# Patient Record
Sex: FEMALE | Race: WHITE | NOT HISPANIC OR LATINO | Employment: OTHER | URBAN - METROPOLITAN AREA
[De-identification: names, ages, dates, MRNs, and addresses within clinical notes are randomized per-mention and may not be internally consistent; named-entity substitution may affect disease eponyms.]

---

## 2024-02-14 NOTE — H&P (VIEW-ONLY)
Cardiology Follow Up    Tevin Bazan  1945  [id number]  [facility] CARDIAC CATH LAB  [address] [address]  [phone]    1. Stroke due to embolism (HCC)        2. Essential hypertension        3. Nonrheumatic aortic valve stenosis        4. Nonrheumatic mitral valve stenosis        5. Dyspnea on exertion        6. Hypercholesterolemia        7. Chest heaviness        8. Unstable angina pectoris (HCC)        9. Abnormal computed tomography angiography (CTA)        10. Coronary artery disease involving native coronary artery of native heart with unstable angina pectoris (HCC)            Interval History: Cardiology follow-up.  Patient was hospitalized earlier this month with chest discomfort.  History had a severe episode, midsternal without radiation.  He tells me over the last several months, there is a crescendo pattern, currently class II-III.  Interestingly he tends to improve after he uses an inhaler.  He has had no prolonged episodes.  His EKG and troponins were unremarkable.  He went on  to have a CT of the coronaries, personally reviewed.  He does have severe coronary calcium score over 4000.  Heavy calcification in the LAD and RCA systems made him unreadable in terms of quantification of his stenosis.  There was mild disease in the circumflex system.  The patient states being compliant with low-cholesterol diet, patient not willing to take statins lipids last checked total cholesterol 137, HDL 53, LDL 72.  He states been compliant with low-sodium diet, blood pressures been well-controlled.    Patient Active Problem List   Diagnosis    Transgender    Stroke due to embolism (HCC)    Primary osteoarthritis of left knee    Effusion of left knee    Cellulitis of right knee    Lumbar spondylosis    Chronic bilateral low back pain without sciatica    Lumbar radiculopathy    Spinal stenosis of lumbar region with  neurogenic claudication    Chronic pain of left knee    Thoracogenic scoliosis of thoracolumbar region    Chronic pain syndrome    Essential hypertension    Symptomatic carotid artery stenosis, left    H/O carotid endarterectomy    History of stroke    Nonrheumatic aortic valve stenosis    Nonrheumatic mitral valve stenosis    Chest heaviness    Abnormal stress test    Dyspnea on exertion    Hypercholesterolemia    Chest pain    Stage 2 chronic kidney disease    COPD (chronic obstructive pulmonary disease) (HCC)    Abnormal computed tomography angiography (CTA)    Coronary artery disease involving native coronary artery of native heart with unstable angina pectoris (HCC)     Past Medical History:   Diagnosis Date    Chronic bilateral low back pain without sciatica 2020    Hypertension     Scoliosis     Stroke (HCC)     Stroke-like symptoms 2021     Social History     Socioeconomic History    Marital status: /Civil Union     Spouse name: Not on file    Number of children: Not on file    Years of education: Not on file    Highest education level: Not on file   Occupational History    Not on file   Tobacco Use    Smoking status: Former     Current packs/day: 0.00     Types: Cigarettes     Quit date:      Years since quittin.1    Smokeless tobacco: Never   Vaping Use    Vaping status: Never Used   Substance and Sexual Activity    Alcohol use: Not Currently    Drug use: No    Sexual activity: Not on file   Other Topics Concern    Not on file   Social History Narrative    Not on file     Social Determinants of Health     Financial Resource Strain: Low Risk  (2022)    Overall Financial Resource Strain (CARDIA)     Difficulty of Paying Living Expenses: Not hard at all   Food Insecurity: No Food Insecurity (2024)    Hunger Vital Sign     Worried About Running Out of Food in the Last Year: Never true     Ran Out of Food in the Last Year: Never true   Transportation Needs: No Transportation  Needs (1/30/2024)    PRAPARE - Transportation     Lack of Transportation (Medical): No     Lack of Transportation (Non-Medical): No   Physical Activity: Sufficiently Active (5/20/2022)    Exercise Vital Sign     Days of Exercise per Week: 7 days     Minutes of Exercise per Session: 30 min   Stress: No Stress Concern Present (5/20/2022)    [place] [employer] - Occupational Stress Questionnaire     Feeling of Stress : Not at all   Social Connections: Socially Isolated (5/20/2022)    Social Connection and Isolation Panel [NHANES]     Frequency of Communication with Friends and Family: Never     Frequency of Social Gatherings with Friends and Family: Never     Attends Gnosticist Services: Never     Active Member of Clubs or Organizations: No     Attends Club or Organization Meetings: Never     Marital Status:    Intimate Partner Violence: Not At Risk (5/20/2022)    Humiliation, Afraid, Rape, and Kick questionnaire     Fear of Current or Ex-Partner: No     Emotionally Abused: No     Physically Abused: No     Sexually Abused: No   Housing Stability: Low Risk  (1/30/2024)    Housing Stability Vital Sign     Unable to Pay for Housing in the Last Year: No     Number of Places Lived in the Last Year: 1     Unstable Housing in the Last Year: No      Family History   Adopted: Yes   Family history unknown: Yes     Past Surgical History:   Procedure Laterality Date    BACK SURGERY      BOWEL RESECTION      MA TEAEC W/PATCH GRF CAROTID VERTB SUBCLAV NECK INC Left 12/1/2021    Procedure: ENDARTERECTOMY ARTERY CAROTID;  Surgeon: Vito Mo MD;  Location: [facility] OR;  Service: Vascular    TONSILLECTOMY         Current Outpatient Medications:     acetaminophen (TYLENOL) 500 mg tablet, Take 1,000 mg by mouth every 6 (six) hours as needed for mild pain, Disp: , Rfl:     albuterol (Proventil HFA) 90 mcg/act inhaler, Inhale 1-2 puffs every 6 (six) hours as needed for wheezing, Disp: 18 g, Rfl: 0    aspirin  (ECOTRIN LOW STRENGTH) 81 mg EC tablet, Take 1 tablet (81 mg total) by mouth daily for 21 days, Disp: 21 tablet, Rfl: 0    Chelated Zinc 50 MG TABS, every 24 hours, Disp: , Rfl:     clopidogrel (PLAVIX) 75 mg tablet, Take 1 tablet (75 mg total) by mouth daily, Disp: 30 tablet, Rfl: 0    diltiazem (TIAZAC) 120 MG 24 hr capsule, Take 120 mg by mouth daily Pt reports taking 80 mg daily, Disp: , Rfl:     famotidine (PEPCID) 40 MG tablet, , Disp: , Rfl:   Allergies   Allergen Reactions    Black Cohosh Rash    Minoxidil Rash       Labs:  Admission on 01/29/2024, Discharged on 01/30/2024   Component Date Value    Ventricular Rate 01/29/2024 85     Atrial Rate 01/29/2024 85     [place] Interval 01/29/2024 146     QRSD Interval 01/29/2024 86     QT Interval 01/29/2024 346     QTC Interval 01/29/2024 411     P Axis 01/29/2024 130     QRS [place] 01/29/2024 189     T Wave [place] 01/29/2024 93     Ventricular Rate 01/29/2024 79     Atrial Rate 01/29/2024 79     [place] Interval 01/29/2024 144     QRSD Interval 01/29/2024 76     QT Interval 01/29/2024 366     QTC Interval 01/29/2024 419     P Axis 01/29/2024 53     QRS Axis 01/29/2024 57     T Wave [place] 01/29/2024 80     WBC 01/29/2024 8.36     RBC 01/29/2024 4.32     Hemoglobin 01/29/2024 13.3     Hematocrit 01/29/2024 40.7     MCV 01/29/2024 94     MCH 01/29/2024 30.8     MCHC 01/29/2024 32.7     RDW 01/29/2024 13.3     MPV 01/29/2024 10.1     Platelets 01/29/2024 198     nRBC 01/29/2024 0     Neutrophils Relative 01/29/2024 52     Immat GRANS % 01/29/2024 1     Lymphocytes Relative 01/29/2024 26     Monocytes Relative 01/29/2024 13 (H)     Eosinophils Relative 01/29/2024 7 (H)     Basophils Relative 01/29/2024 1     Neutrophils Absolute 01/29/2024 4.46     Immature Grans Absolute 01/29/2024 0.04     Lymphocytes Absolute 01/29/2024 2.14     Monocytes Absolute 01/29/2024 1.08     Eosinophils Absolute 01/29/2024 0.56     Basophils Absolute 01/29/2024 0.08     Sodium 01/29/2024 139      Potassium 01/29/2024 3.8     Chloride 01/29/2024 104     CO2 01/29/2024 27     ANION GAP 01/29/2024 8     BUN 01/29/2024 35 (H)     Creatinine 01/29/2024 1.11     Glucose 01/29/2024 149 (H)     Calcium 01/29/2024 9.2     AST 01/29/2024 17     ALT 01/29/2024 18     Alkaline Phosphatase 01/29/2024 110 (H)     Total Protein 01/29/2024 7.1     Albumin 01/29/2024 3.6     Total Bilirubin 01/29/2024 0.40     eGFR 01/29/2024 62     hs TnI 0hr 01/29/2024 11     hs TnI 2hr 01/29/2024 15     Delta 2hr hsTnI 01/29/2024 4     hs TnI 4hr 01/29/2024 18     Delta 4hr hsTnI 01/29/2024 7     Ventricular Rate 01/29/2024 85     Atrial Rate 01/29/2024 85     [place] Interval 01/29/2024 148     QRSD Interval 01/29/2024 72     QT Interval 01/29/2024 366     QTC Interval 01/29/2024 435     P Axis 01/29/2024 47     QRS Axis 01/29/2024 52     T Wave [place] 01/29/2024 63     Ventricular Rate 01/30/2024 83     Atrial Rate 01/30/2024 83     [place] Interval 01/30/2024 150     QRSD Interval 01/30/2024 80     QT Interval 01/30/2024 370     QTC Interval 01/30/2024 434     P Axis 01/30/2024 51     QRS Axis 01/30/2024 28     T Wave [place] 01/30/2024 23     Sodium 01/30/2024 136     Potassium 01/30/2024 3.9     Chloride 01/30/2024 105     CO2 01/30/2024 26     ANION GAP 01/30/2024 5     BUN 01/30/2024 26 (H)     Creatinine 01/30/2024 0.91     Glucose 01/30/2024 114     Glucose, Fasting 01/30/2024 114 (H)     Calcium 01/30/2024 8.2 (L)     eGFR 01/30/2024 79     WBC 01/30/2024 8.07     RBC 01/30/2024 4.28     Hemoglobin 01/30/2024 13.4     Hematocrit 01/30/2024 40.6     MCV 01/30/2024 95     MCH 01/30/2024 31.3     MCHC 01/30/2024 33.0     RDW 01/30/2024 13.2     Platelets 01/30/2024 195     MPV 01/30/2024 9.8    Admission on 08/21/2023, Discharged on 08/21/2023   Component Date Value    WBC 08/21/2023 6.38     RBC 08/21/2023 4.81     Hemoglobin 08/21/2023 14.9     Hematocrit 08/21/2023 45.8     MCV 08/21/2023 95     MCH 08/21/2023 31.0     MCHC 08/21/2023 32.5      RDW 08/21/2023 12.8     MPV 08/21/2023 9.5     Platelets 08/21/2023 200     nRBC 08/21/2023 0     Neutrophils Relative 08/21/2023 58     Immat GRANS % 08/21/2023 0     Lymphocytes Relative 08/21/2023 24     Monocytes Relative 08/21/2023 14 (H)     Eosinophils Relative 08/21/2023 3     Basophils Relative 08/21/2023 1     Neutrophils Absolute 08/21/2023 3.75     Immature Grans Absolute 08/21/2023 0.02     Lymphocytes Absolute 08/21/2023 1.51     Monocytes Absolute 08/21/2023 0.87     Eosinophils Absolute 08/21/2023 0.18     Basophils Absolute 08/21/2023 0.05     Sodium 08/21/2023 137     Potassium 08/21/2023 4.1     Chloride 08/21/2023 103     CO2 08/21/2023 30     ANION GAP 08/21/2023 4     BUN 08/21/2023 28 (H)     Creatinine 08/21/2023 0.95     Glucose 08/21/2023 105     Calcium 08/21/2023 9.4     AST 08/21/2023 17     ALT 08/21/2023 18     Alkaline Phosphatase 08/21/2023 85     Total Protein 08/21/2023 7.2     Albumin 08/21/2023 4.0     Total Bilirubin 08/21/2023 0.65     eGFR 08/21/2023 75     hs TnI 0hr 08/21/2023 8     Ventricular Rate 08/21/2023 80     Atrial Rate 08/21/2023 80     [place] Interval 08/21/2023 140     QRSD Interval 08/21/2023 70     QT Interval 08/21/2023 350     QTC Interval 08/21/2023 403     P Axis 08/21/2023 46     QRS Axis 08/21/2023 33     T Wave [place] 08/21/2023 1     hs TnI 2hr 08/21/2023 9     Delta 2hr hsTnI 08/21/2023 1     Ventricular Rate 08/21/2023 65     Atrial Rate 08/21/2023 65     [place] Interval 08/21/2023 144     QRSD Interval 08/21/2023 70     QT Interval 08/21/2023 392     QTC Interval 08/21/2023 407     P Axis 08/21/2023 46     QRS Axis 08/21/2023 49     T Wave [place] 08/21/2023 44      Imaging: CTA cardiac    Result Date: 1/30/2024  Narrative: CORONARY CT ANGIOGRAM AND CT CALCIUM SCORE - WITHOUT AND WITH IV CONTRAST INDICATION: Chest pain, nonspecific Chest pain. Patient Age: 78 years Patient Gender: Male. COMPARISON: None. TECHNIQUE: Noncontrast CT examination of the heart  examination was performed according to a protocol designed to obtain coronary calcium score. Thin section postcontrast images of the heart were obtained according to gated coronary CT angiographic protocol.  2D and 3D image reconstruction was performed on an independent workstation in order to perform coronary vessel analysis. Premedication was administered according to institutional protocol, as detailed in the electronic health record. Prospective ECG triggering was used. This examination, like all CT scans performed in the [facility], was performed utilizing techniques to minimize radiation dose exposure, including the use of iterative reconstruction and automated exposure control. Radiation dose length product (DLP) for this visit: 183.68 mGy-cm . IV CONTRAST: 80 mL of iohexol (OMNIPAQUE) IMAGE QUALITY: Excellent image quality with no significant artifact present. FINDINGS: CORONARY CALCIUM SCORE: 4306 Calcium score PERCENTILE of age, race, and gender matched database participants in the Multi-Ethnic Study of Atherosclerosis (RUSSO) trial:  94 CORONARY ARTERY ANATOMY: Coronary arteries arise in normal position. There is right coronary artery dominance. There is typical bifurcation of the left main coronary artery into left anterior descending and left circumflex coronary arteries. LEFT MAIN: No atherosclerotic plaque or vascular stenosis. LAD AND DIAGONAL BRANCHES: Heavy calcified plaque burden in the proximal to mid LAD renders this examination nondiagnostic for confident exclusion of flow-limiting stenosis in some segments of this vascular distribution. LCX: Mild atherosclerotic plaque without significant stenosis (less than 50% narrowing). RCA: Heavy calcified plaque burden in the mid to distal RCA renders this examination nondiagnostic for confident exclusion of flow-limiting stenosis in some segments of this vascular distribution. Posterior descending artery is well opacified and patent.  CARDIAC STRUCTURES: Valves: Dense mitral annulus calcification. Mild calcification of the aortic valve leaflets. Pericardium: Normal pericardial contour without pericardial effusion, thickening, or calcifications. Myocardium: No abnormal myocardial thinning, myocardial thickening, or myocardial calcification. GREAT VESSELS: Visualized thoracic aorta and central pulmonary arterial tree are within normal limits for the patient's age. EXTRACARDIAC FINDINGS: Moderate sized hiatal hernia. Mild thickening of the left adrenal gland compatible with adenomatous hyperplasia. Marked thoracic dextroscoliosis noted. No other significant findings identified on limited small field of view low radiation dose noncontrast images of the chest at the level of the heart.     Impression: Severe calcified plaque burden in the coronary arteries, rendering examination nondiagnostic for confident exclusion of flow-limiting stenosis in the LAD and RCA. Dense mitral annulus calcification. CAD-RADS N - Nondiagnostic study. Obstructive coronary artery disease cannot be excluded. Management recommendations: - Additional or alternative evaluation may be needed. Total coronary calcium score equals 4306.  For more useful information regarding the prognostic significance of the calcium score, please consult the calculator at the website http://[email or web address]-[facility].org/CACReference.aspx. Workstation performed: [id number]     XR chest 2 views    Result Date: 1/29/2024  Narrative: CHEST INDICATION:   Chest discomfort, cough, rales right base. COMPARISON: CXR 8/21/2023 and 12/25/2022, CTA neck 11/22/2021. EXAM PERFORMED/VIEWS:  XR CHEST PA & LATERAL. DUAL ENERGY SUBTRACTION. FINDINGS: Cardiomediastinal silhouette normal. Lungs clear. No effusion or pneumothorax. Upper abdomen normal. Partially imaged linear metallic foreign body projecting over the right upper quadrant. Severe scoliosis.     Impression: No acute cardiopulmonary disease. Workstation performed:  [id number]       Review of Systems:  Review of Systems   Constitutional:  Positive for activity change.   HENT:  Negative for hearing loss and nosebleeds.    Eyes:  Negative for visual disturbance.   Respiratory:  Negative for apnea, shortness of breath, wheezing and stridor.    Cardiovascular:  Positive for chest pain. Negative for palpitations and leg swelling.   Gastrointestinal:  Negative for abdominal pain, anal bleeding and blood in stool.   Endocrine: Negative for cold intolerance.   Genitourinary:  Negative for hematuria.   Musculoskeletal:  Negative for arthralgias, gait problem and myalgias.   Skin:  Negative for pallor and rash.   Allergic/Immunologic: Negative for immunocompromised state.   Neurological:  Negative for syncope and weakness.   Hematological:  Does not bruise/bleed easily.   Psychiatric/Behavioral:  Negative for sleep disturbance. The patient is not nervous/anxious.        Physical Exam:  Physical Exam  Vitals reviewed.   Constitutional:       General: He is not in acute distress.     Appearance: Normal appearance. He is normal weight. He is not ill-appearing, toxic-appearing or diaphoretic.   Eyes:      General: No scleral icterus.  Neck:      Vascular: No carotid bruit.   Cardiovascular:      Rate and Rhythm: Normal rate and regular rhythm.      Pulses: Normal pulses.      Heart sounds: Normal heart sounds. No murmur heard.     No friction rub. No gallop.   Pulmonary:      Effort: Pulmonary effort is normal. No respiratory distress.      Breath sounds: Normal breath sounds. No stridor. No wheezing, rhonchi or rales.      Comments: Significant kyphoscoliosis  Chest:      Chest wall: No tenderness.   Musculoskeletal:      Right lower leg: No edema.      Left lower leg: No edema.   Skin:     General: Skin is warm and dry.      Capillary Refill: Capillary refill takes less than 2 seconds.      Coloration: Skin is not jaundiced or pale.      Findings: No bruising or erythema.   Neurological:       Mental Status: He is alert and oriented to person, place, and time.   Psychiatric:         Mood and Affect: Mood normal.         Discussion/Summary:Chest pain syndrome.  Previously improved with inhalers.  He does have several risk factors.  Stress test 2023, he did almost 10 minutes on a modified protocol achieving target rate, he did experience chest discomfort, but there were no EKG criteria for ischemia.  Echocardiogram 2022 revealed normal left ventricular systolic function.  Normal diastolic parameters, there was very mild AV stenosis, mean gradient of 4 mmHg, and very mild mitral stenosis mean gradient of 2 mmHg.  There was mild to moderate tricuspid efficiency with estimated normal pulmonary artery pressures suggested by Doppler criteria.  Recent CT of the coronary suggested coronary disease of uncertain severity.  Favor catheterization.  Continue current medications.  Valvular heart disease, mild severity, in the setting of chronic dyspnea with restrictive lung disease from significant scoliosis.  48-hour Holter monitor 2023 revealed normal sinus rhythm and no significant arrhythmias. , I did explain to him that he would be best for him to be on lipid-lowering therapy given he is peripheral vascular disease, he explained to me that he is antistatin therapy patient.  I explained the patient that his medications are safe and very effective in reducing clinical events including myocardial infarction, further CVAs and need for revascularization  Vascular disease.  Status post left carotid endarterectomy 2021 in the setting of CVA of the central semiovale on the left.  Patent on duplex in 2023, less than 50% on the right..        This note was completed in part utilizing [employer] direct voice recognition software.   Grammatical errors, random word insertion, spelling mistakes, and incomplete sentences may be an occasional consequence of the system secondary to software limitations, ambient noise and  hardware issues. At the time of dictation, efforts were made to edit, clarify and /or correct errors.  Please read the chart carefully and recognize, using context, where substitutions have occurred.  If you have any questions or concerns about the context, text or information contained within the body of this dictation, please contact myself, the provider, for further clarification.

## 2024-02-26 ENCOUNTER — HOSPITAL ENCOUNTER (OUTPATIENT)
Facility: HOSPITAL | Age: 79
Setting detail: OUTPATIENT SURGERY
Discharge: HOME | End: 2024-02-26
Attending: INTERNAL MEDICINE | Admitting: INTERNAL MEDICINE
Payer: COMMERCIAL

## 2024-02-26 VITALS
WEIGHT: 158.51 LBS | HEART RATE: 70 BPM | TEMPERATURE: 98.2 F | BODY MASS INDEX: 24.88 KG/M2 | SYSTOLIC BLOOD PRESSURE: 119 MMHG | HEIGHT: 67 IN | OXYGEN SATURATION: 94 % | DIASTOLIC BLOOD PRESSURE: 72 MMHG | RESPIRATION RATE: 16 BRPM

## 2024-02-26 DIAGNOSIS — I20.0 UNSTABLE ANGINA (HCC): ICD-10-CM

## 2024-02-26 DIAGNOSIS — I25.110 CORONARY ARTERY DISEASE INVOLVING NATIVE CORONARY ARTERY OF NATIVE HEART WITH UNSTABLE ANGINA PECTORIS (HCC): ICD-10-CM

## 2024-02-26 DIAGNOSIS — I25.10 CORONARY ARTERY DISEASE INVOLVING NATIVE CORONARY ARTERY: Primary | ICD-10-CM

## 2024-02-26 DIAGNOSIS — I20.0 UNSTABLE ANGINA PECTORIS (HCC): ICD-10-CM

## 2024-02-26 DIAGNOSIS — Z98.890 S/P CARDIAC CATH: ICD-10-CM

## 2024-02-26 LAB
ATRIAL RATE: 69 BPM
KCT BLD-ACNC: 258 SEC (ref 89–137)
P AXIS: 56 DEGREES
PR INTERVAL: 142 MS
QRS AXIS: 37 DEGREES
QRSD INTERVAL: 72 MS
QT INTERVAL: 384 MS
QTC INTERVAL: 411 MS
SPECIMEN SOURCE: ABNORMAL
T WAVE AXIS: 60 DEGREES
VENTRICULAR RATE: 69 BPM

## 2024-02-26 PROCEDURE — 99152 MOD SED SAME PHYS/QHP 5/>YRS: CPT | Performed by: INTERNAL MEDICINE

## 2024-02-26 PROCEDURE — C1769 GUIDE WIRE: HCPCS | Performed by: INTERNAL MEDICINE

## 2024-02-26 PROCEDURE — 85347 COAGULATION TIME ACTIVATED: CPT

## 2024-02-26 PROCEDURE — C1887 CATHETER, GUIDING: HCPCS | Performed by: INTERNAL MEDICINE

## 2024-02-26 PROCEDURE — 99153 MOD SED SAME PHYS/QHP EA: CPT | Performed by: INTERNAL MEDICINE

## 2024-02-26 PROCEDURE — 93454 CORONARY ARTERY ANGIO S&I: CPT | Performed by: INTERNAL MEDICINE

## 2024-02-26 PROCEDURE — C1894 INTRO/SHEATH, NON-LASER: HCPCS | Performed by: INTERNAL MEDICINE

## 2024-02-26 PROCEDURE — 93005 ELECTROCARDIOGRAM TRACING: CPT

## 2024-02-26 PROCEDURE — 93010 ELECTROCARDIOGRAM REPORT: CPT | Performed by: INTERNAL MEDICINE

## 2024-02-26 RX ORDER — SODIUM CHLORIDE 9 MG/ML
125 INJECTION, SOLUTION INTRAVENOUS CONTINUOUS
Status: DISCONTINUED | OUTPATIENT
Start: 2024-02-26 | End: 2024-02-26

## 2024-02-26 RX ORDER — NITROGLYCERIN 0.4 MG/1
0.4 TABLET SUBLINGUAL
Qty: 45 TABLET | Refills: 0 | Status: SHIPPED | OUTPATIENT
Start: 2024-02-26

## 2024-02-26 RX ORDER — MIDAZOLAM HYDROCHLORIDE 2 MG/2ML
INJECTION, SOLUTION INTRAMUSCULAR; INTRAVENOUS CODE/TRAUMA/SEDATION MEDICATION
Status: DISCONTINUED | OUTPATIENT
Start: 2024-02-26 | End: 2024-02-26 | Stop reason: HOSPADM

## 2024-02-26 RX ORDER — ISOSORBIDE MONONITRATE 30 MG/1
30 TABLET, EXTENDED RELEASE ORAL DAILY
Status: DISCONTINUED | OUTPATIENT
Start: 2024-02-26 | End: 2024-02-26 | Stop reason: HOSPADM

## 2024-02-26 RX ORDER — ONDANSETRON 2 MG/ML
4 INJECTION INTRAMUSCULAR; INTRAVENOUS EVERY 6 HOURS PRN
Status: DISCONTINUED | OUTPATIENT
Start: 2024-02-26 | End: 2024-02-26 | Stop reason: HOSPADM

## 2024-02-26 RX ORDER — NITROGLYCERIN 20 MG/100ML
INJECTION INTRAVENOUS CODE/TRAUMA/SEDATION MEDICATION
Status: DISCONTINUED | OUTPATIENT
Start: 2024-02-26 | End: 2024-02-26 | Stop reason: HOSPADM

## 2024-02-26 RX ORDER — LIDOCAINE HYDROCHLORIDE 10 MG/ML
INJECTION, SOLUTION EPIDURAL; INFILTRATION; INTRACAUDAL; PERINEURAL CODE/TRAUMA/SEDATION MEDICATION
Status: DISCONTINUED | OUTPATIENT
Start: 2024-02-26 | End: 2024-02-26 | Stop reason: HOSPADM

## 2024-02-26 RX ORDER — VERAPAMIL HYDROCHLORIDE 2.5 MG/ML
INJECTION, SOLUTION INTRAVENOUS CODE/TRAUMA/SEDATION MEDICATION
Status: DISCONTINUED | OUTPATIENT
Start: 2024-02-26 | End: 2024-02-26 | Stop reason: HOSPADM

## 2024-02-26 RX ORDER — ISOSORBIDE MONONITRATE 30 MG/1
30 TABLET, EXTENDED RELEASE ORAL DAILY
Qty: 30 TABLET | Refills: 1 | Status: SHIPPED | OUTPATIENT
Start: 2024-02-26

## 2024-02-26 RX ORDER — HEPARIN SODIUM 1000 [USP'U]/ML
INJECTION, SOLUTION INTRAVENOUS; SUBCUTANEOUS CODE/TRAUMA/SEDATION MEDICATION
Status: DISCONTINUED | OUTPATIENT
Start: 2024-02-26 | End: 2024-02-26 | Stop reason: HOSPADM

## 2024-02-26 RX ORDER — ASPIRIN 81 MG/1
324 TABLET, CHEWABLE ORAL ONCE
Status: COMPLETED | OUTPATIENT
Start: 2024-02-26 | End: 2024-02-26

## 2024-02-26 RX ORDER — METOPROLOL SUCCINATE 25 MG/1
25 TABLET, EXTENDED RELEASE ORAL DAILY
Qty: 90 TABLET | Refills: 3 | Status: SHIPPED | OUTPATIENT
Start: 2024-02-26

## 2024-02-26 RX ORDER — NITROGLYCERIN 0.4 MG/1
0.4 TABLET SUBLINGUAL
Status: DISCONTINUED | OUTPATIENT
Start: 2024-02-26 | End: 2024-02-26 | Stop reason: HOSPADM

## 2024-02-26 RX ORDER — ACETAMINOPHEN 325 MG/1
650 TABLET ORAL EVERY 4 HOURS PRN
Status: DISCONTINUED | OUTPATIENT
Start: 2024-02-26 | End: 2024-02-26 | Stop reason: HOSPADM

## 2024-02-26 RX ORDER — SODIUM CHLORIDE 9 MG/ML
200 INJECTION, SOLUTION INTRAVENOUS CONTINUOUS
Status: DISPENSED | OUTPATIENT
Start: 2024-02-26 | End: 2024-02-26

## 2024-02-26 RX ORDER — FENTANYL CITRATE 50 UG/ML
INJECTION, SOLUTION INTRAMUSCULAR; INTRAVENOUS CODE/TRAUMA/SEDATION MEDICATION
Status: DISCONTINUED | OUTPATIENT
Start: 2024-02-26 | End: 2024-02-26 | Stop reason: HOSPADM

## 2024-02-26 RX ORDER — METOPROLOL SUCCINATE 25 MG/1
25 TABLET, EXTENDED RELEASE ORAL DAILY
Status: DISCONTINUED | OUTPATIENT
Start: 2024-02-26 | End: 2024-02-26 | Stop reason: HOSPADM

## 2024-02-26 RX ADMIN — ASPIRIN 81 MG CHEWABLE TABLET 324 MG: 81 TABLET CHEWABLE at 06:22

## 2024-02-26 RX ADMIN — SODIUM CHLORIDE 125 ML/HR: 0.9 INJECTION, SOLUTION INTRAVENOUS at 06:31

## 2024-02-26 RX ADMIN — ISOSORBIDE MONONITRATE 30 MG: 30 TABLET, EXTENDED RELEASE ORAL at 10:19

## 2024-02-26 RX ADMIN — METOPROLOL SUCCINATE 25 MG: 25 TABLET, EXTENDED RELEASE ORAL at 10:19

## 2024-02-26 NOTE — DISCHARGE INSTR - AVS FIRST PAGE
BLOOD TEST  Please have chemistry (BMP) blood test done in the next 2 days.  An electronic prescription has been sent to the [facility] lab for it. You do not need to fast for this test    -----------------------------------------------------------------------------------------------------------------------------------------------------------------------------  SITE CARE  1. Please see the post cardiac catheterization dishcarge instructions. No lifting greater than 10 lbs. or strenuous activity for 48 hrs.    2. Remove band aid tomorrow.  Shower and wash area (wrist) gently with soap and water- beginning tomorrow. Rinse and pat dry.  Apply new water seal band aid.  Repeat this process for 5 days. No powders, creams lotions or antibiotic ointments for 5 days.  No tub baths, hot tubs/sauna or swimming for 5 days.     3. Please call our office ([phone]) if you have any fever, redness, swelling, discharge from your wrist/access site.    4. No driving for 1 day       -----------------------------------------------------------------------------------------------------------------------------------------------------------------------------  PROCEDURE INFORMATION    LEFT HEART CATHETERIZATION    WHAT YOU NEED TO KNOW:   A left heart catheterization is a procedure to look at your heart and its arteries. You may need this procedure if you have chest pain, heart disease, or your heart is not working as it should.        DISCHARGE INSTRUCTIONS:   Follow up with your healthcare provider as directed:  Write down your questions so you remember to ask them during your visits.  Limit activity as directed:   Avoid unnecessary stair climbing for 48 hours, if a catheter was put in your groin.    Do not place pressure on your arm, hand, or wrist, if the catheter was placed in your wrist. Avoid pushing, pulling, or heavy lifting with that arm.    If you need to cough, support the area where the catheter was inserted with your  hand.    Ask your healthcare provider how long you need to limit movement and avoid certain activities.    You may feel like resting more after your procedure. Slowly start to do more each day. Rest when you feel it is needed.  Drink liquids as directed:  Liquids help flush the dye used for your procedure out of your body. Ask your healthcare provider how much liquid to drink each day, and which liquids to drink. Some foods, such as soup and fruit, also provide liquid.   Wound care:  Ask your healthcare provider about how to care for your incision wound. Ask when you can get into a tub, shower, or pool.   Contact your healthcare provider if:   You have a fever.     The skin around your wound is red, swollen, or has pus coming from it.     You have trouble breathing, or your skin is itchy, swollen, or has a rash.     You have questions or concerns about your condition or care.  Seek care immediately or call 911 if:   The area where the catheter was placed is swollen and filled with blood or is bleeding.     The leg or arm used for the procedure becomes numb or turns white or blue.    You feel lightheaded, short of breath, and have chest pain.     You cough up blood.     You have any of the following signs of a heart attack:      Squeezing, pressure, or pain in your chest that lasts longer than 5 minutes or returns    Discomfort or pain in your back, neck, jaw, stomach, or arm     Trouble breathing    Nausea or vomiting    Lightheadedness or a sudden cold sweat, especially with chest pain or trouble breathing    Your arm or leg feels warm, tender, and painful. It may look swollen and red.    You have any of the following signs of a stroke:     Part of your face droops or is numb    Weakness in an arm or leg    Confusion or difficulty speaking    Dizziness, a severe headache, or vision loss  © 2017 [employer] Information is for End User's use only and may not be sold, redistributed or otherwise used  for commercial purposes. All illustrations and images included in CareNotes® are the copyrighted property of [email or web address] or [employer].  The above information is an  only. It is not intended as medical advice for individual conditions or treatments. Talk to your doctor, nurse or pharmacist before following any medical regimen to see if it is safe and effective for you.                1. Please see the post cardiac catheterization dishcarge instructions.   No heavy lifting, greater than 10 lbs. or strenuous  activity for 48 hrs.    2.Remove band aid tomorrow.  Shower and wash area- wrist gently with soap and water- beginning tomorrow. Rinse and pat dry.  Apply new water seal band aid.  Repeat this process for 5 days. No powders, creams lotions or antibiotic ointments  for 5 days.  No tub baths, hot tubs or swimming for 5 days.     3. Please call our office ([phone]) if you have any fever, redness, swelling, discharge from your wrist access site.    4.No driving for 1 day

## 2024-02-26 NOTE — Clinical Note
RCA Quality 47: Advance Care Plan: Advance Care Planning discussed and documented; advance care plan or surrogate decision maker documented in the medical record. Quality 431: Preventive Care And Screening: Unhealthy Alcohol Use - Screening: Patient screened for unhealthy alcohol use using a single question and scores less than 2 times per year Quality 402: Tobacco Use And Help With Quitting Among Adolescents: Patient screened for tobacco and never smoked Quality 110: Preventive Care And Screening: Influenza Immunization: Influenza Immunization Administered during Influenza season Detail Level: Detailed Quality 130: Documentation Of Current Medications In The Medical Record: Current Medications Documented Quality 111:Pneumonia Vaccination Status For Older Adults: Pneumococcal Vaccination Previously Received

## 2024-02-26 NOTE — Clinical Note
Post procedure Danielle is sleepy easily awakened with tactile and verbal stimulation then is responsive and appropriate.  Without complaints.  Dozing when not disturbed.  VALERIANO, NANCY, readied for transfer

## 2024-02-26 NOTE — INTERVAL H&P NOTE
"/70   Pulse 69   Temp 98.2 °F (36.8 °C) (Temporal)   Resp 16   Ht 5' 7\" (1.702 m)   Wt 71.9 kg (158 lb 8.2 oz)   SpO2 94%   BMI 24.83 kg/m²     H&P reviewed. After examining the patient, I find no changed to the H&P since it had been written.    Patient re-evaluated. Accept as history and physical.    Rivas Cantor MD/February 26, 2024/7:24 AM  "

## 2024-02-26 NOTE — Clinical Note
Received to [facility] procedure room and spoke with MD Devaughn, informed consent obtained, questions answered.  Without current complaints

## 2024-02-27 ENCOUNTER — APPOINTMENT (OUTPATIENT)
Dept: LAB | Facility: HOSPITAL | Age: 79
DRG: 091 | End: 2024-02-27
Payer: COMMERCIAL

## 2024-02-27 DIAGNOSIS — Z98.890 S/P CARDIAC CATH: ICD-10-CM

## 2024-02-27 LAB
ANION GAP SERPL CALCULATED.3IONS-SCNC: 8 MMOL/L
BUN SERPL-MCNC: 31 MG/DL (ref 5–25)
CALCIUM SERPL-MCNC: 10.2 MG/DL (ref 8.4–10.2)
CHLORIDE SERPL-SCNC: 100 MMOL/L (ref 96–108)
CO2 SERPL-SCNC: 30 MMOL/L (ref 21–32)
CREAT SERPL-MCNC: 0.99 MG/DL (ref 0.6–1.3)
GFR SERPL CREATININE-BSD FRML MDRD: 72 ML/MIN/1.73SQ M
GLUCOSE SERPL-MCNC: 103 MG/DL (ref 65–140)
POTASSIUM SERPL-SCNC: 4.2 MMOL/L (ref 3.5–5.3)
SODIUM SERPL-SCNC: 138 MMOL/L (ref 135–147)

## 2024-02-27 PROCEDURE — 80048 BASIC METABOLIC PNL TOTAL CA: CPT

## 2024-02-27 PROCEDURE — 36415 COLL VENOUS BLD VENIPUNCTURE: CPT

## 2024-02-28 ENCOUNTER — APPOINTMENT (EMERGENCY)
Dept: RADIOLOGY | Facility: HOSPITAL | Age: 79
DRG: 091 | End: 2024-02-28
Payer: COMMERCIAL

## 2024-02-28 ENCOUNTER — HOSPITAL ENCOUNTER (INPATIENT)
Facility: HOSPITAL | Age: 79
LOS: 1 days | Discharge: HOME | DRG: 091 | End: 2024-03-01
Attending: EMERGENCY MEDICINE | Admitting: INTERNAL MEDICINE
Payer: COMMERCIAL

## 2024-02-28 ENCOUNTER — APPOINTMENT (EMERGENCY)
Dept: CT IMAGING | Facility: HOSPITAL | Age: 79
DRG: 091 | End: 2024-02-28
Payer: COMMERCIAL

## 2024-02-28 DIAGNOSIS — I25.110 CORONARY ARTERY DISEASE INVOLVING NATIVE CORONARY ARTERY OF NATIVE HEART WITH UNSTABLE ANGINA PECTORIS (HCC): ICD-10-CM

## 2024-02-28 DIAGNOSIS — I63.9 CVA (CEREBRAL VASCULAR ACCIDENT) (HCC): ICD-10-CM

## 2024-02-28 DIAGNOSIS — R79.89 ELEVATED TROPONIN: ICD-10-CM

## 2024-02-28 DIAGNOSIS — E78.00 HYPERCHOLESTEROLEMIA: ICD-10-CM

## 2024-02-28 DIAGNOSIS — R47.89 WORD FINDING DIFFICULTY: Primary | ICD-10-CM

## 2024-02-28 PROBLEM — G45.9 TIA (TRANSIENT ISCHEMIC ATTACK): Status: ACTIVE | Noted: 2024-02-28

## 2024-02-28 LAB
2HR DELTA HS TROPONIN: 20 NG/L
4HR DELTA HS TROPONIN: 17 NG/L
ALBUMIN SERPL BCP-MCNC: 3.6 G/DL (ref 3.5–5)
ALP SERPL-CCNC: 93 U/L (ref 34–104)
ALT SERPL W P-5'-P-CCNC: 13 U/L (ref 7–52)
ANION GAP SERPL CALCULATED.3IONS-SCNC: 8 MMOL/L
APTT PPP: 31 SECONDS (ref 23–37)
AST SERPL W P-5'-P-CCNC: 19 U/L (ref 13–39)
BACTERIA UR QL AUTO: ABNORMAL /HPF
BASOPHILS # BLD AUTO: 0.08 THOUSANDS/ÂΜL (ref 0–0.1)
BASOPHILS NFR BLD AUTO: 1 % (ref 0–1)
BILIRUB SERPL-MCNC: 0.29 MG/DL (ref 0.2–1)
BILIRUB UR QL STRIP: NEGATIVE
BUN SERPL-MCNC: 34 MG/DL (ref 5–25)
CALCIUM SERPL-MCNC: 8.6 MG/DL (ref 8.4–10.2)
CARDIAC TROPONIN I PNL SERPL HS: 159 NG/L
CARDIAC TROPONIN I PNL SERPL HS: 176 NG/L
CARDIAC TROPONIN I PNL SERPL HS: 179 NG/L
CHLORIDE SERPL-SCNC: 106 MMOL/L (ref 96–108)
CLARITY UR: CLEAR
CO2 SERPL-SCNC: 22 MMOL/L (ref 21–32)
COLOR UR: YELLOW
CREAT SERPL-MCNC: 0.94 MG/DL (ref 0.6–1.3)
EOSINOPHIL # BLD AUTO: 0.6 THOUSAND/ÂΜL (ref 0–0.61)
EOSINOPHIL NFR BLD AUTO: 7 % (ref 0–6)
ERYTHROCYTE [DISTWIDTH] IN BLOOD BY AUTOMATED COUNT: 12.9 % (ref 11.6–15.1)
GFR SERPL CREATININE-BSD FRML MDRD: 76 ML/MIN/1.73SQ M
GLUCOSE SERPL-MCNC: 138 MG/DL (ref 65–140)
GLUCOSE UR STRIP-MCNC: NEGATIVE MG/DL
HCT VFR BLD AUTO: 39.8 % (ref 36.5–49.3)
HGB BLD-MCNC: 12.8 G/DL (ref 12–17)
HGB UR QL STRIP.AUTO: NEGATIVE
HYALINE CASTS #/AREA URNS LPF: ABNORMAL /LPF
IMM GRANULOCYTES # BLD AUTO: 0.03 THOUSAND/UL (ref 0–0.2)
IMM GRANULOCYTES NFR BLD AUTO: 0 % (ref 0–2)
INR PPP: 1.05 (ref 0.84–1.19)
KETONES UR STRIP-MCNC: NEGATIVE MG/DL
LEUKOCYTE ESTERASE UR QL STRIP: ABNORMAL
LYMPHOCYTES # BLD AUTO: 2.2 THOUSANDS/ÂΜL (ref 0.6–4.47)
LYMPHOCYTES NFR BLD AUTO: 25 % (ref 14–44)
MCH RBC QN AUTO: 30.8 PG (ref 26.8–34.3)
MCHC RBC AUTO-ENTMCNC: 32.2 G/DL (ref 31.4–37.4)
MCV RBC AUTO: 96 FL (ref 82–98)
MONOCYTES # BLD AUTO: 1.19 THOUSAND/ÂΜL (ref 0.17–1.22)
MONOCYTES NFR BLD AUTO: 13 % (ref 4–12)
MUCOUS THREADS UR QL AUTO: ABNORMAL
NEUTROPHILS # BLD AUTO: 4.76 THOUSANDS/ÂΜL (ref 1.85–7.62)
NEUTS SEG NFR BLD AUTO: 54 % (ref 43–75)
NITRITE UR QL STRIP: NEGATIVE
NON-SQ EPI CELLS URNS QL MICRO: ABNORMAL /HPF
NRBC BLD AUTO-RTO: 0 /100 WBCS
PH UR STRIP.AUTO: 6 [PH]
PLATELET # BLD AUTO: 187 THOUSANDS/UL (ref 149–390)
PMV BLD AUTO: 9.8 FL (ref 8.9–12.7)
POTASSIUM SERPL-SCNC: 4.4 MMOL/L (ref 3.5–5.3)
PROT SERPL-MCNC: 6.9 G/DL (ref 6.4–8.4)
PROT UR STRIP-MCNC: ABNORMAL MG/DL
PROTHROMBIN TIME: 14.1 SECONDS (ref 11.6–14.5)
RBC # BLD AUTO: 4.15 MILLION/UL (ref 3.88–5.62)
RBC #/AREA URNS AUTO: ABNORMAL /HPF
SODIUM SERPL-SCNC: 136 MMOL/L (ref 135–147)
SP GR UR STRIP.AUTO: 1.02 (ref 1–1.03)
UROBILINOGEN UR STRIP-ACNC: 2 MG/DL
WBC # BLD AUTO: 8.86 THOUSAND/UL (ref 4.31–10.16)
WBC #/AREA URNS AUTO: ABNORMAL /HPF

## 2024-02-28 PROCEDURE — 81001 URINALYSIS AUTO W/SCOPE: CPT | Performed by: EMERGENCY MEDICINE

## 2024-02-28 PROCEDURE — 70496 CT ANGIOGRAPHY HEAD: CPT

## 2024-02-28 PROCEDURE — 36415 COLL VENOUS BLD VENIPUNCTURE: CPT | Performed by: EMERGENCY MEDICINE

## 2024-02-28 PROCEDURE — 85610 PROTHROMBIN TIME: CPT | Performed by: EMERGENCY MEDICINE

## 2024-02-28 PROCEDURE — 85025 COMPLETE CBC W/AUTO DIFF WBC: CPT | Performed by: EMERGENCY MEDICINE

## 2024-02-28 PROCEDURE — 93005 ELECTROCARDIOGRAM TRACING: CPT

## 2024-02-28 PROCEDURE — 71045 X-RAY EXAM CHEST 1 VIEW: CPT

## 2024-02-28 PROCEDURE — 70498 CT ANGIOGRAPHY NECK: CPT

## 2024-02-28 PROCEDURE — 99285 EMERGENCY DEPT VISIT HI MDM: CPT | Performed by: EMERGENCY MEDICINE

## 2024-02-28 PROCEDURE — 80053 COMPREHEN METABOLIC PANEL: CPT | Performed by: EMERGENCY MEDICINE

## 2024-02-28 PROCEDURE — 99285 EMERGENCY DEPT VISIT HI MDM: CPT

## 2024-02-28 PROCEDURE — 99223 1ST HOSP IP/OBS HIGH 75: CPT | Performed by: INTERNAL MEDICINE

## 2024-02-28 PROCEDURE — 85730 THROMBOPLASTIN TIME PARTIAL: CPT | Performed by: EMERGENCY MEDICINE

## 2024-02-28 PROCEDURE — 84484 ASSAY OF TROPONIN QUANT: CPT | Performed by: EMERGENCY MEDICINE

## 2024-02-28 RX ORDER — ISOSORBIDE MONONITRATE 30 MG/1
30 TABLET, EXTENDED RELEASE ORAL DAILY
Status: DISCONTINUED | OUTPATIENT
Start: 2024-02-29 | End: 2024-03-01 | Stop reason: HOSPADM

## 2024-02-28 RX ORDER — DILTIAZEM HYDROCHLORIDE 120 MG/1
120 CAPSULE, EXTENDED RELEASE ORAL DAILY
Status: DISCONTINUED | OUTPATIENT
Start: 2024-02-29 | End: 2024-02-29

## 2024-02-28 RX ORDER — ACETAMINOPHEN 325 MG/1
650 TABLET ORAL EVERY 6 HOURS PRN
Status: DISCONTINUED | OUTPATIENT
Start: 2024-02-28 | End: 2024-03-01 | Stop reason: HOSPADM

## 2024-02-28 RX ORDER — ACETAMINOPHEN 325 MG/1
975 TABLET ORAL ONCE
Status: COMPLETED | OUTPATIENT
Start: 2024-02-28 | End: 2024-02-28

## 2024-02-28 RX ORDER — CLOPIDOGREL BISULFATE 75 MG/1
75 TABLET ORAL DAILY
Status: DISCONTINUED | OUTPATIENT
Start: 2024-02-29 | End: 2024-03-01 | Stop reason: HOSPADM

## 2024-02-28 RX ORDER — ALBUTEROL SULFATE 90 UG/1
2 AEROSOL, METERED RESPIRATORY (INHALATION) EVERY 6 HOURS PRN
Status: DISCONTINUED | OUTPATIENT
Start: 2024-02-28 | End: 2024-03-01 | Stop reason: HOSPADM

## 2024-02-28 RX ORDER — ONDANSETRON 2 MG/ML
4 INJECTION INTRAMUSCULAR; INTRAVENOUS EVERY 6 HOURS PRN
Status: DISCONTINUED | OUTPATIENT
Start: 2024-02-28 | End: 2024-03-01 | Stop reason: HOSPADM

## 2024-02-28 RX ORDER — ENOXAPARIN SODIUM 100 MG/ML
40 INJECTION SUBCUTANEOUS DAILY
Status: DISCONTINUED | OUTPATIENT
Start: 2024-02-29 | End: 2024-03-01 | Stop reason: HOSPADM

## 2024-02-28 RX ORDER — METOPROLOL SUCCINATE 25 MG/1
25 TABLET, EXTENDED RELEASE ORAL DAILY
Status: DISCONTINUED | OUTPATIENT
Start: 2024-02-29 | End: 2024-03-01 | Stop reason: HOSPADM

## 2024-02-28 RX ORDER — ASPIRIN 81 MG/1
324 TABLET, CHEWABLE ORAL ONCE
Status: COMPLETED | OUTPATIENT
Start: 2024-02-28 | End: 2024-02-28

## 2024-02-28 RX ORDER — ATORVASTATIN CALCIUM 40 MG/1
80 TABLET, FILM COATED ORAL EVERY EVENING
Status: DISCONTINUED | OUTPATIENT
Start: 2024-02-28 | End: 2024-03-01 | Stop reason: HOSPADM

## 2024-02-28 RX ADMIN — IOHEXOL 100 ML: 350 INJECTION, SOLUTION INTRAVENOUS at 18:41

## 2024-02-28 RX ADMIN — ASPIRIN 81 MG CHEWABLE TABLET 324 MG: 81 TABLET CHEWABLE at 20:51

## 2024-02-28 RX ADMIN — ACETAMINOPHEN 975 MG: 325 TABLET ORAL at 19:33

## 2024-02-28 NOTE — ED PROVIDER NOTES
History  Chief Complaint   Patient presents with    Slurred Speech     Patient presents to the ED with c/o slurred speech and trouble with word finding since this afternoon      Patient is a 78-year-old transgender female who presents with episodes of word finding difficulty and slurred speech that started at 3:30 PM.  Patient states that currently she sounds like herself which family member also reports.  Patient states that earlier in the day she had a headache, then later in the day she went to [facility] and that is when the word finding difficulty started.      Prior to Admission Medications   Prescriptions Last Dose Informant Patient Reported? Taking?   Chelated Zinc 50 MG TABS  Self Yes No   Sig: every 24 hours   acetaminophen (TYLENOL) 500 mg tablet  Self Yes No   Sig: Take 1,000 mg by mouth every 6 (six) hours as needed for mild pain   albuterol (Proventil HFA) 90 mcg/act inhaler  Self No No   Sig: Inhale 1-2 puffs every 6 (six) hours as needed for wheezing   aspirin (ECOTRIN LOW STRENGTH) 81 mg EC tablet  Self No No   Sig: Take 1 tablet (81 mg total) by mouth daily for 21 days   clopidogrel (PLAVIX) 75 mg tablet  Self No No   Sig: Take 1 tablet (75 mg total) by mouth daily   diltiazem (TIAZAC) 120 MG 24 hr capsule  Self Yes No   Sig: Take 120 mg by mouth daily Pt reports taking 80 mg daily   famotidine (PEPCID) 40 MG tablet  Self Yes No   Si (two) times a day as needed   isosorbide mononitrate (IMDUR) 30 mg 24 hr tablet   No No   Sig: Take 1 tablet (30 mg total) by mouth daily   metoprolol succinate (TOPROL-XL) 25 mg 24 hr tablet   No No   Sig: Take 1 tablet (25 mg total) by mouth daily   nitroglycerin (NITROSTAT) 0.4 mg SL tablet   No No   Sig: Place 1 tablet (0.4 mg total) under the tongue every 5 (five) minutes as needed for chest pain      Facility-Administered Medications: None       Past Medical History:   Diagnosis Date    Chronic bilateral low back pain without sciatica 2020    Hypertension      Scoliosis     Stroke (HCC)     Stroke-like symptoms 2021       Past Surgical History:   Procedure Laterality Date    BACK SURGERY      BOWEL RESECTION      CARDIAC CATHETERIZATION N/A 2024    Procedure: Cardiac Coronary Angiogram;  Surgeon: Rivas Cantor MD;  Location: [facility];  Service: Cardiology    CARDIAC CATHETERIZATION  2024    Procedure: Cardiac catheterization;  Surgeon: Rivas Cantor MD;  Location: [facility];  Service: Cardiology    CARDIAC CATHETERIZATION N/A 2024    Procedure: Cardiac pci;  Surgeon: Rivas Cantor MD;  Location: [facility];  Service: Cardiology    DE TEAEC W/PATCH GRF CAROTID VERTB SUBCLAV NECK INC Left 2021    Procedure: ENDARTERECTOMY ARTERY CAROTID;  Surgeon: Vito Mo MD;  Location: [facility];  Service: Vascular    TONSILLECTOMY         Family History   Adopted: Yes   Family history unknown: Yes     I have reviewed and agree with the history as documented.    E-Cigarette/Vaping    E-Cigarette Use Never User      E-Cigarette/Vaping Substances    Nicotine No     THC No     CBD No     Flavoring No     Other No     Unknown No      Social History     Tobacco Use    Smoking status: Former     Current packs/day: 0.00     Types: Cigarettes     Quit date:      Years since quittin.1    Smokeless tobacco: Never   Vaping Use    Vaping status: Never Used   Substance Use Topics    Alcohol use: Not Currently    Drug use: No       Review of Systems   Respiratory:  Negative for shortness of breath.    Cardiovascular:  Negative for chest pain.   Gastrointestinal:  Negative for abdominal pain, nausea and vomiting.   Genitourinary:  Negative for dysuria and hematuria.   Musculoskeletal:  Negative for back pain and neck pain.   Neurological:  Positive for speech difficulty and headaches. Negative for dizziness, weakness, light-headedness and numbness.       Physical Exam  Physical Exam  Vitals and nursing note reviewed.    Constitutional:       General: Danielle is not in acute distress.     Appearance: Normal appearance. Danielle is not ill-appearing, toxic-appearing or diaphoretic.   HENT:      Head: Normocephalic and atraumatic.      Mouth/Throat:      Mouth: Mucous membranes are moist.   Eyes:      General: No visual field deficit.     Extraocular Movements: Extraocular movements intact.      Conjunctiva/sclera: Conjunctivae normal.      Pupils: Pupils are equal, round, and reactive to light.   Cardiovascular:      Rate and Rhythm: Normal rate and regular rhythm.      Pulses: Normal pulses.      Heart sounds: Normal heart sounds. No murmur heard.  Pulmonary:      Effort: Pulmonary effort is normal. No respiratory distress.      Breath sounds: Normal breath sounds. No stridor. No wheezing, rhonchi or rales.   Chest:      Chest wall: No tenderness.   Abdominal:      General: Bowel sounds are normal. There is no distension.      Palpations: Abdomen is soft.      Tenderness: There is no abdominal tenderness. There is no guarding or rebound.   Musculoskeletal:      Right lower leg: No edema.      Left lower leg: No edema.   Skin:     General: Skin is warm and dry.   Neurological:      General: No focal deficit present.      Mental Status: Danielle is alert and oriented to person, place, and time. Mental status is at baseline.      GCS: GCS eye subscore is 4. GCS verbal subscore is 5. GCS motor subscore is 6.      Cranial Nerves: Cranial nerves 2-12 are intact. No cranial nerve deficit, dysarthria or facial asymmetry.      Sensory: Sensation is intact. No sensory deficit.      Motor: Tremor (baseline- same as always per patient) present. No weakness, atrophy, abnormal muscle tone, seizure activity or pronator drift.      Coordination: Coordination is intact. Finger-Nose-Finger Test normal.   Psychiatric:         Mood and Affect: Mood normal.         Behavior: Behavior normal.         Vital Signs  ED Triage Vitals   Temperature Pulse  Respirations Blood Pressure SpO2   02/28/24 1705 02/28/24 1705 02/28/24 1705 02/28/24 1705 02/28/24 1705   98.9 °F (37.2 °C) 71 18 127/72 94 %      Temp Source Heart Rate Source Patient Position - Orthostatic VS BP Location FiO2 (%)   02/28/24 1705 02/28/24 1705 02/28/24 1930 02/28/24 1930 --   Temporal Monitor Sitting Right arm       Pain Score       02/28/24 1705       No Pain           Vitals:    02/28/24 1705 02/28/24 1745 02/28/24 1930   BP: 127/72  128/75   Pulse: 71 66 63   Patient Position - Orthostatic VS:   Sitting         Visual Acuity  Visual Acuity      Flowsheet Row Most Recent Value   L Pupil Size (mm) 3   R Pupil Size (mm) 3            ED Medications  Medications   aspirin chewable tablet 324 mg (has no administration in time range)   iohexol (OMNIPAQUE) 350 MG/ML injection (MULTI-DOSE) 100 mL (100 mL Intravenous Given 2/28/24 1841)   acetaminophen (TYLENOL) tablet 975 mg (975 mg Oral Given 2/28/24 1933)       Diagnostic Studies  Results Reviewed       Procedure Component Value Units Date/Time    HS Troponin I 2hr [[id number]]  (Abnormal) Collected: 02/28/24 1947    Lab Status: Final result Specimen: Blood from Arm, Left Updated: 02/28/24 2014     hs TnI 2hr 179 ng/L      Delta 2hr hsTnI 20 ng/L     HS Troponin I 4hr [[id number]]     Lab Status: No result Specimen: Blood     Urine Microscopic [[id number]]  (Abnormal) Collected: 02/28/24 1814    Lab Status: Final result Specimen: Urine, Clean Catch Updated: 02/28/24 1831     RBC, UA None Seen /hpf      WBC, UA 0-1 /hpf      Epithelial Cells Occasional /hpf      Bacteria, UA None Seen /hpf      MUCUS THREADS Occasional     Hyaline Casts, UA 0-1 /lpf     UA w Reflex to Microscopic w Reflex to Culture [[id number]]  (Abnormal) Collected: 02/28/24 1814    Lab Status: Final result Specimen: Urine, Clean Catch Updated: 02/28/24 1825     Color, UA Yellow     Clarity, UA Clear     Specific Gravity, UA 1.025     pH, UA 6.0     Leukocytes, UA Small     Nitrite, UA  Negative     Protein, UA Trace mg/dl      Glucose, UA Negative mg/dl      Ketones, UA Negative mg/dl      Urobilinogen, UA 2.0 mg/dl      Bilirubin, UA Negative     Occult Blood, UA Negative    Comprehensive metabolic panel [[id number]]  (Abnormal) Collected: 02/28/24 1738    Lab Status: Final result Specimen: Blood from Arm, Right Updated: 02/28/24 1815     Sodium 136 mmol/L      Potassium 4.4 mmol/L      Chloride 106 mmol/L      CO2 22 mmol/L      ANION GAP 8 mmol/L      BUN 34 mg/dL      Creatinine 0.94 mg/dL      Glucose 138 mg/dL      Calcium 8.6 mg/dL      AST 19 U/L      ALT 13 U/L      Alkaline Phosphatase 93 U/L      Total Protein 6.9 g/dL      Albumin 3.6 g/dL      Total Bilirubin 0.29 mg/dL      eGFR 76 ml/min/1.73sq m     Narrative:      National Kidney Disease Foundation guidelines for Chronic Kidney Disease (CKD):     Stage 1 with normal or high GFR (GFR > 90 mL/min/1.73 square meters)    Stage 2 Mild CKD (GFR = 60-89 mL/min/1.73 square meters)    Stage 3A Moderate CKD (GFR = 45-59 mL/min/1.73 square meters)    Stage 3B Moderate CKD (GFR = 30-44 mL/min/1.73 square meters)    Stage 4 Severe CKD (GFR = 15-29 mL/min/1.73 square meters)    Stage 5 End Stage CKD (GFR <15 mL/min/1.73 square meters)  Note: GFR calculation is accurate only with a steady state creatinine    HS Troponin 0hr (reflex protocol) [[id number]]  (Abnormal) Collected: 02/28/24 1738    Lab Status: Final result Specimen: Blood from Arm, Right Updated: 02/28/24 1806     hs TnI 0hr 159 ng/L     Protime-INR [[id number]]  (Normal) Collected: 02/28/24 1738    Lab Status: Final result Specimen: Blood from Arm, Right Updated: 02/28/24 1803     Protime 14.1 seconds      INR 1.05    APTT [[id number]]  (Normal) Collected: 02/28/24 1738    Lab Status: Final result Specimen: Blood from Arm, Right Updated: 02/28/24 1803     PTT 31 seconds     CBC and differential [[id number]]  (Abnormal) Collected: 02/28/24 1738    Lab Status: Final result Specimen: Blood  from Arm, Right Updated: 02/28/24 1743     WBC 8.86 Thousand/uL      RBC 4.15 Million/uL      Hemoglobin 12.8 g/dL      Hematocrit 39.8 %      MCV 96 fL      MCH 30.8 pg      MCHC 32.2 g/dL      RDW 12.9 %      MPV 9.8 fL      Platelets 187 Thousands/uL      nRBC 0 /100 WBCs      Neutrophils Relative 54 %      Immat GRANS % 0 %      Lymphocytes Relative 25 %      Monocytes Relative 13 %      Eosinophils Relative 7 %      Basophils Relative 1 %      Neutrophils Absolute 4.76 Thousands/µL      Immature Grans Absolute 0.03 Thousand/uL      Lymphocytes Absolute 2.20 Thousands/µL      Monocytes Absolute 1.19 Thousand/µL      Eosinophils Absolute 0.60 Thousand/µL      Basophils Absolute 0.08 Thousands/µL                    CTA head and neck with and without contrast   Final Result by Hari Rivas MD (02/28 1941)      CT brain:  No acute intracranial abnormality.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      CTA head: Negative for large vessel intracranial occlusion or hemodynamically significant stenosis.      CTA neck: Mild right extracranial ICA stenosis. Left carotid endarterectomy.  The cervical vertebral arteries are patent. Moderate stenosis along the right vertebral artery V2 segment.      Left thyroid nodule for which ultrasound correlation is recommended based on JACR guidelines.               Workstation performed: [id number]         XR chest 1 view portable    (Results Pending)              Procedures  Procedures         ED Course  ED Course as of 02/28/24 2027 Wed Feb 28, 2024 1727 Case reviewed with Dr. Richardson, neurology, who agrees that since Nihss 0 and no aphasia here in the ER, not to call a stroke alert but would obtain cta h/ n, which is already ordered.   1806 hs TnI 0hr(!): 159  S/p cardiac cath on 2/26. EKG WNL and no CP. Will discuss with on-call cardiology.    1823 TT Dr. Sullivan, cardiology   1838 Discussed case with Dr. Sullivan. A mild troponin elevation  can be normal s/p cath or can be elevated in setting of potential stroke. Rcommends trending troponin and 324 mg aspirin. Reviewed EKG which is non-ischemic appearing. No need for heparin.                   Stroke Assessment       Row Name 02/28/24 1717             NIH Stroke Scale    Interval Baseline      Level of Consciousness (1a.) 0      LOC Questions (1b.) 0      LOC Commands (1c.) 0      Best Gaze (2.) 0      Visual (3.) 0      Facial Palsy (4.) 0      Motor Arm, Left (5a.) 0      Motor Arm, Right (5b.) 0      Motor Leg, Left (6a.) 0      Motor Leg, Right (6b.) 0      Limb Ataxia (7.) 0      Sensory (8.) 0      Best Language (9.) 0      Dysarthria (10.) 0      Extinction and Inattention (11.) (Formerly Neglect) 0      Total 0                    Flowsheet Row Most Recent Value   Thrombolytic Decision Options    Thrombolytic Decision Patient not a candidate.   Patient is not a candidate options Symptoms resolved/clearly non disabling.                      SBIRT 22yo+      Flowsheet Row Most Recent Value   Initial Alcohol Screen: US AUDIT-C     1. How often do you have a drink containing alcohol? 0 Filed at: 02/28/2024 1706   2. How many drinks containing alcohol do you have on a typical day you are drinking?  0 Filed at: 02/28/2024 1706   3a. Male UNDER 65: How often do you have five or more drinks on one occasion? 0 Filed at: 02/28/2024 1706   3b. FEMALE Any Age, or MALE 65+: How often do you have 4 or more drinks on one occassion? 0 Filed at: 02/28/2024 1706   Audit-C Score 0 Filed at: 02/28/2024 1706   PORFIRIO: How many times in the past year have you...    Used an illegal drug or used a prescription medication for non-medical reasons? Never Filed at: 02/28/2024 1706                      Medical Decision Making  Assessment and plan:  TIA/CVA versus complex migraine.  NIH currently 0.  Nonfocal neurological exam. check labs to assess for leukocytosis, anemia, electrolyte abnormalities, kidney liver function; CTA  head and neck to evaluate for CVA; chest x-ray to rule out widened mediastinum; EKG/troponin to evaluate for arrhythmia/ischemia.    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  Patient also underwent a cardiac catheterization on 2/26/2024 that showed two-vessel coronary artery disease with 100%m RCA, moderate LAD and crade 3L-R collaterals.     Amount and/or Complexity of Data Reviewed  Labs: ordered. Decision-making details documented in ED Course.  Radiology: ordered.    Risk  OTC drugs.  Prescription drug management.  Decision regarding hospitalization.             Disposition  Final diagnoses:   Word finding difficulty   Elevated troponin     Time reflects when diagnosis was documented in both MDM as applicable and the Disposition within this note       Time User Action Codes Description Comment    2/28/2024  5:59 PM Marichuy Pritchett Add [R47.89] Word finding difficulty     2/28/2024  6:11 PM Marichuy Pritchett Add [R79.89] Elevated troponin           ED Disposition       ED Disposition   Admit    Condition   Stable    Date/Time   Wed Feb 28, 2024 6960    Comment   Case was discussed with hospitalist and the patient's admission status was agreed to be Admission Status: observation status to the service of Dr. Jeffries .               Follow-up Information    None         Patient's Medications   Discharge Prescriptions    No medications on file       No discharge procedures on file.    PDMP Review         Value Time User    PDMP Reviewed  Yes 3/1/2022 10:33 AM ANNIE Welsh            ED Provider  Electronically Signed by             Marichuy Pritchett DO  02/28/24 2027

## 2024-02-29 ENCOUNTER — APPOINTMENT (OUTPATIENT)
Dept: NON INVASIVE DIAGNOSTICS | Facility: HOSPITAL | Age: 79
DRG: 091 | End: 2024-02-29
Payer: COMMERCIAL

## 2024-02-29 ENCOUNTER — APPOINTMENT (OUTPATIENT)
Dept: MRI IMAGING | Facility: HOSPITAL | Age: 79
DRG: 091 | End: 2024-02-29
Payer: COMMERCIAL

## 2024-02-29 PROBLEM — I63.9 CVA (CEREBRAL VASCULAR ACCIDENT) (HCC): Status: ACTIVE | Noted: 2024-02-28

## 2024-02-29 PROBLEM — R73.03 PRE-DIABETES: Status: ACTIVE | Noted: 2024-02-29

## 2024-02-29 PROBLEM — R47.89 WORD FINDING DIFFICULTY: Status: ACTIVE | Noted: 2024-02-28

## 2024-02-29 LAB
ALBUMIN SERPL BCP-MCNC: 3.6 G/DL (ref 3.5–5)
ALP SERPL-CCNC: 89 U/L (ref 34–104)
ALT SERPL W P-5'-P-CCNC: 13 U/L (ref 7–52)
ANION GAP SERPL CALCULATED.3IONS-SCNC: 6 MMOL/L
AORTIC ROOT: 3.5 CM
AORTIC VALVE MEAN VELOCITY: 14.5 M/S
APICAL FOUR CHAMBER EJECTION FRACTION: 70 %
ASCENDING AORTA: 3.7 CM
AST SERPL W P-5'-P-CCNC: 14 U/L (ref 5–45)
ATRIAL RATE: 64 BPM
AV AREA BY CONTINUOUS VTI: 2 CM2
AV AREA PEAK VELOCITY: 1.5 CM2
AV LVOT MEAN GRADIENT: 2 MMHG
AV LVOT PEAK GRADIENT: 4 MMHG
AV MEAN GRADIENT: 10 MMHG
AV PEAK GRADIENT: 17 MMHG
AV VALVE AREA: 1.99 CM2
AV VELOCITY RATIO: 0.47
AVA (PLAN): 2 CM2
BASOPHILS # BLD AUTO: 0.06 THOUSANDS/ÂΜL (ref 0–0.1)
BASOPHILS NFR BLD AUTO: 1 % (ref 0–1)
BILIRUB SERPL-MCNC: 0.51 MG/DL (ref 0.2–1)
BSA FOR ECHO PROCEDURE: 1.83 M2
BUN SERPL-MCNC: 32 MG/DL (ref 5–25)
CALCIUM SERPL-MCNC: 8.5 MG/DL (ref 8.4–10.2)
CHLORIDE SERPL-SCNC: 106 MMOL/L (ref 96–108)
CHOLEST SERPL-MCNC: 103 MG/DL
CO2 SERPL-SCNC: 25 MMOL/L (ref 21–32)
CREAT SERPL-MCNC: 1.04 MG/DL (ref 0.6–1.3)
DOP CALC AO PEAK VEL: 2.06 M/S
DOP CALC AO VTI: 38 CM
DOP CALC LVOT AREA: 3.14 CM2
DOP CALC LVOT CARDIAC INDEX: 2.88 L/MIN/M2
DOP CALC LVOT CARDIAC OUTPUT: 5.27 L/MIN
DOP CALC LVOT DIAMETER: 2 CM
DOP CALC LVOT PEAK VEL VTI: 24.06 CM
DOP CALC LVOT PEAK VEL: 0.97 M/S
DOP CALC LVOT STROKE INDEX: 39.9 ML/M2
DOP CALC LVOT STROKE VOLUME: 75.55
DOP CALC MV VTI: 31.55 CM
E WAVE DECELERATION TIME: 366 MS
E/A RATIO: 0.51
EOSINOPHIL # BLD AUTO: 0.7 THOUSAND/ÂΜL (ref 0–0.61)
EOSINOPHIL NFR BLD AUTO: 8 % (ref 0–6)
ERYTHROCYTE [DISTWIDTH] IN BLOOD BY AUTOMATED COUNT: 12.9 % (ref 11.6–15.1)
EST. AVERAGE GLUCOSE BLD GHB EST-MCNC: 126 MG/DL
FRACTIONAL SHORTENING: 36 (ref 28–44)
GLUCOSE P FAST SERPL-MCNC: 95 MG/DL (ref 65–99)
GLUCOSE SERPL-MCNC: 95 MG/DL (ref 65–140)
HBA1C MFR BLD: 6 %
HCT VFR BLD AUTO: 40.4 % (ref 36.5–46.1)
HDLC SERPL-MCNC: 39 MG/DL
HGB BLD-MCNC: 13.3 G/DL (ref 12–15.4)
IMM GRANULOCYTES # BLD AUTO: 0.04 THOUSAND/UL (ref 0–0.2)
IMM GRANULOCYTES NFR BLD AUTO: 1 % (ref 0–2)
INTERVENTRICULAR SEPTUM IN DIASTOLE (PARASTERNAL SHORT AXIS VIEW): 0.9 CM
INTERVENTRICULAR SEPTUM: 0.9 CM (ref 0.6–1.1)
LAAS-AP2: 15.9 CM2
LAAS-AP4: 26.3 CM2
LDLC SERPL CALC-MCNC: 51 MG/DL (ref 0–100)
LEFT ATRIUM SIZE: 3.6 CM
LEFT ATRIUM VOLUME (MOD BIPLANE): 63 ML
LEFT ATRIUM VOLUME INDEX (MOD BIPLANE): 34.4 ML/M2
LEFT INTERNAL DIMENSION IN SYSTOLE: 3 CM (ref 2.1–4)
LEFT VENTRICLE DIASTOLIC VOLUME (MOD BIPLANE): 110 ML
LEFT VENTRICLE DIASTOLIC VOLUME INDEX (MOD BIPLANE): 60.1 ML/M2
LEFT VENTRICLE SYSTOLIC VOLUME (MOD BIPLANE): 40 ML
LEFT VENTRICLE SYSTOLIC VOLUME INDEX (MOD BIPLANE): 21.9 ML/M2
LEFT VENTRICULAR INTERNAL DIMENSION IN DIASTOLE: 4.7 CM (ref 3.5–6)
LEFT VENTRICULAR POSTERIOR WALL IN END DIASTOLE: 0.8 CM
LEFT VENTRICULAR STROKE VOLUME: 66 ML
LV EF: 64 %
LVSV (TEICH): 66 ML
LYMPHOCYTES # BLD AUTO: 2.29 THOUSANDS/ÂΜL (ref 0.6–4.47)
LYMPHOCYTES NFR BLD AUTO: 27 % (ref 14–44)
MCH RBC QN AUTO: 31 PG (ref 26.8–34.3)
MCHC RBC AUTO-ENTMCNC: 32.9 G/DL (ref 31.4–37.4)
MCV RBC AUTO: 94 FL (ref 82–98)
MONOCYTES # BLD AUTO: 1.31 THOUSAND/ÂΜL (ref 0.17–1.22)
MONOCYTES NFR BLD AUTO: 15 % (ref 4–12)
MV E'TISSUE VEL-LAT: 11 CM/S
MV E'TISSUE VEL-SEP: 7 CM/S
MV MEAN GRADIENT: 3 MMHG
MV PEAK A VEL: 1.42 M/S
MV PEAK E VEL: 72 CM/S
MV PEAK GRADIENT: 9 MMHG
MV STENOSIS PRESSURE HALF TIME: 106 MS
MV VALVE AREA BY CONTINUITY EQUATION: 2.39 CM2
MV VALVE AREA P 1/2 METHOD: 2.08
NEUTROPHILS # BLD AUTO: 4.22 THOUSANDS/ÂΜL (ref 1.85–7.62)
NEUTS SEG NFR BLD AUTO: 48 % (ref 43–75)
NRBC BLD AUTO-RTO: 0 /100 WBCS
P AXIS: 51 DEGREES
PLATELET # BLD AUTO: 200 THOUSANDS/UL (ref 149–390)
PMV BLD AUTO: 10.2 FL (ref 8.9–12.7)
POTASSIUM SERPL-SCNC: 4.1 MMOL/L (ref 3.5–5.3)
PR INTERVAL: 150 MS
PROT SERPL-MCNC: 6.7 G/DL (ref 6.4–8.4)
QRS AXIS: 46 DEGREES
QRSD INTERVAL: 74 MS
QT INTERVAL: 382 MS
QTC INTERVAL: 394 MS
RBC # BLD AUTO: 4.29 MILLION/UL (ref 3.88–5.12)
RIGHT ATRIUM AREA SYSTOLE A4C: 19.2 CM2
RIGHT VENTRICLE ID DIMENSION: 4.5 CM
SL CV LEFT ATRIUM LENGTH A2C: 5.5 CM
SL CV LV EF: 65
SL CV PED ECHO LEFT VENTRICLE DIASTOLIC VOLUME (MOD BIPLANE) 2D: 102 ML
SL CV PED ECHO LEFT VENTRICLE SYSTOLIC VOLUME (MOD BIPLANE) 2D: 36 ML
SODIUM SERPL-SCNC: 137 MMOL/L (ref 135–147)
T WAVE AXIS: 67 DEGREES
TR MAX PG: 32 MMHG
TR PEAK VELOCITY: 2.8 M/S
TRICUSPID ANNULAR PLANE SYSTOLIC EXCURSION: 2.4 CM
TRICUSPID VALVE PEAK REGURGITATION VELOCITY: 2.84 M/S
TRIGL SERPL-MCNC: 64 MG/DL
VENTRICULAR RATE: 64 BPM
WBC # BLD AUTO: 8.62 THOUSAND/UL (ref 4.31–10.16)

## 2024-02-29 PROCEDURE — 70551 MRI BRAIN STEM W/O DYE: CPT

## 2024-02-29 PROCEDURE — 93306 TTE W/DOPPLER COMPLETE: CPT

## 2024-02-29 PROCEDURE — 80061 LIPID PANEL: CPT | Performed by: INTERNAL MEDICINE

## 2024-02-29 PROCEDURE — 93010 ELECTROCARDIOGRAM REPORT: CPT | Performed by: INTERNAL MEDICINE

## 2024-02-29 PROCEDURE — 93306 TTE W/DOPPLER COMPLETE: CPT | Performed by: INTERNAL MEDICINE

## 2024-02-29 PROCEDURE — 99223 1ST HOSP IP/OBS HIGH 75: CPT | Performed by: INTERNAL MEDICINE

## 2024-02-29 PROCEDURE — 83036 HEMOGLOBIN GLYCOSYLATED A1C: CPT | Performed by: INTERNAL MEDICINE

## 2024-02-29 PROCEDURE — 85025 COMPLETE CBC W/AUTO DIFF WBC: CPT | Performed by: INTERNAL MEDICINE

## 2024-02-29 PROCEDURE — 92610 EVALUATE SWALLOWING FUNCTION: CPT

## 2024-02-29 PROCEDURE — 80053 COMPREHEN METABOLIC PANEL: CPT | Performed by: INTERNAL MEDICINE

## 2024-02-29 PROCEDURE — 97162 PT EVAL MOD COMPLEX 30 MIN: CPT

## 2024-02-29 PROCEDURE — 99232 SBSQ HOSP IP/OBS MODERATE 35: CPT | Performed by: INTERNAL MEDICINE

## 2024-02-29 RX ORDER — DILTIAZEM HYDROCHLORIDE 120 MG/1
120 CAPSULE, COATED, EXTENDED RELEASE ORAL DAILY
Status: DISCONTINUED | OUTPATIENT
Start: 2024-02-29 | End: 2024-03-01 | Stop reason: HOSPADM

## 2024-02-29 RX ADMIN — CLOPIDOGREL BISULFATE 75 MG: 75 TABLET ORAL at 08:15

## 2024-02-29 RX ADMIN — ASPIRIN 81 MG: 81 TABLET, COATED ORAL at 08:15

## 2024-02-29 RX ADMIN — ACETAMINOPHEN 650 MG: 325 TABLET ORAL at 04:12

## 2024-02-29 RX ADMIN — DILTIAZEM HYDROCHLORIDE 120 MG: 120 CAPSULE, COATED, EXTENDED RELEASE ORAL at 08:14

## 2024-02-29 RX ADMIN — ATORVASTATIN CALCIUM 80 MG: 40 TABLET, FILM COATED ORAL at 17:50

## 2024-02-29 RX ADMIN — ATORVASTATIN CALCIUM 80 MG: 40 TABLET, FILM COATED ORAL at 00:06

## 2024-02-29 NOTE — PLAN OF CARE
Problem: NEUROSENSORY - ADULT  Goal: Achieves stable or improved neurological status  Description: INTERVENTIONS  - Monitor and report changes in neurological status  - Monitor vital signs such as temperature, blood pressure, glucose, and any other labs ordered   - Initiate measures to prevent increased intracranial pressure  - Monitor for seizure activity and implement precautions if appropriate      Outcome: Progressing

## 2024-02-29 NOTE — CONSULTS
Consultation - Neurology   [facility] 78 y.o. adult MRN: [id number]  Unit/Bed#: -01 Encounter: [id number]      Assessment/Plan     * CVA (cerebral vascular accident) (HCC)  Assessment & Plan  78-year-old adult with CKD, COPD, chronic low back pain, prediabetes, HTN, CAD s/p cardiac cath (02/2024), HLD, history of TIA in the setting of symptomatic left ICA stenosis s/p L CEA (December 2021) and history of CVA (2003, on DAPT) who presents with word finding difficulty and dysarthria.  Patient reports speech disturbances began 2/28 at approximately 3:30 PM and resolved on its own.  Patient also reported a headache earlier in the day but this had also resolved.    BP on arrival 127/72.  NIH score 0, patient was back to baseline in the ED. Initial neuroimaging negative for acute pathology.     Workup:  - CT head negative for acute intracranial abnormality, revealed chronic left centrum semiovale/corona radiata infarct.   - CTA head/neck revealed mild right extracranial ICA stenosis and moderate stenosis along the right vertebral artery V2 segment.    - MRI brain wo revealed punctate acute to subacute infarcts involving left frontal parietal cortex, left parietal subcortical white matter and posterior left cerebellum  - Echo pending  - Lipid panel: Cholesterol 103, LDL 51  - Hemoglobin A1c 6.0    Plan:  - Stroke pathway  - Loaded with Aspirin 325 mg x 1. Continue with Aspirin 81 mg and Plavix 75 mg daily (home medications) at this time   - Recommend outpatient P2Y12  - Continue with Atorvastatin 80 mg daily   - Maintain normotension  - Euglycemic, normothermic goal  - Continue telemetry  - PT/OT/ST  - Secondary risk factor modification.   - Stroke education  - Frequent neuro checks. Continue to monitor and notify neurology with any changes.  - STAT CT head for any acute change in neuro exam  - Medical management and supportive care per primary team. Correction of any metabolic or infectious disturbances.      Plan discussed with Attending Neurologist, please see attestation for further input/recommendations.           Tevin Bazan will need follow up in in 6 weeks with neurovascular attending or advance practitioner. Danielle will need outpatient P2Y12 testing     History of Present Illness     Reason for Consult / Principal Problem: Word finding difficulty  Hx and PE limited by: N/A  HPI: Tevin Bazan is a 78 y.o. adult with CKD, COPD, chronic low back pain, prediabetes, HTN, CAD s/p cardiac cath (02/2024), HLD, history of TIA in the setting of symptomatic left ICA stenosis s/p L CEA (December 2021) and history of CVA (2003, on DAPT) who presents with word finding difficulty and dysarthria.  Patient reports speech disturbances began 2/28 at approximately 3:30 PM and resolved on its own.  Patient also reported a headache earlier in the day but this had also resolved.    BP on arrival 127/72.  NIH score 0, patient was back to baseline in the ED.  CT head negative for acute intracranial abnormality, revealed chronic left centrum semiovale/corona radiata infarct.  CTA head/neck revealed mild right extracranial ICA stenosis and moderate stenosis along the right vertebral artery V2 segment.  Patient was loaded with aspirin and admitted to the hospital on the stroke pathway.    Per chart review, patient had cardiac cath done by his cardiologist on 2/26/2024.  He was continued on DAPT.  He reports he was previously on Coumadin, however this had been discontinued and he denied a history of A-fib.    Per chart review, patient was previously seen by [facility] inpatient neurology in November 2021 for 1 hour episode of confusion, aphasia and right hand numbness.  This episode occurred while being off Plavix for a week due to delay in picking up refill.  Etiology was thought to be secondary to TIA in the setting of symptomatic left ICA stenosis.  AP regimen was deferred to vascular and from neurology standpoint,  patient only required Plavix monotherapy in regards to his stroke.  Patient had left carotid endarterectomy in December 2021 and he was discharged on DAPT.    Inpatient consult to Neurology  Consult performed by: ANNIE Moreno  Consult ordered by: Rubi Gibbs MD          Review of Systems   Constitutional:  Negative for fatigue and fever.   HENT:  Negative for trouble swallowing.    Eyes:  Negative for photophobia and visual disturbance.   Respiratory:  Negative for cough and shortness of breath.    Cardiovascular:  Negative for chest pain and palpitations.   Gastrointestinal:  Negative for nausea.   Musculoskeletal:  Negative for back pain and neck pain.   Skin:  Negative for color change and pallor.   Neurological:  Positive for tremors and headaches. Negative for dizziness, facial asymmetry, speech difficulty, weakness, light-headedness and numbness.   Psychiatric/Behavioral:  Negative for confusion. The patient is not nervous/anxious.        Historical Information   Past Medical History:   Diagnosis Date    Chronic bilateral low back pain without sciatica 7/17/2020    Hypertension     Scoliosis     Stroke (HCC)     Stroke-like symptoms 11/22/2021     Past Surgical History:   Procedure Laterality Date    BACK SURGERY      BOWEL RESECTION      CARDIAC CATHETERIZATION N/A 2/26/2024    Procedure: Cardiac Coronary Angiogram;  Surgeon: Rivas Cantor MD;  Location: [facility];  Service: Cardiology    CARDIAC CATHETERIZATION  2/26/2024    Procedure: Cardiac catheterization;  Surgeon: Rivas Cantor MD;  Location: [facility];  Service: Cardiology    CARDIAC CATHETERIZATION N/A 2/26/2024    Procedure: Cardiac pci;  Surgeon: Rivas Cantor MD;  Location: [facility];  Service: Cardiology    AR TEAEC W/PATCH GRF CAROTID VERTB SUBCLAV NECK INC Left 12/1/2021    Procedure: ENDARTERECTOMY ARTERY CAROTID;  Surgeon: Vito Mo MD;  Location: [facility];  Service: Vascular     TONSILLECTOMY       Social History   Social History     Substance and Sexual Activity   Alcohol Use Not Currently     Social History     Substance and Sexual Activity   Drug Use No     E-Cigarette/Vaping    E-Cigarette Use Never User      E-Cigarette/Vaping Substances    Nicotine No     THC No     CBD No     Flavoring No     Other No     Unknown No      Social History     Tobacco Use   Smoking Status Former    Current packs/day: 0.00    Types: Cigarettes    Quit date:     Years since quittin.1   Smokeless Tobacco Never     Family History:   Family History   Adopted: Yes   Family history unknown: Yes       Review of previous medical records was completed.     Meds/Allergies   all current active meds have been reviewed, current meds:   Current Facility-Administered Medications   Medication Dose Route Frequency    acetaminophen (TYLENOL) tablet 650 mg  650 mg Oral Q6H PRN    albuterol (PROVENTIL HFA,VENTOLIN HFA) inhaler 2 puff  2 puff Inhalation Q6H PRN    aspirin (ECOTRIN LOW STRENGTH) EC tablet 81 mg  81 mg Oral Daily    atorvastatin (LIPITOR) tablet 80 mg  80 mg Oral QPM    clopidogrel (PLAVIX) tablet 75 mg  75 mg Oral Daily    diltiazem (CARDIZEM CD) 24 hr capsule 120 mg  120 mg Oral Daily    enoxaparin (LOVENOX) subcutaneous injection 40 mg  40 mg Subcutaneous Daily    isosorbide mononitrate (IMDUR) 24 hr tablet 30 mg  30 mg Oral Daily    metoprolol succinate (TOPROL-XL) 24 hr tablet 25 mg  25 mg Oral Daily    ondansetron (ZOFRAN) injection 4 mg  4 mg Intravenous Q6H PRN   , and PTA meds:   Prior to Admission Medications   Prescriptions Last Dose Informant Patient Reported? Taking?   Chelated Zinc 50 MG TABS 2024 Self Yes Yes   Sig: every 24 hours   acetaminophen (TYLENOL) 500 mg tablet Unknown Self Yes No   Sig: Take 1,000 mg by mouth every 6 (six) hours as needed for mild pain   albuterol (Proventil HFA) 90 mcg/act inhaler Unknown Self No No   Sig: Inhale 1-2 puffs every 6 (six) hours as needed  "for wheezing   aspirin (ECOTRIN LOW STRENGTH) 81 mg EC tablet  Self No No   Sig: Take 1 tablet (81 mg total) by mouth daily for 21 days   clopidogrel (PLAVIX) 75 mg tablet  Self No No   Sig: Take 1 tablet (75 mg total) by mouth daily   diltiazem (TIAZAC) 120 MG 24 hr capsule 2024 Self Yes Yes   Sig: Take 120 mg by mouth daily Pt reports taking 80 mg daily   famotidine (PEPCID) 40 MG tablet 2024 Self Yes Yes   Si (two) times a day as needed   isosorbide mononitrate (IMDUR) 30 mg 24 hr tablet 2024  No Yes   Sig: Take 1 tablet (30 mg total) by mouth daily   metoprolol succinate (TOPROL-XL) 25 mg 24 hr tablet Unknown  No No   Sig: Take 1 tablet (25 mg total) by mouth daily   nitroglycerin (NITROSTAT) 0.4 mg SL tablet Unknown  No No   Sig: Place 1 tablet (0.4 mg total) under the tongue every 5 (five) minutes as needed for chest pain      Facility-Administered Medications: None       Allergies   Allergen Reactions    Black Cohosh Rash    Minoxidil Rash       Objective   Vitals:Blood pressure 125/71, pulse 77, temperature 97.7 °F (36.5 °C), resp. rate 22, height 5' 7\" (1.702 m), weight 71.7 kg (158 lb), SpO2 92%.,Body mass index is 24.75 kg/m².    Intake/Output Summary (Last 24 hours) at 2024 1223  Last data filed at 2024 1043  Gross per 24 hour   Intake 180 ml   Output --   Net 180 ml       Invasive Devices:   Invasive Devices       Peripheral Intravenous Line  Duration             Peripheral IV 24 Left;Ventral (anterior) Forearm <1 day    Peripheral IV 24 Right;Ventral (anterior) Forearm <1 day                    Physical Exam  Vitals reviewed.   Constitutional:       General: Danielle is not in acute distress.     Appearance: Normal appearance. Danielle is well-developed and normal weight. Danielle is not ill-appearing.   HENT:      Head: Normocephalic and atraumatic.      Right Ear: External ear normal.      Left Ear: External ear normal.      Nose: Nose normal.      Mouth/Throat:      " Mouth: Mucous membranes are moist.   Eyes:      General:         Right eye: No discharge.         Left eye: No discharge.      Extraocular Movements: Extraocular movements intact and EOM normal.      Conjunctiva/sclera: Conjunctivae normal.      Pupils: Pupils are equal, round, and reactive to light.   Cardiovascular:      Rate and Rhythm: Normal rate.   Pulmonary:      Effort: Pulmonary effort is normal. No respiratory distress.   Musculoskeletal:         General: Normal range of motion.      Right lower leg: No edema.      Left lower leg: No edema.   Skin:     General: Skin is warm and dry.      Coloration: Skin is not jaundiced or pale.   Neurological:      Mental Status: Danielle is alert and oriented to person, place, and time. Mental status is at baseline.      Motor: Motor strength is normal.     Coordination: Finger-Nose-Finger Test and Heel to Shin Test normal.   Psychiatric:         Mood and Affect: Mood normal.         Speech: Speech normal.         Behavior: Behavior normal.       Neurologic Exam     Mental Status   Oriented to person, place, and time.   Follows 2 step commands.   Attention: normal. Concentration: normal.   Speech: speech is normal   Level of consciousness: alert  Knowledge: good.   Able to name object. Normal comprehension.     Cranial Nerves     CN II   Visual fields full to confrontation.     CN III, IV, VI   Pupils are equal, round, and reactive to light.  Extraocular motions are normal.   Nystagmus: none   Diplopia: none  Upgaze: normal  Downgaze: normal  Conjugate gaze: present    CN V   Facial sensation intact.     CN VII   Facial expression full, symmetric.     CN VIII   CN VIII normal.     CN IX, X   CN IX normal.     CN XII   CN XII normal.     Motor Exam   Muscle bulk: normal  Right arm pronator drift: absent  Left arm pronator drift: absent    Strength   Strength 5/5 throughout.     Sensory Exam   Light touch normal.     Gait, Coordination, and Reflexes     Coordination   Finger  to nose coordination: normal  Heel to shin coordination: normal  Resting and action tremor present in right hand (ongoing for > year per patient)       Lab Results: I have personally reviewed pertinent reports.  , CBC:   Results from last 7 days   Lab Units 02/29/24  0418 02/28/24  1738   WBC Thousand/uL 8.62 8.86   RBC Million/uL 4.29 4.15   HEMOGLOBIN g/dL 13.3 12.8   HEMATOCRIT % 40.4 39.8   MCV fL 94 96   PLATELETS Thousands/uL 200 187   , BMP/CMP:   Results from last 7 days   Lab Units 02/29/24  0418 02/28/24  1738 02/27/24  1326   SODIUM mmol/L 137 136 138   POTASSIUM mmol/L 4.1 4.4 4.2   CHLORIDE mmol/L 106 106 100   CO2 mmol/L 25 22 30   BUN mg/dL 32* 34* 31*   CREATININE mg/dL 1.04 0.94 0.99   CALCIUM mg/dL 8.5 8.6 10.2   AST U/L 14 19  --    ALT U/L 13 13  --    ALK PHOS U/L 89 93  --    EGFR ml/min/1.73sq m  --  76 72   ,Coagulation:   Results from last 7 days   Lab Units 02/28/24  1738   INR  1.05   , Lipid Profile:   Results from last 7 days   Lab Units 02/29/24  0418   HDL mg/dL 39*   LDL CALC mg/dL 51   TRIGLYCERIDES mg/dL 64   , Urinalysis:   Results from last 7 days   Lab Units 02/28/24  1814   COLOR UA  Yellow   CLARITY UA  Clear   SPEC GRAV UA  1.025   PH UA  6.0   LEUKOCYTES UA  Small*   NITRITE UA  Negative   GLUCOSE UA mg/dl Negative   KETONES UA mg/dl Negative   BILIRUBIN UA  Negative   BLOOD UA  Negative     Imaging Studies: I have personally reviewed pertinent reports.   and I have personally reviewed pertinent films in PACS CTA head/neck  EKG, Pathology, and Other Studies: I have personally reviewed pertinent reports.   and I have personally reviewed pertinent films in PACS  VTE Prophylaxis: Enoxaparin (Lovenox)    Code Status: Level 1 - Full Code

## 2024-02-29 NOTE — CASE MANAGEMENT
Case Management Assessment & Discharge Planning Note    [facility]  [facility] MRN [id number]  : 1945 Date 2024       Current Admission Date: 2024  Current Admission Diagnosis:CVA (cerebral vascular accident) ([facility])   Patient Active Problem List    Diagnosis Date Noted    Pre-diabetes 2024    Elevated troponin 2024    CVA (cerebral vascular accident) ([facility]) 2024    Unstable angina ([facility]) 2024    Coronary artery disease involving native coronary artery 2024    Abnormal computed tomography angiography (CTA) 2024    Coronary artery disease involving native coronary artery of native heart with unstable angina pectoris ([facility]) 2024    Chest pain 2024    Stage 2 chronic kidney disease 2024    COPD (chronic obstructive pulmonary disease) ([facility]) 2024    Abnormal stress test 2023    Dyspnea on exertion 2023    Hypercholesterolemia 2023    Chest heaviness 2023    Nonrheumatic aortic valve stenosis 2023    Nonrheumatic mitral valve stenosis 2023    H/O carotid endarterectomy 2021    History of stroke 2021    Symptomatic carotid artery stenosis, left 2021    Essential hypertension 2021    Chronic bilateral low back pain without sciatica 2020    Lumbar radiculopathy 2020    Spinal stenosis of lumbar region with neurogenic claudication 2020    Chronic pain of left knee 2020    Thoracogenic scoliosis of thoracolumbar region 2020    Chronic pain syndrome 2020    Lumbar spondylosis 2020    Cellulitis of right knee 2020    Primary osteoarthritis of left knee 04/10/2020    Effusion of left knee 04/10/2020    Transgender 2018    Stroke due to embolism ([facility]) 2018      LOS (days): 0  Geometric Mean LOS (GMLOS) (days):   Days to GMLOS:     OBJECTIVE:              Current admission status: Observation        Preferred Pharmacy:   [id number]  [place] - [address]  [address]  [place] [address]  Phone: [phone] Fax: [phone]    [facility] - [place], [place] - [address]  [address]  [address]  [facility] [place] [address]  Phone: [phone] Fax: [phone]    Primary Care Provider: Cris Mann MD    Primary Insurance: [employer]  Secondary Insurance:     ASSESSMENT:  Active Health Care Proxies       Prisca Bazan Alternate Health Care Representative - Spouse   Primary Phone: [phone] (Mobile)  Home Phone: [phone]                 Advance Directives  Does patient have a Health Care POA?: Yes  Does patient have Advance Directives?: Yes  Advance Directives: Living will, Power of  for health care  Primary Contact: [place] (wife)         Readmission Root Cause  30 Day Readmission: No    Patient Information  Admitted from:: Home  Mental Status: Alert  During Assessment patient was accompanied by: Spouse  Assessment information provided by:: Patient  Primary Caregiver: Self  Support Systems: Self, Spouse/significant other  Home entry access options. Select all that apply.: No steps to enter home  Type of Current Residence: Bi-level  Upon entering residence, is there a bedroom on the main floor (no further steps)?: Yes  Upon entering residence, is there a bathroom on the main floor (no further steps)?: Yes  Living Arrangements: Lives w/ Spouse/significant other    Activities of Daily Living Prior to Admission  Functional Status: Independent  Completes ADLs independently?: Yes  Ambulates independently?: Yes  Does patient use assisted devices?: No  Does patient currently own DME?: No  Does patient have a history of Outpatient Therapy (PT/OT)?: No  Does the patient have a history of Short-Term Rehab?: No  Does patient have a history of HHC?: No  Does patient currently have HHC?: No    Patient  Information Continued  Does patient have prescription coverage?: Yes  Does patient receive dialysis treatments?: No  Does patient have a history of substance abuse?: No  Does patient have a history of Mental Health Diagnosis?: No    Means of Transportation  Means of Transport to Appts:: Drives Self      Social Determinants of Health (SDOH)      Flowsheet Row Most Recent Value   Housing Stability    In the last 12 months, was there a time when you were not able to pay the mortgage or rent on time? N   In the last 12 months, how many places have you lived? 1   In the last 12 months, was there a time when you did not have a steady place to sleep or slept in a shelter (including now)? N   Transportation Needs    In the past 12 months, has lack of transportation kept you from medical appointments or from getting medications? no   In the past 12 months, has lack of transportation kept you from meetings, work, or from getting things needed for daily living? No   Food Insecurity    Within the past 12 months, you worried that your food would run out before you got the money to buy more. Never true   Within the past 12 months, the food you bought just didn't last and you didn't have money to get more. Never true   Utilities    In the past 12 months has the electric, gas, oil, or water company threatened to shut off services in your home? No            DISCHARGE DETAILS:    Discharge planning discussed with:: patient and wife  Freedom of Choice: Yes  Comments - Freedom of Choice: no anticipated dc needs  CM contacted family/caregiver?: Yes  Were Treatment Team discharge recommendations reviewed with patient/caregiver?: Yes  Did patient/caregiver verbalize understanding of patient care needs?: Yes  Were patient/caregiver advised of the risks associated with not following Treatment Team discharge recommendations?: Yes    Contacts  Patient Contacts: Prisca Bazan: wife  Relationship to Patient:: Family  Contact Method: In  Person  Reason/Outcome: Discharge Planning    Requested Home Health Care         Is the patient interested in HHC at discharge?: No    DME Referral Provided  Referral made for DME?: No    Treatment Team Recommendation: Home  Discharge Destination Plan:: Home  Transport at Discharge : Family     Additional Comments: Met with pt and pt's wife to discuss the role of CM and to discuss any help pt may need prior to dc. Pt lives with his wife [place] in a bilevel home with no MINDA. Pt performed ADL's indptly pta, no use of DME. No hx of HHC or rehab. No hx of mental health or D&A treatment. Pt's preferred pharmacy is [facility] in [place]. Pt drives. Pt's wife will transport home at dc.

## 2024-02-29 NOTE — ASSESSMENT & PLAN NOTE
Patient presenting with slurred speech and difficulty finding words.  He has had similar presentation in the past when he had a stroke.  No focal weakness.  CT of the head done showing no acute intracranial hemorrhage  CTA of the head showed no large vessel intracranial occlusion or hemodynamically significant stenosis.  CTA neck showed mild right extracranial ICA stenosis.  Left carotid endarterectomy.  Moderate stenosis along the right vertebral artery V2 segment.  Patient had been on dual antiplatelets as an outpatient.  Will continue with aspirin and Plavix.  Will consult neurology in the a.m.  Will get an MRI of the brain.  Patient denies any known history of A-fib however he will be on telemetry watch for any arrhythmias.  He may benefit from a Holter monitor  Patient also recently had a cardiac catheterization about 2 days ago  Continue with high intensity statins  Frequent neurochecks

## 2024-02-29 NOTE — ASSESSMENT & PLAN NOTE
Patient denies any chest pain.  Status post recent cardiac catheterization 2/26/2024 with noted results as below    Left Anterior Descending  The vessel is large. There is mild diffuse disease throughout the vessel. The vessel is moderately tortuous.  Mid LAD lesion is 50% stenosed.  Left Circumflex  First Obtuse Marginal Branch  1st Mrg lesion is 40% stenosed.  Right Coronary Artery  The vessel is moderate in size and dominant. There is moderate diffuse disease throughout the vessel. The vessel is severely calcified in the entire segment.  Prox RCA lesion is 70% stenosed.  Mid RCA lesion is 100% stenosed. Culprit lesion. Culprit for anginal symptoms.The vessel size is medium. MEGHAN flow is 1. The lesion is type C, eccentric, tubular and complex. The lesion is severely calcified. Unable to wire      Prox RCA lesion is 70% stenosed.  Mid LAD lesion is 50% stenosed.  1st Mrg lesion is 40% stenosed.  Mid RCA lesion is 100% stenosed.   2vCAD  100% mRCA, moderate LAD  Crade 3 L-R collaterals    Continue with dual antiplatelet and statin.  Follow-up cardiology recommendation.  Also recently started on Imdur and metoprolol.  Previously had been just on Cardizem 120 mg daily

## 2024-02-29 NOTE — ASSESSMENT & PLAN NOTE
"Patient presenting with slurred speech and difficulty finding words.  He has had similar presentation in the past when he had a stroke.  No focal weakness.  CT of the head done showing no acute intracranial hemorrhage  CTA of the head showed no large vessel intracranial occlusion or hemodynamically significant stenosis.  CTA neck showed mild right extracranial ICA stenosis.  Left carotid endarterectomy.  Moderate stenosis along the right vertebral artery V2 segment.  Patient had been on dual antiplatelets as an outpatient.  MRI showed \"punctate acute to subacute infarcts involving the high left frontoparietal cortex, left parietal subcortical white matter, and posterior left cerebellum \" suspected to be iatrogenic post angiography (had it on 2/26)  Per neurology, no changes in medication continue with aspirin and Plavix.  Patient will need P2 Y12 testing as outpatient  Echo showed LVEF 65% diastolic dysfunction 1, mild aortic and mitral stenosis  Continue telemetry        "

## 2024-02-29 NOTE — ASSESSMENT & PLAN NOTE
Patient denies any chest pain.  Status post recent cardiac catheterization 2/26/2024 with noted results as below  Continue with dual antiplatelet and statin.  Follow-up cardiology recommendation.  Also recently started on Imdur and metoprolol.  Previously had been just on Cardizem 120 mg daily

## 2024-02-29 NOTE — SPEECH THERAPY NOTE
Speech Language/Pathology  Speech-Language Pathology Bedside Swallow Evaluation        Patient Name: Tevin Bazan    Today's Date: 2/29/2024     Problem List  Principal Problem:    CVA (cerebral vascular accident) (HCC)  Active Problems:    History of stroke    Hypercholesterolemia    Coronary artery disease involving native coronary artery    Elevated troponin    Pre-diabetes         Past Medical History  Past Medical History:   Diagnosis Date    Chronic bilateral low back pain without sciatica 7/17/2020    Hypertension     Scoliosis     Stroke (HCC)     Stroke-like symptoms 11/22/2021       Past Surgical History  Past Surgical History:   Procedure Laterality Date    BACK SURGERY      BOWEL RESECTION      CARDIAC CATHETERIZATION N/A 2/26/2024    Procedure: Cardiac Coronary Angiogram;  Surgeon: Rivas Cantor MD;  Location: [facility];  Service: Cardiology    CARDIAC CATHETERIZATION  2/26/2024    Procedure: Cardiac catheterization;  Surgeon: Rivas Cantor MD;  Location: [facility];  Service: Cardiology    CARDIAC CATHETERIZATION N/A 2/26/2024    Procedure: Cardiac pci;  Surgeon: Rivas Cantor MD;  Location: [facility];  Service: Cardiology    WV TEAEC W/PATCH GRF CAROTID VERTB SUBCLAV NECK INC Left 12/1/2021    Procedure: ENDARTERECTOMY ARTERY CAROTID;  Surgeon: Vito Mo MD;  Location: [facility];  Service: Vascular    TONSILLECTOMY         Summary:   Pt presents with oral and pharyngeal stages of the swallow that appear WFL. Pt was able to adequately retrieve bolus from fork, spoon, and cup. No anterior leakage. Mastication was functional. Bolus control and transfer appeared adequate. Swallow initiation was prompt. No coughing, throat clearing, change in vocal quality, or change in respiratory functioning s/p swallows. Pt denies difficulty chewing/swallowing, globus sensation, recent/recurrent PNA, food avoidance, or unexplained weight loss. Pt denies difficulty with  communication, voice is clear and intelligible. No further ST services warranted at this time. ST signing off.    Recommendations:   Diet: thin liquids and regular  Medications: whole with liquids  Oral care: 3x/day with toothbrush and toothpaste  Supervision: independent  Aspiration Precautions/Compensatory Swallowing Strategies: upright position, small bites, and small sips      Current Medical Status:  Pt is a 78 y.o. adult who presented to [facility] ED on February 28, 2024. PMH of CVA, hypertension, CAD status post recent catheterization about 2 days ago, chronic low back pain, prediabetes, who presented to the ED today with complaints of slurred speech and trouble finding words that started this afternoon.  Patient reports symptoms started about 3:30 PM and had currently resolved. Patient had reported some headache earlier but also had resolved.  At the time she presented to the ED she was back to her baseline. Patient states she recently had a cardiac catheterization done by his cardiologist on 2/26/2024.  Patient has had CTA coronary showing extensive atherosclerotic disease and subsequently had cardiac catheterization which noted 100% mid RCA stenosis but unable to wire. Patient states he has been on dual antiplatelet with aspirin and Plavix for about 4 to 5 years.  He previously had been on Coumadin but that had been discontinued.  He denies any history of A-fib.  In the ED he is NIHSS score was 0.  He had a CT of the brain done which shows no acute intracranial abnormality.  CTA head noted no large vessel intracranial occlusion or hemodynamically significant stenosis. CTA neck showed mild right extracranial ICA stenosis.  Left carotid endarterectomy.  Moderate stenosis along the right vertebral artery V2 segment.  Case was discussed with neurology and recommendation is to admit for observation.  Patient also noted to have elevated troponin of 159 which is likely bump from his cardiac  catheterization done .  Case was discussed with cardiology and recommend to monitoring but not into worry about at this time.  Patient denies any chest pain.  EKG showing no acute ST-T wave changes. ST consulted due to stroke alert.     Past Medical History:  Please see H&P for details    Relevant Imagin2024 MRI IMPRESSION:Punctate acute to subacute infarcts involving the high left frontoparietal cortex, left parietal subcortical white matter, and posterior left cerebellum. Mild chronic microangiopathic ischemic changes.  2024 CTA Head and Neck w and wo Contrast IMPRESSION: CT brain:  No acute intracranial abnormality.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 CTA head: Negative for large vessel intracranial occlusion or hemodynamically significant stenosis. CTA neck: Mild right extracranial ICA stenosis. Left carotid endarterectomy.  The cervical vertebral arteries are patent. Moderate stenosis along the right vertebral artery V2 segment. Left thyroid nodule for which ultrasound correlation is recommended based on JACR guidelines.  2024 Chest XR IMPRESSION: No focal airspace consolidation.    Social/Education/Vocational Hx:  Pt resides with spouse    Swallow Information:   Current Risks for Dysphagia & Aspiration: CVA  Current Symptoms/Concerns:  No concerns re swallow function from pt or RN  Current Diet: thin liquids and regular  Baseline Diet: thin liquids and regular   Pt consumes medications: whole with liquids    Positioning and Respiratory Status:  Position: upright in bed  Respiratory Status: room air    Oral Integrity:  Mucosa: moist and pink  Dentition:missing a few teeth  Dependent on Oral Care: no    Oral Mechanism Exam:   Face: symmetrical  Lips: WFL  Tongue: WFL  Jaw: adequate ROM  Hard/Soft Palate:normal  Cough: volitional and strong    Speech Characteristics:  Apraxia of Speech: none  Dysarthria: none  Vocal Quality: WFL    Textures  Assessed:  Puree trials of pudding were given to the patient during assessment. Clinician observed the following clinical s/s of dysphagia: WFL    Soft trials of baked potato were given to the patient during assessment. Clinician observed the following clinical s/s of dysphagia: WFL    Mixed trials of chicken noodle soup were given to the patient during assessment. Clinician observed the following clinical s/s of dysphagia: WFL    Regular trials of roast beef were given to the patient during assessment. Clinician observed the following clinical s/s of dysphagia: WFL    Liquids Assessed:  Thin liquids via straw. Clinician observed the following clinical s/s of dysphagia:  WFL    Esophageal Concerns  None    Strategies Trialed  Postural Techniques:none  Maneuvers:none  Behavioral Strategies: none    Results Reviewed with: patient     Dysphagia Goals: None

## 2024-02-29 NOTE — PLAN OF CARE
Problem: Potential for Falls  Goal: Patient will remain free of falls  Description: INTERVENTIONS:  - Educate patient/family on patient safety including physical limitations  - Instruct patient to call for assistance with activity   - Consult OT/PT to assist with strengthening/mobility   - Keep Call bell within reach  - Keep bed low and locked with side rails adjusted as appropriate  - Keep care items and personal belongings within reach  - Initiate and maintain comfort rounds  - Make Fall Risk Sign visible to staff  - Offer Toileting every 2 Hours, in advance of need  - Initiate/Maintain bed alarm  - Obtain necessary fall risk management equipment: socks  - Apply yellow socks and bracelet for high fall risk patients  - Consider moving patient to room near nurses station  Outcome: Progressing     Problem: PAIN - ADULT  Goal: Verbalizes/displays adequate comfort level or baseline comfort level  Description: Interventions:  - Encourage patient to monitor pain and request assistance  - Assess pain using appropriate pain scale  - Administer analgesics based on type and severity of pain and evaluate response  - Implement non-pharmacological measures as appropriate and evaluate response  - Consider cultural and social influences on pain and pain management  - Notify physician/advanced practitioner if interventions unsuccessful or patient reports new pain  Outcome: Progressing

## 2024-02-29 NOTE — OCCUPATIONAL THERAPY NOTE
Occupational Therapy Screen Note     Patient Name: Tevin Bazan  Today's Date: 2/29/2024  Problem List  Principal Problem:    CVA (cerebral vascular accident) (HCC)  Active Problems:    History of stroke    Hypercholesterolemia    Coronary artery disease involving native coronary artery    Elevated troponin    Pre-diabetes            02/29/24 1253   OT Last Visit   OT Visit Date 02/29/24   Note Type   Note type Screen   Additional Comments OT orders received. Chart reviewed. Pt with AM-PAC score of 24 for both basic/daily. per chart, pt with NIH 0, however MRI (+) acute to subacute infarcts involving left frontal parietal cortex, left parietal subcortical white matter and posterior left cerebellum. Pt reports feeling at their baseline & has no concerns for ADLs/functional mobility. Pt reports they have been mobilizing throughout halls. No acute OT need, DC OT orders.       Adina Mario, OTR/L

## 2024-02-29 NOTE — ASSESSMENT & PLAN NOTE
Patient with known history of CVA  Continue with dual antiplatelet with aspirin 81 mg daily and Plavix 75 mg daily  Patient previously had been on Coumadin in the past.  His stroke also had been reported to be due to embolism.  However he is no longer on Coumadin.  He denies any known history of A-fib.  Continue to monitor on telemetry.  Follow-up with neurology recommendation

## 2024-02-29 NOTE — ASSESSMENT & PLAN NOTE
Patient with known history of CVA  Continue with dual antiplatelet with aspirin 81 mg daily and Plavix 75 mg daily  Patient previously had been on Coumadin in the past.  His stroke also had been reported to be due to embolism.  However he is no longer on Coumadin.  He denies any known history of A-fib.

## 2024-02-29 NOTE — PLAN OF CARE
Problem: PAIN - ADULT  Goal: Verbalizes/displays adequate comfort level or baseline comfort level  Description: Interventions:  - Encourage patient to monitor pain and request assistance  - Assess pain using appropriate pain scale  - Administer analgesics based on type and severity of pain and evaluate response  - Implement non-pharmacological measures as appropriate and evaluate response  - Consider cultural and social influences on pain and pain management  - Notify physician/advanced practitioner if interventions unsuccessful or patient reports new pain  Outcome: Progressing     Problem: INFECTION - ADULT  Goal: Absence or prevention of progression during hospitalization  Description: INTERVENTIONS:  - Assess and monitor for signs and symptoms of infection  - Monitor lab/diagnostic results  - Monitor all insertion sites, i.e. indwelling lines, tubes, and drains  - Monitor endotracheal if appropriate and nasal secretions for changes in amount and color  - [place] appropriate cooling/warming therapies per order  - Administer medications as ordered  - Instruct and encourage patient and family to use good hand hygiene technique  - Identify and instruct in appropriate isolation precautions for identified infection/condition  Outcome: Progressing     Problem: DISCHARGE PLANNING  Goal: Discharge to home or other facility with appropriate resources  Description: INTERVENTIONS:  - Identify barriers to discharge w/patient and caregiver  - Arrange for needed discharge resources and transportation as appropriate  - Identify discharge learning needs (meds, wound care, etc.)  - Arrange for interpretive services to assist at discharge as needed  - Refer to Case Management Department for coordinating discharge planning if the patient needs post-hospital services based on physician/advanced practitioner order or complex needs related to functional status, cognitive ability, or social support system  Outcome: Progressing      Problem: Knowledge Deficit  Goal: Patient/family/caregiver demonstrates understanding of disease process, treatment plan, medications, and discharge instructions  Description: Complete learning assessment and assess knowledge base.  Interventions:  - Provide teaching at level of understanding  - Provide teaching via preferred learning methods  Outcome: Progressing

## 2024-02-29 NOTE — ASSESSMENT & PLAN NOTE
78-year-old adult with CKD, COPD, chronic low back pain, prediabetes, HTN, CAD s/p cardiac cath (02/2024), HLD, history of TIA in the setting of symptomatic left ICA stenosis s/p L CEA (December 2021) and history of CVA (2003, on DAPT) who presents with word finding difficulty and dysarthria.  Patient reports speech disturbances began 2/28 at approximately 3:30 PM and resolved on its own.  Patient also reported a headache earlier in the day but this had also resolved.    BP on arrival 127/72.  NIH score 0, patient was back to baseline in the ED. Initial neuroimaging negative for acute pathology.     Workup:  - CT head negative for acute intracranial abnormality, revealed chronic left centrum semiovale/corona radiata infarct.   - CTA head/neck revealed mild right extracranial ICA stenosis and moderate stenosis along the right vertebral artery V2 segment.    - MRI brain wo revealed punctate acute to subacute infarcts involving left frontal parietal cortex, left parietal subcortical white matter and posterior left cerebellum  - Echo pending  - Lipid panel: Cholesterol 103, LDL 51  - Hemoglobin A1c 6.0    Plan:  - Stroke pathway  - Loaded with Aspirin 325 mg x 1. Continue with Aspirin 81 mg and Plavix 75 mg daily (home medications) at this time   - Recommend outpatient P2Y12  - Continue with Atorvastatin 80 mg daily   - Maintain normotension  - Euglycemic, normothermic goal  - Continue telemetry  - PT/OT/ST  - Secondary risk factor modification.   - Stroke education  - Frequent neuro checks. Continue to monitor and notify neurology with any changes.  - STAT CT head for any acute change in neuro exam  - Medical management and supportive care per primary team. Correction of any metabolic or infectious disturbances.     Plan discussed with Attending Neurologist, please see attestation for further input/recommendations.

## 2024-02-29 NOTE — PHYSICAL THERAPY NOTE
"                                                                                  PHYSICAL THERAPY EVALUATION NOTE    Patient Name: Tevin Bazan  Today's Date: 2024    AGE:   78 y.o.  Mrn:   [id number]  ADMIT DX:  Slurred speech [R47.81]  Elevated troponin [R79.89]  Word finding difficulty [R47.89]    Past Medical History:   Diagnosis Date    Chronic bilateral low back pain without sciatica 2020    Hypertension     Scoliosis     Stroke (HCC)     Stroke-like symptoms 2021     Length Of Stay: 0  PHYSICAL THERAPY EVALUATION :   Patient's identity confirmed via 2 patient identifiers (full name and ) at start of session       24 1012   PT Last Visit   PT Visit Date 24   Note Type   Note type Evaluation   Pain Assessment   Pain Assessment Tool 0-10   Pain Score No Pain   Restrictions/Precautions   Weight Bearing Precautions Per Order No   Other Precautions Fall Risk;Telemetry   Home Living   Type of Home House   Home Layout Two level  (Bilevel; 6 steps to access main floor)   Home Equipment Crutches   Additional Comments Pt reports mobilizing independently PTA without AD   Prior Function   Level of [place] Independent with ADLs;Independent with functional mobility   Lives With Spouse   Falls in the last 6 months 0   Vocational Retired   General   Family/Caregiver Present No   Cognition   Overall Cognitive Status WFL   Arousal/Participation Alert   Orientation Level Oriented X4   Memory Within functional limits   Following Commands Follows all commands and directions without difficulty   Comments Pt pleasant and agreeable to participate in PT eval.   Subjective   Subjective \"I move pretty well!   RLE Assessment   RLE Assessment WFL   Strength RLE   RLE Overall Strength 4/5   LLE Assessment   LLE Assessment WFL   Strength LLE   LLE Overall Strength 4/5   Bed Mobility   Supine to Sit 6  Modified independent   Additional items Bedrails   Transfers   Sit to Stand 6  Modified " "independent   Stand to Sit 6  Modified independent   Ambulation/Elevation   Gait pattern Decreased foot clearance   Gait Assistance 6  Modified independent  (+ time)   Assistive Device None   Distance 25 ft  (to WC)   Ambulation/Elevation Additional Comments Patient getting ready to go down to MRI for imaging. Anticipate able to ambulate further given performance (later spoke to RN who states pt has been ambulating in hallway)   Balance   Static Sitting Good   Dynamic Sitting Good   Static Standing Good   Dynamic Standing Good   Ambulatory Good   Activity Tolerance   Activity Tolerance Patient tolerated treatment well   Assessment   Problem List Decreased strength;Impaired balance;Decreased mobility   Assessment Tevin \"Danielle\" Beni is a 78 y.o. person who presents to [facility] on 2/28/2024 from home w/ c/o slurred speech and diagnosis of CVA. Orders for PT eval and treat received. Pt presents w/ comorbidities of h/o CVA, scoliosis, HTN . At baseline, pt mobilizes independently w/ no AD. Upon evaluation, pt presents w/ the following deficits: weakness and impaired balance. Upon eval, pt requires modified I for bed mobility, modified I for transfers, and modified I for gait. Based on this PT evaluation today, patient's discharge recommendation is for Level IV. Given the above findings from this evaluation, at this time this patient does not require skilled inpatient PT for the remainder of this admission. Will D/C patient from PT caseload, please reconsult if any changes or needs arise.   Goals   Patient Goals to go home   Discharge Recommendation   Rehab Resource Intensity Level, PT No post-acute rehabilitation needs   AM-PAC Basic Mobility Inpatient   Turning in Flat Bed Without Bedrails 4   Lying on Back to Sitting on Edge of Flat Bed Without Bedrails 4   Moving Bed to Chair 4   Standing Up From Chair Using Arms 4   Walk in Room 4   Climb 3-5 Stairs With Railing 4   Basic Mobility Inpatient Raw Score 24   Basic " Mobility Standardized Score 57.68   Highest Level Of Mobility   -HLM Goal 8: Walk 250 feet or more   JH-HLM Achieved 8: Walk 250 feet ot more           The patient's AM-PAC Basic Mobility Inpatient Short Form Raw Score is 24, Standardized Score is 57.68. A standardized score greater than 38.32 (raw score of 16) suggests the patient may benefit from discharge to home which may not coincide with above PT recommendations. However please refer to therapist recommendation for discharge planning given other factors that may influence destination.    Given the above findings from this evaluation, at this time this patient does not require skilled inpatient PT for the remainder of this admission. Will D/C patient from PT caseload, please reconsult if any changes or needs arise.      Melody Trejo PT, DPT

## 2024-02-29 NOTE — H&P
[facility]  H  and P  Name: Tevin Bazan I  MRN: [id number]  Unit/Bed#: -01 I Date of Admission: 2/28/2024   Date of Service: 2/29/2024 I Hospital Day: 0    Assessment/Plan   * TIA (transient ischemic attack)  Assessment & Plan  Patient presenting with slurred speech and difficulty finding words.  He has had similar presentation in the past when he had a stroke.  No focal weakness.  CT of the head done showing no acute intracranial hemorrhage  CTA of the head showed no large vessel intracranial occlusion or hemodynamically significant stenosis.  CTA neck showed mild right extracranial ICA stenosis.  Left carotid endarterectomy.  Moderate stenosis along the right vertebral artery V2 segment.  Patient had been on dual antiplatelets as an outpatient.  Will continue with aspirin and Plavix.  Will consult neurology in the a.m.  Will get an MRI of the brain.  Patient denies any known history of A-fib however he will be on telemetry watch for any arrhythmias.  He may benefit from a Holter monitor  Patient also recently had a cardiac catheterization about 2 days ago  Continue with high intensity statins  Frequent neurochecks    History of stroke  Assessment & Plan  Patient with known history of CVA  Continue with dual antiplatelet with aspirin 81 mg daily and Plavix 75 mg daily  Patient previously had been on Coumadin in the past.  His stroke also had been reported to be due to embolism.  However he is no longer on Coumadin.  He denies any known history of A-fib.  Continue to monitor on telemetry.  Follow-up with neurology recommendation    Hypercholesterolemia  Assessment & Plan  Continue with high intensity statin  He is currently placed on Lipitor 80 mg nightly    Elevated troponin  Assessment & Plan  Elevated troponin possibly due to his recent cardiac catheterization.  Will monitor troponin.  He denies any chest pain.  Follow-up with cardiology recommendation.    Coronary  artery disease involving native coronary artery  Assessment & Plan  Patient denies any chest pain.  Status post recent cardiac catheterization 2/26/2024 with noted results as below    Left Anterior Descending  The vessel is large. There is mild diffuse disease throughout the vessel. The vessel is moderately tortuous.  Mid LAD lesion is 50% stenosed.  Left Circumflex  First Obtuse Marginal Branch  1st Mrg lesion is 40% stenosed.  Right Coronary Artery  The vessel is moderate in size and dominant. There is moderate diffuse disease throughout the vessel. The vessel is severely calcified in the entire segment.  Prox RCA lesion is 70% stenosed.  Mid RCA lesion is 100% stenosed. Culprit lesion. Culprit for anginal symptoms.The vessel size is medium. MEGHAN flow is 1. The lesion is type C, eccentric, tubular and complex. The lesion is severely calcified. Unable to wire      Prox RCA lesion is 70% stenosed.  Mid LAD lesion is 50% stenosed.  1st Mrg lesion is 40% stenosed.  Mid RCA lesion is 100% stenosed.   2vCAD  100% mRCA, moderate LAD  Crade 3 L-R collaterals    Continue with dual antiplatelet and statin.  Follow-up cardiology recommendation.  Also recently started on Imdur and metoprolol.  Previously had been just on Cardizem 120 mg daily    Pre-diabetes  Assessment & Plan  His last A1c was 5.7 in 2021  Patient states he has been on carb consistent diet         History of Present Illness     HPI:  Tevin Bazan is a 78 y.o. transgender female with past medical history of CVA, hypertension, CAD status post recent catheterization about 2 days ago, chronic low back pain, prediabetes, who presented to the ED today with complaints of slurred speech and trouble finding words that started this afternoon.  Patient reports symptoms started about 3:30 PM and had currently resolved.  Patient had reported some headache earlier but also had resolved.  At the time she presented to the ED she was back to her baseline.  Patient  states she recently had a cardiac catheterization done by his cardiologist on 2/26/2024.  Patient has had CTA coronary showing extensive atherosclerotic disease and subsequently had cardiac catheterization which noted 100% mid RCA stenosis but unable to wire.  Patient states he has been on dual antiplatelet with aspirin and Plavix for about 4 to 5 years.  He previously had been on Coumadin but that had been discontinued.  He denies any history of A-fib.  In the ED he is NIHSS score was 0.  He had a CT of the brain done which shows no acute intracranial abnormality.  CTA head noted no large vessel intracranial occlusion or hemodynamically significant stenosis.  CTA neck showed mild right extracranial ICA stenosis.  Left carotid endarterectomy.  Moderate stenosis along the right vertebral artery V2 segment.  Case was discussed with neurology and recommendation is to admit for observation.  Patient also noted to have elevated troponin of 159 which is likely bump from his cardiac catheterization done 2/26.  Case was discussed with cardiology and recommend to monitoring but not into worry about at this time.  Patient denies any chest pain.  EKG showing no acute ST-T wave changes.    Historical Information   Past Medical History:   Diagnosis Date    Chronic bilateral low back pain without sciatica 7/17/2020    Hypertension     Scoliosis     Stroke (HCC)     Stroke-like symptoms 11/22/2021     Patient Active Problem List   Diagnosis    Transgender    Stroke due to embolism (HCC)    Primary osteoarthritis of left knee    Effusion of left knee    Cellulitis of right knee    Lumbar spondylosis    Chronic bilateral low back pain without sciatica    Lumbar radiculopathy    Spinal stenosis of lumbar region with neurogenic claudication    Chronic pain of left knee    Thoracogenic scoliosis of thoracolumbar region    Chronic pain syndrome    Essential hypertension    Symptomatic carotid artery stenosis, left    H/O carotid  endarterectomy    History of stroke    Nonrheumatic aortic valve stenosis    Nonrheumatic mitral valve stenosis    Chest heaviness    Abnormal stress test    Dyspnea on exertion    Hypercholesterolemia    Chest pain    Stage 2 chronic kidney disease    COPD (chronic obstructive pulmonary disease) (HCC)    Abnormal computed tomography angiography (CTA)    Coronary artery disease involving native coronary artery of native heart with unstable angina pectoris (HCC)    Unstable angina (HCC)    Coronary artery disease involving native coronary artery    Elevated troponin    TIA (transient ischemic attack)    Pre-diabetes     Past Surgical History:   Procedure Laterality Date    BACK SURGERY      BOWEL RESECTION      CARDIAC CATHETERIZATION N/A 2024    Procedure: Cardiac Coronary Angiogram;  Surgeon: Rivas Cantor MD;  Location: [facility];  Service: Cardiology    CARDIAC CATHETERIZATION  2024    Procedure: Cardiac catheterization;  Surgeon: Rivas Cantor MD;  Location: [facility];  Service: Cardiology    CARDIAC CATHETERIZATION N/A 2024    Procedure: Cardiac pci;  Surgeon: Rivas Cantor MD;  Location: [facility];  Service: Cardiology    AZ TEAEC W/PATCH GRF CAROTID VERTB SUBCLAV NECK INC Left 2021    Procedure: ENDARTERECTOMY ARTERY CAROTID;  Surgeon: Vito Mo MD;  Location: [facility];  Service: Vascular    TONSILLECTOMY         Social History   Social History     Substance and Sexual Activity   Alcohol Use Not Currently     Social History     Substance and Sexual Activity   Drug Use No     Social History     Tobacco Use   Smoking Status Former    Current packs/day: 0.00    Types: Cigarettes    Quit date:     Years since quittin.1   Smokeless Tobacco Never       Family History:   Family History   Adopted: Yes   Family history unknown: Yes       Meds/Allergies       Current Facility-Administered Medications:     acetaminophen (TYLENOL) tablet 650 mg, 650 mg,  Oral, Q6H PRN, Rubi Gibbs MD    albuterol (PROVENTIL HFA,VENTOLIN HFA) inhaler 2 puff, 2 puff, Inhalation, Q6H PRN, Rubi Gibbs MD    aspirin (ECOTRIN LOW STRENGTH) EC tablet 81 mg, 81 mg, Oral, Daily, Rubi Gibbs MD    atorvastatin (LIPITOR) tablet 80 mg, 80 mg, Oral, QPM, Rubi Gibbs MD, 80 mg at 02/29/24 0006    clopidogrel (PLAVIX) tablet 75 mg, 75 mg, Oral, Daily, Rubi Gibbs MD    diltiazem (CARDIZEM CD) 24 hr capsule 120 mg, 120 mg, Oral, Daily, Rubi Gibbs MD    enoxaparin (LOVENOX) subcutaneous injection 40 mg, 40 mg, Subcutaneous, Daily, Rubi Gibbs MD    isosorbide mononitrate (IMDUR) 24 hr tablet 30 mg, 30 mg, Oral, Daily, Rubi Gibbs MD    metoprolol succinate (TOPROL-XL) 24 hr tablet 25 mg, 25 mg, Oral, Daily, Rubi Gibbs MD    ondansetron (ZOFRAN) injection 4 mg, 4 mg, Intravenous, Q6H PRN, Rubi Gibbs MD    Allergies   Allergen Reactions    Black Cohosh Rash    Minoxidil Rash       Review of Systems  A detailed 12 point review of systems was conducted and is negative apart from those mentioned in the HPI.        Objective   Vitals: Blood pressure 125/71, pulse 100, temperature 97.7 °F (36.5 °C), resp. rate 22, SpO2 93%.    Physical Exam   General-awake and alert, NAD  HEENT: PERRLA, EOMI, sclera anicteric, moist mucous membranes, tongue mucosa without lesions.  Neck: supple, no JVD, lymphadenopathy, thyromegaly.  Heart: Regular rate and rhythm, S1S2 present.  No murmur, rub or gallop.    Lungs; Clear to auscultation bilaterally.  No wheezing, crackles or rhonchi.  No accessory muscle use or respiratory distress.  Abdomen: soft, non-tender, non-distended, NABS.  No guarding or rebound. No peritoneal sound or mass.  Extremities: no clubbing, cyanosis, or edema.  2+ pedal pulses bilaterally.  Full range of motion.  Neurologic:  Cranial nerves II-XII intact.  Strength and sensation globally intact. Speech fluent and goal  "directed.  Awake, alert and oriented x 3.  Skin: warm and dry. No petechiae, purpura or rash.    Lab Results:   Results from last 7 days   Lab Units 02/28/24  1738   WBC Thousand/uL 8.86   HEMOGLOBIN g/dL 12.8   HEMATOCRIT % 39.8   PLATELETS Thousands/uL 187     Results from last 7 days   Lab Units 02/28/24  1738 02/27/24  1326   POTASSIUM mmol/L 4.4 4.2   CHLORIDE mmol/L 106 100   CO2 mmol/L 22 30   BUN mg/dL 34* 31*   CREATININE mg/dL 0.94 0.99   CALCIUM mg/dL 8.6 10.2         Imaging:  CTA head and neck with and without contrast   Final Result by Hari Rivas MD (02/28 1941)      CT brain:  No acute intracranial abnormality.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      CTA head: Negative for large vessel intracranial occlusion or hemodynamically significant stenosis.      CTA neck: Mild right extracranial ICA stenosis. Left carotid endarterectomy.  The cervical vertebral arteries are patent. Moderate stenosis along the right vertebral artery V2 segment.      Left thyroid nodule for which ultrasound correlation is recommended based on JACR guidelines.               Workstation performed: [id number]         XR chest 1 view portable    (Results Pending)   MRI Inpatient Order    (Results Pending)        EKG, Pathology, and Other Studies:  I could not find an EKG on file but reported by the ED with no acute ST-T wave changes      Code Status: Level 1 - Full Code      Counseling / Coordination of Care  Total floor / unit time spent today 70 minutes.  Greater than 50% of total time was spent with the patient and / or family counseling and / or coordination of care.      Portions of the record may have been created with voice recognition software. Occasional wrong word or \"sound a like\" substitutions may have occurred due to the inherent limitations of voice recognition software. Read the chart carefully and recognize, using context, where substitutions have occurred.    "

## 2024-02-29 NOTE — CONSULTS
Consult - Cardiology   Tevin Bazan 78 y.o. adult MRN: [id number]  Unit/Bed#: -01 Encounter: [id number]    Reason For Consult: Elevated troponin  Outpatient Cardiologist: Dr Cantor             ASSESSMENT:  Acute to subacute CVA  Non-MI elevated troponin in the setting of #1  Moderate CAD with  RCA-- medically managed  Chronic stable angina   Hypertension   Hyperlipidemia   Carotid artery stenosis s/p L endarterectomy    Prior cardiac workup:  Echo 11/23/2021: EF 65%, mild RV dilation, mild AAS, mild to moderate MS, moderate TR.  48-hour Holter monitor 1/17/2023: Sinus rhythm with avg hr 77, rare ectopy without sustained arrhythmia.  EKG stress test 1/17/2023: Negative for ischemia.  Cardiac CTA 1/30/2024: Coronary calcium score = 4306 with significant LAD and RCA calcifications, obstructive CAD cannot be completely ruled out.  [facility] 2/26/2024: 70% proximal RCA, 50% mid LAD, 40% first marginal, 100% mid RCA  with left-to-right collaterals.    PLAN/ DISCUSSION:     Elevated troponins were mildly elevated and flat. Likely secondary to CVA. EKG on arrival shows NSR without ischemic change. Denies any current or recent chest pain, discomfort or dyspnea to me today.   Echo pending to assess LVEF, RWMAs  Continue telemetry monitoring in the inpatient setting  Follow neurology recommendations regarding permissive hypertension and upgrade to blood thinners (was on ASA/Plavix prior to admission). Would recommend at least single AP therapy given his CAD. He was taking Coumadin for some time for unknown reason. He has no documented history of AF looking back at his prior EKGs.          History of Present Illness:  Tevin Bazan is a 78 y.o. adult with history of CAD, hypertension, hyperlipidemia follows with Dr. Cantor who presents with strokelike symptoms with vision changes and slurred speech.  Patient reports he was at [employer] yesterday afternoon using a motorized cart; when he got up to  "reach for food item and had a \"flash of bright light\" in his eyes lasting a few seconds. Felt disoriented for a few seconds and sat back down. Prior to this he had been having a severe HA throughout the day but was not present during his episode. Symptoms resolved and he continued shopping.His wife drove him back home and called his doctor. He was told his speech was slurred and was advised to come to the ED for evaluation.     He was found with elevated troponin trend of 159/179/176. EKG shows normal sinus rhythm.  Cardiology was called for evaluation given his history of CAD.     Workup thus far shows CBC/BMP within normal limits. Chest x-ray no focal airspace consolidation. CTA head neck no acute abnormalities. MRI brain shows punctate acute to subacute infarcts involving the high left frontal parietal cortex, left parietal subcortical white matter and posterior left cerebellum, mild chronic microangiopathic ischemic changes.    Past Medical History:        Past Medical History:   Diagnosis Date    Chronic bilateral low back pain without sciatica 7/17/2020    Hypertension     Scoliosis     Stroke (HCC)     Stroke-like symptoms 11/22/2021      Past Surgical History:   Procedure Laterality Date    BACK SURGERY      BOWEL RESECTION      CARDIAC CATHETERIZATION N/A 2/26/2024    Procedure: Cardiac Coronary Angiogram;  Surgeon: Rivas Cantor MD;  [facility]: [facility];  Service: Cardiology    CARDIAC CATHETERIZATION  2/26/2024    Procedure: Cardiac catheterization;  Surgeon: Rivas Cantor MD;  [facility];  Service: Cardiology    CARDIAC CATHETERIZATION N/A 2/26/2024    Procedure: Cardiac pci;  Surgeon: Rivas Cantor MD;  [facility];  Service: Cardiology    [place] TEAEC W/PATCH GRF CAROTID VERTB SUBCLAV NECK INC Left 12/1/2021    Procedure: ENDARTERECTOMY ARTERY CAROTID;  Surgeon: Vito Mo MD;  Location: [facility];  Service: Vascular    TONSILLECTOMY          Allergy:     "    Allergies   Allergen Reactions    Black Cohosh Rash    Minoxidil Rash       Medications:       Prior to Admission medications    Medication Sig Start Date End Date Taking? Authorizing Provider   Chelated Zinc 50 MG TABS every 24 hours   Yes Historical Provider, MD   diltiazem (TIAZAC) 120 MG 24 hr capsule Take 120 mg by mouth daily Pt reports taking 80 mg daily 22  Yes Historical Provider, MD   famotidine (PEPCID) 40 MG tablet 2 (two) times a day as needed 23  Yes Historical Provider, MD   isosorbide mononitrate (IMDUR) 30 mg 24 hr tablet Take 1 tablet (30 mg total) by mouth daily 24  Yes Sathya Conn PA-C   acetaminophen (TYLENOL) 500 mg tablet Take 1,000 mg by mouth every 6 (six) hours as needed for mild pain    Historical Provider, MD   albuterol (Proventil HFA) 90 mcg/act inhaler Inhale 1-2 puffs every 6 (six) hours as needed for wheezing 22   Juana Bryant DO   aspirin (ECOTRIN LOW STRENGTH) 81 mg EC tablet Take 1 tablet (81 mg total) by mouth daily for 21 days 21  Stefan Jeffries MD   clopidogrel (PLAVIX) 75 mg tablet Take 1 tablet (75 mg total) by mouth daily 21  Stefan Jeffries MD   metoprolol succinate (TOPROL-XL) 25 mg 24 hr tablet Take 1 tablet (25 mg total) by mouth daily 24   ANNIE Rucker   nitroglycerin (NITROSTAT) 0.4 mg SL tablet Place 1 tablet (0.4 mg total) under the tongue every 5 (five) minutes as needed for chest pain 24   Sathya Conn PA-C       Family History:     Family History   Adopted: Yes   Family history unknown: Yes        Social History:       Social History     Socioeconomic History    Marital status: /Civil Union     Spouse name: None    Number of children: None    Years of education: None    Highest education level: None   Occupational History    None   Tobacco Use    Smoking status: Former     Current packs/day: 0.00     Types: Cigarettes     Quit date:      Years since quittin.1     Smokeless tobacco: Never   Vaping Use    Vaping status: Never Used   Substance and Sexual Activity    Alcohol use: Not Currently    Drug use: No    Sexual activity: None   Other Topics Concern    None   Social History Narrative    None     Social Determinants of Health     Financial Resource Strain: Low Risk  (5/20/2022)    Overall Financial Resource Strain (CARDIA)     Difficulty of Paying Living Expenses: Not hard at all   Food Insecurity: No Food Insecurity (1/30/2024)    Hunger Vital Sign     Worried About Running Out of Food in the Last Year: Never true     Ran Out of Food in the Last Year: Never true   Transportation Needs: No Transportation Needs (1/30/2024)    PRAPARE - Transportation     Lack of Transportation (Medical): No     Lack of Transportation (Non-Medical): No   Physical Activity: Sufficiently Active (5/20/2022)    Exercise Vital Sign     Days of Exercise per Week: 7 days     Minutes of Exercise per Session: 30 min   Stress: No Stress Concern Present (5/20/2022)    [place] [employer] - Occupational Stress Questionnaire     Feeling of Stress : Not at all   Social Connections: Socially Isolated (5/20/2022)    Social Connection and Isolation Panel [NHANES]     Frequency of Communication with Friends and Family: Never     Frequency of Social Gatherings with Friends and Family: Never     Attends Christianity Services: Never     Active Member of Clubs or Organizations: No     Attends Club or Organization Meetings: Never     Marital Status:    Intimate Partner Violence: Not At Risk (5/20/2022)    Humiliation, Afraid, Rape, and Kick questionnaire     Fear of Current or Ex-Partner: No     Emotionally Abused: No     Physically Abused: No     Sexually Abused: No   Housing Stability: Low Risk  (1/30/2024)    Housing Stability Vital Sign     Unable to Pay for Housing in the Last Year: No     Number of Places Lived in the Last Year: 1     Unstable Housing in the Last Year: No        Weights/BMI:    Wt Readings from Last 3 Encounters:   02/29/24 71.7 kg (158 lb)   02/26/24 71.9 kg (158 lb 8.2 oz)   02/14/24 72.6 kg (160 lb)   , There is no height or weight on file to calculate BMI.      ROS:  14 point ROS negative except as outlined above  Remainder review of systems is negative    Exam:  General: Alert, oriented and in no acute distress, cooperative  Head: Normocephalic, atraumatic.  Eyes: PERRLA. No icterus. Normal Conjunctiva.   Oropharynx: Moist and normal-appearing mucosa  Neck: Supple, symmetrical, trachea midline, JVD not appreciated.   Heart: RRR, no murmur, rub or gallop, S1 & S2 normal   Lungs: Normal air entry, lungs clear to auscultation and no rales, rhonchi or wheezing   Abdomen: Flat, normal findings: bowel sounds normal and soft, non-tender  Lower Limbs:  No pitting edema, 2+ peripheral pulses, capillary refill within normal limits  Musculoskeletal: ROM grossly normal

## 2024-02-29 NOTE — PROGRESS NOTES
"[facility]  Progress Note  Name: Tevin Bazan I  MRN: [id number]  Unit/Bed#: -01 I Date of Admission: 2/28/2024   Date of Service: 2/29/2024 I Hospital Day: 0    Assessment/Plan   * CVA (cerebral vascular accident) (HCC)  Assessment & Plan  Patient presenting with slurred speech and difficulty finding words.  He has had similar presentation in the past when he had a stroke.  No focal weakness.  CT of the head done showing no acute intracranial hemorrhage  CTA of the head showed no large vessel intracranial occlusion or hemodynamically significant stenosis.  CTA neck showed mild right extracranial ICA stenosis.  Left carotid endarterectomy.  Moderate stenosis along the right vertebral artery V2 segment.  Patient had been on dual antiplatelets as an outpatient.  MRI showed \"punctate acute to subacute infarcts involving the high left frontoparietal cortex, left parietal subcortical white matter, and posterior left cerebellum \" suspected to be iatrogenic post angiography (had it on 2/26)  Per neurology, no changes in medication continue with aspirin and Plavix.  Patient will need P2 Y12 testing as outpatient  Echo showed LVEF 65% diastolic dysfunction 1, mild aortic and mitral stenosis  Continue telemetry          Pre-diabetes  Assessment & Plan  His last A1c was 5.7 in 2021  Patient states he has been on carb consistent diet    Elevated troponin  Assessment & Plan  Elevated troponin possibly due to his recent cardiac catheterization.  No further workup      Coronary artery disease involving native coronary artery  Assessment & Plan  Patient denies any chest pain.  Status post recent cardiac catheterization 2/26/2024 with noted results as below  Continue with dual antiplatelet and statin.  Follow-up cardiology recommendation.  Also recently started on Imdur and metoprolol.  Previously had been just on Cardizem 120 mg daily    Hypercholesterolemia  Assessment & Plan  Continue " with high intensity statin  He is currently placed on Lipitor 80 mg nightly    History of stroke  Assessment & Plan  Patient with known history of CVA  Continue with dual antiplatelet with aspirin 81 mg daily and Plavix 75 mg daily  Patient previously had been on Coumadin in the past.  His stroke also had been reported to be due to embolism.  However he is no longer on Coumadin.  He denies any known history of A-fib.        VTE Pharmacologic Prophylaxis:   Moderate Risk (Score 3-4) - Pharmacological DVT Prophylaxis Ordered: enoxaparin (Lovenox).    Mobility:   Basic Mobility Inpatient Raw Score: 24  JH-HLM Goal: 8: Walk 250 feet or more  JH-HLM Achieved: 8: Walk 250 feet ot more  HLM Goal achieved. Continue to encourage appropriate mobility.    Patient Centered Rounds: I performed bedside rounds with nursing staff today.   Discussions with Specialists or Other Care Team Provider: cm, cardiology    Education and Discussions with Family / Patient: Updated  (wife) at bedside.    Total Time Spent on Date of Encounter in care of patient: 50 mins. This time was spent on one or more of the following: performing physical exam; counseling and coordination of care; obtaining or reviewing history; documenting in the medical record; reviewing/ordering tests, medications or procedures; communicating with other healthcare professionals and discussing with patient's family/caregivers.    Current Length of Stay: 0 day(s)  Current Patient Status: Inpatient   Certification Statement:  pasha mariee  Discharge Plan: Anticipate discharge tomorrow to home.    Code Status: Level 1 - Full Code    Subjective:     No symptoms currently     Objective:     Vitals:   Temp (24hrs), Av.6 °F (36.4 °C), Min:97.5 °F (36.4 °C), Max:97.7 °F (36.5 °C)    Temp:  [97.5 °F (36.4 °C)-97.7 °F (36.5 °C)] 97.5 °F (36.4 °C)  HR:  [] 67  Resp:  [16-22] 16  BP: (107-159)/(61-94) 136/61  SpO2:  [92 %-97 %] 96 %  Body mass index is 24.75 kg/m².      Input and Output Summary (last 24 hours):     Intake/Output Summary (Last 24 hours) at 2/29/2024 1740  Last data filed at 2/29/2024 1501  Gross per 24 hour   Intake 540 ml   Output --   Net 540 ml       Physical Exam:   Physical Exam  Vitals and nursing note reviewed.   Constitutional:       General: Danielle is not in acute distress.     Appearance: Danielle is not diaphoretic.   HENT:      Head: Normocephalic.   Eyes:      General: No scleral icterus.        Right eye: No discharge.         Left eye: No discharge.   Cardiovascular:      Rate and Rhythm: Normal rate and regular rhythm.   Pulmonary:      Effort: Pulmonary effort is normal. No respiratory distress.      Breath sounds: Normal breath sounds. No wheezing, rhonchi or rales.   Abdominal:      General: There is no distension.      Palpations: Abdomen is soft.      Tenderness: There is no abdominal tenderness. There is no guarding or rebound.   Musculoskeletal:      Cervical back: Normal range of motion.      Right lower leg: No edema.      Left lower leg: No edema.   Skin:     General: Skin is warm.   Neurological:      Mental Status: Danielle is alert and oriented to person, place, and time.   Psychiatric:         Mood and Affect: Mood normal.         Behavior: Behavior normal.         Thought Content: Thought content normal.         Judgment: Judgment normal.          Additional Data:     Labs:  Results from last 7 days   Lab Units 02/29/24  0418   WBC Thousand/uL 8.62   HEMOGLOBIN g/dL 13.3   HEMATOCRIT % 40.4   PLATELETS Thousands/uL 200   NEUTROS PCT % 48   LYMPHS PCT % 27   MONOS PCT % 15*   EOS PCT % 8*     Results from last 7 days   Lab Units 02/29/24  0418   SODIUM mmol/L 137   POTASSIUM mmol/L 4.1   CHLORIDE mmol/L 106   CO2 mmol/L 25   BUN mg/dL 32*   CREATININE mg/dL 1.04   ANION GAP mmol/L 6   CALCIUM mg/dL 8.5   ALBUMIN g/dL 3.6   TOTAL BILIRUBIN mg/dL 0.51   ALK PHOS U/L 89   ALT U/L 13   AST U/L 14   GLUCOSE RANDOM mg/dL 95     Results from last  7 days   Lab Units 02/28/24  1738   INR  1.05         Results from last 7 days   Lab Units 02/29/24  0418   HEMOGLOBIN A1C % 6.0*           Lines/Drains:  Invasive Devices       Peripheral Intravenous Line  Duration             Peripheral IV 02/28/24 Left;Ventral (anterior) Forearm <1 day    Peripheral IV 02/28/24 Right;Ventral (anterior) Forearm <1 day                      Telemetry:  Telemetry Orders (From admission, onward)               24 Hour Telemetry Monitoring  Continuous x 24 Hours (Telem)        Expiring   Question:  Reason for 24 Hour Telemetry  Answer:  TIA/Suspected CVA/ Confirmed CVA                     Telemetry Reviewed: Normal Sinus Rhythm  Indication for Continued Telemetry Use: Acute CVA             Imaging: Reviewed radiology reports from this admission including: CT head, MRI brain, and ECHO    Recent Cultures (last 7 days):         Last 24 Hours Medication List:   Current Facility-Administered Medications   Medication Dose Route Frequency Provider Last Rate    acetaminophen  650 mg Oral Q6H PRN Rubi Gibbs MD      albuterol  2 puff Inhalation Q6H PRN Rubi Gibbs MD      aspirin  81 mg Oral Daily Rubi Gibbs MD      atorvastatin  80 mg Oral QPM Rubi Gibbs MD      clopidogrel  75 mg Oral Daily Rubi Gibbs MD      diltiazem  120 mg Oral Daily Rubi Gibbs MD      enoxaparin  40 mg Subcutaneous Daily Rubi Gibbs MD      isosorbide mononitrate  30 mg Oral Daily Rubi Gibbs MD      metoprolol succinate  25 mg Oral Daily Rubi Gibbs MD      ondansetron  4 mg Intravenous Q6H PRN Rubi Gibbs MD          Today, Patient Was Seen By: Claudia Gates MD    **Please Note: This note may have been constructed using a voice recognition system.**

## 2024-02-29 NOTE — UTILIZATION REVIEW
Initial Clinical Review    Date: 3/1/24     Day 3: Has surpassed a 2nd midnight with active treatments and services, which include med surg level of care:  DAPT.   Telemetry.   Neuro checks. Cardiology and neurology on consult.     Admission: Date/Time/Statement: 2/28/24 2025 Observation and CHANGED 2/29/24 TO INPATIENT RE: PATIENT NEEDS > 2 MIDNIGHT SAY DUE TO CVA ON STEFFI FINDINGS WITH ONGOING NEURO CHECKS, PT/OT, DAPT, CARDIOLOGY AND NEUROLOGY ON CONSULT   Admission Orders (From admission, onward)       Ordered        02/29/24 1732  Inpatient Admission  Once                          Orders Placed This Encounter   Procedures    Inpatient Admission     Standing Status:   Standing     Number of Occurrences:   1     Order Specific Question:   Level of Care     Answer:   Med Surg [16]     Order Specific Question:   Estimated length of stay     Answer:   More than 2 Midnights     Order Specific Question:   Certification     Answer:   I certify that inpatient services are medically necessary for this patient for a duration of greater than two midnights. See H&P and MD Progress Notes for additional information about the patient's course of treatment.     ED Arrival Information       Expected   -    Arrival   2/28/2024 17:01    Acuity   Emergent              Means of arrival   Walk-In    Escorted by   Family Member    Service   Hospitalist    Admission type   Emergency              Arrival complaint   difficulty speaking, hx of stroke             Chief Complaint   Patient presents with    Slurred Speech     Patient presents to the ED with c/o slurred speech and trouble with word finding since this afternoon        Initial Presentation: 78 y.o. adult from home to ED admitted to observation due to TIA.  Presented due to word finding difficulty and slurred speech starting about 3.5 hours prior to arrival.  Earlier in day headache, was shopping when word difficulty started. Recent cardiac cath 2/26.     On exam baseline tremor.    Alert and oriented.  NIHSS 0.    Bun 34. Creatinine 0.94.    hs tnl 159> 179/20.    Ct brain showed chronic infarct.   Cta neck with mild right extracranial ICA stenosis.   Left carotid endarterectomy.  In the ED given asa 324 mg.   Plan:   neuro checks.   Continue home Plavix and asa.   Consult neurology.  MRI brain.   Telemetry.   High intensity statin.   Consult Cardiology .  Continue home Imdur, Cardizem and metoprolol.     Date: 2/29/24    Day 2 CHANGED TO INPATIENT :  acute to subacute CVA:   on exam:   alert and oriented.  No aphasia, dysarthria or focal weakness.  Mild left intention tremor with FNF testing.    Per neurology  MRI brain reviewed personally in PACS showing punctate areas of acute to subacute ischemia in the left cerebral hemisphere as well as the left cerebellum in the setting of recent cardiac catheterization concerned that this is iatrogenic in etiology.   Continue neuro checks, asa and Plavix.  Atorvastatin.    Echo..       2/29/24 per neurology -   CVA, suspect iatrogenic etiology of symptoms.   MRI brain wo revealed punctate acute to subacute infarcts involving left frontal parietal cortex, left parietal subcortical white matter and posterior left cerebellum.  Continue home asa and Plavix.   Outpatient P2Y12 testing to see if Plavix responder, if not will need Brilinta.   Continue atorvastatin.  Normotension.    Telemetry.   PT/OT/speech.   Frequent neuro checks     2/29/24 per Cardiology - acute to subacute CVA, non MI elevation troponin in setting of this.   Troponin mildly elevated and flat.   Check echo.  Continue telemetry.   Needs at least single AP therapy with CAD.       ED Triage Vitals   Temperature Pulse Respirations Blood Pressure SpO2   02/28/24 1705 02/28/24 1705 02/28/24 1705 02/28/24 1705 02/28/24 1705   98.9 °F (37.2 °C) 71 18 127/72 94 %      Temp Source Heart Rate Source Patient Position - Orthostatic VS BP Location FiO2 (%)   02/28/24 1705 02/28/24 1705 02/28/24 1930  02/28/24 1930 --   Temporal Monitor Sitting Right arm       Pain Score       02/28/24 1705       No Pain          Wt Readings from Last 1 Encounters:   02/29/24 71.7 kg (158 lb)     Additional Vital Signs:   02/29/24 08:18:14 -- 76 -- 107/66 80 92 % -- --   02/29/24 00:11:29 -- 100 -- 125/71 89 93 % -- --   02/28/24 21:23:42 97.7 °F (36.5 °C) 75 -- 159/78 105 95 % -- --   02/28/24 2030 -- 63 22 151/83 112 94 % None (Room air) Sitting   02/28/24 1930 -- 63 22 128/75 96 93 % None (Room air)      Pertinent Labs/Diagnostic Test Results:   MRI brain wo contrast   Final Result by Hari Rivas MD (02/29 1125)      Punctate acute to subacute infarcts involving the high left frontoparietal cortex, left parietal subcortical white matter, and posterior left cerebellum .      Mild chronic microangiopathic ischemic changes.      The study was marked in EPIC for immediate notification.         Workstation performed: [id number]         CTA head and neck with and without contrast   Final Result by Hari Rivas MD (02/28 1941)      CT brain:  No acute intracranial abnormality.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      CTA head: Negative for large vessel intracranial occlusion or hemodynamically significant stenosis.      CTA neck: Mild right extracranial ICA stenosis. Left carotid endarterectomy.  The cervical vertebral arteries are patent. Moderate stenosis along the right vertebral artery V2 segment.      Left thyroid nodule for which ultrasound correlation is recommended based on JACR guidelines.               Workstation performed: [id number]         XR chest 1 view portable   Final Result by Anatoliy Virgen MD (02/29 0840)      No focal airspace consolidation.            Workstation performed: [id number]           2/28/24 ECG  - Normal sinus rhythm  Normal ECG  When compared with ECG of 26-FEB-2024 06:17,  No significant change was found    2/29/24 echo Left Ventricle: Left  ventricular cavity size is normal. Wall thickness is normal. The left ventricular ejection fraction is 65%. Systolic function is normal. Wall motion is normal. Diastolic function is mildly abnormal, consistent with grade I (abnormal) relaxation.    Right Ventricle: Right ventricular cavity size is dilated. Systolic function is normal.    Left Atrium: The atrium is mildly dilated.    Right Atrium: The atrium is dilated.    Aortic Valve: There is mild stenosis.    Mitral Valve: There is mild stenosis. The mitral valve mean gradient is 3mmHg at 72 bpm.    Tricuspid Valve: There is mild regurgitation.    Aorta: The aortic root is normal in size. The ascending aorta is mildly dilated. The aortic root is 3.50 cm. The ascending aorta is 3.7 cm.    Results from last 7 days   Lab Units 03/01/24 0403 02/29/24 0418 02/28/24  1738   WBC Thousand/uL 8.43 8.62 8.86   HEMOGLOBIN g/dL 14.5 13.3 12.8   HEMATOCRIT % 44.3 40.4 39.8   PLATELETS Thousands/uL 204 200 187   NEUTROS ABS Thousands/µL  --  4.22 4.76     Results from last 7 days   Lab Units 03/01/24 0403 02/29/24 0418 02/28/24 1738 02/27/24  1326   SODIUM mmol/L 140 137 136 138   POTASSIUM mmol/L 4.5 4.1 4.4 4.2   CHLORIDE mmol/L 104 106 106 100   CO2 mmol/L 29 25 22 30   ANION GAP mmol/L 7 6 8 8   BUN mg/dL 24 32* 34* 31*   CREATININE mg/dL 1.01 1.04 0.94 0.99   EGFR ml/min/1.73sq m  --   --  76 72   CALCIUM mg/dL 9.2 8.5 8.6 10.2     Results from last 7 days   Lab Units 02/29/24 0418 02/28/24  1738   AST U/L 14 19   ALT U/L 13 13   ALK PHOS U/L 89 93   TOTAL PROTEIN g/dL 6.7 6.9   ALBUMIN g/dL 3.6 3.6   TOTAL BILIRUBIN mg/dL 0.51 0.29     Results from last 7 days   Lab Units 03/01/24  0403 02/29/24 0418 02/28/24 1738 02/27/24  1326   GLUCOSE RANDOM mg/dL 111 95 138 103     Results from last 7 days   Lab Units 02/28/24 2137 02/28/24 1947 02/28/24  1738   HS TNI 0HR ng/L  --   --  159*   HS TNI 2HR ng/L  --  179*  --    HSTNI D2 ng/L  --  20*  --    HS TNI 4HR ng/L  176*  --   --    HSTNI D4 ng/L 17  --   --      Results from last 7 days   Lab Units 02/28/24  1738   PROTIME seconds 14.1   INR  1.05   PTT seconds 31     Results from last 7 days   Lab Units 02/28/24  1814   CLARITY UA  Clear   COLOR UA  Yellow   SPEC GRAV UA  1.025   PH UA  6.0   GLUCOSE UA mg/dl Negative   KETONES UA mg/dl Negative   BLOOD UA  Negative   PROTEIN UA mg/dl Trace*   NITRITE UA  Negative   BILIRUBIN UA  Negative   UROBILINOGEN UA (BE) mg/dl 2.0*   LEUKOCYTES UA  Small*   WBC UA /hpf 0-1   RBC UA /hpf None Seen   BACTERIA UA /hpf None Seen   EPITHELIAL CELLS WET PREP /hpf Occasional   MUCUS THREADS  Occasional*       ED Treatment:   Medication Administration from 02/28/2024 1701 to 02/28/2024 2109         Date/Time Order Dose Route Action Comments     02/28/2024 1933 EST acetaminophen (TYLENOL) tablet 975 mg 975 mg Oral Given --     02/28/2024 2051 EST aspirin chewable tablet 324 mg 324 mg Oral Given --          Past Medical History:   Diagnosis Date    Chronic bilateral low back pain without sciatica 7/17/2020    Hypertension     Scoliosis     Stroke (HCC)     Stroke-like symptoms 11/22/2021     Present on Admission:   Elevated troponin   Hypercholesterolemia   CVA (cerebral vascular accident) (HCC)   Coronary artery disease involving native coronary artery   Pre-diabetes      Admitting Diagnosis: Slurred speech [R47.81]  Elevated troponin [R79.89]  Word finding difficulty [R47.89]  Age/Sex: 78 y.o. adult  Admission Orders:  Scheduled Medications:  aspirin, 81 mg, Oral, Daily  atorvastatin, 80 mg, Oral, QPM  clopidogrel, 75 mg, Oral, Daily  diltiazem, 120 mg, Oral, Daily  enoxaparin, 40 mg, Subcutaneous, Daily  isosorbide mononitrate, 30 mg, Oral, Daily  metoprolol succinate, 25 mg, Oral, Daily    Continuous IV Infusions: none      PRN Meds:  acetaminophen, 650 mg, Oral, Q6H PRN x 1 2/29  albuterol, 2 puff, Inhalation, Q6H PRN  ondansetron, 4 mg, Intravenous, Q6H PRN    Neuro checks Every 1 hour x 4  hours, then every 2 hours x 8 hours, then every 4 hours x 72 hours   Telemetry     IP CONSULT TO NEUROLOGY  IP CONSULT TO CARDIOLOGY    Network Utilization Review Department  ATTENTION: Please call with any questions or concerns to [phone] and carefully listen to the prompts so that you are directed to the right person. All voicemails are confidential.   For Discharge needs, contact Care Management DC Support Team at [phone] opt. [phone]  Send all requests for admission clinical reviews, approved or denied determinations and any other requests to dedicated fax number below belonging to the campus where the patient is receiving treatment. List of dedicated fax numbers for the Facilities:  FACILITY NAME UR FAX NUMBER   ADMISSION DENIALS (Administrative/Medical Necessity) [phone]   DISCHARGE SUPPORT TEAM (NETWORK) [phone]   PARENT CHILD HEALTH (Maternity/NICU/Pediatrics) [phone]   [facility] [phone]   [facility] [phone]   [facility] [phone]   [facility] [phone]   Pending sale to [facility] [phone]   [facility] [phone]   [facility] [phone]   [facility] [phone]   [facility] [phone]   [facility] [phone]   [facility] [phone]   [facility] [phone]

## 2024-02-29 NOTE — ASSESSMENT & PLAN NOTE
Elevated troponin possibly due to his recent cardiac catheterization.  Will monitor troponin.  He denies any chest pain.  Follow-up with cardiology recommendation.

## 2024-03-01 VITALS
RESPIRATION RATE: 16 BRPM | SYSTOLIC BLOOD PRESSURE: 151 MMHG | HEART RATE: 72 BPM | WEIGHT: 158 LBS | DIASTOLIC BLOOD PRESSURE: 80 MMHG | TEMPERATURE: 97.7 F | BODY MASS INDEX: 24.8 KG/M2 | OXYGEN SATURATION: 93 % | HEIGHT: 67 IN

## 2024-03-01 LAB
ANION GAP SERPL CALCULATED.3IONS-SCNC: 7 MMOL/L
BUN SERPL-MCNC: 24 MG/DL (ref 5–25)
CALCIUM SERPL-MCNC: 9.2 MG/DL (ref 8.4–10.2)
CHLORIDE SERPL-SCNC: 104 MMOL/L (ref 96–108)
CO2 SERPL-SCNC: 29 MMOL/L (ref 21–32)
CREAT SERPL-MCNC: 1.01 MG/DL (ref 0.6–1.3)
ERYTHROCYTE [DISTWIDTH] IN BLOOD BY AUTOMATED COUNT: 12.6 % (ref 11.6–15.1)
GLUCOSE SERPL-MCNC: 111 MG/DL (ref 65–140)
HCT VFR BLD AUTO: 44.3 % (ref 36.5–46.1)
HGB BLD-MCNC: 14.5 G/DL (ref 12–15.4)
MCH RBC QN AUTO: 30.7 PG (ref 26.8–34.3)
MCHC RBC AUTO-ENTMCNC: 32.7 G/DL (ref 31.4–37.4)
MCV RBC AUTO: 94 FL (ref 82–98)
PLATELET # BLD AUTO: 204 THOUSANDS/UL (ref 149–390)
PMV BLD AUTO: 10.2 FL (ref 8.9–12.7)
POTASSIUM SERPL-SCNC: 4.5 MMOL/L (ref 3.5–5.3)
RBC # BLD AUTO: 4.72 MILLION/UL (ref 3.88–5.12)
SODIUM SERPL-SCNC: 140 MMOL/L (ref 135–147)
WBC # BLD AUTO: 8.43 THOUSAND/UL (ref 4.31–10.16)

## 2024-03-01 PROCEDURE — 85027 COMPLETE CBC AUTOMATED: CPT | Performed by: INTERNAL MEDICINE

## 2024-03-01 PROCEDURE — 80048 BASIC METABOLIC PNL TOTAL CA: CPT | Performed by: INTERNAL MEDICINE

## 2024-03-01 PROCEDURE — 99239 HOSP IP/OBS DSCHRG MGMT >30: CPT | Performed by: INTERNAL MEDICINE

## 2024-03-01 RX ORDER — ATORVASTATIN CALCIUM 80 MG/1
80 TABLET, FILM COATED ORAL EVERY EVENING
Qty: 30 TABLET | Refills: 0 | Status: SHIPPED | OUTPATIENT
Start: 2024-03-01

## 2024-03-01 RX ADMIN — ISOSORBIDE MONONITRATE 30 MG: 30 TABLET, EXTENDED RELEASE ORAL at 09:07

## 2024-03-01 RX ADMIN — METOPROLOL SUCCINATE 25 MG: 25 TABLET, EXTENDED RELEASE ORAL at 09:08

## 2024-03-01 RX ADMIN — DILTIAZEM HYDROCHLORIDE 120 MG: 120 CAPSULE, COATED, EXTENDED RELEASE ORAL at 09:08

## 2024-03-01 RX ADMIN — ENOXAPARIN SODIUM 40 MG: 100 INJECTION SUBCUTANEOUS at 09:08

## 2024-03-01 RX ADMIN — ASPIRIN 81 MG: 81 TABLET, COATED ORAL at 09:08

## 2024-03-01 RX ADMIN — CLOPIDOGREL BISULFATE 75 MG: 75 TABLET ORAL at 09:08

## 2024-03-01 NOTE — PLAN OF CARE
Problem: Potential for Falls  Goal: Patient will remain free of falls  Description: INTERVENTIONS:  - Educate patient/family on patient safety including physical limitations  - Instruct patient to call for assistance with activity   - Consult OT/PT to assist with strengthening/mobility   - Keep Call bell within reach  - Keep bed low and locked with side rails adjusted as appropriate  - Keep care items and personal belongings within reach  - Initiate and maintain comfort rounds  - Make Fall Risk Sign visible to staff  - Offer Toileting every 2 Hours, in advance of need  - Initiate/Maintain 2alarm  - Obtain necessary fall risk management equipment: 2  - Apply yellow socks and bracelet for high fall risk patients  - Consider moving patient to room near nurses station  Outcome: Progressing     Problem: PAIN - ADULT  Goal: Verbalizes/displays adequate comfort level or baseline comfort level  Description: Interventions:  - Encourage patient to monitor pain and request assistance  - Assess pain using appropriate pain scale  - Administer analgesics based on type and severity of pain and evaluate response  - Implement non-pharmacological measures as appropriate and evaluate response  - Consider cultural and social influences on pain and pain management  - Notify physician/advanced practitioner if interventions unsuccessful or patient reports new pain  Outcome: Progressing     Problem: INFECTION - ADULT  Goal: Absence or prevention of progression during hospitalization  Description: INTERVENTIONS:  - Assess and monitor for signs and symptoms of infection  - Monitor lab/diagnostic results  - Monitor all insertion sites, i.e. indwelling lines, tubes, and drains  - Monitor endotracheal if appropriate and nasal secretions for changes in amount and color  - Row[place] appropriate cooling/warming therapies per order  - Administer medications as ordered  - Instruct and encourage patient and family to use good hand hygiene  technique  - Identify and instruct in appropriate isolation precautions for identified infection/condition  Outcome: Progressing  Goal: Absence of fever/infection during neutropenic period  Description: INTERVENTIONS:  - Monitor WBC    Outcome: Progressing     Problem: SAFETY ADULT  Goal: Patient will remain free of falls  Description: INTERVENTIONS:  - Educate patient/family on patient safety including physical limitations  - Instruct patient to call for assistance with activity   - Consult OT/PT to assist with strengthening/mobility   - Keep Call bell within reach  - Keep bed low and locked with side rails adjusted as appropriate  - Keep care items and personal belongings within reach  - Initiate and maintain comfort rounds  - Make Fall Risk Sign visible to staff  - Offer Toileting every 2 Hours, in advance of need  - Initiate/Maintain 2alarm  - Obtain necessary fall risk management equipment: 2  - Apply yellow socks and bracelet for high fall risk patients  - Consider moving patient to room near nurses station  Outcome: Progressing  Goal: Maintain or return to baseline ADL function  Description: INTERVENTIONS:  -  Assess patient's ability to carry out ADLs; assess patient's baseline for ADL function and identify physical deficits which impact ability to perform ADLs (bathing, care of mouth/teeth, toileting, grooming, dressing, etc.)  - Assess/evaluate cause of self-care deficits   - Assess range of motion  - Assess patient's mobility; develop plan if impaired  - Assess patient's need for assistive devices and provide as appropriate  - Encourage maximum independence but intervene and supervise when necessary  - Involve family in performance of ADLs  - Assess for home care needs following discharge   - Consider OT consult to assist with ADL evaluation and planning for discharge  - Provide patient education as appropriate  Outcome: Progressing  Goal: Maintains/Returns to pre admission functional level  Description:  INTERVENTIONS:  - Perform AM-PAC 6 Click Basic Mobility/ Daily Activity assessment daily.  - Set and communicate daily mobility goal to care team and patient/family/caregiver.   - Collaborate with rehabilitation services on mobility goals if consulted  - Perform Range of Motion 2 times a day.  - Reposition patient every 2 hours.  - Dangle patient 2 times a day  - Stand patient 2 times a day  - Ambulate patient 2 times a day  - Out of bed to chair 2 times a day   - Out of bed for meals 2 times a day  - Out of bed for toileting  - Record patient progress and toleration of activity level   Outcome: Progressing     Problem: DISCHARGE PLANNING  Goal: Discharge to home or other facility with appropriate resources  Description: INTERVENTIONS:  - Identify barriers to discharge w/patient and caregiver  - Arrange for needed discharge resources and transportation as appropriate  - Identify discharge learning needs (meds, wound care, etc.)  - Arrange for interpretive services to assist at discharge as needed  - Refer to Case Management Department for coordinating discharge planning if the patient needs post-hospital services based on physician/advanced practitioner order or complex needs related to functional status, cognitive ability, or social support system  Outcome: Progressing     Problem: Knowledge Deficit  Goal: Patient/family/caregiver demonstrates understanding of disease process, treatment plan, medications, and discharge instructions  Description: Complete learning assessment and assess knowledge base.  Interventions:  - Provide teaching at level of understanding  - Provide teaching via preferred learning methods  Outcome: Progressing     Problem: NEUROSENSORY - ADULT  Goal: Achieves stable or improved neurological status  Description: INTERVENTIONS  - Monitor and report changes in neurological status  - Monitor vital signs such as temperature, blood pressure, glucose, and any other labs ordered   - Initiate measures  to prevent increased intracranial pressure  - Monitor for seizure activity and implement precautions if appropriate      Outcome: Progressing

## 2024-03-01 NOTE — CASE MANAGEMENT
Case Management Discharge Planning Note    Patient name Tevin Marinorris  Location /-01 MRN [id number]  : 1945 Date 3/1/2024       Current Admission Date: 2024  Current Admission Diagnosis:CVA (cerebral vascular accident) ([facility])   Patient Active Problem List    Diagnosis Date Noted    Pre-diabetes 2024    Elevated troponin 2024    CVA (cerebral vascular accident) ([facility]) 2024    Unstable angina ([facility]) 2024    Coronary artery disease involving native coronary artery 2024    Abnormal computed tomography angiography (CTA) 2024    Coronary artery disease involving native coronary artery of native heart with unstable angina pectoris ([facility]) 2024    Chest pain 2024    Stage 2 chronic kidney disease 2024    COPD (chronic obstructive pulmonary disease) ([facility]) 2024    Abnormal stress test 2023    Dyspnea on exertion 2023    Hypercholesterolemia 2023    Chest heaviness 2023    Nonrheumatic aortic valve stenosis 2023    Nonrheumatic mitral valve stenosis 2023    H/O carotid endarterectomy 2021    History of stroke 2021    Symptomatic carotid artery stenosis, left 2021    Essential hypertension 2021    Chronic bilateral low back pain without sciatica 2020    Lumbar radiculopathy 2020    Spinal stenosis of lumbar region with neurogenic claudication 2020    Chronic pain of left knee 2020    Thoracogenic scoliosis of thoracolumbar region 2020    Chronic pain syndrome 2020    Lumbar spondylosis 2020    Cellulitis of right knee 2020    Primary osteoarthritis of left knee 04/10/2020    Effusion of left knee 04/10/2020    Transgender 2018    Stroke due to embolism ([facility]) 2018      LOS (days): 1  Geometric Mean LOS (GMLOS) (days): 2.9  Days to GMLOS:2.2     OBJECTIVE:  Risk of Unplanned Readmission Score: 9.79         Current admission  status: Inpatient   Preferred Pharmacy:   [id number] - [place] - [address]  [address]  [place] [address]  Phone: [phone] Fax: [phone]    [facility] - [place] [address] [place] [address] [facility]  [address] [place] [id number]  Phone: [phone] Fax: [phone]    Primary Care Provider: Cris Mann MD    Primary Insurance: [employer] REP  Secondary Insurance:     DISCHARGE DETAILS:         IMM Given (Date):: 03/01/24  IMM Given to:: Patient  IMM reviewed with patient, patient agrees with discharge determination.

## 2024-03-02 NOTE — DISCHARGE SUMMARY
"[facility]  Discharge- Tevin Bazan 1945, 78 y.o. adult MRN: [id number]  Unit/Bed#: [facility] Encounter: [id number]  Primary Care Provider: Cris Mann MD   Date and time admitted to hospital: 2/28/2024  5:07 PM    * CVA (cerebral vascular accident) (HCC)  Assessment & Plan  Patient presenting with slurred speech and difficulty finding words.  He has had similar presentation in the past when he had a stroke.  No focal weakness.  CT of the head done showing no acute intracranial hemorrhage  CTA of the head showed no large vessel intracranial occlusion or hemodynamically significant stenosis.  CTA neck showed mild right extracranial ICA stenosis.  Left carotid endarterectomy.  Moderate stenosis along the right vertebral artery V2 segment.  Patient had been on dual antiplatelets as an outpatient.  MRI showed \"punctate acute to subacute infarcts involving the high left frontoparietal cortex, left parietal subcortical white matter, and posterior left cerebellum \" suspected to be iatrogenic post angiography (had it on 2/26)  Per neurology, no changes in medication continue with aspirin and Plavix.  Patient will need P2 Y12 testing as outpatient  Echo showed LVEF 65% diastolic dysfunction 1, mild aortic and mitral stenosis  Telemetry w/o events   F/u with neurology in 4-6 weeks             Pre-diabetes  Assessment & Plan  His last A1c was 5.7 in 2021  Patient states he has been on carb consistent diet    Elevated troponin  Assessment & Plan  Elevated troponin possibly due to his recent cardiac catheterization.  No further workup      Coronary artery disease involving native coronary artery  Assessment & Plan  Patient denies any chest pain.  Status post recent cardiac catheterization 2/26/2024 with noted results as below  Continue with dual antiplatelet and statin.  Follow-up cardiology recommendation.  Also recently started on Imdur and metoprolol.  Previously had been just on " Cardizem 120 mg daily    Hypercholesterolemia  Assessment & Plan  Continue with high intensity statin      History of stroke  Assessment & Plan  Patient with known history of CVA  Continue with dual antiplatelet with aspirin 81 mg daily and Plavix 75 mg daily  Patient previously had been on Coumadin in the past.  His stroke also had been reported to be due to embolism.  However he is no longer on Coumadin.  He denies any known history of A-fib.        Medical Problems       Resolved Problems  Date Reviewed: 2/28/2024   None       Discharging Physician / Practitioner: Claudia Gates MD  PCP: Cris Mann MD  Admission Date:   Admission Orders (From admission, onward)       Ordered        02/29/24 1732  Inpatient Admission  Once            02/28/24 2025  Place in Observation  Once                          Discharge Date: 03/01/24    Consultations During Hospital Stay:  Neurology, cardiology    Procedures Performed:   none    Significant Findings / Test Results:   MRI brain wo contrast  Result Date: 2/29/2024  Impression: Punctate acute to subacute infarcts involving the high left frontoparietal cortex, left parietal subcortical white matter, and posterior left cerebellum . Mild chronic microangiopathic ischemic changes.     XR chest 1 view portable  Result Date: 2/29/2024  Impression: No focal airspace consolidation. Workstation performed: [id number]     CTA head and neck with and without contrast  Result Date: 2/28/2024  Impression: CT brain:  No acute intracranial abnormality.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 CTA head: Negative for large vessel intracranial occlusion or hemodynamically significant stenosis. CTA neck: Mild right extracranial ICA stenosis. Left carotid endarterectomy.  The cervical vertebral arteries are patent. Moderate stenosis along the right vertebral artery V2 segment. Left thyroid nodule for which ultrasound correlation is recommended based on  "JACR guidelines.      Incidental Findings:   See above   I reviewed the above mentioned incidental findings with the patient and/or family and they expressed understanding.    Test Results Pending at Discharge (will require follow up):   none     Outpatient Tests Requested:  p2y12    Complications:  none    Reason for Admission: stroke like symptoms     Hospital Course:   Tevin Bazan is a 78 y.o. adult patient who originally presented to the hospital on 2/28/2024 due to slurred speech and difficulty finding words. A day prior to this event patient had cardiac cath. Mri showed acute to subacute ischemia in the left cerebral hemisphere and left cerebellum likely iatrogenic in the setting of recent cardiac cath.      Continue aspirin, plavis and statin. F/u with neurology outpatient       Please see above list of diagnoses and related plan for additional information.     Condition at Discharge: good    Discharge Day Visit / Exam:   Subjective:  no complaints   Vitals: Blood Pressure: 151/80 (03/01/24 0748)  Pulse: 72 (03/01/24 0748)  Temperature: 97.7 °F (36.5 °C) (03/01/24 0748)  Temp Source: Oral (02/29/24 2045)  Respirations: 16 (03/01/24 0748)  Height: 5' 7\" (170.2 cm) (02/29/24 0910)  Weight - Scale: 71.7 kg (158 lb) (02/29/24 0910)  SpO2: 93 % (03/01/24 0748)  Exam:   Physical Exam  Vitals and nursing note reviewed.   Constitutional:       General: Danielle is not in acute distress.     Appearance: Danielle is not diaphoretic.   HENT:      Head: Normocephalic.   Eyes:      General: No scleral icterus.        Right eye: No discharge.         Left eye: No discharge.   Cardiovascular:      Rate and Rhythm: Normal rate and regular rhythm.   Pulmonary:      Effort: Pulmonary effort is normal. No respiratory distress.      Breath sounds: Normal breath sounds. No wheezing, rhonchi or rales.   Abdominal:      General: There is no distension.      Palpations: Abdomen is soft.      Tenderness: There is no abdominal " tenderness. There is no guarding or rebound.   Musculoskeletal:      Cervical back: Normal range of motion.      Right lower leg: No edema.      Left lower leg: No edema.   Skin:     General: Skin is warm.   Neurological:      Mental Status: Danielle is alert and oriented to person, place, and time.   Psychiatric:         Mood and Affect: Mood normal.         Behavior: Behavior normal.         Thought Content: Thought content normal.         Judgment: Judgment normal.          Discussion with Family: Patient declined call to .     Discharge instructions/Information to patient and family:   See after visit summary for information provided to patient and family.      Provisions for Follow-Up Care:  See after visit summary for information related to follow-up care and any pertinent home health orders.      Mobility at time of Discharge:   Basic Mobility Inpatient Raw Score: 24  JH-HLM Goal: 8: Walk 250 feet or more  JH-HLM Achieved: 8: Walk 250 feet ot more  HLM Goal achieved. Continue to encourage appropriate mobility.     Disposition:   Home    Planned Readmission: no     Discharge Statement:  I spent 40 minutes discharging the patient. This time was spent on the day of discharge. I had direct contact with the patient on the day of discharge. Greater than 50% of the total time was spent examining patient, answering all patient questions, arranging and discussing plan of care with patient as well as directly providing post-discharge instructions.  Additional time then spent on discharge activities.    Discharge Medications:  See after visit summary for reconciled discharge medications provided to patient and/or family.      **Please Note: This note may have been constructed using a voice recognition system**

## 2024-03-02 NOTE — ASSESSMENT & PLAN NOTE
"Patient presenting with slurred speech and difficulty finding words.  He has had similar presentation in the past when he had a stroke.  No focal weakness.  CT of the head done showing no acute intracranial hemorrhage  CTA of the head showed no large vessel intracranial occlusion or hemodynamically significant stenosis.  CTA neck showed mild right extracranial ICA stenosis.  Left carotid endarterectomy.  Moderate stenosis along the right vertebral artery V2 segment.  Patient had been on dual antiplatelets as an outpatient.  MRI showed \"punctate acute to subacute infarcts involving the high left frontoparietal cortex, left parietal subcortical white matter, and posterior left cerebellum \" suspected to be iatrogenic post angiography (had it on 2/26)  Per neurology, no changes in medication continue with aspirin and Plavix.  Patient will need P2 Y12 testing as outpatient  Echo showed LVEF 65% diastolic dysfunction 1, mild aortic and mitral stenosis  Telemetry w/o events   F/u with neurology in 4-6 weeks           "

## 2024-03-04 NOTE — UTILIZATION REVIEW
NOTIFICATION OF ADMISSION DISCHARGE   This is a Notification of Discharge from [facility]. Please be advised that this patient has been discharge from our facility. Below you will find the admission and discharge date and time including the patient’s disposition.   UTILIZATION REVIEW CONTACT:  Claudia Kathleen  Utilization   Network Utilization Review Department  Phone: [phone] carefully listen to the prompts. All voicemails are confidential.  Email: [email or web address]     ADMISSION INFORMATION  PRESENTATION DATE: 2/28/2024  5:07 PM  OBERVATION ADMISSION DATE:   INPATIENT ADMISSION DATE: 2/29/24  5:32 PM   DISCHARGE DATE: 3/1/2024 12:31 PM   DISPOSITION:Home/Self Care    Network Utilization Review Department  ATTENTION: Please call with any questions or concerns to [phone] and carefully listen to the prompts so that you are directed to the right person. All voicemails are confidential.   For Discharge needs, contact Care Management DC Support Team at [phone] opt. [phone]  Send all requests for admission clinical reviews, approved or denied determinations and any other requests to dedicated fax number below belonging to the campus where the patient is receiving treatment. List of dedicated fax numbers for the Facilities:  FACILITY NAME UR FAX NUMBER   ADMISSION DENIALS (Administrative/Medical Necessity) [phone]   DISCHARGE SUPPORT TEAM ([facility]) [phone]   PARENT CHILD HEALTH (Maternity/NICU/Pediatrics) [phone]   [facility] [phone]   [facility] [phone]   [facility] [phone]   [facility] [phone]   [facility] [phone]   [facility] [phone]   [facility] [phone]   [facility] [phone]    [facility] [phone]   [facility] [phone]   [facility] [phone]   [facility] [phone]

## 2024-03-05 ENCOUNTER — TELEPHONE (OUTPATIENT)
Dept: NEUROLOGY | Facility: CLINIC | Age: 79
End: 2024-03-05

## 2024-03-05 ENCOUNTER — APPOINTMENT (OUTPATIENT)
Dept: LAB | Facility: CLINIC | Age: 79
End: 2024-03-05
Payer: COMMERCIAL

## 2024-03-05 DIAGNOSIS — I63.9 CVA (CEREBRAL VASCULAR ACCIDENT) (HCC): ICD-10-CM

## 2024-03-05 LAB — PA ADP BLD-ACNC: 199 PRU (ref 194–418)

## 2024-03-05 PROCEDURE — 36415 COLL VENOUS BLD VENIPUNCTURE: CPT

## 2024-03-05 PROCEDURE — 85576 BLOOD PLATELET AGGREGATION: CPT

## 2024-03-05 NOTE — TELEPHONE ENCOUNTER
[facility]/ GEOVANNY Norman/ CVA      DC- Home- 3/1/24    Notes:  Tevin Bazan will need follow up in in 6 weeks with neurovascular attending or advance practitioner. [place] will need outpatient P2Y12 testing      Scheduled:  Michel 4/25/24 10am [facility]

## 2024-04-03 ENCOUNTER — OFFICE VISIT (OUTPATIENT)
Dept: CARDIOLOGY CLINIC | Facility: CLINIC | Age: 79
End: 2024-04-03
Payer: COMMERCIAL

## 2024-04-03 VITALS
SYSTOLIC BLOOD PRESSURE: 100 MMHG | WEIGHT: 160.8 LBS | BODY MASS INDEX: 25.24 KG/M2 | DIASTOLIC BLOOD PRESSURE: 52 MMHG | HEIGHT: 67 IN | HEART RATE: 70 BPM

## 2024-04-03 DIAGNOSIS — I25.110 CORONARY ARTERY DISEASE INVOLVING NATIVE CORONARY ARTERY OF NATIVE HEART WITH UNSTABLE ANGINA PECTORIS (HCC): Primary | ICD-10-CM

## 2024-04-03 DIAGNOSIS — I35.0 NONRHEUMATIC AORTIC VALVE STENOSIS: ICD-10-CM

## 2024-04-03 DIAGNOSIS — I34.2 NONRHEUMATIC MITRAL VALVE STENOSIS: ICD-10-CM

## 2024-04-03 DIAGNOSIS — I10 ESSENTIAL HYPERTENSION: ICD-10-CM

## 2024-04-03 DIAGNOSIS — I63.9 STROKE DUE TO EMBOLISM (HCC): ICD-10-CM

## 2024-04-03 DIAGNOSIS — R06.09 DYSPNEA ON EXERTION: ICD-10-CM

## 2024-04-03 DIAGNOSIS — E78.00 HYPERCHOLESTEROLEMIA: ICD-10-CM

## 2024-04-03 PROCEDURE — 99214 OFFICE O/P EST MOD 30 MIN: CPT | Performed by: INTERNAL MEDICINE

## 2024-04-03 RX ORDER — OMEGA-3 FATTY ACIDS/FISH OIL 300-1000MG
1 CAPSULE ORAL EVERY 12 HOURS
COMMUNITY

## 2024-04-03 RX ORDER — MULTIVIT WITH MINERALS/LUTEIN
TABLET ORAL EVERY 24 HOURS
COMMUNITY

## 2024-04-03 NOTE — PROGRESS NOTES
Cardiology Follow Up    Tevin Bazan  1945  [id number]  [facility] CARDIAC CATH LAB  [facility] [address]  [phone]    1. Coronary artery disease involving native coronary artery of native heart with unstable angina pectoris (HCC)        2. Stroke due to embolism (HCC)        3. Essential hypertension        4. Nonrheumatic aortic valve stenosis        5. Nonrheumatic mitral valve stenosis        6. Dyspnea on exertion        7. Hypercholesterolemia            Interval History: Cardiology follow-up after recent catheterization.  Patient is feeling overall well.  He tells me that his chest pain have not been recurrent.  He does have mild dyspnea class I in the morning.  Overall he is feeling well.  He did have a catheterization revealed two-vessel coronary disease with an occluded RCA  and mild to mod disease in the LAD system.  Manage medically, he was started on nitrates, however he is not taking those or the beta-blocker.  He was admitted 2 days later with speech aphasia transient that resolved spontaneously, CT of the head was negative, CTA of the head and neck revealed mild disease in the right internal carotid, mild disease in the vertebral left carotid endarterectomy site patent.  An MRI of the brain revealed minimal punctate lesions in the frontoparietal left side.  This was felt to be atheroembolic from the recent catheterization.  All the lesions were on the left side interestingly so.  He did have a catheterization from the right radial artery.  He has had no recurrent neurological deficits.  Is been trying to be active.  His lipids prior total cholesterol 137 HDL 53, LDL 72, he appears to be more willing and compliant with a statin therapy.  Previously reluctant.  No bleeding issues on dual antiplatelet therapy    Patient Active Problem List   Diagnosis    Transgender    Stroke due to embolism  ([facility])    Primary osteoarthritis of left knee    Effusion of left knee    Cellulitis of right knee    Lumbar spondylosis    Chronic bilateral low back pain without sciatica    Lumbar radiculopathy    Spinal stenosis of lumbar region with neurogenic claudication    Chronic pain of left knee    Thoracogenic scoliosis of thoracolumbar region    Chronic pain syndrome    Essential hypertension    Symptomatic carotid artery stenosis, left    H/O carotid endarterectomy    History of stroke    Nonrheumatic aortic valve stenosis    Nonrheumatic mitral valve stenosis    Chest heaviness    Abnormal stress test    Dyspnea on exertion    Hypercholesterolemia    Chest pain    Stage 2 chronic kidney disease    COPD (chronic obstructive pulmonary disease) ([facility])    Abnormal computed tomography angiography (CTA)    Coronary artery disease involving native coronary artery of native heart with unstable angina pectoris ([facility])    Unstable angina ([facility])    Coronary artery disease involving native coronary artery    Elevated troponin    CVA (cerebral vascular accident) ([facility])    Pre-diabetes     Past Medical History:   Diagnosis Date    Chronic bilateral low back pain without sciatica 2020    Hypertension     Scoliosis     Stroke ([facility])     Stroke-like symptoms 2021     Social History     Socioeconomic History    Marital status: /Civil Union     Spouse name: Not on file    Number of children: Not on file    Years of education: Not on file    Highest education level: Not on file   Occupational History    Not on file   Tobacco Use    Smoking status: Former     Current packs/day: 0.00     Types: Cigarettes     Quit date:      Years since quittin.2    Smokeless tobacco: Never   Vaping Use    Vaping status: Never Used   Substance and Sexual Activity    Alcohol use: Not Currently    Drug use: No    Sexual activity: Not on file   Other Topics Concern    Not on file   Social History Narrative    Not on file     Social  Determinants of Health     Financial Resource Strain: Low Risk  (5/20/2022)    Overall Financial Resource Strain (CARDIA)     Difficulty of Paying Living Expenses: Not hard at all   Food Insecurity: No Food Insecurity (2/29/2024)    Hunger Vital Sign     Worried About Running Out of Food in the Last Year: Never true     Ran Out of Food in the Last Year: Never true   Transportation Needs: No Transportation Needs (2/29/2024)    PRAPARE - Transportation     Lack of Transportation (Medical): No     Lack of Transportation (Non-Medical): No   Physical Activity: Sufficiently Active (5/20/2022)    Exercise Vital Sign     Days of Exercise per Week: 7 days     Minutes of Exercise per Session: 30 min   Stress: No Stress Concern Present (5/20/2022)    [employer] - Occupational Stress Questionnaire     Feeling of Stress : Not at all   Social Connections: Socially Isolated (5/20/2022)    Social Connection and Isolation Panel [NHANES]     Frequency of Communication with Friends and Family: Never     Frequency of Social Gatherings with Friends and Family: Never     Attends Sikhism Services: Never     Active Member of Clubs or Organizations: No     Attends Club or Organization Meetings: Never     Marital Status:    Intimate Partner Violence: Not At Risk (5/20/2022)    Humiliation, Afraid, Rape, and Kick questionnaire     Fear of Current or Ex-Partner: No     Emotionally Abused: No     Physically Abused: No     Sexually Abused: No   Housing Stability: Low Risk  (2/29/2024)    Housing Stability Vital Sign     Unable to Pay for Housing in the Last Year: No     Number of Places Lived in the Last Year: 1     Unstable Housing in the Last Year: No      Family History   Adopted: Yes   Family history unknown: Yes     Past Surgical History:   Procedure Laterality Date    BACK SURGERY      BOWEL RESECTION      CARDIAC CATHETERIZATION N/A 2/26/2024    Procedure: Cardiac Coronary Angiogram;  Surgeon: Rivas MEYER  MD Devaughn;  Location: [facility] CARDIAC CATH LAB;  Service: Cardiology    CARDIAC CATHETERIZATION  2/26/2024    Procedure: Cardiac catheterization;  Surgeon: Rivas Cantor MD;  Location: [facility] CARDIAC CATH [facility];  Service: Cardiology    CARDIAC CATHETERIZATION N/A 2/26/2024    Procedure: Cardiac pci;  Surgeon: Rivas Cantor MD;  Location: [facility] CARDIAC CATH LAB;  Service: Cardiology    MA TEAEC W/PATCH GRF CAROTID VERTB SUBCLAV NECK INC Left 12/1/2021    Procedure: ENDARTERECTOMY ARTERY CAROTID;  Surgeon: Vito Mo MD;  Location: [facility] Main OR;  Service: Vascular    TONSILLECTOMY         Current Outpatient Medications:     acetaminophen (TYLENOL) 500 mg tablet, Take 1,000 mg by mouth every 6 (six) hours as needed for mild pain, Disp: , Rfl:     albuterol (Proventil HFA) 90 mcg/act inhaler, Inhale 1-2 puffs every 6 (six) hours as needed for wheezing, Disp: 18 g, Rfl: 0    Ascorbic Acid (vitamin C) 1000 MG tablet, every 24 hours, Disp: , Rfl:     aspirin (ECOTRIN LOW STRENGTH) 81 mg EC tablet, Take 1 tablet (81 mg total) by mouth daily for 21 days, Disp: 21 tablet, Rfl: 0    atorvastatin (LIPITOR) 80 mg tablet, Take 1 tablet (80 mg total) by mouth every evening, Disp: 30 tablet, Rfl: 0    Chelated Zinc 50 MG TABS, every 24 hours, Disp: , Rfl:     clopidogrel (PLAVIX) 75 mg tablet, Take 1 tablet (75 mg total) by mouth daily, Disp: 30 tablet, Rfl: 0    diltiazem (TIAZAC) 120 MG 24 hr capsule, Take 120 mg by mouth daily, Disp: , Rfl:     famotidine (PEPCID) 40 MG tablet, 2 (two) times a day as needed, Disp: , Rfl:     nitroglycerin (NITROSTAT) 0.4 mg SL tablet, Place 1 tablet (0.4 mg total) under the tongue every 5 (five) minutes as needed for chest pain, Disp: 45 tablet, Rfl: 0    Omega 3 1000 MG CAPS, 1 capsule Every 12 hours, Disp: , Rfl:     isosorbide mononitrate (IMDUR) 30 mg 24 hr tablet, Take 1 tablet (30 mg total) by mouth daily, Disp: 30 tablet, Rfl: 1    metoprolol succinate (TOPROL-XL) 25 mg 24 hr tablet, Take 1 tablet  (25 mg total) by mouth daily, Disp: 90 tablet, Rfl: 3  Allergies   Allergen Reactions    Black Cohosh Rash    Minoxidil Rash       Labs:  Appointment on 03/05/2024   Component Date Value    P2Y12 03/05/2024 199    No results displayed because visit has over 200 results.      Appointment on 02/27/2024   Component Date Value    Sodium 02/27/2024 138     Potassium 02/27/2024 4.2     Chloride 02/27/2024 100     CO2 02/27/2024 30     ANION GAP 02/27/2024 8     BUN 02/27/2024 31 (H)     Creatinine 02/27/2024 0.99     Glucose 02/27/2024 103     Calcium 02/27/2024 10.2     eGFR 02/27/2024 72    Admission on 02/26/2024, Discharged on 02/26/2024   Component Date Value    Ventricular Rate 02/26/2024 69     Atrial Rate 02/26/2024 69     AR Interval 02/26/2024 142     QRSD Interval 02/26/2024 72     QT Interval 02/26/2024 384     QTC Interval 02/26/2024 411     P Axis 02/26/2024 56     QRS Axis 02/26/2024 37     T Wave [place] 02/26/2024 60     Activated Clotting Time,* 02/26/2024 258 (H)     Specimen Type 02/26/2024 ARTERIAL    Admission on 01/29/2024, Discharged on 01/30/2024   Component Date Value    Ventricular Rate 01/29/2024 85     Atrial Rate 01/29/2024 85     AR Interval 01/29/2024 146     QRSD Interval 01/29/2024 86     QT Interval 01/29/2024 346     QTC Interval 01/29/2024 411     P Axis 01/29/2024 130     QRS [place] 01/29/2024 189     T Wave [place] 01/29/2024 93     Ventricular Rate 01/29/2024 79     Atrial Rate 01/29/2024 79     AR Interval 01/29/2024 144     QRSD Interval 01/29/2024 76     QT Interval 01/29/2024 366     QTC Interval 01/29/2024 419     P Axis 01/29/2024 53     QRS Axis 01/29/2024 57     T Wave Axis 01/29/2024 80     WBC 01/29/2024 8.36     RBC 01/29/2024 4.32     Hemoglobin 01/29/2024 13.3     Hematocrit 01/29/2024 40.7     MCV 01/29/2024 94     MCH 01/29/2024 30.8     MCHC 01/29/2024 32.7     RDW 01/29/2024 13.3     MPV 01/29/2024 10.1     Platelets 01/29/2024 198     nRBC 01/29/2024 0     Neutrophils  Relative 01/29/2024 52     Immature Grans % 01/29/2024 1     Lymphocytes Relative 01/29/2024 26     Monocytes Relative 01/29/2024 13 (H)     Eosinophils Relative 01/29/2024 7 (H)     Basophils Relative 01/29/2024 1     Neutrophils Absolute 01/29/2024 4.46     Absolute Immature Grans 01/29/2024 0.04     Absolute Lymphocytes 01/29/2024 2.14     Absolute Monocytes 01/29/2024 1.08     Eosinophils Absolute 01/29/2024 0.56     Basophils Absolute 01/29/2024 0.08     Sodium 01/29/2024 139     Potassium 01/29/2024 3.8     Chloride 01/29/2024 104     CO2 01/29/2024 27     ANION GAP 01/29/2024 8     BUN 01/29/2024 35 (H)     Creatinine 01/29/2024 1.11     Glucose 01/29/2024 149 (H)     Calcium 01/29/2024 9.2     AST 01/29/2024 17     ALT 01/29/2024 18     Alkaline Phosphatase 01/29/2024 110 (H)     Total Protein 01/29/2024 7.1     Albumin 01/29/2024 3.6     Total Bilirubin 01/29/2024 0.40     eGFR 01/29/2024 62     hs TnI 0hr 01/29/2024 11     hs TnI 2hr 01/29/2024 15     Delta 2hr hsTnI 01/29/2024 4     hs TnI 4hr 01/29/2024 18     Delta 4hr hsTnI 01/29/2024 7     Ventricular Rate 01/29/2024 85     Atrial Rate 01/29/2024 85     WI Interval 01/29/2024 148     QRSD Interval 01/29/2024 72     QT Interval 01/29/2024 366     QTC Interval 01/29/2024 435     P Axis 01/29/2024 47     QRS Axis 01/29/2024 52     T Wave Axis 01/29/2024 63     Ventricular Rate 01/30/2024 83     Atrial Rate 01/30/2024 83     WI Interval 01/30/2024 150     QRSD Interval 01/30/2024 80     QT Interval 01/30/2024 370     QTC Interval 01/30/2024 434     P Axis 01/30/2024 51     QRS Axis 01/30/2024 28     T Wave [place] 01/30/2024 23     Sodium 01/30/2024 136     Potassium 01/30/2024 3.9     Chloride 01/30/2024 105     CO2 01/30/2024 26     ANION GAP 01/30/2024 5     BUN 01/30/2024 26 (H)     Creatinine 01/30/2024 0.91     Glucose 01/30/2024 114     Glucose, Fasting 01/30/2024 114 (H)     Calcium 01/30/2024 8.2 (L)     eGFR 01/30/2024 79     WBC 01/30/2024 8.07      RBC 01/30/2024 4.28     Hemoglobin 01/30/2024 13.4     Hematocrit 01/30/2024 40.6     MCV 01/30/2024 95     MCH 01/30/2024 31.3     MCHC 01/30/2024 33.0     RDW 01/30/2024 13.2     Platelets 01/30/2024 195     MPV 01/30/2024 9.8      Imaging: No results found.    Review of Systems:  Review of Systems   Constitutional:  Negative for activity change, diaphoresis, fatigue and fever.   HENT:  Negative for hearing loss and nosebleeds.    Eyes:  Negative for visual disturbance.   Respiratory:  Positive for shortness of breath. Negative for apnea and stridor.    Cardiovascular:  Negative for chest pain, palpitations and leg swelling.   Gastrointestinal:  Negative for abdominal pain, anal bleeding and blood in stool.   Endocrine: Negative for cold intolerance.   Genitourinary:  Positive for frequency. Negative for hematuria.   Musculoskeletal:  Positive for arthralgias. Negative for gait problem and myalgias.   Skin:  Negative for pallor and rash.   Allergic/Immunologic: Negative for immunocompromised state.   Neurological:  Negative for dizziness, syncope and weakness.   Hematological:  Bruises/bleeds easily.   Psychiatric/Behavioral:  Negative for self-injury and sleep disturbance.        Physical Exam:  Physical Exam  Vitals reviewed.   Constitutional:       General: Danielle is not in acute distress.     Appearance: Normal appearance. Danielle is normal weight. Danielle is not ill-appearing, toxic-appearing or diaphoretic.   Eyes:      General: No scleral icterus.  Neck:      Vascular: No carotid bruit.   Cardiovascular:      Rate and Rhythm: Normal rate and regular rhythm.      Heart sounds: Murmur (soft systolic ejection murmur at the base) heard.      No friction rub. No gallop.   Pulmonary:      Effort: Pulmonary effort is normal. No respiratory distress.      Breath sounds: Normal breath sounds. No stridor. No wheezing, rhonchi or rales.      Comments: Severe kyphoscoliosis  Musculoskeletal:      Right lower leg: No  edema.      Left lower leg: No edema.   Skin:     General: Skin is warm and dry.      Capillary Refill: Capillary refill takes less than 2 seconds.      Coloration: Skin is not jaundiced or pale.      Findings: No bruising or erythema.   Neurological:      Mental Status: Danielle is alert and oriented to person, place, and time.   Psychiatric:         Mood and Affect: Mood normal.         Discussion/Summary:   Coronary artery disease.  Chest pain syndrome.  Previously improved with inhalers.  He does have several risk factors.  Stress test 2023, he did almost 10 minutes on a modified protocol achieving target rate, he did experience chest discomfort, but there were no EKG criteria for ischemia.  Echocardiogram 2022, revealed normal left ventricular systolic function.  Normal diastolic parameters, there was very mild AV stenosis, mean gradient of 4 mmHg, and very mild mitral stenosis mean gradient of 2 mmHg.  There was mild to moderate tricuspid insufficiency with estimated normal pulmonary artery pressures suggested by Doppler criteria.  Recent CT of the coronary suggested coronary disease of uncertain severity because of extensive calcification..  catheterization on 2/24, revealed two-vessel coronary disease with a  of the mid right coronary artery.  Mild to moderate disease in the LAD system, manage medically..  Continue current medications, however he is not taking the nitrates regularly, his symptoms continue to be improved nevertheless.  Will hold on pushing the issue unless he has more symptoms.  Postcatheterization cardioembolic CVA small.  With full recovery as described above.  Valvular heart disease, mild severity, echocardiogram 2024 during the last admission revealed minimal changes, mild aortic stenosis mean gradient of 10, mild mitral stenosis mean gradient 3.  Normal left ventricular systolic function, diastolic parameters described as normal.  In the setting of chronic dyspnea with restrictive lung  disease from significant scoliosis.  48-hour Holter monitor 2023 revealed normal sinus rhythm and no significant arrhythmias. , I did explain to him that he would be best for him to be on lipid-lowering therapy given he is peripheral vascular disease, he explained to me that he is antistatin therapy patient.  I explained the patient that his medications are safe and very effective in reducing clinical events including myocardial infarction, further CVAs and need for revascularization, he is now compliant with a statin therapy.  Vascular disease.  Status post left carotid endarterectomy 2021 in the setting of CVA of the central semiovale on the left.  Patent on duplex in 2023, less than 50% on the right..        This note was completed in part utilizing [employer] direct voice recognition software.   Grammatical errors, random word insertion, spelling mistakes, and incomplete sentences may be an occasional consequence of the system secondary to software limitations, ambient noise and hardware issues. At the time of dictation, efforts were made to edit, clarify and /or correct errors.  Please read the chart carefully and recognize, using context, where substitutions have occurred.  If you have any questions or concerns about the context, text or information contained within the body of this dictation, please contact myself, the provider, for further clarification.

## 2024-04-24 ENCOUNTER — TELEPHONE (OUTPATIENT)
Age: 79
End: 2024-04-24

## 2024-04-25 NOTE — TELEPHONE ENCOUNTER
I called patient to offer a sooner appointment than what was scheduled, due to Provider was out of office. I was unable to LVM for patient to call back to scheduled sooner.

## 2024-05-10 ENCOUNTER — OFFICE VISIT (OUTPATIENT)
Dept: NEUROLOGY | Facility: CLINIC | Age: 79
End: 2024-05-10
Payer: COMMERCIAL

## 2024-05-10 VITALS
BODY MASS INDEX: 24.9 KG/M2 | WEIGHT: 156.6 LBS | OXYGEN SATURATION: 98 % | DIASTOLIC BLOOD PRESSURE: 76 MMHG | TEMPERATURE: 98.2 F | SYSTOLIC BLOOD PRESSURE: 114 MMHG | HEART RATE: 75 BPM

## 2024-05-10 DIAGNOSIS — I63.9 STROKE DUE TO EMBOLISM (HCC): ICD-10-CM

## 2024-05-10 DIAGNOSIS — Z86.73 HISTORY OF STROKE: ICD-10-CM

## 2024-05-10 DIAGNOSIS — I63.9 CVA (CEREBRAL VASCULAR ACCIDENT) (HCC): Primary | ICD-10-CM

## 2024-05-10 DIAGNOSIS — R25.1 TREMOR: ICD-10-CM

## 2024-05-10 PROCEDURE — 99214 OFFICE O/P EST MOD 30 MIN: CPT | Performed by: PSYCHIATRY & NEUROLOGY

## 2024-05-10 RX ORDER — ATORVASTATIN CALCIUM 80 MG/1
80 TABLET, FILM COATED ORAL DAILY
Qty: 30 TABLET | Refills: 3 | Status: SHIPPED | OUTPATIENT
Start: 2024-05-10

## 2024-05-10 RX ORDER — ATORVASTATIN CALCIUM 20 MG/1
20 TABLET, FILM COATED ORAL DAILY
Qty: 30 TABLET | Refills: 3 | Status: SHIPPED | OUTPATIENT
Start: 2024-05-10 | End: 2024-05-10 | Stop reason: SDUPTHER

## 2024-05-10 NOTE — PROGRESS NOTES
Patient ID: Tevin Bazan is a 78 y.o. adult.    Assessment/Plan:    This is a 79 y/o adult with left carotid stenosis s/p left CEA, prior left hemisphere stroke, CKD, COPD, HTN, CAD here as a hospital follow up. Patient had recent catherization and had strokes after (subacute/acute embolic several), these are likely 2/2 cardioembolic in the setting of recent catherization    PLAN:      Diagnoses and all orders for this visit:    CVA (cerebral vascular accident) (HCC)  -for secondary stroke prevention, recommend continuation of combination of aspirin, plavix and atorvastatin  -Blood Pressure goal < 130/80, BP   -LDL goal <70  -snoring - does not want to see sleep medicine     Counseling/stroke education -   -I advised patient to avoid using NSAIDs for headaches or other pain and to stick to tylenol if needed  -Recommend lifestyle modifications such as mediterranean diet & regular exercise regimen atleast 4-5 times a week for 20-30 minutes.   -I educated patient/family regarding medication compliance  -encourage smoking cessation, and control of diabetes and hypertension; defer management to primary     Tremor  -more consistent with essential tremor at this time, wants to hold off on starting any medications.   -no signs of parkinsons noted    Stroke due to embolism (HCC)       Follow up in 6 months     I would be happy to see the patient sooner if any new questions/concerns arise.  Patient/Guardian was advised to the call the office if they have any questions and concerns in the meantime.     Patient/Guardian does understand that if they have any new stroke like symptoms such as facial droop on one side, weakness/paralysis on either side, speech trouble, numbness on one side, balance issues, any vision changes, extreme dizziness or any new headache, to call 9-1-1 immediately or to proceed to the nearest ER immediately.      Subjective:    HPI    This is a 79 y/o adult with left carotid stenosis s/p left CEA,  prior left hemisphere stroke, CKD, COPD, HTN, CAD here as a hospital follow up. Patient had recent catherization and had strokes after (subacute/acute embolic several), these are likely 2/2 cardioembolic in the setting of recent catherization.     They had some stroke like symptoms post procedure, had transient episodes of speech alteration, and aphasia. CTA head and neck negative, no LVO. Echo was done which was negative as well.    At this time patient is doing well and does not have any new issues, no new TIA/CVA like symptoms. Patient is more worried about the tremor and has trouble when using hands especially the left hand, and has not been dropping any thing or having a hard time with buttoning shirts, but wanted to make sure it was not serious     The following portions of the patient's history were reviewed and updated as appropriate: Danielle  has a past medical history of Chronic bilateral low back pain without sciatica (7/17/2020), Hypertension, Scoliosis, Stroke ([facility]), and Stroke-like symptoms (11/22/2021).  Danielle   Patient Active Problem List    Diagnosis Date Noted    Tremor 05/10/2024    Pre-diabetes 02/29/2024    Elevated troponin 02/28/2024    CVA (cerebral vascular accident) ([facility]) 02/28/2024    Unstable angina ([facility]) 02/26/2024    Coronary artery disease involving native coronary artery 02/26/2024    Abnormal computed tomography angiography (CTA) 02/14/2024    Coronary artery disease involving native coronary artery of native heart with unstable angina pectoris ([facility]) 02/14/2024    Chest pain 01/29/2024    Stage 2 chronic kidney disease 01/29/2024    COPD (chronic obstructive pulmonary disease) ([facility]) 01/29/2024    Abnormal stress test 11/08/2023    Dyspnea on exertion 11/08/2023    Hypercholesterolemia 11/08/2023    Chest heaviness 08/21/2023    Nonrheumatic aortic valve stenosis 01/09/2023    Nonrheumatic mitral valve stenosis 01/09/2023    H/O carotid endarterectomy 12/01/2021    History of stroke  12/01/2021    Symptomatic carotid artery stenosis, left 11/23/2021    Essential hypertension 11/22/2021    Chronic bilateral low back pain without sciatica 07/17/2020    Lumbar radiculopathy 07/17/2020    Spinal stenosis of lumbar region with neurogenic claudication 07/17/2020    Chronic pain of left knee 07/17/2020    Thoracogenic scoliosis of thoracolumbar region 07/17/2020    Chronic pain syndrome 07/17/2020    Lumbar spondylosis 06/19/2020    Cellulitis of right knee 05/28/2020    Primary osteoarthritis of left knee 04/10/2020    Effusion of left knee 04/10/2020    Transgender 12/07/2018    Stroke due to embolism (HCC) 12/07/2018     Danielle  has a past surgical history that includes Back surgery; Bowel resection; Tonsillectomy; pr teaec w/patch grf carotid vertb subclav neck inc (Left, 12/1/2021); Cardiac catheterization (N/A, 2/26/2024); Cardiac catheterization (2/26/2024); and Cardiac catheterization (N/A, 2/26/2024).  Danielle'pasha Santos was adopted. Family history is unknown by patient.  Danielle  reports that Danielle quit smoking about 39 years ago. Danielle's smoking use included cigarettes. Danielle has never used smokeless tobacco. Danielle reports that Danielle does not currently use alcohol. Danielle reports that Danielle does not use drugs.  Current Outpatient Medications   Medication Sig Dispense Refill    acetaminophen (TYLENOL) 500 mg tablet Take 1,000 mg by mouth every 6 (six) hours as needed for mild pain      albuterol (Proventil HFA) 90 mcg/act inhaler Inhale 1-2 puffs every 6 (six) hours as needed for wheezing 18 g 0    Ascorbic Acid (vitamin C) 1000 MG tablet every 24 hours      aspirin (ECOTRIN LOW STRENGTH) 81 mg EC tablet Take 1 tablet (81 mg total) by mouth daily for 21 days 21 tablet 0    atorvastatin (LIPITOR) 80 mg tablet Take 1 tablet (80 mg total) by mouth every evening 30 tablet 0    Chelated Zinc 50 MG TABS every 24 hours      clopidogrel (PLAVIX) 75 mg tablet Take 1 tablet (75 mg total) by mouth daily 30  tablet 0    diltiazem (TIAZAC) 120 MG 24 hr capsule Take 120 mg by mouth daily      famotidine (PEPCID) 40 MG tablet 2 (two) times a day as needed      nitroglycerin (NITROSTAT) 0.4 mg SL tablet Place 1 tablet (0.4 mg total) under the tongue every 5 (five) minutes as needed for chest pain 45 tablet 0    Omega 3 1000 MG CAPS 1 capsule Every 12 hours      isosorbide mononitrate (IMDUR) 30 mg 24 hr tablet Take 1 tablet (30 mg total) by mouth daily (Patient not taking: Reported on 5/10/2024) 30 tablet 1    metoprolol succinate (TOPROL-XL) 25 mg 24 hr tablet Take 1 tablet (25 mg total) by mouth daily (Patient not taking: Reported on 5/10/2024) 90 tablet 3     No current facility-administered medications for this visit.     Current Outpatient Medications on File Prior to Visit   Medication Sig    acetaminophen (TYLENOL) 500 mg tablet Take 1,000 mg by mouth every 6 (six) hours as needed for mild pain    albuterol (Proventil HFA) 90 mcg/act inhaler Inhale 1-2 puffs every 6 (six) hours as needed for wheezing    Ascorbic Acid (vitamin C) 1000 MG tablet every 24 hours    aspirin (ECOTRIN LOW STRENGTH) 81 mg EC tablet Take 1 tablet (81 mg total) by mouth daily for 21 days    atorvastatin (LIPITOR) 80 mg tablet Take 1 tablet (80 mg total) by mouth every evening    Chelated Zinc 50 MG TABS every 24 hours    clopidogrel (PLAVIX) 75 mg tablet Take 1 tablet (75 mg total) by mouth daily    diltiazem (TIAZAC) 120 MG 24 hr capsule Take 120 mg by mouth daily    famotidine (PEPCID) 40 MG tablet 2 (two) times a day as needed    nitroglycerin (NITROSTAT) 0.4 mg SL tablet Place 1 tablet (0.4 mg total) under the tongue every 5 (five) minutes as needed for chest pain    Omega 3 1000 MG CAPS 1 capsule Every 12 hours    isosorbide mononitrate (IMDUR) 30 mg 24 hr tablet Take 1 tablet (30 mg total) by mouth daily (Patient not taking: Reported on 5/10/2024)    metoprolol succinate (TOPROL-XL) 25 mg 24 hr tablet Take 1 tablet (25 mg total) by mouth  daily (Patient not taking: Reported on 5/10/2024)     No current facility-administered medications on file prior to visit.     Danielle is allergic to black cohosh and minoxidil..         Objective:    Blood pressure 114/76, pulse 75, temperature 98.2 °F (36.8 °C), temperature source Temporal, weight 71 kg (156 lb 9.6 oz), SpO2 98%.    Physical Exam  General - patient is alert   Speech - no dysarthria noted, no aphasia noted.     Neuro:   Cranial nerves: PERRL, EOMI, facial sensation intact to soft touch in V1, V2 and V3, no facial asymmetry noted, uvula/palate midline, tongue midline.   Motor: 5/5 throughout, normal tone, no pronator drift noted. Mild postural tremor noted on the left hand   Sensory - intact to soft touch throughout  Reflexes - 2+ throughout  Coordination - no ataxia/dysmetria noted  Gait - normal      ROS:  Reviewed ROS   Review of Systems   Constitutional: Negative.    HENT: Negative.     Eyes: Negative.    Respiratory: Negative.     Cardiovascular: Negative.    Gastrointestinal: Negative.    Endocrine: Negative.    Genitourinary: Negative.    Musculoskeletal: Negative.    Skin: Negative.    Allergic/Immunologic: Negative.    Neurological:  Positive for dizziness and tremors.   Hematological: Negative.    Psychiatric/Behavioral: Negative.

## 2024-08-26 ENCOUNTER — HOSPITAL ENCOUNTER (OUTPATIENT)
Dept: NON INVASIVE DIAGNOSTICS | Age: 79
Discharge: HOME | End: 2024-08-26
Payer: COMMERCIAL

## 2024-08-26 DIAGNOSIS — I65.22 SYMPTOMATIC CAROTID ARTERY STENOSIS, LEFT: ICD-10-CM

## 2024-08-26 PROCEDURE — 93880 EXTRACRANIAL BILAT STUDY: CPT

## 2024-08-26 PROCEDURE — 93880 EXTRACRANIAL BILAT STUDY: CPT | Performed by: SURGERY

## 2024-09-13 ENCOUNTER — APPOINTMENT (EMERGENCY)
Dept: CT IMAGING | Facility: HOSPITAL | Age: 79
DRG: 061 | End: 2024-09-13
Payer: COMMERCIAL

## 2024-09-13 ENCOUNTER — HOSPITAL ENCOUNTER (INPATIENT)
Facility: HOSPITAL | Age: 79
LOS: 4 days | Discharge: HOME | DRG: 061 | End: 2024-09-17
Attending: EMERGENCY MEDICINE | Admitting: INTERNAL MEDICINE
Payer: COMMERCIAL

## 2024-09-13 DIAGNOSIS — I63.89 CEREBROVASCULAR ACCIDENT (CVA) DUE TO OTHER MECHANISM (HCC): Primary | ICD-10-CM

## 2024-09-13 PROBLEM — E11.9 TYPE 2 DIABETES MELLITUS WITHOUT COMPLICATION (HCC): Status: ACTIVE | Noted: 2019-12-06

## 2024-09-13 PROBLEM — R73.03 PRE-DIABETES: Status: RESOLVED | Noted: 2024-02-29 | Resolved: 2024-09-13

## 2024-09-13 PROBLEM — D72.829 LEUKOCYTOSIS: Status: ACTIVE | Noted: 2024-09-13

## 2024-09-13 PROBLEM — N40.0 BPH (BENIGN PROSTATIC HYPERPLASIA): Status: ACTIVE | Noted: 2024-09-13

## 2024-09-13 LAB
2HR DELTA HS TROPONIN: -1 NG/L
ANION GAP SERPL CALCULATED.3IONS-SCNC: 7 MMOL/L (ref 4–13)
APTT PPP: 31 SECONDS (ref 23–34)
BUN SERPL-MCNC: 38 MG/DL (ref 5–25)
CALCIUM SERPL-MCNC: 9.2 MG/DL (ref 8.4–10.2)
CARDIAC TROPONIN I PNL SERPL HS: 5 NG/L
CARDIAC TROPONIN I PNL SERPL HS: 6 NG/L
CHLORIDE SERPL-SCNC: 105 MMOL/L (ref 96–108)
CO2 SERPL-SCNC: 28 MMOL/L (ref 21–32)
CREAT SERPL-MCNC: 1.18 MG/DL (ref 0.6–1.3)
ERYTHROCYTE [DISTWIDTH] IN BLOOD BY AUTOMATED COUNT: 13.2 % (ref 11.6–15.1)
GLUCOSE SERPL-MCNC: 122 MG/DL (ref 65–140)
GLUCOSE SERPL-MCNC: 132 MG/DL (ref 65–140)
HCT VFR BLD AUTO: 40.3 % (ref 36.5–46.1)
HGB BLD-MCNC: 13.3 G/DL (ref 12–15.4)
INR PPP: 1.09 (ref 0.85–1.19)
MCH RBC QN AUTO: 31.4 PG (ref 26.8–34.3)
MCHC RBC AUTO-ENTMCNC: 33 G/DL (ref 31.4–37.4)
MCV RBC AUTO: 95 FL (ref 82–98)
PLATELET # BLD AUTO: 198 THOUSANDS/UL (ref 149–390)
PMV BLD AUTO: 9.6 FL (ref 8.9–12.7)
POTASSIUM SERPL-SCNC: 4.2 MMOL/L (ref 3.5–5.3)
PROTHROMBIN TIME: 14.6 SECONDS (ref 12.3–15)
RBC # BLD AUTO: 4.23 MILLION/UL (ref 3.88–5.12)
SODIUM SERPL-SCNC: 140 MMOL/L (ref 135–147)
WBC # BLD AUTO: 10.77 THOUSAND/UL (ref 4.31–10.16)

## 2024-09-13 PROCEDURE — 82948 REAGENT STRIP/BLOOD GLUCOSE: CPT

## 2024-09-13 PROCEDURE — 36415 COLL VENOUS BLD VENIPUNCTURE: CPT | Performed by: EMERGENCY MEDICINE

## 2024-09-13 PROCEDURE — 70496 CT ANGIOGRAPHY HEAD: CPT

## 2024-09-13 PROCEDURE — 80048 BASIC METABOLIC PNL TOTAL CA: CPT | Performed by: EMERGENCY MEDICINE

## 2024-09-13 PROCEDURE — 85027 COMPLETE CBC AUTOMATED: CPT | Performed by: EMERGENCY MEDICINE

## 2024-09-13 PROCEDURE — 93005 ELECTROCARDIOGRAM TRACING: CPT

## 2024-09-13 PROCEDURE — 84484 ASSAY OF TROPONIN QUANT: CPT | Performed by: EMERGENCY MEDICINE

## 2024-09-13 PROCEDURE — 99285 EMERGENCY DEPT VISIT HI MDM: CPT

## 2024-09-13 PROCEDURE — 99291 CRITICAL CARE FIRST HOUR: CPT | Performed by: EMERGENCY MEDICINE

## 2024-09-13 PROCEDURE — 85730 THROMBOPLASTIN TIME PARTIAL: CPT | Performed by: EMERGENCY MEDICINE

## 2024-09-13 PROCEDURE — 3E03317 INTRODUCTION OF OTHER THROMBOLYTIC INTO PERIPHERAL VEIN, PERCUTANEOUS APPROACH: ICD-10-PCS | Performed by: PSYCHIATRY & NEUROLOGY

## 2024-09-13 PROCEDURE — 84484 ASSAY OF TROPONIN QUANT: CPT | Performed by: NURSE PRACTITIONER

## 2024-09-13 PROCEDURE — 70498 CT ANGIOGRAPHY NECK: CPT

## 2024-09-13 PROCEDURE — NC001 PR NO CHARGE: Performed by: PSYCHIATRY & NEUROLOGY

## 2024-09-13 PROCEDURE — 85610 PROTHROMBIN TIME: CPT | Performed by: EMERGENCY MEDICINE

## 2024-09-13 RX ORDER — CHLORHEXIDINE GLUCONATE ORAL RINSE 1.2 MG/ML
15 SOLUTION DENTAL EVERY 12 HOURS SCHEDULED
Status: DISCONTINUED | OUTPATIENT
Start: 2024-09-13 | End: 2024-09-17 | Stop reason: HOSPADM

## 2024-09-13 RX ORDER — INSULIN LISPRO 100 [IU]/ML
1-5 INJECTION, SOLUTION INTRAVENOUS; SUBCUTANEOUS EVERY 6 HOURS SCHEDULED
Status: DISCONTINUED | OUTPATIENT
Start: 2024-09-14 | End: 2024-09-14

## 2024-09-13 RX ORDER — FAMOTIDINE 20 MG/1
40 TABLET, FILM COATED ORAL DAILY
Status: DISCONTINUED | OUTPATIENT
Start: 2024-09-14 | End: 2024-09-17 | Stop reason: HOSPADM

## 2024-09-13 RX ORDER — ATORVASTATIN CALCIUM 40 MG/1
80 TABLET, FILM COATED ORAL
Status: DISCONTINUED | OUTPATIENT
Start: 2024-09-14 | End: 2024-09-17 | Stop reason: HOSPADM

## 2024-09-13 RX ADMIN — CHLORHEXIDINE GLUCONATE 15 ML: 1.2 RINSE ORAL at 22:40

## 2024-09-13 RX ADMIN — Medication 18 MG: at 21:51

## 2024-09-13 RX ADMIN — IOHEXOL 85 ML: 350 INJECTION, SOLUTION INTRAVENOUS at 20:40

## 2024-09-14 ENCOUNTER — APPOINTMENT (INPATIENT)
Dept: CT IMAGING | Facility: HOSPITAL | Age: 79
DRG: 061 | End: 2024-09-14
Payer: COMMERCIAL

## 2024-09-14 PROBLEM — R47.89 GARBLED SPEECH: Status: ACTIVE | Noted: 2024-09-14

## 2024-09-14 LAB
BILIRUB UR QL STRIP: NEGATIVE
CHOLEST SERPL-MCNC: 83 MG/DL
CLARITY UR: CLEAR
COLOR UR: YELLOW
EST. AVERAGE GLUCOSE BLD GHB EST-MCNC: 134 MG/DL
GLUCOSE SERPL-MCNC: 102 MG/DL (ref 65–140)
GLUCOSE SERPL-MCNC: 103 MG/DL (ref 65–140)
GLUCOSE SERPL-MCNC: 115 MG/DL (ref 65–140)
GLUCOSE SERPL-MCNC: 119 MG/DL (ref 65–140)
GLUCOSE SERPL-MCNC: 125 MG/DL (ref 65–140)
GLUCOSE UR STRIP-MCNC: NEGATIVE MG/DL
HBA1C MFR BLD: 6.3 %
HDLC SERPL-MCNC: 39 MG/DL
HGB UR QL STRIP.AUTO: NEGATIVE
KETONES UR STRIP-MCNC: NEGATIVE MG/DL
LDLC SERPL CALC-MCNC: 37 MG/DL (ref 0–100)
LEUKOCYTE ESTERASE UR QL STRIP: NEGATIVE
NITRITE UR QL STRIP: NEGATIVE
PH UR STRIP.AUTO: 6.5 [PH]
PROT UR STRIP-MCNC: NEGATIVE MG/DL
SP GR UR STRIP.AUTO: 1.02 (ref 1–1.03)
TRIGL SERPL-MCNC: 33 MG/DL
UROBILINOGEN UR STRIP-ACNC: <2 MG/DL

## 2024-09-14 PROCEDURE — 92522 EVALUATE SPEECH PRODUCTION: CPT

## 2024-09-14 PROCEDURE — 82948 REAGENT STRIP/BLOOD GLUCOSE: CPT

## 2024-09-14 PROCEDURE — NC001 PR NO CHARGE: Performed by: PSYCHIATRY & NEUROLOGY

## 2024-09-14 PROCEDURE — 83036 HEMOGLOBIN GLYCOSYLATED A1C: CPT | Performed by: NURSE PRACTITIONER

## 2024-09-14 PROCEDURE — 92610 EVALUATE SWALLOWING FUNCTION: CPT

## 2024-09-14 PROCEDURE — 70450 CT HEAD/BRAIN W/O DYE: CPT

## 2024-09-14 PROCEDURE — 99223 1ST HOSP IP/OBS HIGH 75: CPT | Performed by: PSYCHIATRY & NEUROLOGY

## 2024-09-14 PROCEDURE — 81003 URINALYSIS AUTO W/O SCOPE: CPT

## 2024-09-14 PROCEDURE — 99223 1ST HOSP IP/OBS HIGH 75: CPT | Performed by: INTERNAL MEDICINE

## 2024-09-14 PROCEDURE — 80061 LIPID PANEL: CPT | Performed by: NURSE PRACTITIONER

## 2024-09-14 RX ORDER — INSULIN LISPRO 100 [IU]/ML
1-5 INJECTION, SOLUTION INTRAVENOUS; SUBCUTANEOUS
Status: DISCONTINUED | OUTPATIENT
Start: 2024-09-14 | End: 2024-09-15

## 2024-09-14 RX ORDER — ONDANSETRON 2 MG/ML
4 INJECTION INTRAMUSCULAR; INTRAVENOUS EVERY 6 HOURS PRN
Status: DISCONTINUED | OUTPATIENT
Start: 2024-09-14 | End: 2024-09-17 | Stop reason: HOSPADM

## 2024-09-14 RX ADMIN — CHLORHEXIDINE GLUCONATE 15 ML: 1.2 RINSE ORAL at 09:44

## 2024-09-14 RX ADMIN — ATORVASTATIN CALCIUM 80 MG: 40 TABLET, FILM COATED ORAL at 17:41

## 2024-09-14 RX ADMIN — ONDANSETRON 4 MG: 2 INJECTION, SOLUTION INTRAMUSCULAR; INTRAVENOUS at 20:44

## 2024-09-14 RX ADMIN — CHLORHEXIDINE GLUCONATE 15 ML: 1.2 RINSE ORAL at 21:15

## 2024-09-14 RX ADMIN — FAMOTIDINE 40 MG: 20 TABLET, FILM COATED ORAL at 09:44

## 2024-09-14 NOTE — H&P
H&P - Critical Care/ICU   Name: Tevin Bazan 79 y.o. adult I MRN: [id number]  Unit/Bed#: -01 I Date of Admission: 9/13/2024   Date of Service: 9/14/2024 I Hospital Day: 1       Assessment & Plan  CVA (cerebral vascular accident) (HCC)  9/13 sudden onset garbled speech at 1900 as witnessed by patient's wife   Associated dizziness en route to ER per wife   9/13 CTH -- neg   9/13 CTA H/N --  No proximal large vessel occlusion or high-grade vascular stenosis in the head and neck. Redemonstrated left thyroid nodule, for which dedicated thyroid sonogram is recommended if not previously performed.  9/13 TNK @ 2151  Follows with Dr. Pearson as OP, last seen 5/10/24  Previous CVA Hx:   2/29/24 MRI -- Punctate acute to subacute infarcts involving the high left frontoparietal cortex, left parietal subcortical white matter, and posterior left cerebellum -- thought to be 2/2 embolic as this was 2 days after undergoing cardiac catheterization   Pt reports embolic stroke in 2003, no further records available   OP CVA optimization -- statin, asa, plavix   Patient reports being on coumadin for several years previously, however coumadin was stopped in 2021 2/2 epistaxis and he was transitioned to plavix     Plan:   Admit to ICU for post TNK CVA protocol   Serial neuro exams   MRI, echo pending   24h post TNK CTH ordered   Goal SBP <180  Cont home statin   Hold asa, plavix for 24h post TNK   Neurology, speech, PT, OT, CM consults   NPO until passed by speech   Hypercholesterolemia  Home regimen: atorvastain 80 qd, although patient states he only takes statin on days he has substernal CP   Continue qd now   Coronary artery disease involving native coronary artery of native heart with unstable angina pectoris (HCC)  Card cath 2/26/24 with Dr. Devaughn Dubois RCA lesion is 70% stenosed.  Mid LAD lesion is 50% stenosed.  1st Mrg lesion is 40% stenosed.  Mid RCA lesion is 100% stenosed.  Essential hypertension  Home regimen:  diltiazem qd, metoprolol qd -- unclear if patient has been taking, will hold for now   Goal SBP <180 given CVA   Symptomatic carotid artery stenosis, left  Status post left carotid endarterectomy 2021 in the setting of CVA of the central semiovale on the left   Stage 2 chronic kidney disease  Baseline creat appears to be 0.9-1.1  Admission creat 1.18  Urinary retention protocol   Trend I&O   Avoid hypotension, nephrotoxic agents   COPD (chronic obstructive pulmonary disease) (HCC)  Not on daily maintenance therapies   Previous smoker, quit in 1970s   PRN abbuterol   No in AE on admission, no SOB or wheezing, though baseline productive cough noted   IS, pulm toileting   Goal spo2 > 90%  BPH (benign prostatic hyperplasia)  Urinary retention protocol   Type 2 diabetes mellitus without complication (HCC)  Not on home regimen  Check hba1c   SSI q6h for npo for goal glucose 140-180  Will need CHO controlled diet once appropriate   Leukocytosis  Admission wbc 10.77  Likely 2/2 reactive in the setting of CVA   Did not otherwise meet sirs on admission, no concern for infectious source   Trend   Disposition: Critical care    History of Present Illness   Tevin Bazan is a 79 y.o. with a past medical history of previous CVA, carotid artery stenosis status post CEA in 2021, CKD 2, HLD, DM2, BPH, COPD not on home maintenance therapies who presents to the ICU with strokelike symptoms, status post TNK.  Per the patient, on Friday, 9/13 at approximately 1900, he had a sudden onset of garbled speech, which progressively worsened.  En route to the hospital, his wife reports that he experienced some mild dizziness.  In the ER, NIH 1 for garbled speech, CT head negative, CTA without large occlusion.  TNK administered at 2151. Persistent garbled speech, otherwise neurologically intact. Admit to  for serial neuro checks.     History obtained from chart review and the patient.  Review of Systems: Review of Systems    Constitutional:  Negative for chills, diaphoresis, fatigue and fever.   Respiratory:  Positive for cough (chronic). Negative for chest tightness, shortness of breath and wheezing.    Cardiovascular:  Negative for chest pain, palpitations and leg swelling.   Gastrointestinal:  Negative for abdominal distention, abdominal pain, constipation, diarrhea, nausea and vomiting.   Genitourinary: Negative.    Musculoskeletal: Negative.    Skin: Negative.    Neurological:  Positive for dizziness and speech difficulty. Negative for seizures, facial asymmetry, weakness, light-headedness, numbness and headaches.   Psychiatric/Behavioral: Negative.         Historical Information   Past Medical History:  7/17/2020: Chronic bilateral low back pain without sciatica  No date: Hypertension  No date: Scoliosis  No date: Stroke (HCC)  11/22/2021: Stroke-like symptoms Past Surgical History:  No date: BACK SURGERY  No date: BOWEL RESECTION  2/26/2024: CARDIAC CATHETERIZATION; N/A      Comment:  Procedure: Cardiac Coronary Angiogram;  Surgeon: Rivas Cantor MD;  Location: [facility];  Service:                Cardiology  2/26/2024: CARDIAC CATHETERIZATION      Comment:  Procedure: Cardiac catheterization;  Surgeon: Rivas Cantor MD;  Location: [facility];  Service:                Cardiology  2/26/2024: CARDIAC CATHETERIZATION; N/A      Comment:  Procedure: Cardiac pci;  Surgeon: Rivas Cantor MD;                 Location: [facility];  Service: Cardiology  12/1/2021: FL TEAEC W/PATCH GRF CAROTID VERTB SUBCLAV NECK INC; Left      Comment:  Procedure: ENDARTERECTOMY ARTERY CAROTID;  Surgeon: Vito Mo MD;  Location: [facility];  Service: Vascular  No date: TONSILLECTOMY   Current Outpatient Medications   Medication Instructions    acetaminophen (TYLENOL) 1,000 mg, Oral, Every 6 hours PRN    albuterol (Proventil HFA) 90 mcg/act inhaler 1-2 puffs, Inhalation, Every 6  hours PRN    Ascorbic Acid (vitamin C) 1000 MG tablet Every 24 hours    aspirin (ECOTRIN LOW STRENGTH) 81 mg, Oral, Daily    atorvastatin (LIPITOR) 80 mg, Oral, Daily    Chelated Zinc 50 MG TABS Every 24 hours    clopidogrel (PLAVIX) 75 mg, Oral, Daily    diltiazem (TIAZAC) 120 mg, Oral, Daily    famotidine (PEPCID) 40 MG tablet 2 times daily PRN    isosorbide mononitrate (IMDUR) 30 mg, Oral, Daily    metoprolol succinate (TOPROL-XL) 25 mg, Oral, Daily    nitroglycerin (NITROSTAT) 0.4 mg, Sublingual, Every 5 minutes PRN    Omega 3 1000 MG CAPS 1 capsule, Every 12 hours    Allergies   Allergen Reactions    Black Cohosh Rash    Minoxidil Rash      Social History     Tobacco Use    Smoking status: Former     Current packs/day: 0.00     Types: Cigarettes     Quit date:      Years since quittin.7    Smokeless tobacco: Never   Vaping Use    Vaping status: Never Used   Substance Use Topics    Alcohol use: Not Currently    Drug use: No    Family History   Adopted: Yes   Family history unknown: Yes          Objective                          Vitals I/O      Most Recent Min/Max in 24hrs   Temp 98.9 °F (37.2 °C) Temp  Min: 97.8 °F (36.6 °C)  Max: 98.9 °F (37.2 °C)   Pulse 63 Pulse  Min: 60  Max: 82   Resp 20 Resp  Min: 16  Max: 36   /72 BP  Min: 117/58  Max: 158/72   O2 Sat 94 % SpO2  Min: 93 %  Max: 96 %      Intake/Output Summary (Last 24 hours) at 2024 0108  Last data filed at 2024 0045  Gross per 24 hour   Intake 40 ml   Output 245 ml   Net -205 ml       Diet NPO; Sips with meds    Invasive Monitoring           Physical Exam   Physical Exam  Vitals and nursing note reviewed.   Skin:     General: Skin is warm.      Capillary Refill: Capillary refill takes less than 2 seconds.      Coloration: Skin is not pale.   HENT:      Head: Normocephalic and atraumatic.      Mouth/Throat:      Lips: Pink.      Mouth: Mucous membranes are moist.      Dentition: Abnormal dentition (poor overall dentition).       Comments: Minor bleeding of R upper posterior tooth/gum  Cardiovascular:      Rate and Rhythm: Normal rate and regular rhythm.   Musculoskeletal:      Right lower leg: No edema.      Left lower leg: No edema.   Abdominal: General: Abdomen is flat. Bowel sounds are decreased. There is no distension.      Palpations: Abdomen is soft.      Tenderness: There is no abdominal tenderness.   Constitutional:       General: Danielle is awake. Danielle is not in acute distress.     Appearance: Normal appearance. Danielle is not ill-appearing.   Pulmonary:      Effort: Pulmonary effort is normal.      Breath sounds: Normal breath sounds.   Psychiatric:         Behavior: Behavior is cooperative.   Neurological:      Mental Status: Danielle is alert.      Comments: AAO x3, in NAD, speech garbled  No facial droop noted  No pronator drift  Shoulder shrugs L 5/5, R 5/5  Hand grasps L 5/5, R 5/5  UE push/pull L 5/5, R 5/5  Plantar/dorsi flexion L 5/5, R 5/5  Leg lifts L 5/5, R 5/5  No upper or lower limb ataxia    Genitourinary/Anorectal:     Comments: Voiding independently         Diagnostic Studies        Lab Results: I have reviewed the following results: CBC/BMP:   .     09/13/24 2027   WBC 10.77*   HGB 13.3   HCT 40.3      SODIUM 140   K 4.2      CO2 28   BUN 38*   CREATININE 1.18   GLUC 122         Medications:  Scheduled PRN   atorvastatin, 80 mg, After Dinner  chlorhexidine, 15 mL, Q12H KELLI  famotidine, 40 mg, Daily  insulin lispro, 1-5 Units, Q6H KELLI          Continuous          Labs:   CBC    Recent Labs     09/13/24 2027   WBC 10.77*   HGB 13.3   HCT 40.3        BMP    Recent Labs     09/13/24 2027   SODIUM 140   K 4.2      CO2 28   AGAP 7   BUN 38*   CREATININE 1.18   CALCIUM 9.2       Coags    Recent Labs     09/13/24 2027   INR 1.09   PTT 31        Additional Electrolytes  No recent results       Blood Gas    No recent results  No recent results LFTs  No recent results    Infectious  No recent results   Glucose  Recent Labs     09/13/24 2027   GLUC 122

## 2024-09-14 NOTE — ASSESSMENT & PLAN NOTE
Status post left carotid endarterectomy 2021 in the setting of CVA of the central semiovale on the left

## 2024-09-14 NOTE — ASSESSMENT & PLAN NOTE
Home regimen: diltiazem qd, metoprolol qd -- unclear if patient has been taking, will hold for now   Goal SBP <180 given CVA

## 2024-09-14 NOTE — ASSESSMENT & PLAN NOTE
Not on home regimen  Check hba1c   SSI q6h for npo for goal glucose 140-180  Will need CHO controlled diet once appropriate

## 2024-09-14 NOTE — PLAN OF CARE
Problem: NEUROSENSORY - ADULT  Goal: Achieves stable or improved neurological status  Description: INTERVENTIONS  - Monitor and report changes in neurological status  - Monitor vital signs such as temperature, blood pressure, glucose, and any other labs ordered   - Initiate measures to prevent increased intracranial pressure  - Monitor for seizure activity and implement precautions if appropriate      Outcome: Progressing  Goal: Remains free of injury related to seizures activity  Description: INTERVENTIONS  - Maintain airway, patient safety  and administer oxygen as ordered  - Monitor patient for seizure activity, document and report duration and description of seizure to physician/advanced practitioner  - If seizure occurs,  ensure patient safety during seizure  - Reorient patient post seizure  - Seizure pads on all 4 side rails  - Instruct patient/family to notify RN of any seizure activity including if an aura is experienced  - Instruct patient/family to call for assistance with activity based on nursing assessment  - Administer anti-seizure medications if ordered    Outcome: Progressing  Goal: Achieves maximal functionality and self care  Description: INTERVENTIONS  - Monitor swallowing and airway patency with patient fatigue and changes in neurological status  - Encourage and assist patient to increase activity and self care.   - Encourage visually impaired, hearing impaired and aphasic patients to use assistive/communication devices  Outcome: Progressing     Problem: MUSCULOSKELETAL - ADULT  Goal: Maintain or return mobility to safest level of function  Description: INTERVENTIONS:  - Assess patient's ability to carry out ADLs; assess patient's baseline for ADL function and identify physical deficits which impact ability to perform ADLs (bathing, care of mouth/teeth, toileting, grooming, dressing, etc.)  - Assess/evaluate cause of self-care deficits   - Assess range of motion  - Assess patient's mobility  -  Assess patient's need for assistive devices and provide as appropriate  - Encourage maximum independence but intervene and supervise when necessary  - Involve family in performance of ADLs  - Assess for home care needs following discharge   - Consider OT consult to assist with ADL evaluation and planning for discharge  - Provide patient education as appropriate  Outcome: Progressing  Goal: Maintain proper alignment of affected body part  Description: INTERVENTIONS:  - Support, maintain and protect limb and body alignment  - Provide patient/ family with appropriate education  Outcome: Progressing     Problem: Nutrition  Goal: Nutrition/Hydration status is improving  Description: Monitor and assess patient's nutrition/hydration status for malnutrition (ex- brittle hair, bruises, dry skin, pale skin and conjunctiva, muscle wasting, smooth red tongue, and disorientation). Collaborate with interdisciplinary team and initiate plan and interventions as ordered.  Monitor patient's weight and dietary intake as ordered or per policy. Utilize nutrition screening tool and intervene per policy. Determine patient's food preferences and provide high-protein, high-caloric foods as appropriate.     - Assist patient with eating.  - Allow adequate time for meals.  - Encourage patient to take dietary supplement as ordered.  - Collaborate with clinical nutritionist.  - Include patient/family/caregiver in decisions related to nutrition.  Outcome: Progressing     Problem: Potential for Aspiration  Goal: Non-ventilated patient's risk of aspiration is minimized  Description: Assess and monitor vital signs, respiratory status, and labs (WBC).  Monitor for signs of aspiration (tachypnea, cough, rales, wheezing, cyanosis, fever).    - Assess and monitor patient's ability to swallow.  - Place patient up in chair to eat if possible.  - HOB up at 90 degrees to eat if unable to get patient up into chair.  - Supervise patient during oral intake.    - Instruct patient/ family to take small bites.  - Instruct patient/ family to take small single sips when taking liquids.  - Follow patient-specific strategies generated by speech pathologist.  Outcome: Progressing  Goal: Ventilated patient's risk of aspiration is minimized  Description: Assess and monitor vital signs, respiratory status, airway cuff pressure, and labs (WBC).  Monitor for signs of aspiration (tachypnea, cough, rales, wheezing, cyanosis, fever).    - Elevate head of bed 30 degrees if patient has tube feeding.  - Monitor tube feeding.  Outcome: Progressing     Problem: Neurological Deficit  Goal: Neurological status is stable or improving  Description: Interventions:  - Monitor and assess patient's level of consciousness, motor function, sensory function, and level of assistance needed for ADLs.   - Monitor and report changes from baseline. Collaborate with interdisciplinary team to initiate plan and implement interventions as ordered.   - Provide and maintain a safe environment.  - Consider seizure precautions.  - Consider fall precautions.  - Consider aspiration precautions.  - Consider bleeding precautions.  Outcome: Progressing     Problem: SAFETY ADULT  Goal: Patient will remain free of falls  Description: INTERVENTIONS:  - Educate patient/family on patient safety including physical limitations  - Instruct patient to call for assistance with activity   - Consult OT/PT to assist with strengthening/mobility   - Keep Call bell within reach  - Keep bed low and locked with side rails adjusted as appropriate  - Keep care items and personal belongings within reach  - Initiate and maintain comfort rounds  - Make Fall Risk Sign visible to staff  - Offer Toileting every 2 Hours, in advance of need  - Initiate/Maintain bed/chair alarm  - Obtain necessary fall risk management equipment:   - Apply yellow socks and bracelet for high fall risk patients  - Consider moving patient to room near nurses  station  Outcome: Progressing  Goal: Maintain or return to baseline ADL function  Description: INTERVENTIONS:  -  Assess patient's ability to carry out ADLs; assess patient's baseline for ADL function and identify physical deficits which impact ability to perform ADLs (bathing, care of mouth/teeth, toileting, grooming, dressing, etc.)  - Assess/evaluate cause of self-care deficits   - Assess range of motion  - Assess patient's mobility; develop plan if impaired  - Assess patient's need for assistive devices and provide as appropriate  - Encourage maximum independence but intervene and supervise when necessary  - Involve family in performance of ADLs  - Assess for home care needs following discharge   - Consider OT consult to assist with ADL evaluation and planning for discharge  - Provide patient education as appropriate  Outcome: Progressing  Goal: Maintains/Returns to pre admission functional level  Description: INTERVENTIONS:  - Perform AM-PAC 6 Click Basic Mobility/ Daily Activity assessment daily.  - Set and communicate daily mobility goal to care team and patient/family/caregiver.   - Collaborate with rehabilitation services on mobility goals if consulted  - Perform Range of Motion 5 times a day.  - Reposition patient every 2 hours.  - Dangle patient 4 times a day  - Stand patient 4 times a day  - Ambulate patient 4 times a day  - Out of bed to chair 4 times a day   - Out of bed for meals 4 times a day  - Out of bed for toileting  - Record patient progress and toleration of activity level   Outcome: Progressing     Problem: Potential for Falls  Goal: Patient will remain free of falls  Description: INTERVENTIONS:  - Educate patient/family on patient safety including physical limitations  - Instruct patient to call for assistance with activity   - Consult OT/PT to assist with strengthening/mobility   - Keep Call bell within reach  - Keep bed low and locked with side rails adjusted as appropriate  - Keep care  items and personal belongings within reach  - Initiate and maintain comfort rounds  - Make Fall Risk Sign visible to staff  - Offer Toileting every 2 Hours, in advance of need  - Initiate/Maintain bed/chair alarm  - Obtain necessary fall risk management equipment:   - Apply yellow socks and bracelet for high fall risk patients  - Consider moving patient to room near nurses station  Outcome: Progressing

## 2024-09-14 NOTE — ASSESSMENT & PLAN NOTE
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

## 2024-09-14 NOTE — CONSULTS
Consultation - Neurology   Name: Tevin Bazan 79 y.o. adult I MRN: [id number]  Unit/Bed#: -01 I Date of Admission: 9/13/2024   Date of Service: 9/14/2024 I Hospital Day: 1   Inpatient consult to Neurology  Consult performed by: ANNIE Moreno  Consult ordered by: ANNIE Arias        Physician Requesting Evaluation: Zuleima Ann DO   Reason for Evaluation / Principal Problem: CVA    Assessment & Plan  Garbled speech  79-year-old LHD adult with prior TIA's (2003, 2021), prior left hemispheric CVA, CAD s/p CEA (2021 on DAPT), CKD, HLD, DM, BPH and COPD who presented on 9/13 for strokelike symptoms.  Patient reports that approximately 7 PM on 9/13, he had an acute onset of garbled/dysarthric speech that was progressively worsening.  He also noticed water was spilling out of his mouth, did not specify what side.  While he was en route to the hospital, his wife stated he was experiencing mild dizziness.    BP on arrival 141/67. NIHSS 1 for speech.  Initial neuroimaging (CT head, CTA head/neck) was unremarkable for acute intracranial abnormalities.   Per on-call neurology and ED documentation, patient was agreeable for TNK administration.  TNK was administered at 2151 and he was transferred to the ICU.    Neurological workup:  - Labs on admission revealed elevated white count (10.77)  - CTH negative for acute intracranial abnormality  - CTA head/neck: Negative for LVO or high-grade stenosis  - Lipid panel: Cholesterol 83, LDL 37    Plan:  - Acute ischemic stroke protocol/tNK protocol  - Continue with Atorvastatin 80 mg          - Repeat CTH 24 hours post tNK pending            - Hold AC/AP until confirmation that repeat CTH is stable  - MRI brain wo pending  - Echo pending  - Hemoglobin A1c pending  - Permissive HTN with max of 180/105    - Euglycemic, normothermic goal  - Continue telemetry; monitor for any underlying arrhthymias  - PT/OT/ST  - Secondary risk factor modification.   - Stroke  education  - Frequent neuro checks. Continue to monitor and notify neurology with any changes.  - STAT CT head for any acute change in neuro exam  - Medical management and supportive care per primary team. Correction of any metabolic or infectious disturbances.      Plan discussed with Attending Neurologist, please see attestation for further input/recommendations.    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 Workup pending    History of Present Illness   HPI: Tevin Bazan is a 79 y.o. L hand dominant adult with prior TIA's (2003, 2021 in the setting of symptomatic L ICA), prior left hemispheric CVA (2024, thought to be cardio embolic in the setting of recent cardiac cath), CAD s/p CEA (2021 on DAPT), CKD, HLD, DM, BPH and COPD who presented on 9/13 for strokelike symptoms.      Patient reports that approximately 7 PM on 9/13, he had an acute onset of garbled speech that was progressively worsening.  He also noticed water was spilling out of his mouth, did not specify what side. Patient denied coughing/choking, focal weakness, visual disturbances, receptive/expressive aphasia. While he was en route to the hospital, his wife stated he was experiencing mild dizziness.    Patient reports compliance with Aspirin, Plavix. He does not take his stain everyday, he only takes it when he gets chest pain. He underwent x 48 hours of Holter monitoring in 2023 which was  "NSR.    BP on arrival 141/67. NIHSS 1 for speech.  Initial neuroimaging (CT head, CTA head/neck) was unremarkable for acute intracranial abnormalities.  Labs on admission revealed elevated white count (10.77).  Per on-call neurology and ED documentation, patient was agreeable for TNK administration.  TNK was administered at 9:51 PM and he was transferred to the ICU on the stroke pathway.      Pertinent prior neurological history:  -The following paragraph is taken from my previous consult note completed on 2/29/2024:  \"Per chart review, patient was previously seen by [facility] inpatient neurology in November 2021 for 1 hour episode of confusion, aphasia and right hand numbness. This episode occurred while being off Plavix for a week due to delay in picking up refill. Etiology was thought to be secondary to TIA in the setting of symptomatic left ICA stenosis. AP regimen was deferred to vascular and from neurology standpoint, patient only required Plavix monotherapy in regards to his stroke. Patient had left carotid endarterectomy in December 2021 and he was discharged on DAPT. \"    -Hospitalization February 2024 for word finding difficulty and dysarthria.  Neuroimaging revealed acute to subacute infarcts throughout his left hemisphere.  This was likely in the setting of cardioembolic event in the setting of recent catheterization.  Prior to admission, he had a cardiac cath completed a few days prior where he was continued on DAPT.    -P2Y12 199 in March 2024    -Outpatient follow-up appointment May 2024 where he was continued on DAPT/statin for his stroke.  Patient also reported consistent L handed essential tremor however wanted to hold off on medications.  Dr. Pearson noted no signs of Parkinson's.      Performed by Attending Neurologist, AP present at bedside acting as scribe  Review of Systems   HENT:  Positive for postnasal drip.    Eyes:  Negative for photophobia and visual disturbance.   Respiratory:  " Negative for cough and shortness of breath.    Cardiovascular:  Negative for chest pain and palpitations.   Skin:  Negative for color change and pallor.   Neurological:  Positive for speech difficulty.   Psychiatric/Behavioral:  Negative for confusion. The patient is not nervous/anxious.         I have reviewed the patient's PMH, PSH, Social History, Family History, Meds, and Allergies  Historical Information   Past Medical History:   Diagnosis Date    Chronic bilateral low back pain without sciatica 2020    Hypertension     Scoliosis     Stroke (HCC)     Stroke-like symptoms 2021     Past Surgical History:   Procedure Laterality Date    BACK SURGERY      BOWEL RESECTION      CARDIAC CATHETERIZATION N/A 2024    Procedure: Cardiac Coronary Angiogram;  Surgeon: Rivas Cantor MD;  Location: [facility];  Service: Cardiology    CARDIAC CATHETERIZATION  2024    Procedure: Cardiac catheterization;  Surgeon: Rivas Cantor MD;  Location: [facility];  Service: Cardiology    CARDIAC CATHETERIZATION N/A 2024    Procedure: Cardiac pci;  Surgeon: Rivas Cantor MD;  Location: [facility];  Service: Cardiology    ME TEAEC W/PATCH GRF CAROTID VERTB SUBCLAV NECK INC Left 2021    Procedure: ENDARTERECTOMY ARTERY CAROTID;  Surgeon: Vito Mo MD;  Location: [facility];  Service: Vascular    TONSILLECTOMY       Social History     Tobacco Use    Smoking status: Former     Current packs/day: 0.00     Types: Cigarettes     Quit date:      Years since quittin.7    Smokeless tobacco: Never   Vaping Use    Vaping status: Never Used   Substance and Sexual Activity    Alcohol use: Not Currently    Drug use: No    Sexual activity: Not on file     E-Cigarette/Vaping    E-Cigarette Use Never User      E-Cigarette/Vaping Substances    Nicotine No     THC No     CBD No     Flavoring No     Other No     Unknown No      Family History   Adopted: Yes   Family history unknown: Yes      Social History     Tobacco Use    Smoking status: Former     Current packs/day: 0.00     Types: Cigarettes     Quit date:      Years since quittin.7    Smokeless tobacco: Never   Vaping Use    Vaping status: Never Used   Substance and Sexual Activity    Alcohol use: Not Currently    Drug use: No    Sexual activity: Not on file       Current Facility-Administered Medications:     atorvastatin (LIPITOR) tablet 80 mg, After Dinner    chlorhexidine (PERIDEX) 0.12 % oral rinse 15 mL, Q12H KELLI    famotidine (PEPCID) tablet 40 mg, Daily    insulin lispro (HumALOG/ADMELOG) 100 units/mL subcutaneous injection 1-5 Units, Q6H KELLI **AND** Fingerstick Glucose (POCT), Q6H  Prior to Admission Medications   Prescriptions Last Dose Informant Patient Reported? Taking?   Ascorbic Acid (vitamin C) 1000 MG tablet 2024 Self Yes Yes   Sig: every 24 hours   Chelated Zinc 50 MG TABS 2024 Self Yes Yes   Sig: every 24 hours   Al[place] 3 1000 MG CAPS 2024 Self Yes Yes   Si capsule Every 12 hours   acetaminophen (TYLENOL) 500 mg tablet Past Month Self Yes Yes   Sig: Take 1,000 mg by mouth every 6 (six) hours as needed for mild pain   albuterol (Proventil HFA) 90 mcg/act inhaler 2024 Self No Yes   Sig: Inhale 1-2 puffs every 6 (six) hours as needed for wheezing   aspirin (ECOTRIN LOW STRENGTH) 81 mg EC tablet  Self No No   Sig: Take 1 tablet (81 mg total) by mouth daily for 21 days   atorvastatin (LIPITOR) 80 mg tablet 2024  No Yes   Sig: Take 1 tablet (80 mg total) by mouth daily   clopidogrel (PLAVIX) 75 mg tablet  Self No No   Sig: Take 1 tablet (75 mg total) by mouth daily   diltiazem (TIAZAC) 120 MG 24 hr capsule 2024 Self Yes Yes   Sig: Take 120 mg by mouth daily   famotidine (PEPCID) 40 MG tablet 2024 Self Yes Yes   Si (two) times a day as needed   isosorbide mononitrate (IMDUR) 30 mg 24 hr tablet Not Taking Self No No   Sig: Take 1 tablet (30 mg total) by mouth daily   Patient not  taking: Reported on 5/10/2024   metoprolol succinate (TOPROL-XL) 25 mg 24 hr tablet Not Taking Self No No   Sig: Take 1 tablet (25 mg total) by mouth daily   Patient not taking: Reported on 5/10/2024   nitroglycerin (NITROSTAT) 0.4 mg SL tablet Unknown Self No No   Sig: Place 1 tablet (0.4 mg total) under the tongue every 5 (five) minutes as needed for chest pain   Patient not taking: Reported on 9/13/2024      Facility-Administered Medications: None     Black cohosh and Minoxidil    Objective      Temp:  [97.3 °F (36.3 °C)-98.9 °F (37.2 °C)] 97.3 °F (36.3 °C)  HR:  [59-82] 69  Resp:  [13-25] 14  BP: (117-166)/(58-80) 159/69  O2 Device: None (Room air)          I/O last 24 hours:  In: 40 [P.O.:20; I.V.:20]  Out: 895 [Urine:895]  Lines/Drains/Airways       Active Status       None                    Performed by Attending Neurologist, AP present at bedside acting as scribe  Physical Exam  Vitals reviewed.   Constitutional:       General: Danielle is not in acute distress.     Appearance: Normal appearance. Danielle is normal weight. Danielle is not ill-appearing.   HENT:      Head: Normocephalic and atraumatic.      Right Ear: External ear normal.      Left Ear: External ear normal.      Mouth/Throat:      Comments: Poor dentition  Eyes:      General:         Right eye: No discharge.         Left eye: No discharge.      Extraocular Movements: Extraocular movements intact and EOM normal.      Conjunctiva/sclera: Conjunctivae normal.      Pupils: Pupils are equal, round, and reactive to light.   Cardiovascular:      Rate and Rhythm: Normal rate.   Pulmonary:      Effort: Pulmonary effort is normal. No respiratory distress.   Musculoskeletal:         General: Normal range of motion.      Cervical back: Normal range of motion.      Right lower leg: No edema.      Left lower leg: No edema.   Skin:     General: Skin is warm and dry.      Coloration: Skin is not jaundiced or pale.   Neurological:      Mental Status: Danielle is alert.       Motor: Motor strength is normal.     Coordination: Finger-Nose-Finger Test normal.      Comments: See below   Psychiatric:         Mood and Affect: Mood normal.         Speech: Speech is slurred.         Behavior: Behavior normal.      Neurologic Exam     Mental Status   Oriented to person.   Oriented to place.   Disoriented to date. Oriented to year and month.   Follows 2 step commands.   Attention: normal. Concentration: normal.   Speech: slurred (No aphasia present)  Level of consciousness: alert  Able to name object. Able to read. Able to repeat. Normal comprehension.     Cranial Nerves     CN II   Visual fields full to confrontation.     CN III, IV, VI   Pupils are equal, round, and reactive to light.  Extraocular motions are normal.   Nystagmus: none   Diplopia: none  Conjugate gaze: present    CN V   Facial sensation intact.     CN VIII   CN VIII normal.     Asymmetry with L NLF, symmetric with movement     Motor Exam   Muscle bulk: normal  Right arm pronator drift: absent  Left arm pronator drift: absent    Strength   Strength 5/5 throughout.     Sensory Exam   Light touch normal.   Right arm vibration: normal  Left arm vibration: normal  Right leg vibration: decreased from toes  Left leg vibration: decreased from toes  Pinprick normal.     Temperature sensation intact, slightly different on distal BLE     Gait, Coordination, and Reflexes     Coordination   Finger to nose coordination: normal    Tremor   Resting tremor: absent  Normal finger tapping movements b/l       Lab Results: I have reviewed the following results: CBC/BMP:   .     09/13/24 2027   WBC 10.77*   HGB 13.3   HCT 40.3      SODIUM 140   K 4.2      CO2 28   BUN 38*   CREATININE 1.18   GLUC 122    , Troponin,BNP:  .     09/13/24 2027 09/13/24  2239   HSTNI0 6  --    HSTNI2  --  5    , Lipid Profile:   Results from last 7 days   Lab Units 09/14/24  0612   HDL mg/dL 39*   LDL CALC mg/dL 37   TRIGLYCERIDES mg/dL 33     Imaging  Review: Personally reviewed the following image studies in PACS and associated radiology reports: CT head, CTA h/n and MRI brain.  Other Studies: No additional pertinent studies reviewed.    VTE Prophylaxis: VTE covered by:    None, s/p tNK 9/13 at 3812

## 2024-09-14 NOTE — ASSESSMENT & PLAN NOTE
Home regimen: atorvastain 80 qd, although patient states he only takes statin on days he has substernal CP   Continue qd now

## 2024-09-14 NOTE — ASSESSMENT & PLAN NOTE
Not on daily maintenance therapies   Previous smoker, quit in 1970s   PRN abbuterol   No in AE on admission, no SOB or wheezing, though baseline productive cough noted   IS, pulm toileting   Goal spo2 > 90%

## 2024-09-14 NOTE — ED PROVIDER NOTES
1. Cerebrovascular accident (CVA) due to other mechanism (HCC)      ED Disposition       ED Disposition   Admit    Condition   Stable    Date/Time   Fri Sep 13, 2024  9:25 PM    Comment   Case was discussed with ICU and the patient's admission status was agreed to be Admission Status: inpatient status to the service of Dr. Wright .               Assessment & Plan       Medical Decision Making  Amount and/or Complexity of Data Reviewed  Labs: ordered.  Radiology: ordered.    Risk  Prescription drug management.  Decision regarding hospitalization.      79-year-old presenting with speech difficulty that started 90 minutes prior to arrival.  NIH 1.  Stroke alert called.  Patient with a prior history of TIAs but report that was a word finding difficulty.  Patient has been compliant with aspirin and Plavix.  Discussed case with neurology team.    Upon reassessment, patient's speech worsening.  Discussion regarding TNK and patient agreeable.  Will administer and admit to ICU.                Medications   iohexol (OMNIPAQUE) 350 MG/ML injection (MULTI-DOSE) 85 mL (85 mL Intravenous Given 9/13/24 2040)   tenecteplase (TNKase) injection 18 mg (18 mg Intravenous Given 9/13/24 2151)       History of Present Illness       HPI    79-year-old presenting with speech difficulty that started shortly prior to arrival.  Patient denies any other neurological deficits.  Spouse at bedside stating she first noticed symptoms 7 PM tonight.     Review of Systems   Neurological:  Positive for speech difficulty.           Objective     ED Triage Vitals   Temperature Pulse Blood Pressure Respirations SpO2 Patient Position - Orthostatic VS   09/13/24 2030 09/13/24 2023 09/13/24 2023 09/13/24 2023 09/13/24 2023 09/13/24 2023   98.2 °F (36.8 °C) 82 141/67 18 96 % Lying      Temp Source Heart Rate Source BP Location FiO2 (%) Pain Score    09/13/24 2030 09/13/24 2023 09/13/24 2023 -- 09/13/24 2115    Temporal Monitor Left arm  No Pain        Physical  Exam  Vitals and nursing note reviewed.   Constitutional:       General: Danielle is not in acute distress.     Appearance: Danielle is well-developed.   HENT:      Head: Normocephalic and atraumatic.      Right Ear: External ear normal.      Left Ear: External ear normal.      Nose: Nose normal.   Eyes:      General: No scleral icterus.     Comments: Visual fields intact   Pulmonary:      Effort: Pulmonary effort is normal. No respiratory distress.   Abdominal:      General: There is no distension.      Palpations: Abdomen is soft.   Musculoskeletal:         General: No deformity. Normal range of motion.      Cervical back: Normal range of motion and neck supple.      Comments: 5/5 strength of bilateral upper and lower extremities   Skin:     General: Skin is warm.      Findings: No rash.   Neurological:      General: No focal deficit present.      Mental Status: Danielle is alert.      Gait: Gait normal.      Comments: Able to identify objects.  Mild slurred speech   Psychiatric:         Mood and Affect: Mood normal.         Labs Reviewed   BASIC METABOLIC PANEL - Abnormal       Result Value    Sodium 140      Potassium 4.2      Chloride 105      CO2 28      ANION GAP 7      BUN 38 (*)     Creatinine 1.18      Glucose 122      Calcium 9.2      eGFR        Narrative:     Notes:     1. eGFR calculation is only valid for adults 18 years and older.  2. EGFR calculation cannot be performed for patients who are transgender, non-binary, or whose legal sex, sex at birth, and gender identity differ.   CBC AND PLATELET - Abnormal    WBC 10.77 (*)     RBC 4.23      Hemoglobin 13.3      Hematocrit 40.3      MCV 95      MCH 31.4      MCHC 33.0      RDW 13.2      Platelets 198      MPV 9.6     PROTIME-INR - Normal    Protime 14.6      INR 1.09      Narrative:     INR Therapeutic Range    Indication                                             INR Range      Atrial Fibrillation                                                2.0-3.0  Hypercoagulable State                                    2.0.2.3  Left Ventricular Asist Device                            2.0-3.0  Mechanical Heart Valve                                  -    Aortic(with afib, MI, embolism, HF, LA enlargement,    and/or coagulopathy)                                     2.0-3.0 (2.5-3.5)     Mitral                                                             2.5-3.5  Prosthetic/Bioprosthetic Heart Valve               2.0-3.0  Venous thromboembolism (VTE: VT, PE        2.0-3.0   APTT - Normal    PTT 31     HS TROPONIN I 0HR - Normal    hs TnI 0hr 6     POCT GLUCOSE - Normal    POC Glucose 132     HS TROPONIN I 2HR   HS TROPONIN I 4HR     CTA stroke alert (head/neck)   Final Interpretation by Biju Ramirez MD (09/13 2123)      1. No proximal large vessel occlusion or high-grade vascular stenosis in the head and neck.      2. Redemonstrated left thyroid nodule, for which dedicated thyroid sonogram is recommended if not previously performed.      CTA findings were directly discussed with BYRON LUGO at approximately 9:03 p.m.      The study was marked in EPIC for immediate notification.      Workstation performed: [id number]         CT stroke alert brain   Final Interpretation by Biju Ramirez MD (09/13 2100)      No acute intracranial hemorrhage, mass effect or midline shift.      Findings were directly discussed with BYRON LUGO at approximately 9:00 p.m.      Workstation performed: [id number]         CT head wo contrast    (Results Pending)   MRI Inpatient Order    (Results Pending)       CriticalCare Time    Date/Time: 9/13/2024 9:00 PM    Performed by: Jh Montalvo DO  Authorized by: Jh Montalvo DO    Critical care provider statement:     Critical care time (minutes):  35    Critical care time was exclusive of:  Separately billable procedures and treating other patients and teaching time    Critical care was necessary to treat or prevent imminent or  life-threatening deterioration of the following conditions:  CNS failure or compromise    Critical care was time spent personally by me on the following activities:  Blood draw for specimens, obtaining history from patient or surrogate, development of treatment plan with patient or surrogate, discussions with consultants, discussions with primary provider, evaluation of patient's response to treatment, examination of patient, ordering and performing treatments and interventions, ordering and review of laboratory studies, ordering and review of radiographic studies, re-evaluation of patient's condition and review of old charts    I assumed direction of critical care for this patient from another provider in my specialty: tri Montalvo,   09/13/24 3598

## 2024-09-14 NOTE — CONSULTS
Duplicate order. Please see consult note completed by myself and attested by Dr. Perez from today.

## 2024-09-14 NOTE — OCCUPATIONAL THERAPY NOTE
Occupational Therapy Cancel Note     Patient Name: Tevin Bazan  Today's Date: 9/14/2024  Problem List  Principal Problem:    CVA (cerebral vascular accident) ([facility])  Active Problems:    Essential hypertension    Symptomatic carotid artery stenosis, left    Hypercholesterolemia    Stage 2 chronic kidney disease    COPD (chronic obstructive pulmonary disease) (HCC)    Coronary artery disease involving native coronary artery of native heart with unstable angina pectoris (HCC)    BPH (benign prostatic hyperplasia)    Type 2 diabetes mellitus without complication ([facility])    Leukocytosis    Garbled speech          09/14/24 3390   Note Type   Note type Cancelled Session   Cancel Reasons Medical status   Additional Comments 9/14: Therapy orders received and reviewed. Med cx, pt s/p TNK on 9/13 1080. Will f/u as appropriate.

## 2024-09-14 NOTE — ASSESSMENT & PLAN NOTE
79-year-old LHD adult with prior TIA's (2003, 2021), prior left hemispheric CVA, CAD s/p CEA (2021 on DAPT), CKD, HLD, DM, BPH and COPD who presented on 9/13 for strokelike symptoms.  Patient reports that approximately 7 PM on 9/13, he had an acute onset of garbled/dysarthric speech that was progressively worsening.  He also noticed water was spilling out of his mouth, did not specify what side.  While he was en route to the hospital, his wife stated he was experiencing mild dizziness.    BP on arrival 141/67. NIHSS 1 for speech.  Initial neuroimaging (CT head, CTA head/neck) was unremarkable for acute intracranial abnormalities.   Per on-call neurology and ED documentation, patient was agreeable for TNK administration.  TNK was administered at 2151 and he was transferred to the ICU.    Neurological workup:  - Labs on admission revealed elevated white count (10.77)  - CTH negative for acute intracranial abnormality  - CTA head/neck: Negative for LVO or high-grade stenosis  - Lipid panel: Cholesterol 83, LDL 37    Plan:  - Acute ischemic stroke protocol/tNK protocol  - Continue with Atorvastatin 80 mg          - Repeat CTH 24 hours post tNK pending            - Hold AC/AP until confirmation that repeat CTH is stable  - MRI brain wo pending  - Echo pending  - Hemoglobin A1c pending  - Permissive HTN with max of 180/105    - Euglycemic, normothermic goal  - Continue telemetry; monitor for any underlying arrhthymias  - PT/OT/ST  - Secondary risk factor modification.   - Stroke education  - Frequent neuro checks. Continue to monitor and notify neurology with any changes.  - STAT CT head for any acute change in neuro exam  - Medical management and supportive care per primary team. Correction of any metabolic or infectious disturbances.      Plan discussed with Attending Neurologist, please see attestation for further input/recommendations.

## 2024-09-14 NOTE — PROGRESS NOTES
Progress Note - Neurology   Name: Tevin Bazan 79 y.o. adult I MRN: [id number]  Unit/Bed#: ED 05 I Date of Admission: 9/13/2024   Date of Service: 9/13/2024 I Hospital Day: 0     Assessment & Plan      Called by  regarding stroke alert at 8:30 pm, neuro response was immediate.    79-year-old male presenting with slurred speech  - NIHSS 1  - LKW 1900        CTH & CTA were unremarkable for any acute pathology, LVO, or other IR target.     Asked ED to discuss risks, benefits, and alternatives of/to IV thrombolysis.  IV thrombolysis was 2122      Recommendations:    -Is s/p TNK administration at 2122. Will need ICU admission for 24 hours with routine post TNK protocol   -BP and Neurochecks every 15 minutes for 2 hours after TNK, then every 30minutes for 6 hours, and then every hour for 16 hours followed by routine.  -Routine management:   Maintain MAP >65 or higher if symptoms are pressure dependant   Treat BP >180/105 using IV medications such as labetalol or nicardipine as necessary   Treat intravascular volume depletion with NSS. Avoid hypotonic fluids or fluids containing dextrose   Maintain head of bed <30°, supine if tolerable   Maintain Blood Glucose <180   Agressively treat fever >38°C   Avoid unnecessary Arterial or central venous punctures/lines, IM injections, nasogastric tubes, Laguna catheters for 24h  -Please repeat CTH in 24h from time of TNK  -Hold chemical DVT prophylaxis, antiplatelets and anticoagulants for 24h pending repeat head CT  -Obtain STAT CTH for any signs of hemorrhage such as any acute neurological deterioration, new headache, acute hypertension, nausea and vomiting, and hiccups and call Neurology provider  -Stroke workup: MRI brain to evaluate stroke site/burden , TTE, TSH, lipid panel, hemoglobin a1c   -NPO pending bedside dysphagia screen   -When appropriate, PT/OT and speech consults              Terence Mai MD.   Staff Neurologist  09/13/24   9:28  PM

## 2024-09-14 NOTE — PLAN OF CARE
Problem: Potential for Falls  Goal: Patient will remain free of falls  Description: INTERVENTIONS:  - Educate patient/family on patient safety including physical limitations  - Instruct patient to call for assistance with activity   - Consult OT/PT to assist with strengthening/mobility   - Keep Call bell within reach  - Keep bed low and locked with side rails adjusted as appropriate  - Keep care items and personal belongings within reach  - Initiate and maintain comfort rounds  - Make Fall Risk Sign visible to staff  - Apply yellow socks and bracelet for high fall risk patients  - Consider moving patient to room near nurses station  Outcome: Progressing     Problem: PAIN - ADULT  Goal: Verbalizes/displays adequate comfort level or baseline comfort level  Description: Interventions:  - Encourage patient to monitor pain and request assistance  - Assess pain using appropriate pain scale  - Administer analgesics based on type and severity of pain and evaluate response  - Implement non-pharmacological measures as appropriate and evaluate response  - Consider cultural and social influences on pain and pain management  - Notify physician/advanced practitioner if interventions unsuccessful or patient reports new pain  Outcome: Progressing     Problem: INFECTION - ADULT  Goal: Absence or prevention of progression during hospitalization  Description: INTERVENTIONS:  - Assess and monitor for signs and symptoms of infection  - Monitor lab/diagnostic results  - Monitor all insertion sites, i.e. indwelling lines, tubes, and drains  - Monitor endotracheal if appropriate and nasal secretions for changes in amount and color  - Weare appropriate cooling/warming therapies per order  - Administer medications as ordered  - Instruct and encourage patient and family to use good hand hygiene technique  - Identify and instruct in appropriate isolation precautions for identified infection/condition  Outcome: Progressing  Goal: Absence  of fever/infection during neutropenic period  Description: INTERVENTIONS:  - Monitor WBC    Outcome: Progressing     Problem: SAFETY ADULT  Goal: Patient will remain free of falls  Description: INTERVENTIONS:  - Educate patient/family on patient safety including physical limitations  - Instruct patient to call for assistance with activity   - Consult OT/PT to assist with strengthening/mobility   - Keep Call bell within reach  - Keep bed low and locked with side rails adjusted as appropriate  - Keep care items and personal belongings within reach  - Initiate and maintain comfort rounds  - Make Fall Risk Sign visible to staff  - Apply yellow socks and bracelet for high fall risk patients  - Consider moving patient to room near nurses station  Outcome: Progressing  Goal: Maintain or return to baseline ADL function  Description: INTERVENTIONS:  -  Assess patient's ability to carry out ADLs; assess patient's baseline for ADL function and identify physical deficits which impact ability to perform ADLs (bathing, care of mouth/teeth, toileting, grooming, dressing, etc.)  - Assess/evaluate cause of self-care deficits   - Assess range of motion  - Assess patient's mobility; develop plan if impaired  - Assess patient's need for assistive devices and provide as appropriate  - Encourage maximum independence but intervene and supervise when necessary  - Involve family in performance of ADLs  - Assess for home care needs following discharge   - Consider OT consult to assist with ADL evaluation and planning for discharge  - Provide patient education as appropriate  Outcome: Progressing  Goal: Maintains/Returns to pre admission functional level  Description: INTERVENTIONS:  - Perform AM-PAC 6 Click Basic Mobility/ Daily Activity assessment daily.  - Set and communicate daily mobility goal to care team and patient/family/caregiver.   - Collaborate with rehabilitation services on mobility goals if consulted  - Out of bed for  toileting  - Record patient progress and toleration of activity level   Outcome: Progressing     Problem: DISCHARGE PLANNING  Goal: Discharge to home or other facility with appropriate resources  Description: INTERVENTIONS:  - Identify barriers to discharge w/patient and caregiver  - Arrange for needed discharge resources and transportation as appropriate  - Identify discharge learning needs (meds, wound care, etc.)  - Arrange for interpretive services to assist at discharge as needed  - Refer to Case Management Department for coordinating discharge planning if the patient needs post-hospital services based on physician/advanced practitioner order or complex needs related to functional status, cognitive ability, or social support system  Outcome: Progressing     Problem: Knowledge Deficit  Goal: Patient/family/caregiver demonstrates understanding of disease process, treatment plan, medications, and discharge instructions  Description: Complete learning assessment and assess knowledge base.  Interventions:  - Provide teaching at level of understanding  - Provide teaching via preferred learning methods  Outcome: Progressing     Problem: Neurological Deficit  Goal: Neurological status is stable or improving  Description: Interventions:  - Monitor and assess patient's level of consciousness, motor function, sensory function, and level of assistance needed for ADLs.   - Monitor and report changes from baseline. Collaborate with interdisciplinary team to initiate plan and implement interventions as ordered.   - Provide and maintain a safe environment.  - Consider seizure precautions.  - Consider fall precautions.  - Consider aspiration precautions.  - Consider bleeding precautions.  Outcome: Progressing     Problem: Activity Intolerance/Impaired Mobility  Goal: Mobility/activity is maintained at optimum level for patient  Description: Interventions:  - Assess and monitor patient  barriers to mobility and need for  assistive/adaptive devices.  - Assess patient's emotional response to limitations.  - Collaborate with interdisciplinary team and initiate plans and interventions as ordered.  - Encourage independent activity per ability.  - Maintain proper body alignment.  - Perform active/passive rom as tolerated/ordered.  - Plan activities to conserve energy.  - Turn patient as appropriate  Outcome: Progressing     Problem: Communication Impairment  Goal: Ability to express needs and understand communication  Description: Assess patient's communication skills and ability to understand information.  Patient will demonstrate use of effective communication techniques, alternative methods of communication and understanding even if not able to speak.     - Encourage communication and provide alternate methods of communication as needed.  - Collaborate with case management/ for discharge needs.  - Include patient/family/caregiver in decisions related to communication.  Outcome: Progressing     Problem: Potential for Aspiration  Goal: Non-ventilated patient's risk of aspiration is minimized  Description: Assess and monitor vital signs, respiratory status, and labs (WBC).  Monitor for signs of aspiration (tachypnea, cough, rales, wheezing, cyanosis, fever).    - Assess and monitor patient's ability to swallow.  - Place patient up in chair to eat if possible.  - HOB up at 90 degrees to eat if unable to get patient up into chair.  - Supervise patient during oral intake.   - Instruct patient/ family to take small bites.  - Instruct patient/ family to take small single sips when taking liquids.  - Follow patient-specific strategies generated by speech pathologist.  Outcome: Progressing  Goal: Ventilated patient's risk of aspiration is minimized  Description: Assess and monitor vital signs, respiratory status, airway cuff pressure, and labs (WBC).  Monitor for signs of aspiration (tachypnea, cough, rales, wheezing, cyanosis,  fever).    - Elevate head of bed 30 degrees if patient has tube feeding.  - Monitor tube feeding.  Outcome: Progressing     Problem: Nutrition  Goal: Nutrition/Hydration status is improving  Description: Monitor and assess patient's nutrition/hydration status for malnutrition (ex- brittle hair, bruises, dry skin, pale skin and conjunctiva, muscle wasting, smooth red tongue, and disorientation). Collaborate with interdisciplinary team and initiate plan and interventions as ordered.  Monitor patient's weight and dietary intake as ordered or per policy. Utilize nutrition screening tool and intervene per policy. Determine patient's food preferences and provide high-protein, high-caloric foods as appropriate.     - Assist patient with eating.  - Allow adequate time for meals.  - Encourage patient to take dietary supplement as ordered.  - Collaborate with clinical nutritionist.  - Include patient/family/caregiver in decisions related to nutrition.  Outcome: Progressing     Problem: NEUROSENSORY - ADULT  Goal: Achieves stable or improved neurological status  Description: INTERVENTIONS  - Monitor and report changes in neurological status  - Monitor vital signs such as temperature, blood pressure, glucose, and any other labs ordered   - Initiate measures to prevent increased intracranial pressure  - Monitor for seizure activity and implement precautions if appropriate      Outcome: Progressing  Goal: Remains free of injury related to seizures activity  Description: INTERVENTIONS  - Maintain airway, patient safety  and administer oxygen as ordered  - Monitor patient for seizure activity, document and report duration and description of seizure to physician/advanced practitioner  - If seizure occurs,  ensure patient safety during seizure  - Reorient patient post seizure  - Seizure pads on all 4 side rails  - Instruct patient/family to notify RN of any seizure activity including if an aura is experienced  - Instruct  patient/family to call for assistance with activity based on nursing assessment  - Administer anti-seizure medications if ordered    Outcome: Progressing  Goal: Achieves maximal functionality and self care  Description: INTERVENTIONS  - Monitor swallowing and airway patency with patient fatigue and changes in neurological status  - Encourage and assist patient to increase activity and self care.   - Encourage visually impaired, hearing impaired and aphasic patients to use assistive/communication devices  Outcome: Progressing     Problem: MUSCULOSKELETAL - ADULT  Goal: Maintain or return mobility to safest level of function  Description: INTERVENTIONS:  - Assess patient's ability to carry out ADLs; assess patient's baseline for ADL function and identify physical deficits which impact ability to perform ADLs (bathing, care of mouth/teeth, toileting, grooming, dressing, etc.)  - Assess/evaluate cause of self-care deficits   - Assess range of motion  - Assess patient's mobility  - Assess patient's need for assistive devices and provide as appropriate  - Encourage maximum independence but intervene and supervise when necessary  - Involve family in performance of ADLs  - Assess for home care needs following discharge   - Consider OT consult to assist with ADL evaluation and planning for discharge  - Provide patient education as appropriate  Outcome: Progressing  Goal: Maintain proper alignment of affected body part  Description: INTERVENTIONS:  - Support, maintain and protect limb and body alignment  - Provide patient/ family with appropriate education  Outcome: Progressing      no

## 2024-09-14 NOTE — ASSESSMENT & PLAN NOTE
Admission wbc 10.77  Likely 2/2 reactive in the setting of CVA   Did not otherwise meet sirs on admission, no concern for infectious source   Trend

## 2024-09-14 NOTE — ASSESSMENT & PLAN NOTE
9/13 sudden onset garbled speech at 1900 as witnessed by patient's wife   Associated dizziness en route to ER per wife   9/13 CTH -- neg   9/13 CTA H/N --  No proximal large vessel occlusion or high-grade vascular stenosis in the head and neck. Redemonstrated left thyroid nodule, for which dedicated thyroid sonogram is recommended if not previously performed.  9/13 TNK @ 2151  Follows with Dr. Pearson as OP, last seen 5/10/24  Previous CVA Hx:   2/29/24 MRI -- Punctate acute to subacute infarcts involving the high left frontoparietal cortex, left parietal subcortical white matter, and posterior left cerebellum -- thought to be 2/2 embolic as this was 2 days after undergoing cardiac catheterization   Pt reports embolic stroke in 2003, no further records available   OP CVA optimization -- statin, asa, plavix   Patient reports being on coumadin for several years previously, however coumadin was stopped in 2021 2/2 epistaxis and he was transitioned to plavix     Plan:   Admit to ICU for post TNK CVA protocol   Serial neuro exams   MRI, echo pending   24h post TNK CTH ordered   Goal SBP <180  Cont home statin   Hold asa, plavix for 24h post TNK   Neurology, speech, PT, OT, CM consults   NPO until passed by speech

## 2024-09-14 NOTE — ASSESSMENT & PLAN NOTE
Baseline creat appears to be 0.9-1.1  Admission creat 1.18  Urinary retention protocol   Trend I&O   Avoid hypotension, nephrotoxic agents

## 2024-09-14 NOTE — SPEECH THERAPY NOTE
Speech Language/Pathology    Speech-Language Pathology Bedside Swallow Evaluation      Patient Name: Tevin Bazan    Today's Date: 9/14/2024     Problem List  Principal Problem:    CVA (cerebral vascular accident) ([facility])  Active Problems:    Essential hypertension    Symptomatic carotid artery stenosis, left    Hypercholesterolemia    Stage 2 chronic kidney disease    COPD (chronic obstructive pulmonary disease) ([facility])    Coronary artery disease involving native coronary artery of native heart with unstable angina pectoris ([facility])    BPH (benign prostatic hyperplasia)    Type 2 diabetes mellitus without complication ([facility])    Leukocytosis    Garbled speech      Past Medical History  Past Medical History:   Diagnosis Date    Chronic bilateral low back pain without sciatica 7/17/2020    Hypertension     Scoliosis     Stroke ([facility])     Stroke-like symptoms 11/22/2021       Past Surgical History  Past Surgical History:   Procedure Laterality Date    BACK SURGERY      BOWEL RESECTION      CARDIAC CATHETERIZATION N/A 2/26/2024    Procedure: Cardiac Coronary Angiogram;  Surgeon: Rivas Cantor MD;  Location: [facility];  Service: Cardiology    CARDIAC CATHETERIZATION  2/26/2024    Procedure: Cardiac catheterization;  Surgeon: Rivas Cantor MD;  Location: [facility];  Service: Cardiology    CARDIAC CATHETERIZATION N/A 2/26/2024    Procedure: Cardiac pci;  Surgeon: Rivas Cantor MD;  Location: [facility];  Service: Cardiology    FL TEAEC W/PATCH GRF CAROTID VERTB SUBCLAV NECK INC Left 12/1/2021    Procedure: ENDARTERECTOMY ARTERY CAROTID;  Surgeon: Vito Mo MD;  Location: [facility];  Service: Vascular    TONSILLECTOMY         Summary   Pt presented with s/s suggestive of mild oral and suspected mild pharyngeal dysphagia. Mild prolonged/disorganized mastication and oral organization. Disorganized transfers w/ mild residue. Pt attempts to clear residue w/ liquid wash. Swallows suspects  delayed. +cough w/ thin liquids across mult trials. No overt s/s aspiration w/ other material. Education provided regarding risks/benefits of dysphagia diet modifications w/ continued ST f/u, pt agreeable to modifications at this time and MBS if needed.     Risk/s for Aspiration: Mild risk, CVA like sx, dysarthria     Recommended Diet: soft/level 3 diet and nectar thick liquids   Recommended Form of Meds: crushed with puree   Aspiration precautions and swallowing strategies: upright posture, only feed when fully alert, slow rate of feeding, and small bites/sips  Other Recommendations: Continue frequent oral care        Current Medical Status  Pt is a 79 y.o. adult who presented to  [facility]  with  a past medical history of previous CVA, carotid artery stenosis status post CEA in 2021, CKD 2, HLD, DM2, BPH, COPD not on home maintenance therapies who presents to the ICU with strokelike symptoms, status post TNK.  Per the patient, on Friday, 9/13 at approximately 1900, he had a sudden onset of garbled speech, which progressively worsened.  En route to the hospital, his wife reports that he experienced some mild dizziness.  In the ER, NIH 1 for garbled speech, CT head negative, CTA without large occlusion.  TNK administered at 2151. Persistent garbled speech, otherwise neurologically intact. Admit to  for serial neuro checks.        Current Precautions:  Fall     Allergies:  No known food allergies    Past medical history:  Please see H&P for details    Special Studies:  CT stroke alert brain 9/13: No acute intracranial hemorrhage, mass effect or midline shift.     CTA stroke alert 9/13:   1. No proximal large vessel occlusion or high-grade vascular stenosis in the head and neck.     2. Redemonstrated left thyroid nodule, for which dedicated thyroid sonogram is recommended if not previously performed.      Social/Education/Vocational Hx:  Pt lives  at home    Swallow Information   Current Risks for  Dysphagia & Aspiration: dysarthria  Current Symptoms/Concerns:  CVA-like sx  Current Diet: NPO   Baseline Diet: regular diet and thin liquids      Baseline Assessment   Behavior/Cognition: alert  Speech/Language Status: able to participate in conversation, able to follow commands, and dysarthric speech - would benefit from further assessment  Patient Positioning: upright in bed  Pain Status/Interventions/Response to Interventions:  No report of or nonverbal indications of pain.       Swallow Mechanism Exam  Facial: symmetrical  Labial: WFL  Lingual: decreased ROM  Velum: symmetrical  Mandible: adequate ROM  Dentition: limited dentition  Vocal quality:clear/adequate   Volitional Cough: strong/productive   Respiratory Status: on RA      Consistencies Assessed and Performance   Consistencies Administered: ice chips, thin liquids, nectar thick, puree, soft solids, and hard solids    Oral Stage: mild  Mastication was mild prolonged/disorganized. Reduced breakdown w/ difficulty forming cohesive bolus for transfer. Mild oral residue which pt is aware of and attempts to clear w/ liquid wash.     Pharyngeal Stage: mild  Swallow Mechanics:  Swallowing initiation w/ ?able delay. Laryngeal rise was palpated and judged to be fair. +cough w/ thin liquids across mult trials.     Esophageal Concerns: none reported    Strategies and Efficacy: -     Summary and Recommendations (see above)    Results Reviewed with: patient, RN, and CRNP     Treatment Recommended: Yes     Frequency of treatment: As able/appropriate     Patient Stated Goal: none stated     Dysphagia LTG  -Patient will demonstrate safe and effective oral intake (without overt s/s significant oral/pharyngeal dysphagia including s/s penetration or aspiration) for the highest appropriate diet level.     Short Term Goals:  -Pt will tolerate Dysphagia 3/advanced (dental soft) diet and nectar thick liquid with no significant s/s oral or pharyngeal dysphagia across 1-3  diagnostic session/s.    -Patient will tolerate trials of upgraded food and/or liquid texture with no significant s/s of oral or pharyngeal dysphagia including aspiration across 1-3 diagnostic sessions     -Patient will comply with a Video/Modified Barium Swallow study for more complete assessment of swallowing anatomy/physiology/aspiration risk and to assess efficacy of treatment techniques so as to best guide treatment plan        Speech Therapy Prognosis   Prognosis: fair    Prognosis Considerations: age, medical status, prior medical history, and cognitive status

## 2024-09-14 NOTE — ASSESSMENT & PLAN NOTE
Card cath 2/26/24 with Dr. Cantor    Prox RCA lesion is 70% stenosed.  Mid LAD lesion is 50% stenosed.  1st Mrg lesion is 40% stenosed.  Mid RCA lesion is 100% stenosed.

## 2024-09-14 NOTE — CASE MANAGEMENT
Case Management Assessment & Discharge Planning Note    Patient name Tevin Marinorris  Location /-01 MRN [id number]  : 1945 Date 2024       Current Admission Date: 2024  Current Admission Diagnosis:CVA (cerebral vascular accident) ([facility])   Patient Active Problem List    Diagnosis Date Noted Date Diagnosed    Garbled speech 2024     BPH (benign prostatic hyperplasia) 2024     Leukocytosis 2024     Tremor 05/10/2024     Elevated troponin 2024     CVA (cerebral vascular accident) ([facility]) 2024     Unstable angina ([facility]) 2024     Coronary artery disease involving native coronary artery 2024     Abnormal computed tomography angiography (CTA) 2024     Coronary artery disease involving native coronary artery of native heart with unstable angina pectoris ([facility]) 2024     Chest pain 2024     Stage 2 chronic kidney disease 2024     COPD (chronic obstructive pulmonary disease) ([facility]) 2024     Abnormal stress test 2023     Dyspnea on exertion 2023     Hypercholesterolemia 2023     Chest heaviness 2023     Nonrheumatic aortic valve stenosis 2023     Nonrheumatic mitral valve stenosis 2023     H/O carotid endarterectomy 2021     History of stroke 2021     Symptomatic carotid artery stenosis, left 2021     Essential hypertension 2021     Chronic bilateral low back pain without sciatica 2020     Lumbar radiculopathy 2020     Spinal stenosis of lumbar region with neurogenic claudication 2020     Chronic pain of left knee 2020     Thoracogenic scoliosis of thoracolumbar region 2020     Chronic pain syndrome 2020     Lumbar spondylosis 2020     Cellulitis of right knee 2020     Primary osteoarthritis of left knee 04/10/2020     Effusion of left knee 04/10/2020     Type 2 diabetes mellitus without complication ([facility]) 2019      Transgender 12/07/2018     Stroke due to embolism ([facility]) 12/07/2018       LOS (days): 1  Geometric Mean LOS (GMLOS) (days):   Days to GMLOS:     OBJECTIVE:    Risk of Unplanned Readmission Score: 10.35         Current admission status: Inpatient       Preferred Pharmacy:   [id number]  [place] Garcia - [address]  [address]  [place] [address]  Phone: [phone] Fax: [phone]    [facility] - [place] [id number] [facility]  [address]  [facility] [address]  Phone: [phone] Fax: [phone]    Primary Care Provider: Catherine Youngblood MD    Primary Insurance: MOHAMUD SALEEM  Secondary Insurance:     ASSESSMENT:  Active Health Care Proxies       Prisca Bazan [facility] Health Care Representative - Spouse   Primary Phone: [phone] (Mobile)  Home Phone: [phone]                 Advance Directives  Does patient have a Health Care POA?: Yes  Does patient have Advance Directives?: Yes  Advance Directives: Living will, Power of  for health care  Primary Contact: Prisca Bazan: wife: [phone]    Readmission Root Cause  30 Day Readmission: No    Patient Information  Admitted from:: Home  Mental Status: Alert  During Assessment patient was accompanied by: Spouse  Assessment information provided by:: Patient, Spouse  Primary Caregiver: Self  Support Systems: Self, Spouse/significant other, Children, Family members  County of Residence: [place]  What city do you live in?: [place]  Home entry access options. Select all that apply.: No steps to enter home  Type of Current Residence: Bi-level  Upon entering residence, is there a bedroom on the main floor (no further steps)?: Yes  Upon entering residence, is there a bathroom on the main floor (no further steps)?: Yes  Living Arrangements: Lives w/ Spouse/significant other  Is patient a ?: No    Activities of Daily Living Prior to  Admission  Functional Status: Independent  Completes ADLs independently?: Yes  Ambulates independently?: Yes  Does patient use assisted devices?: No  Does patient currently own DME?: Yes  What DME does the patient currently own?: Crutches  Does patient have a history of Outpatient Therapy (PT/OT)?: Yes  Does the patient have a history of Short-Term Rehab?: Yes (acute rehab in [place])  Does patient have a history of HHC?: No  Does patient currently have HHC?: No    Patient Information Continued  Income Source: Pension/USP  Does patient have prescription coverage?: Yes  Does patient receive dialysis treatments?: No  Does patient have a history of substance abuse?: No  Does patient have a history of Mental Health Diagnosis?: No    Means of Transportation  Means of Transport to App:: Family transport      Social Determinants of Health (SDOH)      Flowsheet Row Most Recent Value   Housing Stability    In the last 12 months, was there a time when you were not able to pay the mortgage or rent on time? N   In the past 12 months, how many times have you moved where you were living? 0   At any time in the past 12 months, were you homeless or living in a shelter (including now)? N   Transportation Needs    In the past 12 months, has lack of transportation kept you from medical appointments or from getting medications? no   In the past 12 months, has lack of transportation kept you from meetings, work, or from getting things needed for daily living? No   Food Insecurity    Within the past 12 months, you worried that your food would run out before you got the money to buy more. Never true   Within the past 12 months, the food you bought just didn't last and you didn't have money to get more. Never true   Utilities    In the past 12 months has the electric, gas, oil, or water company threatened to shut off services in your home? No            DISCHARGE DETAILS:    Discharge planning discussed with:: patient and  patient's wife  Freedom of Choice: Yes  Comments - Freedom of Choice: pending PT/OT recs  CM contacted family/caregiver?: No- see comments (Pt's wife at bedside)    Contacts  Patient Contacts: Prisca Bazan: wife  Relationship to Patient:: Family  Contact Method: In Person  Reason/Outcome: Discharge Planning, Emergency Contact, Continuity of Care    Additional Comments: Met with Pt and Pt's wife at bedside. Discussed role of case management. Pt lives with wife in split level, no kaci, 6 steps to upper level. Independent PTA. Owns crutches. Reports hx of acute rehab in [facility]. Hx of outpt PT. Ptt's wife is POA and has living will. Pt uses [facility] pharmacy and is able to afford medications. Pt's wife provides transportation and grocery shopping. Await PT/OT recs for discharge planning. CM to follow.

## 2024-09-15 ENCOUNTER — APPOINTMENT (INPATIENT)
Dept: MRI IMAGING | Facility: HOSPITAL | Age: 79
DRG: 061 | End: 2024-09-15
Payer: COMMERCIAL

## 2024-09-15 LAB
ANION GAP SERPL CALCULATED.3IONS-SCNC: 7 MMOL/L (ref 4–13)
ATRIAL RATE: 74 BPM
BASOPHILS # BLD AUTO: 0.04 THOUSANDS/ΜL (ref 0–0.1)
BASOPHILS # BLD AUTO: 0.05 THOUSANDS/ΜL (ref 0–0.1)
BASOPHILS NFR BLD AUTO: 0 % (ref 0–1)
BASOPHILS NFR BLD AUTO: 1 % (ref 0–1)
BUN SERPL-MCNC: 28 MG/DL (ref 5–25)
CALCIUM SERPL-MCNC: 8.3 MG/DL (ref 8.4–10.2)
CHLORIDE SERPL-SCNC: 106 MMOL/L (ref 96–108)
CO2 SERPL-SCNC: 26 MMOL/L (ref 21–32)
CREAT SERPL-MCNC: 0.92 MG/DL (ref 0.6–1.3)
EOSINOPHIL # BLD AUTO: 0.23 THOUSAND/ΜL (ref 0–0.61)
EOSINOPHIL # BLD AUTO: 0.29 THOUSAND/ΜL (ref 0–0.61)
EOSINOPHIL NFR BLD AUTO: 2 % (ref 0–6)
EOSINOPHIL NFR BLD AUTO: 4 % (ref 0–6)
ERYTHROCYTE [DISTWIDTH] IN BLOOD BY AUTOMATED COUNT: 13.3 % (ref 11.6–15.1)
ERYTHROCYTE [DISTWIDTH] IN BLOOD BY AUTOMATED COUNT: 13.4 % (ref 11.6–15.1)
GLUCOSE SERPL-MCNC: 106 MG/DL (ref 65–140)
GLUCOSE SERPL-MCNC: 111 MG/DL (ref 65–140)
GLUCOSE SERPL-MCNC: 140 MG/DL (ref 65–140)
HCT VFR BLD AUTO: 40.1 % (ref 36.5–46.1)
HCT VFR BLD AUTO: 43.1 % (ref 36.5–46.1)
HGB BLD-MCNC: 12.9 G/DL (ref 12–15.4)
HGB BLD-MCNC: 14 G/DL (ref 12–15.4)
IMM GRANULOCYTES # BLD AUTO: 0.02 THOUSAND/UL (ref 0–0.2)
IMM GRANULOCYTES # BLD AUTO: 0.07 THOUSAND/UL (ref 0–0.2)
IMM GRANULOCYTES NFR BLD AUTO: 0 % (ref 0–2)
IMM GRANULOCYTES NFR BLD AUTO: 1 % (ref 0–2)
LYMPHOCYTES # BLD AUTO: 2 THOUSANDS/ΜL (ref 0.6–4.47)
LYMPHOCYTES # BLD AUTO: 2.66 THOUSANDS/ΜL (ref 0.6–4.47)
LYMPHOCYTES NFR BLD AUTO: 15 % (ref 14–44)
LYMPHOCYTES NFR BLD AUTO: 33 % (ref 14–44)
MCH RBC QN AUTO: 31 PG (ref 26.8–34.3)
MCH RBC QN AUTO: 31.3 PG (ref 26.8–34.3)
MCHC RBC AUTO-ENTMCNC: 32.2 G/DL (ref 31.4–37.4)
MCHC RBC AUTO-ENTMCNC: 32.5 G/DL (ref 31.4–37.4)
MCV RBC AUTO: 96 FL (ref 82–98)
MCV RBC AUTO: 96 FL (ref 82–98)
MONOCYTES # BLD AUTO: 1.08 THOUSAND/ΜL (ref 0.17–1.22)
MONOCYTES # BLD AUTO: 1.31 THOUSAND/ΜL (ref 0.17–1.22)
MONOCYTES NFR BLD AUTO: 10 % (ref 4–12)
MONOCYTES NFR BLD AUTO: 14 % (ref 4–12)
NEUTROPHILS # BLD AUTO: 3.92 THOUSANDS/ΜL (ref 1.85–7.62)
NEUTROPHILS # BLD AUTO: 9.63 THOUSANDS/ΜL (ref 1.85–7.62)
NEUTS SEG NFR BLD AUTO: 48 % (ref 43–75)
NEUTS SEG NFR BLD AUTO: 72 % (ref 43–75)
NRBC BLD AUTO-RTO: 0 /100 WBCS
NRBC BLD AUTO-RTO: 0 /100 WBCS
P AXIS: 43 DEGREES
PLATELET # BLD AUTO: 172 THOUSANDS/UL (ref 149–390)
PLATELET # BLD AUTO: 193 THOUSANDS/UL (ref 149–390)
PMV BLD AUTO: 9.6 FL (ref 8.9–12.7)
PMV BLD AUTO: 9.7 FL (ref 8.9–12.7)
POTASSIUM SERPL-SCNC: 4.4 MMOL/L (ref 3.5–5.3)
PR INTERVAL: 160 MS
QRS AXIS: 35 DEGREES
QRSD INTERVAL: 70 MS
QT INTERVAL: 362 MS
QTC INTERVAL: 401 MS
RBC # BLD AUTO: 4.16 MILLION/UL (ref 3.88–5.12)
RBC # BLD AUTO: 4.47 MILLION/UL (ref 3.88–5.12)
SODIUM SERPL-SCNC: 139 MMOL/L (ref 135–147)
T WAVE AXIS: 54 DEGREES
VENTRICULAR RATE: 74 BPM
WBC # BLD AUTO: 13.29 THOUSAND/UL (ref 4.31–10.16)
WBC # BLD AUTO: 8.01 THOUSAND/UL (ref 4.31–10.16)

## 2024-09-15 PROCEDURE — 85025 COMPLETE CBC W/AUTO DIFF WBC: CPT | Performed by: INTERNAL MEDICINE

## 2024-09-15 PROCEDURE — 70551 MRI BRAIN STEM W/O DYE: CPT

## 2024-09-15 PROCEDURE — 80048 BASIC METABOLIC PNL TOTAL CA: CPT | Performed by: INTERNAL MEDICINE

## 2024-09-15 PROCEDURE — 82948 REAGENT STRIP/BLOOD GLUCOSE: CPT

## 2024-09-15 PROCEDURE — 99232 SBSQ HOSP IP/OBS MODERATE 35: CPT | Performed by: INTERNAL MEDICINE

## 2024-09-15 PROCEDURE — 99233 SBSQ HOSP IP/OBS HIGH 50: CPT | Performed by: PSYCHIATRY & NEUROLOGY

## 2024-09-15 PROCEDURE — 93010 ELECTROCARDIOGRAM REPORT: CPT | Performed by: INTERNAL MEDICINE

## 2024-09-15 RX ORDER — HEPARIN SODIUM 5000 [USP'U]/ML
5000 INJECTION, SOLUTION INTRAVENOUS; SUBCUTANEOUS EVERY 8 HOURS SCHEDULED
Status: DISCONTINUED | OUTPATIENT
Start: 2024-09-15 | End: 2024-09-16

## 2024-09-15 RX ORDER — CLOPIDOGREL BISULFATE 75 MG/1
75 TABLET ORAL DAILY
Status: DISCONTINUED | OUTPATIENT
Start: 2024-09-15 | End: 2024-09-16

## 2024-09-15 RX ADMIN — HEPARIN SODIUM 5000 UNITS: 5000 INJECTION, SOLUTION INTRAVENOUS; SUBCUTANEOUS at 15:22

## 2024-09-15 RX ADMIN — FAMOTIDINE 40 MG: 20 TABLET, FILM COATED ORAL at 08:06

## 2024-09-15 RX ADMIN — CHLORHEXIDINE GLUCONATE 15 ML: 1.2 RINSE ORAL at 08:06

## 2024-09-15 RX ADMIN — CLOPIDOGREL BISULFATE 75 MG: 75 TABLET ORAL at 08:06

## 2024-09-15 RX ADMIN — HEPARIN SODIUM 5000 UNITS: 5000 INJECTION, SOLUTION INTRAVENOUS; SUBCUTANEOUS at 06:13

## 2024-09-15 RX ADMIN — CHLORHEXIDINE GLUCONATE 15 ML: 1.2 RINSE ORAL at 21:21

## 2024-09-15 RX ADMIN — ASPIRIN 81 MG: 81 TABLET, COATED ORAL at 08:06

## 2024-09-15 RX ADMIN — ATORVASTATIN CALCIUM 80 MG: 40 TABLET, FILM COATED ORAL at 17:01

## 2024-09-15 RX ADMIN — HEPARIN SODIUM 5000 UNITS: 5000 INJECTION, SOLUTION INTRAVENOUS; SUBCUTANEOUS at 21:21

## 2024-09-15 NOTE — ASSESSMENT & PLAN NOTE
9/13 sudden onset garbled speech at 1900 as witnessed by patient's wife   Associated dizziness en route to ER per wife   9/13 CTH -- neg   9/13 CTA H/N --  No proximal large vessel occlusion or high-grade vascular stenosis in the head and neck. Redemonstrated left thyroid nodule, for which dedicated thyroid sonogram is recommended if not previously performed.  9/13 TNK @ 2151  Follows with Dr. Pearson as OP, last seen 5/10/24  Previous CVA Hx:   2/29/24 MRI -- Punctate acute to subacute infarcts involving the high left frontoparietal cortex, left parietal subcortical white matter, and posterior left cerebellum -- thought to be 2/2 embolic as this was 2 days after undergoing cardiac catheterization   Pt reports embolic stroke in 2003, no further records available   OP CVA optimization -- statin, asa, plavix   Patient reports being on coumadin for several years previously, however coumadin was stopped in 2021 2/2 epistaxis and he was transitioned to plavix   24 hours post TNK CTH over the night: No acute change  MRI brain-Acute/recent infarct involving the corona radiata white matter of the right frontal lobe with associated cytotoxic edema. No hemorrhage.   Echo-completed, final reading pending  Plan:     Goal SBP <180  Cont home statin   Start SQ heparin 5000 units TID  CTH negative-continue dual antiplatelet therapy, aspirin and Brilinta, Plavix discontinued  PT/OT-no rehab needs  Speech-ordered VPS  SLP evaluation- Dysphagia 3 with nectar thick liquids

## 2024-09-15 NOTE — ASSESSMENT & PLAN NOTE
Lab Results   Component Value Date    HGBA1C 6.3 (H) 09/14/2024       Recent Labs     09/14/24  2114 09/15/24  0034 09/15/24  0755 09/16/24  0744   POCGLU 115 140 111 121       Not on home regimen  Hgba1c 6.6   SSI ACHS, goal blood glucose 140-180  Hypoglycemia protocol

## 2024-09-15 NOTE — PLAN OF CARE
Problem: Potential for Falls  Goal: Patient will remain free of falls  Description: INTERVENTIONS:  - Educate patient/family on patient safety including physical limitations  - Instruct patient to call for assistance with activity   - Consult OT/PT to assist with strengthening/mobility   - Keep Call bell within reach  - Keep bed low and locked with side rails adjusted as appropriate  - Keep care items and personal belongings within reach  - Initiate and maintain comfort rounds  - Make Fall Risk Sign visible to staff  - Offer Toileting every 2 Hours, in advance of need  - Initiate/Maintain bed/chair alarm  - Obtain necessary fall risk management equipment:   - Apply yellow socks and bracelet for high fall risk patients  - Consider moving patient to room near nurses station  Outcome: Progressing     Problem: PAIN - ADULT  Goal: Verbalizes/displays adequate comfort level or baseline comfort level  Description: Interventions:  - Encourage patient to monitor pain and request assistance  - Assess pain using appropriate pain scale  - Administer analgesics based on type and severity of pain and evaluate response  - Implement non-pharmacological measures as appropriate and evaluate response  - Consider cultural and social influences on pain and pain management  - Notify physician/advanced practitioner if interventions unsuccessful or patient reports new pain  Outcome: Progressing     Problem: SAFETY ADULT  Goal: Patient will remain free of falls  Description: INTERVENTIONS:  - Educate patient/family on patient safety including physical limitations  - Instruct patient to call for assistance with activity   - Consult OT/PT to assist with strengthening/mobility   - Keep Call bell within reach  - Keep bed low and locked with side rails adjusted as appropriate  - Keep care items and personal belongings within reach  - Initiate and maintain comfort rounds  - Make Fall Risk Sign visible to staff  - Offer Toileting every 2 Hours,  in advance of need  - Initiate/Maintain bed/chair alarm  - Obtain necessary fall risk management equipment:   - Apply yellow socks and bracelet for high fall risk patients  - Consider moving patient to room near nurses station  Outcome: Progressing  Goal: Maintain or return to baseline ADL function  Description: INTERVENTIONS:  - Educate patient/family on patient safety including physical limitations  - Instruct patient to call for assistance with activity   - Consult OT/PT to assist with strengthening/mobility   - Keep Call bell within reach  - Keep bed low and locked with side rails adjusted as appropriate  - Keep care items and personal belongings within reach  - Initiate and maintain comfort rounds  - Make Fall Risk Sign visible to staff  - Offer Toileting every 2 Hours, in advance of need  - Initiate/Maintain bed/chair alarm  - Obtain necessary fall risk management equipment:   - Apply yellow socks and bracelet for high fall risk patients  - Consider moving patient to room near nurses station  Outcome: Progressing  Goal: Maintains/Returns to pre admission functional level  Description: INTERVENTIONS:  -  Assess patient's ability to carry out ADLs; assess patient's baseline for ADL function and identify physical deficits which impact ability to perform ADLs (bathing, care of mouth/teeth, toileting, grooming, dressing, etc.)  - Assess/evaluate cause of self-care deficits   - Assess range of motion  - Assess patient's mobility; develop plan if impaired  - Assess patient's need for assistive devices and provide as appropriate  - Encourage maximum independence but intervene and supervise when necessary  - Involve family in performance of ADLs  - Assess for home care needs following discharge   - Consider OT consult to assist with ADL evaluation and planning for discharge  - Provide patient education as appropriate  Outcome: Progressing     Problem: Neurological Deficit  Goal: Neurological status is stable or  [Well Developed] : well developed improving  Description: Interventions:  - Monitor and assess patient's level of consciousness, motor function, sensory function, and level of assistance needed for ADLs.   - Monitor and report changes from baseline. Collaborate with interdisciplinary team to initiate plan and implement interventions as ordered.   - Provide and maintain a safe environment.  - Consider seizure precautions.  - Consider fall precautions.  - Consider aspiration precautions.  - Consider bleeding precautions.  Outcome: Progressing     Problem: Communication Impairment  Goal: Ability to express needs and understand communication  Description: Assess patient's communication skills and ability to understand information.  Patient will demonstrate use of effective communication techniques, alternative methods of communication and understanding even if not able to speak.     - Encourage communication and provide alternate methods of communication as needed.  - Collaborate with case management/ for discharge needs.  - Include patient/family/caregiver in decisions related to communication.  Outcome: Progressing     Problem: Potential for Aspiration  Goal: Non-ventilated patient's risk of aspiration is minimized  Description: Assess and monitor vital signs, respiratory status, and labs (WBC).  Monitor for signs of aspiration (tachypnea, cough, rales, wheezing, cyanosis, fever).    - Assess and monitor patient's ability to swallow.  - Place patient up in chair to eat if possible.  - HOB up at 90 degrees to eat if unable to get patient up into chair.  - Supervise patient during oral intake.   - Instruct patient/ family to take small bites.  - Instruct patient/ family to take small single sips when taking liquids.  - Follow patient-specific strategies generated by speech pathologist.  Outcome: Progressing  Goal: Ventilated patient's risk of aspiration is minimized  Description: Assess and monitor vital signs, respiratory status, airway  [Well Nourished] : well nourished cuff pressure, and labs (WBC).  Monitor for signs of aspiration (tachypnea, cough, rales, wheezing, cyanosis, fever).    - Elevate head of bed 30 degrees if patient has tube feeding.  - Monitor tube feeding.  Outcome: Progressing     Problem: NEUROSENSORY - ADULT  Goal: Achieves stable or improved neurological status  Description: INTERVENTIONS  - Monitor and report changes in neurological status  - Monitor vital signs such as temperature, blood pressure, glucose, and any other labs ordered   - Initiate measures to prevent increased intracranial pressure  - Monitor for seizure activity and implement precautions if appropriate      Outcome: Progressing  Goal: Remains free of injury related to seizures activity  Description: INTERVENTIONS  - Maintain airway, patient safety  and administer oxygen as ordered  - Monitor patient for seizure activity, document and report duration and description of seizure to physician/advanced practitioner  - If seizure occurs,  ensure patient safety during seizure  - Reorient patient post seizure  - Seizure pads on all 4 side rails  - Instruct patient/family to notify RN of any seizure activity including if an aura is experienced  - Instruct patient/family to call for assistance with activity based on nursing assessment  - Administer anti-seizure medications if ordered    Outcome: Progressing  Goal: Achieves maximal functionality and self care  Description: INTERVENTIONS  - Monitor swallowing and airway patency with patient fatigue and changes in neurological status  - Encourage and assist patient to increase activity and self care.   - Encourage visually impaired, hearing impaired and aphasic patients to use assistive/communication devices  Outcome: Progressing      [No Acute Distress] : no acute distress [Normal Conjunctiva] : normal conjunctiva [Normal Venous Pressure] : normal venous pressure [No Carotid Bruit] : no carotid bruit [Normal S1, S2] : normal S1, S2 [No Murmur] : no murmur [No Rub] : no rub [No Gallop] : no gallop [Clear Lung Fields] : clear lung fields [Good Air Entry] : good air entry [No Respiratory Distress] : no respiratory distress  [Soft] : abdomen soft [Non Tender] : non-tender [No Masses/organomegaly] : no masses/organomegaly [Normal Bowel Sounds] : normal bowel sounds [Normal Gait] : normal gait [Gait - Sufficient for Exercise Testing] : gait - sufficient for exercise testing [No Edema] : no edema [No Cyanosis] : no cyanosis [No Clubbing] : no clubbing [No Varicosities] : no varicosities [No Rash] : no rash [Moves all extremities] : moves all extremities [No Focal Deficits] : no focal deficits [Normal Speech] : normal speech [Alert and Oriented] : alert and oriented [Normal memory] : normal memory

## 2024-09-15 NOTE — PROGRESS NOTES
Progress Note -     Name: Tevin Bazan 79 y.o. adult I MRN: [id number]  Unit/Bed#: -01 I Date of Admission: 9/13/2024   Date of Service: 9/15/2024 I Hospital Day: 2  { Disappearing Link I Update Problem List I PORCH I On-Call Finder:82997}  Assessment & Plan      {SL IP Progress note specialty templates:27208}

## 2024-09-15 NOTE — ASSESSMENT & PLAN NOTE
Admission wbc 10.77  Likely 2/2 reactive in the setting of CVA   Did not otherwise meet sirs on admission, no concern for infectious source

## 2024-09-15 NOTE — PROGRESS NOTES
Progress Note - Critical Care/ICU   Name: Tevin Bazan 79 y.o. adult I MRN: [id number]  Unit/Bed#: -01 I Date of Admission: 9/13/2024   Date of Service: 9/15/2024 I Hospital Day: 2      Assessment & Plan  CVA (cerebral vascular accident) (HCC)  9/13 sudden onset garbled speech at 1900 as witnessed by patient's wife   Associated dizziness en route to ER per wife   9/13 CTH -- neg   9/13 CTA H/N --  No proximal large vessel occlusion or high-grade vascular stenosis in the head and neck. Redemonstrated left thyroid nodule, for which dedicated thyroid sonogram is recommended if not previously performed.  9/13 TNK @ 2151  Follows with Dr. Pearson as OP, last seen 5/10/24  Previous CVA Hx:   2/29/24 MRI -- Punctate acute to subacute infarcts involving the high left frontoparietal cortex, left parietal subcortical white matter, and posterior left cerebellum -- thought to be 2/2 embolic as this was 2 days after undergoing cardiac catheterization   Pt reports embolic stroke in 2003, no further records available   OP CVA optimization -- statin, asa, plavix   Patient reports being on coumadin for several years previously, however coumadin was stopped in 2021 2/2 epistaxis and he was transitioned to plavix   24 hours post TNK CTH over the night: No acute change    Plan:   Serial neuro exams Q 4 hours  MRI, echo pending   Goal SBP <180  Cont home statin   Start SQ heparin 5000 units TID  CTH negative- Continue home dose DAPT therapy  Neurology, speech, PT, OT, CM consults   SLP evaluation- Dysphagia 3 with nectar thick liquids   Hypercholesterolemia  Home regimen: atorvastain 80 qd, although patient states he only takes statin on days he has substernal CP   Continue qd now   Lipid panel with LDL of 37, triglycerides 33  Coronary artery disease involving native coronary artery of native heart with unstable angina pectoris (HCC)  Card cath 2/26/24 with Dr. Devaughn Dubois RCA lesion is 70% stenosed.  Mid LAD lesion  is 50% stenosed.  1st Mrg lesion is 40% stenosed.  Mid RCA lesion is 100% stenosed.  Continue DAPT/Statin therapy   Essential hypertension  Home regimen: diltiazem qd, metoprolol qd -- unclear if patient has been taking, will hold for now   Goal SBP <180 given CVA   Symptomatic carotid artery stenosis, left  Status post left carotid endarterectomy 2021 in the setting of CVA of the central semiovale on the left   Stage 2 chronic kidney disease  Baseline creat appears to be 0.9-1.1  Admission creat 1.18  Urinary retention protocol   Trend I&O   Avoid hypotension, nephrotoxic agents   COPD (chronic obstructive pulmonary disease) ([facility])  Not on daily maintenance therapies   Previous smoker, quit in 1970s   PRN abbuterol   No in AE on admission, no SOB or wheezing, though baseline productive cough noted   IS, pulm toileting   Goal spo2 > 90%  BPH (benign prostatic hyperplasia)  Urinary retention protocol   Type 2 diabetes mellitus without complication ([facility])  Not on home regimen  Hgba1c 6.6   SSI ACHS, goal blood glucose 140-180  Hypoglycemia protocol   Leukocytosis  Admission wbc 10.77  Likely 2/2 reactive in the setting of CVA   Did not otherwise meet sirs on admission, no concern for infectious source   Trend   Garbled speech  See plan under stroke like symptoms   Disposition: Stepdown Level 1    ICU Core Measures     A: Assess, Prevent, and Manage Pain Has pain been assessed? Yes  Need for changes to pain regimen? No   B: Both SAT/SAT  N/A   C: Choice of Sedation RASS Goal: 0 Alert and Calm or N/A patient not on sedation  Need for changes to sedation or analgesia regimen? NA   D: Delirium CAM-ICU: Negative   E: Early Mobility  Plan for early mobility? Yes   F: Family Engagement Plan for family engagement today? Yes         Prophylaxis:  VTE VTE covered by:  heparin (porcine), Subcutaneous       Stress Ulcer  covered byfamotidine (PEPCID) 40 MG tablet [[id number]] (Long-Term Med), famotidine (PEPCID) tablet 40 mg  [[id number]]         24 Hour Events   24hr events: Patient 24-hour post TNK.  Still with noticeable dysarthria.  Patient sent down for CTh over the night as a repeat exam.  Negative for acute bleed.  No other overnight events.  Subjective   Review of Systems: See HPI for Review of Systems    Objective                          Vitals I/O      Most Recent Min/Max in 24hrs   Temp 98.1 °F (36.7 °C) Temp  Min: 97.3 °F (36.3 °C)  Max: 98.5 °F (36.9 °C)   Pulse 84 Pulse  Min: 58  Max: 94   Resp 18 Resp  Min: 13  Max: 24   /64 BP  Min: 117/63  Max: 169/86   O2 Sat 93 % SpO2  Min: 91 %  Max: 97 %      Intake/Output Summary (Last 24 hours) at 9/15/2024 0057  Last data filed at 9/14/2024 2000  Gross per 24 hour   Intake 140 ml   Output 1675 ml   Net -1535 ml       Diet Dysphagia/Modified Consistency; Dysphagia 3-Dental Soft; Nectar Thick Liquid    Invasive Monitoring           Physical Exam   Physical Exam  Eyes:      General: Vision grossly intact. No scleral icterus.     Extraocular Movements: Extraocular movements intact.      Conjunctiva/sclera: Conjunctivae normal.      Pupils: Pupils are equal, round, and reactive to light.   Skin:     General: Skin is warm and dry.      Capillary Refill: Capillary refill takes less than 2 seconds.   HENT:      Head: Normocephalic and atraumatic.      Mouth/Throat:      Mouth: Mucous membranes are moist.   Cardiovascular:      Rate and Rhythm: Normal rate and regular rhythm.      Pulses: Normal pulses.      Heart sounds: Normal heart sounds.   Musculoskeletal:         General: Normal range of motion.      Right lower leg: No edema.      Left lower leg: No edema.   Abdominal: General: Bowel sounds are normal.      Palpations: Abdomen is soft.   Constitutional:       General: Danielle is not in acute distress.     Appearance: Danielle is not ill-appearing.   Pulmonary:      Effort: Pulmonary effort is normal. No respiratory distress.      Breath sounds: Normal breath sounds. No wheezing,  rhonchi or rales.   Neurological:      General: No focal deficit present.      Mental Status: Danielle is alert. Mental status is at baseline.      GCS: GCS eye subscore is 4. GCS verbal subscore is 5. GCS motor subscore is 6.      Cranial Nerves: Dysarthria and facial asymmetry present.      Sensory: Sensation is intact.      Motor: gross motor function is at baseline for patient.      Coordination: Coordination is intact.        Corneal reflex present, cough reflex and gag reflex intact.          Diagnostic Studies        Lab Results: I have reviewed the following results:      Medications:  Scheduled PRN   aspirin, 81 mg, Daily  atorvastatin, 80 mg, After Dinner  chlorhexidine, 15 mL, Q12H KELLI  clopidogrel, 75 mg, Daily  famotidine, 40 mg, Daily  heparin (porcine), 5,000 Units, Q8H KELLI  insulin lispro, 1-5 Units, 4x Daily (PC & HS)      ondansetron, 4 mg, Q6H PRN       Continuous          Labs:   CBC    Recent Labs     09/13/24 2027   WBC 10.77*   HGB 13.3   HCT 40.3        BMP    Recent Labs     09/13/24 2027   SODIUM 140   K 4.2      CO2 28   AGAP 7   BUN 38*   CREATININE 1.18   CALCIUM 9.2       Coags    Recent Labs     09/13/24 2027   INR 1.09   PTT 31        Additional Electrolytes  No recent results       Blood Gas    No recent results  No recent results LFTs  No recent results    Infectious  No recent results  Glucose  Recent Labs     09/13/24 2027   GLUC 122

## 2024-09-15 NOTE — QUICK NOTE
Critical Care Transfer Note:    [  ] 78 YO with PMHx of prior CVA x2, HTN, DMII presenting to [facility] ED 9/13PM with dysarthria. NIHSS 1 S/p TNK at 2151  [  ] CTH CTA negative, 24hr CTH negative  [  ] Still with residual dysarthria, now with dysphagia on pureed/nectar diet    Pending:  [  ] MRIB pending, continue stroke protocol  [  ] Appreciate neuro  [  ] PT/OT    Spoke with Dr. Stefan Jeffries regarding transfer to [facility] service as Med Surg Tele

## 2024-09-15 NOTE — PLAN OF CARE
Problem: Potential for Falls  Goal: Patient will remain free of falls  Description: INTERVENTIONS:  - Educate patient/family on patient safety including physical limitations  - Instruct patient to call for assistance with activity   - Consult OT/PT to assist with strengthening/mobility   - Keep Call bell within reach  - Keep bed low and locked with side rails adjusted as appropriate  - Keep care items and personal belongings within reach  - Initiate and maintain comfort rounds  - Make Fall Risk Sign visible to staff  - Apply yellow socks and bracelet for high fall risk patients  - Consider moving patient to room near nurses station  Outcome: Progressing     Problem: PAIN - ADULT  Goal: Verbalizes/displays adequate comfort level or baseline comfort level  Description: Interventions:  - Encourage patient to monitor pain and request assistance  - Assess pain using appropriate pain scale  - Administer analgesics based on type and severity of pain and evaluate response  - Implement non-pharmacological measures as appropriate and evaluate response  - Consider cultural and social influences on pain and pain management  - Notify physician/advanced practitioner if interventions unsuccessful or patient reports new pain  Outcome: Progressing     Problem: INFECTION - ADULT  Goal: Absence or prevention of progression during hospitalization  Description: INTERVENTIONS:  - Assess and monitor for signs and symptoms of infection  - Monitor lab/diagnostic results  - Monitor all insertion sites, i.e. indwelling lines, tubes, and drains  - Monitor endotracheal if appropriate and nasal secretions for changes in amount and color  - [place] appropriate cooling/warming therapies per order  - Administer medications as ordered  - Instruct and encourage patient and family to use good hand hygiene technique  - Identify and instruct in appropriate isolation precautions for identified infection/condition  Outcome: Progressing  Goal: Absence  of fever/infection during neutropenic period  Description: INTERVENTIONS:  - Monitor WBC    Outcome: Progressing     Problem: SAFETY ADULT  Goal: Patient will remain free of falls  Description: INTERVENTIONS:  - Educate patient/family on patient safety including physical limitations  - Instruct patient to call for assistance with activity   - Consult OT/PT to assist with strengthening/mobility   - Keep Call bell within reach  - Keep bed low and locked with side rails adjusted as appropriate  - Keep care items and personal belongings within reach  - Initiate and maintain comfort rounds  - Make Fall Risk Sign visible to staff  - Apply yellow socks and bracelet for high fall risk patients  - Consider moving patient to room near nurses station  Outcome: Progressing  Goal: Maintain or return to baseline ADL function  Description: INTERVENTIONS:  -  Assess patient's ability to carry out ADLs; assess patient's baseline for ADL function and identify physical deficits which impact ability to perform ADLs (bathing, care of mouth/teeth, toileting, grooming, dressing, etc.)  - Assess/evaluate cause of self-care deficits   - Assess range of motion  - Assess patient's mobility; develop plan if impaired  - Assess patient's need for assistive devices and provide as appropriate  - Encourage maximum independence but intervene and supervise when necessary  - Involve family in performance of ADLs  - Assess for home care needs following discharge   - Consider OT consult to assist with ADL evaluation and planning for discharge  - Provide patient education as appropriate  Outcome: Progressing  Goal: Maintains/Returns to pre admission functional level  Description: INTERVENTIONS:  - Perform AM-PAC 6 Click Basic Mobility/ Daily Activity assessment daily.  - Set and communicate daily mobility goal to care team and patient/family/caregiver.   - Collaborate with rehabilitation services on mobility goals if consulted  - Out of bed for  toileting  - Record patient progress and toleration of activity level   Outcome: Progressing     Problem: DISCHARGE PLANNING  Goal: Discharge to home or other facility with appropriate resources  Description: INTERVENTIONS:  - Identify barriers to discharge w/patient and caregiver  - Arrange for needed discharge resources and transportation as appropriate  - Identify discharge learning needs (meds, wound care, etc.)  - Arrange for interpretive services to assist at discharge as needed  - Refer to Case Management Department for coordinating discharge planning if the patient needs post-hospital services based on physician/advanced practitioner order or complex needs related to functional status, cognitive ability, or social support system  Outcome: Progressing     Problem: Knowledge Deficit  Goal: Patient/family/caregiver demonstrates understanding of disease process, treatment plan, medications, and discharge instructions  Description: Complete learning assessment and assess knowledge base.  Interventions:  - Provide teaching at level of understanding  - Provide teaching via preferred learning methods  Outcome: Progressing     Problem: Neurological Deficit  Goal: Neurological status is stable or improving  Description: Interventions:  - Monitor and assess patient's level of consciousness, motor function, sensory function, and level of assistance needed for ADLs.   - Monitor and report changes from baseline. Collaborate with interdisciplinary team to initiate plan and implement interventions as ordered.   - Provide and maintain a safe environment.  - Consider seizure precautions.  - Consider fall precautions.  - Consider aspiration precautions.  - Consider bleeding precautions.  Outcome: Progressing     Problem: Activity Intolerance/Impaired Mobility  Goal: Mobility/activity is maintained at optimum level for patient  Description: Interventions:  - Assess and monitor patient  barriers to mobility and need for  assistive/adaptive devices.  - Assess patient's emotional response to limitations.  - Collaborate with interdisciplinary team and initiate plans and interventions as ordered.  - Encourage independent activity per ability.  - Maintain proper body alignment.  - Perform active/passive rom as tolerated/ordered.  - Plan activities to conserve energy.  - Turn patient as appropriate  Outcome: Progressing     Problem: Communication Impairment  Goal: Ability to express needs and understand communication  Description: Assess patient's communication skills and ability to understand information.  Patient will demonstrate use of effective communication techniques, alternative methods of communication and understanding even if not able to speak.     - Encourage communication and provide alternate methods of communication as needed.  - Collaborate with case management/ for discharge needs.  - Include patient/family/caregiver in decisions related to communication.  Outcome: Progressing     Problem: Potential for Aspiration  Goal: Non-ventilated patient's risk of aspiration is minimized  Description: Assess and monitor vital signs, respiratory status, and labs (WBC).  Monitor for signs of aspiration (tachypnea, cough, rales, wheezing, cyanosis, fever).    - Assess and monitor patient's ability to swallow.  - Place patient up in chair to eat if possible.  - HOB up at 90 degrees to eat if unable to get patient up into chair.  - Supervise patient during oral intake.   - Instruct patient/ family to take small bites.  - Instruct patient/ family to take small single sips when taking liquids.  - Follow patient-specific strategies generated by speech pathologist.  Outcome: Progressing  Goal: Ventilated patient's risk of aspiration is minimized  Description: Assess and monitor vital signs, respiratory status, airway cuff pressure, and labs (WBC).  Monitor for signs of aspiration (tachypnea, cough, rales, wheezing, cyanosis,  fever).    - Elevate head of bed 30 degrees if patient has tube feeding.  - Monitor tube feeding.  Outcome: Progressing     Problem: Nutrition  Goal: Nutrition/Hydration status is improving  Description: Monitor and assess patient's nutrition/hydration status for malnutrition (ex- brittle hair, bruises, dry skin, pale skin and conjunctiva, muscle wasting, smooth red tongue, and disorientation). Collaborate with interdisciplinary team and initiate plan and interventions as ordered.  Monitor patient's weight and dietary intake as ordered or per policy. Utilize nutrition screening tool and intervene per policy. Determine patient's food preferences and provide high-protein, high-caloric foods as appropriate.     - Assist patient with eating.  - Allow adequate time for meals.  - Encourage patient to take dietary supplement as ordered.  - Collaborate with clinical nutritionist.  - Include patient/family/caregiver in decisions related to nutrition.  Outcome: Progressing     Problem: NEUROSENSORY - ADULT  Goal: Achieves stable or improved neurological status  Description: INTERVENTIONS  - Monitor and report changes in neurological status  - Monitor vital signs such as temperature, blood pressure, glucose, and any other labs ordered   - Initiate measures to prevent increased intracranial pressure  - Monitor for seizure activity and implement precautions if appropriate      Outcome: Progressing  Goal: Remains free of injury related to seizures activity  Description: INTERVENTIONS  - Maintain airway, patient safety  and administer oxygen as ordered  - Monitor patient for seizure activity, document and report duration and description of seizure to physician/advanced practitioner  - If seizure occurs,  ensure patient safety during seizure  - Reorient patient post seizure  - Seizure pads on all 4 side rails  - Instruct patient/family to notify RN of any seizure activity including if an aura is experienced  - Instruct  patient/family to call for assistance with activity based on nursing assessment  - Administer anti-seizure medications if ordered    Outcome: Progressing  Goal: Achieves maximal functionality and self care  Description: INTERVENTIONS  - Monitor swallowing and airway patency with patient fatigue and changes in neurological status  - Encourage and assist patient to increase activity and self care.   - Encourage visually impaired, hearing impaired and aphasic patients to use assistive/communication devices  Outcome: Progressing     Problem: MUSCULOSKELETAL - ADULT  Goal: Maintain or return mobility to safest level of function  Description: INTERVENTIONS:  - Assess patient's ability to carry out ADLs; assess patient's baseline for ADL function and identify physical deficits which impact ability to perform ADLs (bathing, care of mouth/teeth, toileting, grooming, dressing, etc.)  - Assess/evaluate cause of self-care deficits   - Assess range of motion  - Assess patient's mobility  - Assess patient's need for assistive devices and provide as appropriate  - Encourage maximum independence but intervene and supervise when necessary  - Involve family in performance of ADLs  - Assess for home care needs following discharge   - Consider OT consult to assist with ADL evaluation and planning for discharge  - Provide patient education as appropriate  Outcome: Progressing  Goal: Maintain proper alignment of affected body part  Description: INTERVENTIONS:  - Support, maintain and protect limb and body alignment  - Provide patient/ family with appropriate education  Outcome: Progressing

## 2024-09-15 NOTE — ASSESSMENT & PLAN NOTE
Card cath 2/26/24 with Dr. Cantor    Prox RCA lesion is 70% stenosed.  Mid LAD lesion is 50% stenosed.  1st Mrg lesion is 40% stenosed.  Mid RCA lesion is 100% stenosed.  Continue DAPT/Statin therapy

## 2024-09-15 NOTE — ASSESSMENT & PLAN NOTE
Card cath 2/26/24 with Dr. aCntor    Prox RCA lesion is 70% stenosed.  Mid LAD lesion is 50% stenosed.  1st Mrg lesion is 40% stenosed.  Mid RCA lesion is 100% stenosed.  Continue DAPT/Statin therapy

## 2024-09-15 NOTE — ASSESSMENT & PLAN NOTE
"Home regimen: diltiazem qd  Resumed 9/16  Goal SBP <180 given CVA   /72 (BP Location: Left arm)   Pulse 57   Temp 97.8 °F (36.6 °C) (Oral)   Resp 18   Ht 5' 6\" (1.676 m)   Wt 73 kg (161 lb)   SpO2 96%   BMI 25.99 kg/m²     " 5

## 2024-09-15 NOTE — ASSESSMENT & PLAN NOTE
9/13 sudden onset garbled speech at 1900 as witnessed by patient's wife   Associated dizziness en route to ER per wife   9/13 CTH -- neg   9/13 CTA H/N --  No proximal large vessel occlusion or high-grade vascular stenosis in the head and neck. Redemonstrated left thyroid nodule, for which dedicated thyroid sonogram is recommended if not previously performed.  9/13 TNK @ 2151  Follows with Dr. Pearson as OP, last seen 5/10/24  Previous CVA Hx:   2/29/24 MRI -- Punctate acute to subacute infarcts involving the high left frontoparietal cortex, left parietal subcortical white matter, and posterior left cerebellum -- thought to be 2/2 embolic as this was 2 days after undergoing cardiac catheterization   Pt reports embolic stroke in 2003, no further records available   OP CVA optimization -- statin, asa, plavix   Patient reports being on coumadin for several years previously, however coumadin was stopped in 2021 2/2 epistaxis and he was transitioned to plavix   24 hours post TNK CTH over the night: No acute change    Plan:   Serial neuro exams Q 4 hours  MRI, echo pending   Goal SBP <180  Cont home statin   Start SQ heparin 5000 units TID  CTH negative- Continue home dose DAPT therapy  Neurology, speech, PT, OT, CM consults   SLP evaluation- Dysphagia 3 with nectar thick liquids

## 2024-09-15 NOTE — PROGRESS NOTES
Progress Note - Neurology   [facility] 79 y.o. adult [id number]  Unit/Bed#: /-01    Assessment/Plan:  Garbled speech  Assessment & Plan  79-year-old LHD adult with prior TIA's (2003, 2021), prior left hemispheric CVA, CAD s/p CEA (2021 on DAPT), CKD, HLD, DM, BPH and COPD who presented on 9/13 for strokelike symptoms.  Patient reports that approximately 7 PM on 9/13, he had an acute onset of garbled/dysarthric speech that was progressively worsening.  He also noticed water was spilling out of his mouth, did not specify what side.  While he was en route to the hospital, his wife stated he was experiencing mild dizziness.    BP on arrival 141/67. NIHSS 1 for speech.  Initial neuroimaging (CT head, CTA head/neck) was unremarkable for acute intracranial abnormalities.   Per on-call neurology and ED documentation, patient was agreeable for TNK administration.  TNK was administered at 2151 and he was transferred to the ICU.    Neurological workup:  - Labs on admission revealed elevated white count (10.77)  - CTH negative for acute intracranial abnormality  - CTA head/neck: Negative for LVO or high-grade stenosis  - Repeat CTH s/p 24 hours post tNK: unremarkable   - MRI brain wo pending  - Echo pending  - Lipid panel: Cholesterol 83, LDL 37  - Hemoglobin A1c 6.3    Plan:  - Acute ischemic stroke protocol/tNK protocol  - Will follow-up on above-mentioned studies  - Aspirin 81 mg and Plavix 75 mg restarted s/p stable repeat CTH  - If MRI brain is positive for acute infarct, recommend switching Plavix to Brilinta given most recent P2Y12 was 199  - Continue with Atorvastatin 80 mg   - Maintain normotension  - Euglycemic, normothermic goal  - Continue telemetry; monitor for any underlying arrhthymias  - PT/OT/ST  - Secondary risk factor modification.   - Stroke education  - Frequent neuro checks. Continue to monitor and notify neurology with any changes.  - STAT CT head for any acute change in neuro  exam  - Medical management and supportive care per primary team. Correction of any metabolic or infectious disturbances.      Plan discussed with Attending Neurologist, please see attestation for further input/recommendations.          Recommendations for outpatient neurological follow up have yet to be determined. Workup pending    Subjective:   Patient seen resting comfortably in bed this morning. He reports feeling increased salvia/drooling out of both sides of his mouth. Attending neurologist explained this is likely due to an acute infarct. He is currently on nectar thick liquids, SLP following. He was encouraged to have sugar free lozenges to promote swallowing.   His dysarthria appears progressively worse today, speech is more unintelligible.   Discussed with patient that if MRI brain is positive for acute infarct, we will switch his Plavix to Brilinta. Patient verbalized understanding.       Past Medical History:   Diagnosis Date    Chronic bilateral low back pain without sciatica 7/17/2020    Hypertension     Scoliosis     Stroke (HCC)     Stroke-like symptoms 11/22/2021     Past Surgical History:   Procedure Laterality Date    BACK SURGERY      BOWEL RESECTION      CARDIAC CATHETERIZATION N/A 2/26/2024    Procedure: Cardiac Coronary Angiogram;  Surgeon: Rivas Cantor MD;  Location: [facility];  Service: Cardiology    CARDIAC CATHETERIZATION  2/26/2024    Procedure: Cardiac catheterization;  Surgeon: Rivas Cantor MD;  Location: [facility];  Service: Cardiology    CARDIAC CATHETERIZATION N/A 2/26/2024    Procedure: Cardiac pci;  Surgeon: Rivas Cantor MD;  Location: [facility];  Service: Cardiology    WA TEAEC W/PATCH GRF CAROTID VERTB SUBCLAV NECK INC Left 12/1/2021    Procedure: ENDARTERECTOMY ARTERY CAROTID;  Surgeon: Vito Mo MD;  Location: [facility];  Service: Vascular    TONSILLECTOMY       Family History   Adopted: Yes   Family history unknown: Yes     Social History      Socioeconomic History    Marital status: /Civil Union     Spouse name: Not on file    Number of children: Not on file    Years of education: Not on file    Highest education level: Not on file   Occupational History    Not on file   Tobacco Use    Smoking status: Former     Current packs/day: 0.00     Types: Cigarettes     Quit date:      Years since quittin.7    Smokeless tobacco: Never   Vaping Use    Vaping status: Never Used   Substance and Sexual Activity    Alcohol use: Not Currently    Drug use: No    Sexual activity: Not on file   Other Topics Concern    Not on file   Social History Narrative    Not on file     Social Determinants of Health     Financial Resource Strain: Low Risk  (2022)    Overall Financial Resource Strain (CARDIA)     Difficulty of Paying Living Expenses: Not hard at all   Food Insecurity: No Food Insecurity (2024)    Hunger Vital Sign     Worried About Running Out of Food in the Last Year: Never true     Ran Out of Food in the Last Year: Never true   Transportation Needs: No Transportation Needs (2024)    PRAPARE - Transportation     Lack of Transportation (Medical): No     Lack of Transportation (Non-Medical): No   Physical Activity: Sufficiently Active (2022)    Exercise Vital Sign     Days of Exercise per Week: 7 days     Minutes of Exercise per Session: 30 min   Stress: No Stress Concern Present (2022)    [employer] [place] [employer] - Occupational Stress Questionnaire     Feeling of Stress : Not at all   Social Connections: Socially Isolated (2022)    Social Connection and Isolation Panel [NHANES]     Frequency of Communication with Friends and Family: Never     Frequency of Social Gatherings with Friends and Family: Never     Attends Restoration Services: Never     Active Member of Clubs or Organizations: No     Attends Club or Organization Meetings: Never     Marital Status:    Intimate Partner Violence: Not At Risk  "(5/20/2022)    Humiliation, Afraid, Rape, and Kick questionnaire     Fear of Current or Ex-Partner: No     Emotionally Abused: No     Physically Abused: No     Sexually Abused: No   Housing Stability: Low Risk  (9/14/2024)    Housing Stability Vital Sign     Unable to Pay for Housing in the Last Year: No     Number of Times Moved in the Last Year: 0     Homeless in the Last Year: No       Medications: Reviewed in detail by me.    Performed by Attending Neurologist, AP present at bedside acting as scribe  ROS: Review of Systems   Constitutional:  Negative for fatigue and fever.   Eyes:  Negative for photophobia and visual disturbance.   Respiratory:  Negative for cough.    Gastrointestinal:  Negative for nausea and vomiting.   Skin:  Negative for color change and pallor.   Neurological:  Positive for tremors, facial asymmetry and speech difficulty. Negative for dizziness, weakness, numbness and headaches.   Psychiatric/Behavioral:  Negative for confusion. The patient is not nervous/anxious.         Vitals: /80 (BP Location: Right arm)   Pulse 79   Temp 97.5 °F (36.4 °C) (Oral)   Resp 20   Ht 5' 6\" (1.676 m)   Wt 73.6 kg (162 lb 4.1 oz)   SpO2 93%   BMI 26.19 kg/m²     Performed by Attending Neurologist, AP present at bedside acting as scribe   Physical Exam:   Physical Exam  Vitals reviewed.   Constitutional:       General: Danielle is not in acute distress.     Appearance: Normal appearance. Danielle is normal weight. Danielle is not ill-appearing.   HENT:      Head: Normocephalic and atraumatic.      Right Ear: External ear normal.      Left Ear: External ear normal.      Nose: Nose normal.      Mouth/Throat:      Mouth: Mucous membranes are moist.      Comments: Poor dentition  Eyes:      General:         Right eye: No discharge.         Left eye: No discharge.      Extraocular Movements: Extraocular movements intact and EOM normal.      Conjunctiva/sclera: Conjunctivae normal.      Pupils: Pupils are equal, " round, and reactive to light.   Cardiovascular:      Rate and Rhythm: Normal rate.   Pulmonary:      Effort: Pulmonary effort is normal. No respiratory distress.   Musculoskeletal:         General: Normal range of motion.      Right lower leg: No edema.      Left lower leg: No edema.   Skin:     General: Skin is warm and dry.      Coloration: Skin is not jaundiced or pale.   Neurological:      Mental Status: Danielle is alert and oriented to person, place, and time.      Motor: Motor strength is normal.     Coordination: Finger-Nose-Finger Test normal.      Comments: See below   Psychiatric:         Mood and Affect: Mood normal.         Speech: Speech is slurred.         Behavior: Behavior normal.       Neurologic Exam     Mental Status   Oriented to person, place, and time.   Follows 2 step commands.   Attention: normal. Concentration: normal.   Speech: slurred   Level of consciousness: alert  Able to repeat. Normal comprehension.     Cranial Nerves     CN II   Visual fields full to confrontation.     CN III, IV, VI   Pupils are equal, round, and reactive to light.  Extraocular motions are normal.   Nystagmus: none   Diplopia: none  Conjugate gaze: present    CN V   Facial sensation intact.     CN VIII   CN VIII normal.     CN IX, X   Palate: symmetric  Right gag reflex: abnormal  Left gag reflex: abnormal      Asymmetry with L NLF, symmetric with movement      Motor Exam   Muscle bulk: normal  Right arm pronator drift: slight R pronation, without drift.  Left arm pronator drift: absent    Strength   Strength 5/5 throughout.     Sensory Exam   Light touch normal.     Gait, Coordination, and Reflexes     Coordination   Finger to nose coordination: normal    Tremor   Resting tremor: absent  Intention tremor: present (L hand)      Labs: Reviewed in detail by me.     Imaging: I have personally reviewed pertinent imaging and PACS reports.     VTE Prophylaxis: Heparin      Please note: This note has been constructed using a  voice recognition system

## 2024-09-15 NOTE — ASSESSMENT & PLAN NOTE
Home regimen: atorvastain 80 qd, although patient states he only takes statin on days he has substernal CP   Continue qd now   Lipid panel with LDL of 37, triglycerides 33

## 2024-09-15 NOTE — ASSESSMENT & PLAN NOTE
Lab Results   Component Value Date    EGFR 76 02/28/2024    EGFR 72 02/27/2024    EGFR 79 01/30/2024    CREATININE 0.82 09/15/2024    CREATININE 0.92 09/15/2024    CREATININE 1.18 09/13/2024     Baseline creat appears to be 0.9-1.1  Admission creat 1.18  Urinary retention protocol   Trend I&O   Avoid hypotension, nephrotoxic agents

## 2024-09-15 NOTE — ASSESSMENT & PLAN NOTE
Not on daily maintenance therapies   Previous smoker, quit in 1970s   PRN abbuterol   Not in AE on admission, no SOB or wheezing, though baseline productive cough noted   IS, pulm toileting   Goal spo2 > 90%

## 2024-09-15 NOTE — UTILIZATION REVIEW
Initial Clinical Review    Admission: Date/Time/Statement:   Admission Orders (From admission, onward)       Ordered        09/13/24 2125  INPATIENT ADMISSION  Once                          Orders Placed This Encounter   Procedures    INPATIENT ADMISSION     Standing Status:   Standing     Number of Occurrences:   1     Order Specific Question:   Level of Care     Answer:   Critical Care [15]     Order Specific Question:   Estimated length of stay     Answer:   More than 2 Midnights     Order Specific Question:   Certification     Answer:   I certify that inpatient services are medically necessary for this patient for a duration of greater than two midnights. See H&P and MD Progress Notes for additional information about the patient's course of treatment.     ED Arrival Information       Expected   -    Arrival   9/13/2024 20:16    Acuity   Emergent              Means of arrival   Walk-In    Escorted by   Family Member    Service   Hospitalist    Admission type   Emergency              Arrival complaint   Difficulty talking, dizzy, hx of tia's             Chief Complaint   Patient presents with    Slurred Speech     Pt c/o difficulty speaking since last night. Pt denies HA or dizziness.        Initial Presentation: 79 y.o. adult to ED via walk-in from home  Present to ED with strokelike symptoms, status post TNK.  Per the patient, on Friday, 9/13 at approximately 1900, he had a sudden onset of garbled speech, which progressively worsened.  En route to the hospital, his wife reports that he experienced some mild dizziness.  In the ER, NIH 1 for garbled speech, CT head negative, CTA without large occlusion.  TNK administered at 2151. Persistent garbled speech, otherwise neurologically intact.   PMHX: previous CVA, carotid artery stenosis status post CEA in 2021, CKD 2, HLD, DM2, BPH, COPD   Admitted to ICU with DX: CVA  on exam: speech garbled; WBC 10.77  PLAN: neuro checks; Cardiopulmonary monitoring; monitor labs;  pain / nausea control (see below); Accuchecks with [facility]          Date: 9/14/24      Day 2   Feels like his voice is a little bit stronger.  Mild dysarthria.  Dysarthria/dysphagia secondary to acute CVA - s/p TNK - no improvement in symptoms since given about 12 hours ago.   Plan: neuro checks; Cardiopulmonary monitoring; monitor labs; pain / nausea control (see below); Repeat CT head 10pm tonight ; Accuchecks with [facility]        NEURO CONSULT   Patient was administered TNK on 913 around 10 PM after presenting with new onset garbled speech.  Patient denied any other associated neurologic deficit affecting the arms, legs, vision, balance.   Overnight there has been perhaps mild improvement, but he is still obviously dysarthric.  There was no aphasic deficits.  His cranial nerve testing was relatively intact except for very mild asymmetry noted in the left lower facial movements during spontaneous speech (volitional forced grin is symmetric).  There is no identifiable hemiparesis nor hemisensory loss.  Coordination testing was performed accurately bilaterally.  His dysarthria is fairly pronounced, unlikely to represent mirror or recrudescence of prior deficits, therefore do suspect he may have suffered a small stroke. Previously an echocardiogram demonstrated biatrial enlargement. Given that, coupled with the multifocal event after catheterization, again raises concern for possible undetected PAF. Alternately this could well represent just a small vessel lacunar thrombosis, recognizing that his P2 Y12 previously was not therapeutic.   Plan: f/u MRI; f/u echo; PT/ OT / ST eval - tx      Date: 9/15/24     Day 3: Has surpassed a 2nd midnight with active treatments and services.  No acute change in symptoms. Still with dysarthria. 75% intelligible, but garbled. Mild facial droop on left. He has retained posterior pharyngeal sensation, but reduced gag reflex.  MRI of the brain is completed today, and confirms small infarct in  the right frontotemporal corona radiata, likely related to small vessel disease.   Plan: neuro checks; Cardiopulmonary monitoring; monitor labs; pain / nausea control (see below); f/u repeat CT; f/u echo; f/u PT/ OT / ST recom;Accuchecks with [facility]        ED Triage Vitals   Temperature Pulse Respirations Blood Pressure SpO2 Pain Score   09/13/24 2030 09/13/24 2023 09/13/24 2023 09/13/24 2023 09/13/24 2023 09/13/24 2115   98.2 °F (36.8 °C) 82 18 141/67 96 % No Pain     Weight (last 2 days)       Date/Time Weight    09/15/24 0600 73.6 (162.26)    09/14/24 0600 74.5 (164.24)    09/13/24 2230 73.5 (162.04)     Weight: not ICU bed. Remove SCD pump to weigh Pt. at 09/13/24 2230 09/13/24 2030 70.3 (155)            Vital Signs (last 3 days)       Date/Time Temp Pulse Resp BP MAP (mmHg) SpO2 O2 Device Patient Position - Orthostatic VS Louis Coma Scale Score Pain    09/15/24 1140 97.6 °F (36.4 °C) -- 20 -- -- -- None (Room air) Lying -- --    09/15/24 0800 -- 79 20 155/80 112 93 % None (Room air) Lying 15 No Pain    09/15/24 0752 97.5 °F (36.4 °C) 70 -- -- -- 94 % -- -- -- --    09/15/24 0600 -- 67 -- 123/69 91 92 % -- -- -- --    09/15/24 0400 97.7 °F (36.5 °C) 84 16 166/80 112 91 % None (Room air) Lying 15 No Pain    09/15/24 0000 -- 84 -- 124/64 88 93 % -- -- 15 No Pain    09/14/24 2200 98.1 °F (36.7 °C) 85 18 132/63 90 91 % None (Room air) Lying 15 No Pain    09/14/24 2100 -- 94 18 117/63 81 93 % -- -- 15 --    09/14/24 2000 97.6 °F (36.4 °C) 87 18 126/71 93 96 % None (Room air) Lying 15 No Pain    09/14/24 1900 -- 74 -- 149/73 104 93 % -- -- 15 --    09/14/24 1800 -- 72 -- 168/81 116 95 % -- -- 15 --    09/14/24 1700 -- 75 -- 169/86 121 95 % -- -- 15 --    09/14/24 1612 97.4 °F (36.3 °C) 66 -- 159/76 108 97 % -- -- -- --    09/14/24 1600 -- 64 -- 161/81 113 95 % -- -- 15 No Pain    09/14/24 1500 -- 63 -- 164/79 113 96 % -- -- 15 --    09/14/24 1400 -- 63 -- 162/74 106 97 % -- -- 15 --    09/14/24 1300 -- 67 -- 143/76  103 96 % -- -- 15 --    09/14/24 1200 -- 67 -- 160/88 118 95 % -- -- 15 --    09/14/24 1100 97.5 °F (36.4 °C) 58 16 145/73 103 95 % None (Room air) Lying 15 No Pain    09/14/24 1000 -- 60 -- 145/77 105 96 % -- -- 15 --    09/14/24 0900 -- 67 -- 149/72 98 96 % -- -- 15 --    09/14/24 0800 -- 59 -- 133/60 87 94 % -- -- 15 --    09/14/24 0758 97.3 °F (36.3 °C) 60 16 133/60 87 95 % None (Room air) Lying -- No Pain    09/14/24 0700 -- 69 -- 159/69 99 96 % -- -- 15 --    09/14/24 0630 -- -- -- -- -- -- -- -- 15 --    09/14/24 0600 -- 66 -- 153/69 99 93 % -- -- 15 --    09/14/24 0530 -- 64 -- 143/66 95 92 % -- -- 15 --    09/14/24 0500 -- 64 -- 158/71 102 93 % -- -- 15 --    09/14/24 0430 -- 59 -- 145/72 102 96 % -- -- 15 --    09/14/24 0400 98.5 °F (36.9 °C) 61 14 148/71 102 94 % None (Room air) Lying 15 No Pain    09/14/24 0330 -- 60 -- 166/79 113 94 % -- -- 15 --    09/14/24 0300 -- 63 14 141/75 101 92 % -- -- 15 --    09/14/24 0230 -- 61 13 163/74 107 94 % -- -- 15 --    09/14/24 0200 -- 61 17 155/71 102 92 % -- -- -- --    09/14/24 0130 -- 63 24 142/80 105 94 % -- -- 15 --    09/14/24 0100 -- 60 21 131/63 91 94 % -- -- -- --    09/14/24 0030 -- 63 20 158/72 103 94 % -- -- -- --    09/14/24 0000 98.9 °F (37.2 °C) 61 20 145/71 102 93 % None (Room air) Lying 15 No Pain    09/13/24 2345 -- 60 19 146/72 103 93 % -- -- 15 --    09/13/24 2330 -- 60 18 145/72 102 94 % -- -- 15 --    09/13/24 2315 -- 61 25 141/70 99 94 % -- -- 15 --    09/13/24 2300 -- 63 20 117/58 83 93 % -- -- 15 --    09/13/24 2245 -- 63 22 122/60 86 94 % -- -- 15 --    09/13/24 2233 -- -- -- -- -- 96 % None (Room air) -- -- --    09/13/24 2230 98.7 °F (37.1 °C) 67 18 145/74 103 96 % None (Room air) Lying 15 No Pain    09/13/24 2225 -- 61 14 147/70 101 95 % -- -- -- --    09/13/24 2206 -- 76 18 129/68 -- -- -- Lying 15 --    09/13/24 2200 -- 66 20 125/70 92 95 % None (Room air) -- 15 No Pain    09/13/24 2152 -- 65 18 125/65 89 96 % None (Room air) -- 15 --     09/13/24 2151 97.8 °F (36.6 °C) 66 18 124/60 -- 96 % -- -- -- --    09/13/24 2145 -- 64 20 124/60 -- 95 % None (Room air) -- 15 --    09/13/24 2130 -- 66 18 130/69 -- 93 % None (Room air) -- 15 No Pain    09/13/24 2115 -- 68 20 121/66 -- 94 % None (Room air) -- 15 No Pain    09/13/24 2100 -- 68 20 126/63 -- 94 % None (Room air) -- 15 --    09/13/24 2045 -- 72 20 119/60 -- 95 % None (Room air) -- 15 --    09/13/24 2040 -- 61 19 119/60 -- 94 % None (Room air) -- 15 --    09/13/24 2035 -- 66 16 122/58 -- 95 % None (Room air) -- 15 --    09/13/24 2030 98.2 °F (36.8 °C) 68 18 139/63 -- 95 % None (Room air) -- 15 --    09/13/24 2023 -- 82 18 141/67 -- 96 % None (Room air) Lying -- --              Pertinent Labs/Diagnostic Test Results:   Radiology:  MRI brain wo contrast   Final Interpretation by Giovanni Rossi MD (09/15 1229)         1. 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 No hemorrhage.   2. 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   3. Mild chronic white matter microangiopathic changes again demonstrated.      The study was marked in EPIC for immediate notification.      Workstation performed: [id number]         CT head wo contrast   Final Interpretation by Carlos Bond DO (09/14 2320)      No acute intracranial abnormality is seen. No intracranial hemorrhage identified.      Other findings as above.      Clinical follow-up recommended.                  Workstation performed: [id number]         CTA stroke alert (head/neck)   Final Interpretation by Biju Ramirez MD (09/13 2123)      1. No proximal large vessel occlusion or high-grade vascular stenosis in the head and neck.      2. Redemonstrated left thyroid nodule, for which dedicated thyroid sonogram is recommended if not previously performed.      CTA findings were directly discussed with BYRON  BRITTNEY at approximately 9:03 p.m.      The study was marked in EPIC for immediate notification.      Workstation performed: [id number]         CT stroke alert brain   Final Interpretation by Biju Ramirez MD (09/13 2100)      No acute intracranial hemorrhage, mass effect or midline shift.      Findings were directly discussed with BYRON LUGO at approximately 9:00 p.m.      Workstation performed: [id number]           Cardiology:  ECG 12 lead   Final Result by Anatoliy Mccurdy MD (09/15 1424)   Normal sinus rhythm   Nonspecific T wave abnormality   Abnormal ECG   When compared with ECG of 28-FEB-2024 17:15,   No significant change was found   Confirmed by Anatoliy Mccurdy ([id number]) on 9/15/2024 2:24:35 PM           Results from last 7 days   Lab Units 09/15/24  0232 09/13/24  2027   WBC Thousand/uL 13.29* 10.77*   HEMOGLOBIN g/dL 14.0 13.3   HEMATOCRIT % 43.1 40.3   PLATELETS Thousands/uL 193 198   TOTAL NEUT ABS Thousands/µL 9.63*  --         Results from last 7 days   Lab Units 09/15/24  0232 09/13/24  2027   SODIUM mmol/L 139 140   POTASSIUM mmol/L 4.4 4.2   CHLORIDE mmol/L 106 105   CO2 mmol/L 26 28   ANION GAP mmol/L 7 7   BUN mg/dL 28* 38*   CREATININE mg/dL 0.92 1.18   CALCIUM mg/dL 8.3* 9.2        Results from last 7 days   Lab Units 09/15/24  0755 09/15/24  0034 09/14/24  2114 09/14/24  1632 09/14/24  1137 09/14/24  0610 09/14/24  0027 09/13/24  2030   POC GLUCOSE mg/dl 111 140 115 125 102 103 119 132     Results from last 7 days   Lab Units 09/15/24  0232 09/13/24  2027   GLUCOSE RANDOM mg/dL 106 122        Results from last 7 days   Lab Units 09/14/24 0612   HEMOGLOBIN A1C % 6.3*   EAG mg/dl 134        Results from last 7 days   Lab Units 09/13/24  2239 09/13/24 2027   HS TNI 0HR ng/L  --  6   HS TNI 2HR ng/L 5  --    HSTNI D2 ng/L -1  --         Results from last 7 days   Lab Units 09/13/24  2027   PROTIME seconds 14.6   INR  1.09   PTT seconds 31        Results from last 7 days   Lab Units 09/14/24  1404    CLARITY UA  Clear   COLOR UA  Yellow   SPEC GRAV UA  1.020   PH UA  6.5   GLUCOSE UA mg/dl Negative   KETONES UA mg/dl Negative   BLOOD UA  Negative   PROTEIN UA mg/dl Negative   NITRITE UA  Negative   BILIRUBIN UA  Negative   UROBILINOGEN UA (BE) mg/dl <2.0   LEUKOCYTES UA  Negative       ED Treatment-Medication Administration from 09/13/2024 2016 to 09/13/2024 2219         Date/Time Order Dose Route Action     09/13/2024 2040 iohexol (OMNIPAQUE) 350 MG/ML injection (MULTI-DOSE) 85 mL 85 mL Intravenous Given     09/13/2024 2151 tenecteplase (TNKase) injection 18 mg 18 mg Intravenous Given            Past Medical History:   Diagnosis Date    Chronic bilateral low back pain without sciatica 7/17/2020    Hypertension     Scoliosis     Stroke (HCC)     Stroke-like symptoms 11/22/2021     Present on Admission:   BPH (benign prostatic hyperplasia)   Type 2 diabetes mellitus without complication (HCC)   CVA (cerebral vascular accident) ([facility])   Hypercholesterolemia   Stage 2 chronic kidney disease   Coronary artery disease involving native coronary artery of native heart with unstable angina pectoris (HCC)   COPD (chronic obstructive pulmonary disease) ([facility])   Essential hypertension   Symptomatic carotid artery stenosis, left   Leukocytosis      Admitting Diagnosis: Slurred speech [R47.81]  Cerebrovascular accident (CVA) due to other mechanism ([facility]) [I63.89]    Age/Sex: 79 y.o. adult    Admission Orders: SCDs; I/O; neuro checks; Cardiopulmonary monitoring; dysphagia diet    Scheduled Medications:  aspirin, 81 mg, Oral, Daily  atorvastatin, 80 mg, Oral, After Dinner  chlorhexidine, 15 mL, Mouth/Throat, Q12H KELLI  clopidogrel, 75 mg, Oral, Daily  famotidine, 40 mg, Oral, Daily  heparin (porcine), 5,000 Units, Subcutaneous, Q8H KELLI    tenecteplase (TNKase) injection 18 mg  Dose: 0.25 mg/kg  Weight Dosing Info: 70.3 kg  Freq: Once Route: IV  Start: 09/13/24 2130 End: 09/13/24 2151       Continuous IV Infusions: None       PRN  Meds:  ondansetron, 4 mg, Intravenous, Q6H PRN  (9/14 recd x1)         IP CONSULT TO NEUROLOGY  IP CONSULT TO NEUROLOGY  IP CONSULT TO CASE MANAGEMENT    Network Utilization Review Department  ATTENTION: Please call with any questions or concerns to [phone] and carefully listen to the prompts so that you are directed to the right person. All voicemails are confidential.   For Discharge needs, contact Care Management DC Support Team at [phone] opt. [phone]  Send all requests for admission clinical reviews, approved or denied determinations and any other requests to dedicated fax number below belonging to the campus where the patient is receiving treatment. List of dedicated fax numbers for the Facilities:  FACILITY NAME UR FAX NUMBER   ADMISSION DENIALS (Administrative/Medical Necessity) [phone]   DISCHARGE SUPPORT TEAM (NETWORK) [phone]   PARENT CHILD HEALTH (Maternity/NICU/Pediatrics) [phone]   [facility] [phone]   [facility] [phone]   [facility] [phone]   [facility] [phone]   [facility] [phone]   [facility] [phone]   [facility] [phone]   [facility] [phone]   [facility] [phone]   [facility] [phone]   [facility] [phone]   [facility] [phone]

## 2024-09-16 ENCOUNTER — TELEPHONE (OUTPATIENT)
Age: 79
End: 2024-09-16

## 2024-09-16 ENCOUNTER — APPOINTMENT (INPATIENT)
Dept: NON INVASIVE DIAGNOSTICS | Facility: HOSPITAL | Age: 79
DRG: 061 | End: 2024-09-16
Payer: COMMERCIAL

## 2024-09-16 LAB
ANION GAP SERPL CALCULATED.3IONS-SCNC: 6 MMOL/L (ref 4–13)
AORTIC ROOT: 3 CM
AORTIC VALVE MEAN VELOCITY: 13.1 M/S
APICAL FOUR CHAMBER EJECTION FRACTION: 64 %
ASCENDING AORTA: 3.3 CM
AV AREA BY CONTINUOUS VTI: 2.1 CM2
AV AREA PEAK VELOCITY: 1.9 CM2
AV LVOT MEAN GRADIENT: 2 MMHG
AV LVOT PEAK GRADIENT: 5 MMHG
AV MEAN GRADIENT: 8 MMHG
AV PEAK GRADIENT: 16 MMHG
AV VALVE AREA: 2.12 CM2
AV VELOCITY RATIO: 0.54
AVA (PLAN): 1.8 CM2
BSA FOR ECHO PROCEDURE: 1.82 M2
BUN SERPL-MCNC: 32 MG/DL (ref 5–25)
CALCIUM SERPL-MCNC: 8.4 MG/DL (ref 8.4–10.2)
CHLORIDE SERPL-SCNC: 108 MMOL/L (ref 96–108)
CO2 SERPL-SCNC: 27 MMOL/L (ref 21–32)
CREAT SERPL-MCNC: 0.82 MG/DL (ref 0.6–1.3)
DOP CALC AO PEAK VEL: 1.97 M/S
DOP CALC AO VTI: 40.05 CM
DOP CALC LVOT AREA: 3.46 CM2
DOP CALC LVOT CARDIAC INDEX: 4.12 L/MIN/M2
DOP CALC LVOT CARDIAC OUTPUT: 7.49 L/MIN
DOP CALC LVOT DIAMETER: 2.1 CM
DOP CALC LVOT PEAK VEL VTI: 24.52 CM
DOP CALC LVOT PEAK VEL: 1.07 M/S
DOP CALC LVOT STROKE INDEX: 45.1 ML/M2
DOP CALC LVOT STROKE VOLUME: 84.88
DOP CALC MV VTI: 29.1 CM
E WAVE DECELERATION TIME: 519 MS
E/A RATIO: 0.48
FRACTIONAL SHORTENING: 32 (ref 28–44)
GLUCOSE SERPL-MCNC: 109 MG/DL (ref 65–140)
GLUCOSE SERPL-MCNC: 121 MG/DL (ref 65–140)
GLUCOSE SERPL-MCNC: 154 MG/DL (ref 65–140)
INTERVENTRICULAR SEPTUM IN DIASTOLE (PARASTERNAL SHORT AXIS VIEW): 1.2 CM
INTERVENTRICULAR SEPTUM: 1.2 CM (ref 0.6–1.1)
LAAS-AP2: 10.3 CM2
LAAS-AP4: 21.5 CM2
LEFT ATRIUM SIZE: 3.3 CM
LEFT ATRIUM VOLUME (MOD BIPLANE): 51 ML
LEFT ATRIUM VOLUME INDEX (MOD BIPLANE): 28 ML/M2
LEFT INTERNAL DIMENSION IN SYSTOLE: 2.6 CM (ref 2.1–4)
LEFT VENTRICLE DIASTOLIC VOLUME (MOD BIPLANE): 115 ML
LEFT VENTRICLE DIASTOLIC VOLUME INDEX (MOD BIPLANE): 63.2 ML/M2
LEFT VENTRICLE SYSTOLIC VOLUME (MOD BIPLANE): 36 ML
LEFT VENTRICLE SYSTOLIC VOLUME INDEX (MOD BIPLANE): 19.8 ML/M2
LEFT VENTRICULAR INTERNAL DIMENSION IN DIASTOLE: 3.8 CM (ref 3.5–6)
LEFT VENTRICULAR POSTERIOR WALL IN END DIASTOLE: 1.2 CM
LEFT VENTRICULAR STROKE VOLUME: 35 ML
LV EF: 69 %
LVSV (TEICH): 35 ML
MV E'TISSUE VEL-LAT: 11 CM/S
MV E'TISSUE VEL-SEP: 8 CM/S
MV MEAN GRADIENT: 4 MMHG
MV PEAK A VEL: 1.47 M/S
MV PEAK E VEL: 71 CM/S
MV PEAK GRADIENT: 11 MMHG
MV STENOSIS PRESSURE HALF TIME: 150 MS
MV VALVE AREA BY CONTINUITY EQUATION: 2.92 CM2
MV VALVE AREA P 1/2 METHOD: 1.47
POTASSIUM SERPL-SCNC: 4.1 MMOL/L (ref 3.5–5.3)
RA PRESSURE ESTIMATED: 10 MMHG
RIGHT ATRIUM AREA SYSTOLE A4C: 32 CM2
RIGHT VENTRICLE ID DIMENSION: 4.2 CM
RV PSP: 41 MMHG
SL CV LEFT ATRIUM LENGTH A2C: 3.4 CM
SL CV LV EF: 65
SL CV PED ECHO LEFT VENTRICLE DIASTOLIC VOLUME (MOD BIPLANE) 2D: 60 ML
SL CV PED ECHO LEFT VENTRICLE SYSTOLIC VOLUME (MOD BIPLANE) 2D: 25 ML
SODIUM SERPL-SCNC: 141 MMOL/L (ref 135–147)
TR MAX PG: 31 MMHG
TR PEAK VELOCITY: 2.8 M/S
TRICUSPID ANNULAR PLANE SYSTOLIC EXCURSION: 2.7 CM
TRICUSPID VALVE PEAK REGURGITATION VELOCITY: 2.77 M/S

## 2024-09-16 PROCEDURE — 82948 REAGENT STRIP/BLOOD GLUCOSE: CPT

## 2024-09-16 PROCEDURE — 92507 TX SP LANG VOICE COMM INDIV: CPT

## 2024-09-16 PROCEDURE — 97167 OT EVAL HIGH COMPLEX 60 MIN: CPT

## 2024-09-16 PROCEDURE — 92526 ORAL FUNCTION THERAPY: CPT

## 2024-09-16 PROCEDURE — 93306 TTE W/DOPPLER COMPLETE: CPT | Performed by: INTERNAL MEDICINE

## 2024-09-16 PROCEDURE — 97163 PT EVAL HIGH COMPLEX 45 MIN: CPT

## 2024-09-16 PROCEDURE — 99232 SBSQ HOSP IP/OBS MODERATE 35: CPT | Performed by: INTERNAL MEDICINE

## 2024-09-16 PROCEDURE — 93306 TTE W/DOPPLER COMPLETE: CPT

## 2024-09-16 RX ORDER — ALBUTEROL SULFATE 90 UG/1
2 INHALANT RESPIRATORY (INHALATION) EVERY 6 HOURS PRN
Status: DISCONTINUED | OUTPATIENT
Start: 2024-09-16 | End: 2024-09-17 | Stop reason: HOSPADM

## 2024-09-16 RX ORDER — INSULIN LISPRO 100 [IU]/ML
1-5 INJECTION, SOLUTION INTRAVENOUS; SUBCUTANEOUS
Status: DISCONTINUED | OUTPATIENT
Start: 2024-09-17 | End: 2024-09-17 | Stop reason: HOSPADM

## 2024-09-16 RX ORDER — DILTIAZEM HYDROCHLORIDE 120 MG/1
120 CAPSULE, COATED, EXTENDED RELEASE ORAL DAILY
Status: DISCONTINUED | OUTPATIENT
Start: 2024-09-16 | End: 2024-09-17 | Stop reason: HOSPADM

## 2024-09-16 RX ORDER — INSULIN LISPRO 100 [IU]/ML
1-5 INJECTION, SOLUTION INTRAVENOUS; SUBCUTANEOUS
Status: DISCONTINUED | OUTPATIENT
Start: 2024-09-16 | End: 2024-09-16

## 2024-09-16 RX ADMIN — DILTIAZEM HYDROCHLORIDE 120 MG: 120 CAPSULE, COATED, EXTENDED RELEASE ORAL at 09:10

## 2024-09-16 RX ADMIN — TICAGRELOR 90 MG: 90 TABLET ORAL at 19:48

## 2024-09-16 RX ADMIN — CHLORHEXIDINE GLUCONATE 15 ML: 1.2 RINSE ORAL at 19:48

## 2024-09-16 RX ADMIN — HEPARIN SODIUM 5000 UNITS: 5000 INJECTION, SOLUTION INTRAVENOUS; SUBCUTANEOUS at 05:15

## 2024-09-16 RX ADMIN — FAMOTIDINE 40 MG: 20 TABLET, FILM COATED ORAL at 09:11

## 2024-09-16 RX ADMIN — ONDANSETRON 4 MG: 2 INJECTION, SOLUTION INTRAMUSCULAR; INTRAVENOUS at 18:45

## 2024-09-16 RX ADMIN — ATORVASTATIN CALCIUM 80 MG: 40 TABLET, FILM COATED ORAL at 17:19

## 2024-09-16 RX ADMIN — CHLORHEXIDINE GLUCONATE 15 ML: 1.2 RINSE ORAL at 09:11

## 2024-09-16 RX ADMIN — TICAGRELOR 90 MG: 90 TABLET ORAL at 09:11

## 2024-09-16 RX ADMIN — ASPIRIN 81 MG: 81 TABLET, COATED ORAL at 09:11

## 2024-09-16 NOTE — SPEECH THERAPY NOTE
Speech Language/Pathology    Speech/Language Pathology Progress Note    Patient Name: Tevin Bazan  Today's Date: 2024     Problem List  Principal Problem:    CVA (cerebral vascular accident) ([facility])  Active Problems:    Essential hypertension    Symptomatic carotid artery stenosis, left    Hypercholesterolemia    Stage 2 chronic kidney disease    COPD (chronic obstructive pulmonary disease) (HCC)    Coronary artery disease involving native coronary artery of native heart with unstable angina pectoris (HCC)    BPH (benign prostatic hyperplasia)    Type 2 diabetes mellitus without complication (HCC)    Leukocytosis    Garbled speech       Past Medical History  Past Medical History:   Diagnosis Date    Chronic bilateral low back pain without sciatica 2020    Hypertension     Scoliosis     Stroke ([facility])     Stroke-like symptoms 2021        Past Surgical History  Past Surgical History:   Procedure Laterality Date    BACK SURGERY      BOWEL RESECTION      CARDIAC CATHETERIZATION N/A 2024    Procedure: Cardiac Coronary Angiogram;  Surgeon: Rivas Cantor MD;  Location: [facility] CARD[facility]AC [facility]ATH [facility];  Service: Cardiology    CARDIAC CATHETERIZATION  2024    Procedure: Cardiac catheterization;  Surgeon: Rivas Cantor MD;  Location: [facility];  Service: Cardiology    CARDIAC CATHETERIZATION N/A 2024    Procedure: Cardiac pci;  Surgeon: Rivas Cantor MD;  Location: [facility] CARDIAC C[facility];  Service: Cardiology    NE TEAEC W/PATCH GRF CAROTID VERTB SUBCLAV NECK INC Left 2021    Procedure: ENDARTERECTOMY ARTERY CAROTID;  Surgeon: Vito Mo MD;  Location: [facility];  Service: Vascular    TONSILLECTOMY         Updated History:  Last seen by SLP  recommending NTL and dysphagia 3 textures, a motor speech evaluation was also completed.     Updated Imagin/15/24 MRI brain wo contrast:  1. Acute/recent infarct involving the corona radiata white matter of the right frontal lobe with  associated cytotoxic edema. No hemorrhage.  2. 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  3. Mild chronic white matter microangiopathic changes again demonstrated.    AAO: person, place, date     Subjective:  S/w RN prior to re-evaluation stating no concerns at this time. Pt awake and alert upon arrival, agreeable to PO trials and HOB elevation. Denies difficulty swallowing current diet textures though expressed dislike for thickened liquids at this time.     Objective:  Materials administered: regular and dental soft textures, thin liquids via cup, nectar thick liquids via cup and straw sip     Complete labial seal for retrieval and containment of all materials, no anterior bolus loss present. Mild prolonged rotary mastication w/ intermittent anterior munching of regular textures, adequate rotary mastication of dental soft textures. Disorganized bolus formation and slowed bolus transfer. Min oral residue noted across all trials, cleared w/ liquid wash. Suspected delayed swallow initiation and reduced hyolaryngeal elevation to palpation. Immediate cough response following thin liquids. No overt s/s of aspiration w/ other materials administered.     Assessment:  Pt present w/ suspected mild oropharyngeal dysphagia. Discussed w/ pt rationale for current diet recommendations and rationale for completion of VBS, pt verbalized understanding and agreement, denies questions or concerns at this time.     SLP contacted X-ray tech who reports fluoroscopy suite is currently requiring repairs, unable to schedule VBS at this time. SLP to monitor pt chart/status as well as repair of fluoroscopy suite in order to complete VBS as able and appropriate.     Plan/Recommendations:  Continue current diet of dysphagia 3/dental soft textures and nectar thick liquids   Meds crushed in puree   Swallow strategies: upright posture, small bites/sips, slow rate    Frequent/thorough oral care   NO MIXED CONSISTENCIES   ST to f/u as able and appropriate for motor speech tx, dysphagia tx, and completion of VBS

## 2024-09-16 NOTE — SPEECH THERAPY NOTE
Speech Language/Pathology    Speech/Language Pathology Progress Note    Patient Name: Tevin Bazan  Today's Date: 2024     Problem List  Principal Problem:    CVA (cerebral vascular accident) ([facility])  Active Problems:    Essential hypertension    Symptomatic carotid artery stenosis, left    Hypercholesterolemia    Stage 2 chronic kidney disease    COPD (chronic obstructive pulmonary disease) (HCC)    Coronary artery disease involving native coronary artery of native heart with unstable angina pectoris (HCC)    BPH (benign prostatic hyperplasia)    Type 2 diabetes mellitus without complication (HCC)    Leukocytosis    Garbled speech       Past Medical History  Past Medical History:   Diagnosis Date    Chronic bilateral low back pain without sciatica 2020    Hypertension     Scoliosis     Stroke ([facility])     Stroke-like symptoms 2021        Past Surgical History  Past Surgical History:   Procedure Laterality Date    BACK SURGERY      BOWEL RESECTION      CARDIAC CATHETERIZATION N/A 2024    Procedure: Cardiac Coronary Angiogram;  Surgeon: Rivas Cantor MD;  Location: [facility];  Service: Cardiology    CARDIAC CATHETERIZATION  2024    Procedure: Cardiac catheterization;  Surgeon: Rivas Cantor MD;  Location: [facility];  Service: Cardiology    CARDIAC CATHETERIZATION N/A 2024    Procedure: Cardiac pci;  Surgeon: Rivas Cantor MD;  Location: [facility];  Service: Cardiology    AK TEAEC W/PATCH GRF CAROTID VERTB SUBCLAV NECK INC Left 2021    Procedure: ENDARTERECTOMY ARTERY CAROTID;  Surgeon: Vito Mo MD;  Location: [facility];  Service: Vascular    TONSILLECTOMY         Updated History:  Last seen by SLP  recommending NTL and dysphagia 3 textures, a motor speech evaluation was also completed.     Updated Imagin/15/24 MRI brain wo contrast:  1. Acute/recent infarct involving the corona radiata white matter of the right frontal lobe with  "associated cytotoxic edema. No hemorrhage.  2. 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  3. Mild chronic white matter microangiopathic changes again demonstrated.    AAO: person, place, date     Subjective:  S/w RN prior to re-evaluation stating no concerns at this time. Pt reports \"only problem is my speech and swallowing\". Pt awake and alert upon arrival, agreeable to motor speech tx at this time.     Objective:  Respiration and Phonation  Patient will utilize the strategy of increased respiratory support (ie “inhale more deeply”)  before beginning an utterance at the word, phrase and sentence level tasks  for 90% of trials with minimal to no cues needed in both formal speech tasks and in informal ADL activities.  -During formal speech tasks, pt independently utilized increased respiratory support in 23/27 (85%) word level tasks. Increased to 24/27 (88.8%) given verbal cue for usage.   -will continue to monitor independent use of increased respiratory support across the word, phrase, and sentence level tasks.      Articulation  Patient will demonstrate adequate lingual strength, ROM and control for  fricatives and affricates and produce 20 understandable words, phrases, sentences) related to basic personal and medical needs  -Pt produced 21/27 understandable words this tx session. Will continue to address increased verbal output (phrases and sentences) during upcoming speech tx.      Patient will use trained strategies (eg slowed rate, over articulation, increased oral opening, writing key word, increased loudness, phrasing)  to improve speech intelligibility in word,  phrases, sentences and  conversation with 80% accuracy.   -Pt independently utilized trained strategies listed above to improve speech intelligibility at the word level w/ 77.7% (21/27) accuracy, increased to 81% (22/27) when given verbal cue for strategy use.   -SLP to continue " working towards word level speech production prior to addressing higher level verbal output (phrase/sentence length)     Patient will discriminate between intelligible and unintelligible speech and use trained strategies to repair communication (eg slowed rate, exaggerated articulation, repetition, rephrasal)   -Pt was able to discriminate between intelligible and unintelligible speech w/ use of trained strategies to repair communication during formal tasks in 2/6 opportunities.   -SLP to continue to monitor/work towards independently discriminating between intelligible and unintelligible speech in order to repair communication during upcoming tx sessions.     Assessment:  Pt presents w/ moderate dysarthria distorted speech sounds, imprecise articulation, and increased effort for speech production. Of note, pt w/ increased dysarthria/worsening inteligibility during conversational speech/unknown context.     SLP provided pt w/ handout of motor speech exercises to aid in increased ROM/strength in oral structures.     Plan/Recommendations:  Continue motor speech tx as well as dysphagia tx/completion of VBS as able and appropriate   Work on independent use of strategies listed above during communication

## 2024-09-16 NOTE — PROGRESS NOTES
"Progress Note - Hospitalist   Name: Tevin Bazan 79 y.o. adult I MRN: [id number]  Unit/Bed#: -01 I Date of Admission: 9/13/2024   Date of Service: 9/16/2024 I Hospital Day: 3    Assessment & Plan  CVA (cerebral vascular accident) (HCC)  9/13 sudden onset garbled speech at 1900 as witnessed by patient's wife   Associated dizziness en route to ER per wife   9/13 CTH -- neg   9/13 CTA H/N --  No proximal large vessel occlusion or high-grade vascular stenosis in the head and neck. Redemonstrated left thyroid nodule, for which dedicated thyroid sonogram is recommended if not previously performed.  9/13 TNK @ 2151  Follows with Dr. Pearson as OP, last seen 5/10/24  Previous CVA Hx:   2/29/24 MRI -- Punctate acute to subacute infarcts involving the high left frontoparietal cortex, left parietal subcortical white matter, and posterior left cerebellum -- thought to be 2/2 embolic as this was 2 days after undergoing cardiac catheterization   Pt reports embolic stroke in 2003, no further records available   OP CVA optimization -- statin, asa, plavix   Patient reports being on coumadin for several years previously, however coumadin was stopped in 2021 2/2 epistaxis and he was transitioned to plavix   24 hours post TNK CTH over the night: No acute change  MRI brain-Acute/recent infarct involving the corona radiata white matter of the right frontal lobe with associated cytotoxic edema. No hemorrhage.   Echo-completed, final reading pending  Plan:     Goal SBP <180  Cont home statin   Start SQ heparin 5000 units TID  CTH negative-continue dual antiplatelet therapy, aspirin and Brilinta, Plavix discontinued  PT/OT-no rehab needs  Speech-ordered VPS  SLP evaluation- Dysphagia 3 with nectar thick liquids   Essential hypertension  Home regimen: diltiazem qd  Resumed 9/16  Goal SBP <180 given CVA   /72 (BP Location: Left arm)   Pulse 57   Temp 97.8 °F (36.6 °C) (Oral)   Resp 18   Ht 5' 6\" (1.676 m)   Wt 73 kg " (161 lb)   SpO2 96%   BMI 25.99 kg/m²     Symptomatic carotid artery stenosis, left  Status post left carotid endarterectomy 2021 in the setting of CVA of the central [place] on the left   Hypercholesterolemia  Home regimen: atorvastain 80 qd, although patient states he only takes statin on days he has substernal CP   Continue qd now   Lipid panel with LDL of 37, triglycerides 33  Stage 2 chronic kidney disease  Lab Results   Component Value Date    EGFR 76 02/28/2024    EGFR 72 02/27/2024    EGFR 79 01/30/2024    CREATININE 0.82 09/15/2024    CREATININE 0.92 09/15/2024    CREATININE 1.18 09/13/2024     Baseline creat appears to be 0.9-1.1  Admission creat 1.18  Urinary retention protocol   Trend I&O   Avoid hypotension, nephrotoxic agents  COPD (chronic obstructive pulmonary disease) ([facility])  Not on daily maintenance therapies   Previous smoker, quit in 1970s   PRN abbuterol   Not in AE on admission, no SOB or wheezing, though baseline productive cough noted   IS, pulm toileting   Goal spo2 > 90%  Coronary artery disease involving native coronary artery of native heart with unstable angina pectoris ([facility])  Card cath 2/26/24 with Dr. Devaughn Dubois RCA lesion is 70% stenosed.  Mid LAD lesion is 50% stenosed.  1st Mrg lesion is 40% stenosed.  Mid RCA lesion is 100% stenosed.  Continue DAPT/Statin therapy   BPH (benign prostatic hyperplasia)  Urinary retention protocol   Type 2 diabetes mellitus without complication ([facility])  Lab Results   Component Value Date    HGBA1C 6.3 (H) 09/14/2024       Recent Labs     09/14/24  2114 09/15/24  0034 09/15/24  0755 09/16/24  0744   POCGLU 115 140 111 121       Not on home regimen  Hgba1c 6.6   SSI ACHS, goal blood glucose 140-180  Hypoglycemia protocol   Leukocytosis  Admission wbc 10.77  Likely 2/2 reactive in the setting of CVA   Did not otherwise meet sirs on admission, no concern for infectious source     Garbled speech  See plan under stroke like symptoms     VTE  Pharmacologic Prophylaxis: VTE Score: 4 Moderate Risk (Score 3-4) - Pharmacological DVT Prophylaxis Ordered: brilinta.    Mobility:   Basic Mobility Inpatient Raw Score: 22  JH-HLM Goal: 7: Walk 25 feet or more  JH-HLM Achieved: 7: Walk 25 feet or more  JH-HLM Goal achieved. Continue to encourage appropriate mobility.    Patient Centered Rounds: I performed bedside rounds with nursing staff today.   Discussions with Specialists or Other Care Team Provider: yes    Education and Discussions with Family / Patient:  yes.     Current Length of Stay: 3 day(s)  Current Patient Status: Inpatient   Certification Statement: The patient will continue to require additional inpatient hospital stay due to pending evaluation  Discharge Plan: Anticipate discharge in 24-48 hrs to home.    Code Status: Level 1 - Full Code    Subjective   Seen and evaluated at bedside  Awake and alert  No complaints       Objective     Vitals:   Temp (24hrs), Av.8 °F (36.6 °C), Min:97.3 °F (36.3 °C), Max:98.5 °F (36.9 °C)    Temp:  [97.3 °F (36.3 °C)-98.5 °F (36.9 °C)] 97.8 °F (36.6 °C)  HR:  [57-93] 57  Resp:  [18-26] 18  BP: (134-176)/(72-79) 142/72  SpO2:  [94 %-98 %] 96 %  Body mass index is 25.99 kg/m².     Input and Output Summary (last 24 hours):     Intake/Output Summary (Last 24 hours) at 2024 1556  Last data filed at 2024 1200  Gross per 24 hour   Intake 180 ml   Output --   Net 180 ml       Physical Exam  Vitals reviewed.   Constitutional:       Appearance: Normal appearance.   HENT:      Head: Atraumatic.      Mouth/Throat:      Mouth: Mucous membranes are dry.   Cardiovascular:      Rate and Rhythm: Normal rate.      Heart sounds: Normal heart sounds.   Pulmonary:      Effort: No respiratory distress.   Abdominal:      General: Bowel sounds are normal. There is no distension.   Skin:     General: Skin is dry.      Capillary Refill: Capillary refill takes less than 2 seconds.   Neurological:      Mental Status: Danielle is  alert. Mental status is at baseline.      Comments: Dysarthria           Lines/Drains:  Lines/Drains/Airways       Active Status       None                            Lab Results: I have reviewed the following results: CBC/BMP:   .     09/15/24  2350   WBC 8.01   HGB 12.9   HCT 40.1      SODIUM 141   K 4.1      CO2 27   BUN 32*   CREATININE 0.82   GLUC 109    , Creatinine Clearance: Estimated Creatinine Clearance (by C-G formula based on SCr of 0.82 mg/dL)  Female: 56 mL/min  Male: 64.8 mL/min  When the gender of the patient is unspecified you will see values for both male and female.   Results from last 7 days   Lab Units 09/15/24  2350   WBC Thousand/uL 8.01   HEMOGLOBIN g/dL 12.9   HEMATOCRIT % 40.1   PLATELETS Thousands/uL 172   SEGS PCT % 48   LYMPHO PCT % 33   MONO PCT % 14*   EOS PCT % 4     Results from last 7 days   Lab Units 09/15/24  2350   SODIUM mmol/L 141   POTASSIUM mmol/L 4.1   CHLORIDE mmol/L 108   CO2 mmol/L 27   BUN mg/dL 32*   CREATININE mg/dL 0.82   ANION GAP mmol/L 6   CALCIUM mg/dL 8.4   GLUCOSE RANDOM mg/dL 109     Results from last 7 days   Lab Units 09/13/24  2027   INR  1.09     Results from last 7 days   Lab Units 09/16/24  0744 09/15/24  0755 09/15/24  0034 09/14/24  2114 09/14/24  1632 09/14/24  1137 09/14/24  0610 09/14/24  0027 09/13/24  2030   POC GLUCOSE mg/dl 121 111 140 115 125 102 103 119 132     Results from last 7 days   Lab Units 09/14/24  0612   HEMOGLOBIN A1C % 6.3*           Recent Cultures (last 7 days):         Imaging Review: Reviewed radiology reports from this admission including: MRI brain.  Other Studies: EKG was reviewed.     Last 24 Hours Medication List:     Current Facility-Administered Medications:     albuterol (PROVENTIL HFA,VENTOLIN HFA) inhaler 2 puff, Q6H PRN    aspirin (ECOTRIN LOW STRENGTH) EC tablet 81 mg, Daily    atorvastatin (LIPITOR) tablet 80 mg, After Dinner    chlorhexidine (PERIDEX) 0.12 % oral rinse 15 mL, Q12H [facility]    diltiazem  (CARDIZEM CD) 24 hr capsule 120 mg, Daily    famotidine (PEPCID) tablet 40 mg, Daily    insulin lispro (HumALOG/ADMELOG) 100 units/mL subcutaneous injection 1-5 Units, TID AC **AND** Fingerstick Glucose (POCT), TID AC    ondansetron (ZOFRAN) injection 4 mg, Q6H PRN    ticagrelor (BRILINTA) tablet 90 mg, Q12H KELLI    Administrative Statements   Today, Patient Was Seen By: Jessica Connolly MD  I have spent a total time of 39 minutes in caring for this patient on the day of the visit/encounter including Risk factor reductions, Documenting in the medical record, Reviewing / ordering tests, medicine, procedures  , Obtaining or reviewing history  , and Communicating with other healthcare professionals .    **Please Note: This note may have been constructed using a voice recognition system.**

## 2024-09-16 NOTE — PHYSICAL THERAPY NOTE
"                                                                                  PHYSICAL THERAPY EVALUATION NOTE    Patient Name: Tevin Bazan  Today's Date: 2024    AGE:   79 y.o.  Mrn:   [id number]  ADMIT DX:  Slurred speech [R47.81]  Cerebrovascular accident (CVA) due to other mechanism (HCC) [I63.89]    Past Medical History:   Diagnosis Date    Chronic bilateral low back pain without sciatica 2020    Hypertension     Scoliosis     Stroke (HCC)     Stroke-like symptoms 2021     Length Of Stay: 3  PHYSICAL THERAPY EVALUATION :   Patient's identity confirmed via 2 patient identifiers (full name and ) at start of session       24 1151   PT Last Visit   PT Visit Date 24   Note Type   Note type Evaluation   Pain Assessment   Pain Assessment Tool 0-10   Pain Score No Pain   Restrictions/Precautions   Weight Bearing Precautions Per Order No   Other Precautions Chair Alarm;Bed Alarm;Multiple lines;Telemetry;Fall Risk   Home Living   Type of Home House   Home Layout Two level  (Bilevel, 6 steps to access main floor)   Bathroom Shower/Tub Tub/shower unit   Home Equipment Crutches   Additional Comments Pt independently ambulating PTA   Prior Function   Level of [place] Independent with ADLs;Independent with functional mobility   Lives With Spouse   Falls in the last 6 months 0   General   Family/Caregiver Present Yes  (Wife)   Cognition   Overall Cognitive Status WFL   Arousal/Participation Alert   Orientation Level Oriented X4   Following Commands Follows multistep commands with increased time or repetition   Comments Pt continues to be moderately dysarthritc   Subjective   Subjective \"I'm doing about 1600, 1700 steps a day\"   RLE Assessment   RLE Assessment WFL   Strength RLE   RLE Overall Strength 4-/5   LLE Assessment   LLE Assessment WFL   Strength LLE   LLE Overall Strength 4/5   Bed Mobility   Additional Comments OOB in recliner chair upon arrival   Transfers   Sit to " Stand 6  Modified independent   Stand to Sit 6  Modified independent   Ambulation/Elevation   Gait pattern Decreased foot clearance  (R sided lean d/t hx of scoliosis)   Gait Assistance 5  Supervision  (distant supervision)   Assistive Device None   Distance 200 ft   Balance   Static Sitting Good   Static Standing Fair +   Ambulatory Fair +   Activity Tolerance   Activity Tolerance Patient tolerated treatment well   Medical Staff Made Aware OT Elen   Nurse Made Aware ICU RN Estefany   Assessment   Problem List Impaired balance;Decreased mobility;Decreased range of motion  (impaired speech)   Assessment Tevin Bazan is 79 y.o. genderqueer who [facility] from home w/ c/o speech difficulty and diagnosis of CVA. Orders for PT eval and treat received. Pt presents w/ comorbidities of HTN, hx of CVA, COPD, DMII, lumbar radiculopathy, CAD.. At baseline, pt mobilizes independently w/ no AD, and reports 0 falls in the last 6 months. Upon evaluation, pt presents w/ the following deficits: weakness, impaired balance, decreased endurance, and impaired speech . Upon eval, pt requires mod  I for transfers, and distant S for gait. Based on this PT evaluation today, patient's discharge recommendation is for No rehabilitation needs. Given the above findings from this evaluation, at this time this patient does not require skilled inpatient PT for the remainder of this admission. Will D/C patient from PT caseload, please reconsult if any changes or needs arise.   Goals   Patient Goals for his speech to return   Discharge Recommendation   Rehab Resource Intensity Level, PT No post-acute rehabilitation needs   AM-PAC Basic Mobility Inpatient   Turning in Flat Bed Without Bedrails 4   Lying on Back to Sitting on Edge of Flat Bed Without Bedrails 4   Moving Bed to Chair 4   Standing Up From Chair Using Arms 4   Walk in Room 3   Climb 3-5 Stairs With Railing 3   Basic Mobility Inpatient Raw Score 22   Basic Mobility  Standardized Score 47.4   [facility] Highest Level Of Mobility   -HLM Goal 7: Walk 25 feet or more   -HLM Achieved 7: Walk 25 feet or more   End of Consult   Patient Position at End of Consult Bedside chair;Bed/Chair alarm activated;All needs within reach       Spoke w/ Danielle and wife, notified that if they begin to notice any motor changes in the upcoming days/weeks to immediately notify PCP and potentially request OPPT/OT referral. They verbalized understanding.      The patient's AM-PAC Basic Mobility Inpatient Short Form Raw Score is 22, Standardized Score is 47.4. A standardized score greater than 38.32 (raw score of 16) suggests the patient may benefit from discharge to home which may not coincide with above PT recommendations. However please refer to therapist recommendation for discharge planning given other factors that may influence destination.    Given the above findings from this evaluation, at this time this patient does not require skilled inpatient PT for the remainder of this admission. Will D/C patient from PT caseload, please reconsult if any changes or needs arise.      Melody Trejo PT, DPT

## 2024-09-16 NOTE — QUICK NOTE
Tevin Bazan will need follow-up in in 6 weeks with neurovascular team for Small vessel lacunar infarct in 30 minute appointment. They will not require outpatient neurological testing. Message sent to schedulers.

## 2024-09-16 NOTE — PLAN OF CARE
Problem: Potential for Falls  Goal: Patient will remain free of falls  Description: INTERVENTIONS:  - Educate patient/family on patient safety including physical limitations  - Instruct patient to call for assistance with activity   - Consult OT/PT to assist with strengthening/mobility   - Keep Call bell within reach  - Keep bed low and locked with side rails adjusted as appropriate  - Keep care items and personal belongings within reach  - Initiate and maintain comfort rounds  - Make Fall Risk Sign visible to staff  - Offer Toileting every 2 Hours, in advance of need  - Initiate/Maintain bed/chair alarm  - Obtain necessary fall risk management equipment:   - Apply yellow socks and bracelet for high fall risk patients  - Consider moving patient to room near nurses station  Outcome: Progressing     Problem: SAFETY ADULT  Goal: Patient will remain free of falls  Description: INTERVENTIONS:  - Educate patient/family on patient safety including physical limitations  - Instruct patient to call for assistance with activity   - Consult OT/PT to assist with strengthening/mobility   - Keep Call bell within reach  - Keep bed low and locked with side rails adjusted as appropriate  - Keep care items and personal belongings within reach  - Initiate and maintain comfort rounds  - Make Fall Risk Sign visible to staff  - Offer Toileting every 2 Hours, in advance of need  - Initiate/Maintain bed/chair alarm  - Obtain necessary fall risk management equipment:   - Apply yellow socks and bracelet for high fall risk patients  - Consider moving patient to room near nurses station  Outcome: Progressing  Goal: Maintain or return to baseline ADL function  Description: INTERVENTIONS:  -  Assess patient's ability to carry out ADLs; assess patient's baseline for ADL function and identify physical deficits which impact ability to perform ADLs (bathing, care of mouth/teeth, toileting, grooming, dressing, etc.)  - Assess/evaluate cause of  self-care deficits   - Assess range of motion  - Assess patient's mobility; develop plan if impaired  - Assess patient's need for assistive devices and provide as appropriate  - Encourage maximum independence but intervene and supervise when necessary  - Involve family in performance of ADLs  - Assess for home care needs following discharge   - Consider OT consult to assist with ADL evaluation and planning for discharge  - Provide patient education as appropriate  Outcome: Progressing  Goal: Maintains/Returns to pre admission functional level  Description: INTERVENTIONS:  - Perform AM-PAC 6 Click Basic Mobility/ Daily Activity assessment daily.  - Set and communicate daily mobility goal to care team and patient/family/caregiver.   - Collaborate with rehabilitation services on mobility goals if consulted  - Perform Range of Motion 4 times a day.  - Reposition patient every 2 hours.  - Dangle patient 4 times a day  - Stand patient 4 times a day  - Ambulate patient 4 times a day  - Out of bed to chair 4 times a day   - Out of bed for meals 4 times a day  - Out of bed for toileting  - Record patient progress and toleration of activity level   Outcome: Progressing     Problem: Neurological Deficit  Goal: Neurological status is stable or improving  Description: Interventions:  - Monitor and assess patient's level of consciousness, motor function, sensory function, and level of assistance needed for ADLs.   - Monitor and report changes from baseline. Collaborate with interdisciplinary team to initiate plan and implement interventions as ordered.   - Provide and maintain a safe environment.  - Consider seizure precautions.  - Consider fall precautions.  - Consider aspiration precautions.  - Consider bleeding precautions.  Outcome: Progressing     Problem: Activity Intolerance/Impaired Mobility  Goal: Mobility/activity is maintained at optimum level for patient  Description: Interventions:  - Assess and monitor patient   barriers to mobility and need for assistive/adaptive devices.  - Assess patient's emotional response to limitations.  - Collaborate with interdisciplinary team and initiate plans and interventions as ordered.  - Encourage independent activity per ability.  - Maintain proper body alignment.  - Perform active/passive rom as tolerated/ordered.  - Plan activities to conserve energy.  - Turn patient as appropriate  Outcome: Progressing     Problem: Potential for Aspiration  Goal: Non-ventilated patient's risk of aspiration is minimized  Description: Assess and monitor vital signs, respiratory status, and labs (WBC).  Monitor for signs of aspiration (tachypnea, cough, rales, wheezing, cyanosis, fever).    - Assess and monitor patient's ability to swallow.  - Place patient up in chair to eat if possible.  - HOB up at 90 degrees to eat if unable to get patient up into chair.  - Supervise patient during oral intake.   - Instruct patient/ family to take small bites.  - Instruct patient/ family to take small single sips when taking liquids.  - Follow patient-specific strategies generated by speech pathologist.  Outcome: Progressing  Goal: Ventilated patient's risk of aspiration is minimized  Description: Assess and monitor vital signs, respiratory status, airway cuff pressure, and labs (WBC).  Monitor for signs of aspiration (tachypnea, cough, rales, wheezing, cyanosis, fever).    - Elevate head of bed 30 degrees if patient has tube feeding.  - Monitor tube feeding.  Outcome: Progressing     Problem: NEUROSENSORY - ADULT  Goal: Achieves stable or improved neurological status  Description: INTERVENTIONS  - Monitor and report changes in neurological status  - Monitor vital signs such as temperature, blood pressure, glucose, and any other labs ordered   - Initiate measures to prevent increased intracranial pressure  - Monitor for seizure activity and implement precautions if appropriate      Outcome: Progressing  Goal: Remains  free of injury related to seizures activity  Description: INTERVENTIONS  - Maintain airway, patient safety  and administer oxygen as ordered  - Monitor patient for seizure activity, document and report duration and description of seizure to physician/advanced practitioner  - If seizure occurs,  ensure patient safety during seizure  - Reorient patient post seizure  - Seizure pads on all 4 side rails  - Instruct patient/family to notify RN of any seizure activity including if an aura is experienced  - Instruct patient/family to call for assistance with activity based on nursing assessment  - Administer anti-seizure medications if ordered    Outcome: Progressing  Goal: Achieves maximal functionality and self care  Description: INTERVENTIONS  - Monitor swallowing and airway patency with patient fatigue and changes in neurological status  - Encourage and assist patient to increase activity and self care.   - Encourage visually impaired, hearing impaired and aphasic patients to use assistive/communication devices  Outcome: Progressing     Problem: MUSCULOSKELETAL - ADULT  Goal: Maintain or return mobility to safest level of function  Description: INTERVENTIONS:  - Assess patient's ability to carry out ADLs; assess patient's baseline for ADL function and identify physical deficits which impact ability to perform ADLs (bathing, care of mouth/teeth, toileting, grooming, dressing, etc.)  - Assess/evaluate cause of self-care deficits   - Assess range of motion  - Assess patient's mobility  - Assess patient's need for assistive devices and provide as appropriate  - Encourage maximum independence but intervene and supervise when necessary  - Involve family in performance of ADLs  - Assess for home care needs following discharge   - Consider OT consult to assist with ADL evaluation and planning for discharge  - Provide patient education as appropriate  Outcome: Progressing  Goal: Maintain proper alignment of affected body  part  Description: INTERVENTIONS:  - Support, maintain and protect limb and body alignment  - Provide patient/ family with appropriate education  Outcome: Progressing     Problem: Nutrition/Hydration-ADULT  Goal: Nutrient/Hydration intake appropriate for improving, restoring or maintaining nutritional needs  Description: Monitor and assess patient's nutrition/hydration status for malnutrition. Collaborate with interdisciplinary team and initiate plan and interventions as ordered.  Monitor patient's weight and dietary intake as ordered or per policy. Utilize nutrition screening tool and intervene as necessary. Determine patient's food preferences and provide high-protein, high-caloric foods as appropriate.     INTERVENTIONS:  - Monitor oral intake, urinary output, labs, and treatment plans  - Assess nutrition and hydration status and recommend course of action  - Evaluate amount of meals eaten  - Assist patient with eating if necessary   - Allow adequate time for meals  - Recommend/ encourage appropriate diets, oral nutritional supplements, and vitamin/mineral supplements  - Order, calculate, and assess calorie counts as needed  - Recommend, monitor, and adjust tube feedings and TPN/PPN based on assessed needs  - Assess need for intravenous fluids  - Provide specific nutrition/hydration education as appropriate  - Include patient/family/caregiver in decisions related to nutrition  Outcome: Progressing

## 2024-09-16 NOTE — OCCUPATIONAL THERAPY NOTE
Occupational Therapy Evaluation & Discharge Note     Patient Name: Tevin Bazan  Today's Date: 9/16/2024  Problem List  Principal Problem:    CVA (cerebral vascular accident) ([facility])  Active Problems:    Essential hypertension    Symptomatic carotid artery stenosis, left    Hypercholesterolemia    Stage 2 chronic kidney disease    COPD (chronic obstructive pulmonary disease) (HCC)    Coronary artery disease involving native coronary artery of native heart with unstable angina pectoris (HCC)    BPH (benign prostatic hyperplasia)    Type 2 diabetes mellitus without complication ([facility])    Leukocytosis    Garbled speech    Past Medical History  Past Medical History:   Diagnosis Date    Chronic bilateral low back pain without sciatica 7/17/2020    Hypertension     Scoliosis     Stroke ([facility])     Stroke-like symptoms 11/22/2021     Past Surgical History  Past Surgical History:   Procedure Laterality Date    BACK SURGERY      BOWEL RESECTION      CARDIAC CATHETERIZATION N/A 2/26/2024    Procedure: Cardiac Coronary Angiogram;  Surgeon: Rivas Cantor MD;  Location: [facility];  Service: Cardiology    CARDIAC CATHETERIZATION  2/26/2024    Procedure: Cardiac catheterization;  Surgeon: Rivas Cantor MD;  Location: [facility];  Service: Cardiology    CARDIAC CATHETERIZATION N/A 2/26/2024    Procedure: Cardiac pci;  Surgeon: Rivas Cantor MD;  Location: [facility];  Service: Cardiology    PA TEAEC W/PATCH GRF CAROTID VERTB SUBCLAV NECK INC Left 12/1/2021    Procedure: ENDARTERECTOMY ARTERY CAROTID;  Surgeon: Vito Mo MD;  Location: [facility];  Service: Vascular    TONSILLECTOMY          09/16/24 1145   OT Last Visit   OT Visit Date 09/16/24   Note Type   Note type Evaluation   Pain Assessment   Pain Assessment Tool 0-10   Pain Score No Pain   Restrictions/Precautions   Weight Bearing Precautions Per Order No   Other Precautions Fall Risk;Aspiration;Bed Alarm;Chair Alarm  (Preferred name:  "Danielle)   Home Living   Type of Home House  ((Split level; 0 MINDA; 5 up & 7 down w/ HR ))   Home Layout Two level   Bathroom Shower/Tub Tub/shower unit   Home Equipment Crutches   Additional Comments Pt independently performing ADL's and functional mobility PTA   Prior Function   Level of [place] Independent with ADLs   Lives With Spouse   Falls in the last 6 months 0   General   Additional Pertinent History h/o scoliosis   Family/Caregiver Present Yes   Additional General Comments spouse   Subjective   Subjective \"I went through this 10years ago.\"   ADL   Where Assessed Chair   Eating Assistance 5  Supervision/Setup   Eating Deficit Supervision/safety;Other (Comment);Thickened liquids   Grooming Assistance 5  Supervision/Setup   UB Bathing Assistance 5  Supervision/Setup   LB Bathing Assistance 5  Supervision/Setup   UB Dressing Assistance 5  Supervision/Setup   LB Dressing Assistance 5  Supervision/Setup   Toileting Assistance  5  Supervision/Setup   Additional Comments Pt performed functional mobility and self-feeding during OT/PT evaluation. All other ADL levels of assist are based on OT's clinical reasoning and observation of performance skills during session.      Transfers   Sit to Stand 5  Supervision   Stand to Sit 5  Supervision   Functional Mobility   Functional Mobility 5  Supervision   Balance   Static Sitting Good   Dynamic Sitting Fair +   Static Standing Fair +   Dynamic Standing Fair   Activity Tolerance   Activity Tolerance Patient tolerated treatment well   Medical Staff Made Aware CHRISTOPHER Allison   Nurse Made Aware Yes, CHRISTOPHER Allison   RUDIMITRI Assessment   RUE Assessment WFL   LUE Assessment   LUE Assessment WFL   Hand Function   Gross Motor Coordination Functional   Fine Motor Coordination Functional   Sensation   Light Touch No apparent deficits   Sharp/Dull No apparent deficits   Proprioception   Proprioception No apparent deficits   Vision-Basic Assessment   Current Vision No visual deficits   Vision - " Complex Assessment   Ocular Range of Motion Intact   Psychosocial   Psychosocial (WDL) WDL   Perception   Inattention/Neglect Appears intact   Cognition   Orientation Level Oriented X4   Following Commands Follows multistep commands with increased time or repetition   Comments Dysarthric   Assessment   Limitation Decreased high-level ADLs   Prognosis Good   Assessment Pt is a 79 y.o. adult seen for OT evaluation at [facility], admitted 9/13/2024 w/ CVA (cerebral vascular accident) (HCC).  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 Comorbidities affecting pt's functional performance at time of assessment include: new onset dysarthria and chronic chest heaviness, LBP, and prior h/o CVA. Prior to admission, pt was living at home with spouse and was independent with ADL's and functional mobility. Pt was walking up to 1600 steps per day according to their pedometer. Upon evaluation, pt presents to OT at functional baseline. The patient's raw score on the -PAC Daily Activity inpatient short form is 23, standardized score is 51.12, greater than 39.4. Patients at this level are likely to benefit from DC to home. Based on findings, pt is of high complexity, due to rapid onset of signs and symptoms, new CVA. Pt seen as a co-eval with PT due to the patient's co-morbidities, clinically unstable presentation, and present impairments which are a regression from the patient's baseline. At this time, OT recommendations at time of discharge are level 4. No further acute OT needs indicated at this time - Recommend pt continue to be OOB for meals, ambulation to/from BR, perform self care tasks, and mobility in hallway with nursing. D/C from OT caseload with above recommendations.   Goals   Patient Goals To improve speech   Discharge Recommendation   Rehab Resource  Intensity Level, OT No post-acute rehabilitation needs   AM-PAC Daily Activity Inpatient   Lower Body Dressing 3   Bathing 4   Toileting 4   Upper Body Dressing 4   Grooming 4   Eating 4   Daily Activity Raw Score 23   Daily Activity Standardized Score (Calc for Raw Score >=11) 51.12   AM-PAC Applied Cognition Inpatient   Following a Speech/Presentation 4   Understanding Ordinary Conversation 4   Taking Medications 4   Remembering Where Things Are Placed or Put Away 3   Remembering List of 4-5 Errands 3   Taking Care of Complicated Tasks 3   Applied Cognition Raw Score 21   Applied Cognition Standardized Score 44.3   End of Consult   Education Provided Yes;Family or social support of family present for education by provider   Patient Position at End of Consult Bedside chair;Bed/Chair alarm activated;All needs within reach   Nurse Communication Nurse aware of consult     Elen Lopez OTR/L

## 2024-09-16 NOTE — TELEPHONE ENCOUNTER
PT ALREADY HAD AN APPT WITH , I HAVE CHANGED FROM AN OVS TO AN HFU SHORT.         HFU/ SL UPPER BUCKS /Garbled speech     DC- STILL ADMITTED     ----- Message from ANNIE Flores sent at 9/16/2024  8:48 AM EDT -----  Regarding: HFU  Tevin Bazan will need follow-up in in 6 weeks with neurovascular team for Small vessel lacunar infarct in 30 minute appointment. They will not require outpatient neurological testing.

## 2024-09-17 ENCOUNTER — APPOINTMENT (INPATIENT)
Dept: RADIOLOGY | Facility: HOSPITAL | Age: 79
DRG: 061 | End: 2024-09-17
Payer: COMMERCIAL

## 2024-09-17 VITALS
OXYGEN SATURATION: 94 % | HEART RATE: 74 BPM | WEIGHT: 161 LBS | BODY MASS INDEX: 25.88 KG/M2 | HEIGHT: 66 IN | DIASTOLIC BLOOD PRESSURE: 75 MMHG | SYSTOLIC BLOOD PRESSURE: 145 MMHG | TEMPERATURE: 97.8 F | RESPIRATION RATE: 18 BRPM

## 2024-09-17 LAB
ANION GAP SERPL CALCULATED.3IONS-SCNC: 8 MMOL/L (ref 4–13)
BASOPHILS # BLD AUTO: 0.06 THOUSANDS/ΜL (ref 0–0.1)
BASOPHILS NFR BLD AUTO: 1 % (ref 0–1)
BUN SERPL-MCNC: 26 MG/DL (ref 5–25)
CALCIUM SERPL-MCNC: 8.6 MG/DL (ref 8.4–10.2)
CHLORIDE SERPL-SCNC: 107 MMOL/L (ref 96–108)
CO2 SERPL-SCNC: 25 MMOL/L (ref 21–32)
CREAT SERPL-MCNC: 0.87 MG/DL (ref 0.6–1.3)
EOSINOPHIL # BLD AUTO: 0.35 THOUSAND/ΜL (ref 0–0.61)
EOSINOPHIL NFR BLD AUTO: 4 % (ref 0–6)
ERYTHROCYTE [DISTWIDTH] IN BLOOD BY AUTOMATED COUNT: 13.2 % (ref 11.6–15.1)
GLUCOSE SERPL-MCNC: 110 MG/DL (ref 65–140)
GLUCOSE SERPL-MCNC: 121 MG/DL (ref 65–140)
GLUCOSE SERPL-MCNC: 174 MG/DL (ref 65–140)
HCT VFR BLD AUTO: 42.4 % (ref 36.5–46.1)
HGB BLD-MCNC: 13.7 G/DL (ref 12–15.4)
IMM GRANULOCYTES # BLD AUTO: 0.05 THOUSAND/UL (ref 0–0.2)
IMM GRANULOCYTES NFR BLD AUTO: 1 % (ref 0–2)
LYMPHOCYTES # BLD AUTO: 2.28 THOUSANDS/ΜL (ref 0.6–4.47)
LYMPHOCYTES NFR BLD AUTO: 25 % (ref 14–44)
MCH RBC QN AUTO: 30.6 PG (ref 26.8–34.3)
MCHC RBC AUTO-ENTMCNC: 32.3 G/DL (ref 31.4–37.4)
MCV RBC AUTO: 95 FL (ref 82–98)
MONOCYTES # BLD AUTO: 1.1 THOUSAND/ΜL (ref 0.17–1.22)
MONOCYTES NFR BLD AUTO: 12 % (ref 4–12)
NEUTROPHILS # BLD AUTO: 5.12 THOUSANDS/ΜL (ref 1.85–7.62)
NEUTS SEG NFR BLD AUTO: 57 % (ref 43–75)
NRBC BLD AUTO-RTO: 0 /100 WBCS
PLATELET # BLD AUTO: 198 THOUSANDS/UL (ref 149–390)
PMV BLD AUTO: 10.1 FL (ref 8.9–12.7)
POTASSIUM SERPL-SCNC: 4.3 MMOL/L (ref 3.5–5.3)
RBC # BLD AUTO: 4.47 MILLION/UL (ref 3.88–5.12)
SODIUM SERPL-SCNC: 140 MMOL/L (ref 135–147)
WBC # BLD AUTO: 8.96 THOUSAND/UL (ref 4.31–10.16)

## 2024-09-17 PROCEDURE — 82948 REAGENT STRIP/BLOOD GLUCOSE: CPT

## 2024-09-17 PROCEDURE — 92611 MOTION FLUOROSCOPY/SWALLOW: CPT

## 2024-09-17 PROCEDURE — 85025 COMPLETE CBC W/AUTO DIFF WBC: CPT | Performed by: INTERNAL MEDICINE

## 2024-09-17 PROCEDURE — 80048 BASIC METABOLIC PNL TOTAL CA: CPT | Performed by: INTERNAL MEDICINE

## 2024-09-17 PROCEDURE — 74230 X-RAY XM SWLNG FUNCJ C+: CPT

## 2024-09-17 PROCEDURE — 99239 HOSP IP/OBS DSCHRG MGMT >30: CPT | Performed by: INTERNAL MEDICINE

## 2024-09-17 RX ORDER — LANOLIN ALCOHOL/MO/W.PET/CERES
3 CREAM (GRAM) TOPICAL
Status: DISCONTINUED | OUTPATIENT
Start: 2024-09-17 | End: 2024-09-17 | Stop reason: HOSPADM

## 2024-09-17 RX ADMIN — CHLORHEXIDINE GLUCONATE 15 ML: 1.2 RINSE ORAL at 08:54

## 2024-09-17 RX ADMIN — DILTIAZEM HYDROCHLORIDE 120 MG: 120 CAPSULE, COATED, EXTENDED RELEASE ORAL at 08:53

## 2024-09-17 RX ADMIN — INSULIN LISPRO 1 UNITS: 100 INJECTION, SOLUTION INTRAVENOUS; SUBCUTANEOUS at 11:33

## 2024-09-17 RX ADMIN — MELATONIN 3 MG: at 03:30

## 2024-09-17 RX ADMIN — ASPIRIN 81 MG: 81 TABLET, COATED ORAL at 08:54

## 2024-09-17 RX ADMIN — FAMOTIDINE 40 MG: 20 TABLET, FILM COATED ORAL at 08:53

## 2024-09-17 RX ADMIN — TICAGRELOR 90 MG: 90 TABLET ORAL at 08:53

## 2024-09-17 NOTE — PROCEDURES
Video Swallow Study      Patient Name: Tevin Bazan  Today's Date: 9/17/2024        Past Medical History  Past Medical History:   Diagnosis Date    Chronic bilateral low back pain without sciatica 7/17/2020    Hypertension     Scoliosis     Stroke (HCC)     Stroke-like symptoms 11/22/2021        Past Surgical History  Past Surgical History:   Procedure Laterality Date    BACK SURGERY      BOWEL RESECTION      CARDIAC CATHETERIZATION N/A 2/26/2024    Procedure: Cardiac Coronary Angiogram;  Surgeon: Rivas Cantor MD;  Location: [facility];  Service: Cardiology    CARDIAC CATHETERIZATION  2/26/2024    Procedure: Cardiac catheterization;  Surgeon: Rivas Cantor MD;  Location: [facility];  Service: Cardiology    CARDIAC CATHETERIZATION N/A 2/26/2024    Procedure: Cardiac pci;  Surgeon: Rivas Cantor MD;  Location: [facility];  Service: Cardiology    NM TEAEC W/PATCH GRF CAROTID VERTB SUBCLAV NECK INC Left 12/1/2021    Procedure: ENDARTERECTOMY ARTERY CAROTID;  Surgeon: Vito Mo MD;  Location: [facility];  Service: Vascular    TONSILLECTOMY       Modified (Video) Barium Swallow Study    Summary:  Images are on PACS for review.     Pt presents w/ mild oral dysphagia, min-mild pharyngeal dysphagia.     Min-mild prolonged rotary mastication w/ slowed bolus transfer and reduced bolus control w/ premature spill over BOT. Reduced  BOT retraction w/ mild oral residue on lingual surface and BOT. Pt noted to have piecemeal deglutition throughout study. Delayed swallow initiation w/ bolus head in the pyriforms w/ most trials except solids. WFL laryngeal elevation, epiglottic inversion and vestibule closure though reduced anterior hyoid excursion. Penetration before the swallow d/t residual requiring secondary swallow ultimately w/ thin liquids contacting vocal folds. Penetration noted during the swallow with cup sip, sequential cup sip and chin tuck thin  liquids w/ epiglottic undercoating. No penetration or aspiration w/ other materials administered. WFL pharyngeal stripping wave, w/ min pharyngeal residue in the valleculae and pyriforms.       Recommendations:  Diet: Regular textures w/ choice of softer solids as needed  Liquids: Thin liquids   Meds: whole w/ puree/crushed as needed   Strategies: small bites/sips, added moisture  Frequent oral care  Upright position  F/u ST tx: as able and appropriate at the acute care level, pt would benefit from OP ST following d/c   Therapy Prognosis: good   Prognosis considerations: age, motivation/participation  Aspiration Precautions  Results reviewed with: pt, physician  Aspiration precautions posted.  Repeat MBS as necessary  If a dedicated assessment of the esophagus is desired, consider esophagram/barium swallow or EGD.      Goals:  Pt will tolerate least restrictive diet w/out s/s aspiration or oral/pharyngeal difficulties.    Pt will perform the following glottic closure and laryngeal elevation exercises to increase strength and improve airway protection: Mendelsohn maneuver.      Pt will perform complete oropharyngeal strengthening exercises including effortful swallow, Aure, chin tuck against resistance (CTAR) to increase strength and reduce aspiration risk for potential diet advancement.       H&P/pertinent provider notes: (PMH noted above)   who presented to  [facility]  with  a past medical history of previous CVA, carotid artery stenosis status post CEA in 2021, CKD 2, HLD, DM2, BPH, COPD not on home maintenance therapies who presents to the ICU with strokelike symptoms, status post TNK.  Per the patient, on Friday, 9/13 at approximately 1900, he had a sudden onset of garbled speech, which progressively worsened.  En route to the hospital, his wife reports that he experienced some mild dizziness.  In the ER, NIH 1 for garbled speech, CT head negative, CTA without large occlusion.  TNK administered at  2151. Persistent garbled speech, otherwise neurologically intact.     Special Studies:  9/15/24 MRI brain wo contrast:  1. 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 No hemorrhage.  2. 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  3. Mild chronic white matter microangiopathic changes again demonstrated.    CT stroke alert brain 9/13: No acute intracranial hemorrhage, mass effect or midline shift.      CTA stroke alert 9/13:   1. No proximal large vessel occlusion or high-grade vascular stenosis in the head and neck.     2. Redemonstrated left thyroid nodule, for which dedicated thyroid sonogram is recommended if not previously performed.    Previous VBS:  N/A     Does the pt have pain? No  If yes, was nursing made aware/was it addressed? N/A     Swallow Mechanism Exam  Facial: symmetrical  Labial: WFL  Lingual: decreased ROM  Velum: symmetrical  Mandible: adequate ROM  Dentition: limited dentition  Vocal quality:clear/adequate   Volitional Cough: strong/productive   Respiratory Status: on RA    Swallow Information   Current Diet: soft/level 3 diet and nectar thick liquids   Baseline Diet: regular diet and thin liquids per pt report       Consistencies Administered:  Pt was viewed sitting upright in the lateral and AP positions. Trials administered were comparable to MBSImP Validated Protocol: tsp thin liquid x2, cup sip thin x2, cup sip thin chin tuck, sequential swallow cup sip thin, tsp nectar thick, cup sip nectar thick, sequential swallow cup sip nectar thick, straw sip nectar thick liquid, tsp Honey thick, tsp pudding, ½ cookie coated with pudding coating. Pt was also given thin liquids by straw, as well as a barium tablet with nectar thick liquid.       Oral Phase:  Lip Closure: Adequate   Tongue Control During Bolus Hold: reduced w/ premature spill over BOT   Bolus  Preparation/Mastication: min-mild prolonged   Bolus Transport/Lingual Motion: slowed   Oral Residue: mild oral residue on lingual surface and BOT   Initiation of the Pharyngeal Swallow: delayed w/ bolus head in the pyriforms w/ all materials except solid textures     Pharyngeal Phase:  Soft Palate Elevation: Adequate   Laryngeal Elevation: WFL  Anterior Hyoid Excursion: reduced   Epiglottic Movement: WFL  Laryngeal Vestibular Closure: Adequate   Pharyngeal Stripping Wave: WFL  PES Opening: WFL  Tongue Base Retraction: reduced   Pharyngeal Residue: min in the valleculae and pyriforms       Penetration/Aspiration:  Thin: tsp- no penetration or aspiration (PAS-1), cup sip thin x1- penetration noted before the secondary swallow d/t residual contacting the vocal folds (PAS-5), cup sip x2 and sequential cup sip- penetration noted during the swallow w/ epiglottic undercoating (PAS-3), chin tick cup sip- penetration noted during the swallow w/ epiglottic undercoating (PAS-3)  Nectar: no penetration or aspiration (PAS-1)  Honey: no penetration or aspiration (PAS-1)  Puree: no penetration or aspiration (PAS-1)  Solid: no penetration or aspiration (PAS-1)  Response to Aspiration: N/A  Strategies/Efficacy: chin tuck was no effective in eliminating penetration    8-Point Penetration-Aspiration Scale   1 Material does not enter the airway   2 Material enters the airway, remains above the vocal folds, and is ejected  from the  airway    3 Material enters the airway, remains above the vocal folds, and is not ejected from the airway   4 Material enters the airway, contacts the vocal folds, and is ejected from the airway   5 Material enters the airway, contacts the vocal folds, and is not ejected from the airway    6 Material enters the airway, passes below the vocal folds and is ejected into the larynx or out of the airway    7 Material enters the airway, passes below the vocal folds, and is not ejected from the trachea despite  effort    8 Material enters the airway, passes below the vocal folds, and no effort is made to eject         Screening of Esophageal Phase  Esophageal Clearance: appears WFL

## 2024-09-17 NOTE — CASE MANAGEMENT
Case Management Discharge Planning Note    Patient name Tevin Marinorris  Location /-01 MRN [id number]  : 1945 Date 2024       Current Admission Date: 2024  Current Admission Diagnosis:CVA (cerebral vascular accident) (Formerly [facility])   Patient Active Problem List    Diagnosis Date Noted Date Diagnosed    Garbled speech 2024     BPH (benign prostatic hyperplasia) 2024     Leukocytosis 2024     Tremor 05/10/2024     Elevated troponin 2024     CVA (cerebral vascular accident) (Formerly [facility]) 2024     Unstable angina (Formerly [facility]) 2024     Coronary artery disease involving native coronary artery 2024     Abnormal computed tomography angiography (CTA) 2024     Coronary artery disease involving native coronary artery of native heart with unstable angina pectoris (Formerly [facility]) 2024     Chest pain 2024     Stage 2 chronic kidney disease 2024     COPD (chronic obstructive pulmonary disease) (Formerly [facility]) 2024     Abnormal stress test 2023     Dyspnea on exertion 2023     Hypercholesterolemia 2023     Chest heaviness 2023     Nonrheumatic aortic valve stenosis 2023     Nonrheumatic mitral valve stenosis 2023     H/O carotid endarterectomy 2021     History of stroke 2021     Symptomatic carotid artery stenosis, left 2021     Essential hypertension 2021     Chronic bilateral low back pain without sciatica 2020     Lumbar radiculopathy 2020     Spinal stenosis of lumbar region with neurogenic claudication 2020     Chronic pain of left knee 2020     Thoracogenic scoliosis of thoracolumbar region 2020     Chronic pain syndrome 2020     Lumbar spondylosis 2020     Cellulitis of right knee 2020     Primary osteoarthritis of left knee 04/10/2020     Effusion of left knee 04/10/2020     Type 2 diabetes mellitus without complication (Formerly [facility]) 2019     Transgender  12/07/2018     Stroke due to embolism (HCC) 12/07/2018       LOS (days): 4  Geometric Mean LOS (GMLOS) (days): 5  Days to GMLOS:1.4     OBJECTIVE:  Risk of Unplanned Readmission Score: 11.34         Current admission status: Inpatient   Preferred Pharmacy:   [id number]  [place] Garcia - [address]  [address] [facility] [address]  Phone: [phone] Fax: [phone]    [facility] - [place] [address] [address]  Phone: [phone] Fax: [phone]    Primary Care Provider: Catherine Youngblood MD    Primary Insurance: MOHAMUD SALEEM  Secondary Insurance:     DISCHARGE DETAILS:     IMM reviewed with patient and caregiver, patient and caregiver agrees with discharge determination.       IMM Given (Date):: 09/17/24 (1220p)  IMM Given to:: Family  Family notified:: Prisca Mackay, spouse

## 2024-09-17 NOTE — PLAN OF CARE
Problem: Potential for Falls  Goal: Patient will remain free of falls  Description: INTERVENTIONS:  - Educate patient/family on patient safety including physical limitations  - Instruct patient to call for assistance with activity   - Consult OT/PT to assist with strengthening/mobility   - Keep Call bell within reach  - Keep bed low and locked with side rails adjusted as appropriate  - Keep care items and personal belongings within reach  - Initiate and maintain comfort rounds  - Make Fall Risk Sign visible to staff  - Offer Toileting every 2 Hours, in advance of need  - Initiate/Maintain alarm  - Obtain necessary fall risk management equipment:   - Apply yellow socks and bracelet for high fall risk patients  - Consider moving patient to room near nurses station  Outcome: Progressing     Problem: PAIN - ADULT  Goal: Verbalizes/displays adequate comfort level or baseline comfort level  Description: Interventions:  - Encourage patient to monitor pain and request assistance  - Assess pain using appropriate pain scale  - Administer analgesics based on type and severity of pain and evaluate response  - Implement non-pharmacological measures as appropriate and evaluate response  - Consider cultural and social influences on pain and pain management  - Notify physician/advanced practitioner if interventions unsuccessful or patient reports new pain  Outcome: Progressing     Problem: INFECTION - ADULT  Goal: Absence or prevention of progression during hospitalization  Description: INTERVENTIONS:  - Assess and monitor for signs and symptoms of infection  - Monitor lab/diagnostic results  - Monitor all insertion sites, i.e. indwelling lines, tubes, and drains  - Monitor endotracheal if appropriate and nasal secretions for changes in amount and color  - [place] appropriate cooling/warming therapies per order  - Administer medications as ordered  - Instruct and encourage patient and family to use good hand hygiene  technique  - Identify and instruct in appropriate isolation precautions for identified infection/condition  Outcome: Progressing  Goal: Absence of fever/infection during neutropenic period  Description: INTERVENTIONS:  - Monitor WBC    Outcome: Progressing     Problem: SAFETY ADULT  Goal: Patient will remain free of falls  Description: INTERVENTIONS:  - Educate patient/family on patient safety including physical limitations  - Instruct patient to call for assistance with activity   - Consult OT/PT to assist with strengthening/mobility   - Keep Call bell within reach  - Keep bed low and locked with side rails adjusted as appropriate  - Keep care items and personal belongings within reach  - Initiate and maintain comfort rounds  - Make Fall Risk Sign visible to staff  - Offer Toileting every 2 Hours, in advance of need  - Initiate/Maintain alarm  - Obtain necessary fall risk management equipment  - Apply yellow socks and bracelet for high fall risk patients  - Consider moving patient to room near nurses station  Outcome: Progressing  Goal: Maintain or return to baseline ADL function  Description: INTERVENTIONS:  -  Assess patient's ability to carry out ADLs; assess patient's baseline for ADL function and identify physical deficits which impact ability to perform ADLs (bathing, care of mouth/teeth, toileting, grooming, dressing, etc.)  - Assess/evaluate cause of self-care deficits   - Assess range of motion  - Assess patient's mobility; develop plan if impaired  - Assess patient's need for assistive devices and provide as appropriate  - Encourage maximum independence but intervene and supervise when necessary  - Involve family in performance of ADLs  - Assess for home care needs following discharge   - Consider OT consult to assist with ADL evaluation and planning for discharge  - Provide patient education as appropriate  Outcome: Progressing  Goal: Maintains/Returns to pre admission functional level  Description:  INTERVENTIONS:  - Perform AM-PAC 6 Click Basic Mobility/ Daily Activity assessment daily.  - Set and communicate daily mobility goal to care team and patient/family/caregiver.   - Collaborate with rehabilitation services on mobility goals if consulted  - Perform Range of Motion 3 times a day.  - Reposition patient every 3 hours.  - Dangle patient 3 times a day  - Stand patient 3 times a day  - Ambulate patient 3 times a day  - Out of bed to chair 3 times a day   - Out of bed for meals 3 times a day  - Out of bed for toileting  - Record patient progress and toleration of activity level   Outcome: Progressing     Problem: DISCHARGE PLANNING  Goal: Discharge to home or other facility with appropriate resources  Description: INTERVENTIONS:  - Identify barriers to discharge w/patient and caregiver  - Arrange for needed discharge resources and transportation as appropriate  - Identify discharge learning needs (meds, wound care, etc.)  - Arrange for interpretive services to assist at discharge as needed  - Refer to Case Management Department for coordinating discharge planning if the patient needs post-hospital services based on physician/advanced practitioner order or complex needs related to functional status, cognitive ability, or social support system  Outcome: Progressing     Problem: Knowledge Deficit  Goal: Patient/family/caregiver demonstrates understanding of disease process, treatment plan, medications, and discharge instructions  Description: Complete learning assessment and assess knowledge base.  Interventions:  - Provide teaching at level of understanding  - Provide teaching via preferred learning methods  Outcome: Progressing     Problem: Neurological Deficit  Goal: Neurological status is stable or improving  Description: Interventions:  - Monitor and assess patient's level of consciousness, motor function, sensory function, and level of assistance needed for ADLs.   - Monitor and report changes from  baseline. Collaborate with interdisciplinary team to initiate plan and implement interventions as ordered.   - Provide and maintain a safe environment.  - Consider seizure precautions.  - Consider fall precautions.  - Consider aspiration precautions.  - Consider bleeding precautions.  Outcome: Progressing     Problem: Activity Intolerance/Impaired Mobility  Goal: Mobility/activity is maintained at optimum level for patient  Description: Interventions:  - Assess and monitor patient  barriers to mobility and need for assistive/adaptive devices.  - Assess patient's emotional response to limitations.  - Collaborate with interdisciplinary team and initiate plans and interventions as ordered.  - Encourage independent activity per ability.  - Maintain proper body alignment.  - Perform active/passive rom as tolerated/ordered.  - Plan activities to conserve energy.  - Turn patient as appropriate  Outcome: Progressing     Problem: Communication Impairment  Goal: Ability to express needs and understand communication  Description: Assess patient's communication skills and ability to understand information.  Patient will demonstrate use of effective communication techniques, alternative methods of communication and understanding even if not able to speak.     - Encourage communication and provide alternate methods of communication as needed.  - Collaborate with case management/ for discharge needs.  - Include patient/family/caregiver in decisions related to communication.  Outcome: Progressing     Problem: Potential for Aspiration  Goal: Non-ventilated patient's risk of aspiration is minimized  Description: Assess and monitor vital signs, respiratory status, and labs (WBC).  Monitor for signs of aspiration (tachypnea, cough, rales, wheezing, cyanosis, fever).    - Assess and monitor patient's ability to swallow.  - Place patient up in chair to eat if possible.  - HOB up at 90 degrees to eat if unable to get  patient up into chair.  - Supervise patient during oral intake.   - Instruct patient/ family to take small bites.  - Instruct patient/ family to take small single sips when taking liquids.  - Follow patient-specific strategies generated by speech pathologist.  Outcome: Progressing  Goal: Ventilated patient's risk of aspiration is minimized  Description: Assess and monitor vital signs, respiratory status, airway cuff pressure, and labs (WBC).  Monitor for signs of aspiration (tachypnea, cough, rales, wheezing, cyanosis, fever).    - Elevate head of bed 30 degrees if patient has tube feeding.  - Monitor tube feeding.  Outcome: Progressing     Problem: Nutrition  Goal: Nutrition/Hydration status is improving  Description: Monitor and assess patient's nutrition/hydration status for malnutrition (ex- brittle hair, bruises, dry skin, pale skin and conjunctiva, muscle wasting, smooth red tongue, and disorientation). Collaborate with interdisciplinary team and initiate plan and interventions as ordered.  Monitor patient's weight and dietary intake as ordered or per policy. Utilize nutrition screening tool and intervene per policy. Determine patient's food preferences and provide high-protein, high-caloric foods as appropriate.     - Assist patient with eating.  - Allow adequate time for meals.  - Encourage patient to take dietary supplement as ordered.  - Collaborate with clinical nutritionist.  - Include patient/family/caregiver in decisions related to nutrition.  Outcome: Progressing     Problem: NEUROSENSORY - ADULT  Goal: Achieves stable or improved neurological status  Description: INTERVENTIONS  - Monitor and report changes in neurological status  - Monitor vital signs such as temperature, blood pressure, glucose, and any other labs ordered   - Initiate measures to prevent increased intracranial pressure  - Monitor for seizure activity and implement precautions if appropriate      Outcome: Progressing  Goal: Remains  free of injury related to seizures activity  Description: INTERVENTIONS  - Maintain airway, patient safety  and administer oxygen as ordered  - Monitor patient for seizure activity, document and report duration and description of seizure to physician/advanced practitioner  - If seizure occurs,  ensure patient safety during seizure  - Reorient patient post seizure  - Seizure pads on all 4 side rails  - Instruct patient/family to notify RN of any seizure activity including if an aura is experienced  - Instruct patient/family to call for assistance with activity based on nursing assessment  - Administer anti-seizure medications if ordered    Outcome: Progressing  Goal: Achieves maximal functionality and self care  Description: INTERVENTIONS  - Monitor swallowing and airway patency with patient fatigue and changes in neurological status  - Encourage and assist patient to increase activity and self care.   - Encourage visually impaired, hearing impaired and aphasic patients to use assistive/communication devices  Outcome: Progressing     Problem: MUSCULOSKELETAL - ADULT  Goal: Maintain or return mobility to safest level of function  Description: INTERVENTIONS:  - Assess patient's ability to carry out ADLs; assess patient's baseline for ADL function and identify physical deficits which impact ability to perform ADLs (bathing, care of mouth/teeth, toileting, grooming, dressing, etc.)  - Assess/evaluate cause of self-care deficits   - Assess range of motion  - Assess patient's mobility  - Assess patient's need for assistive devices and provide as appropriate  - Encourage maximum independence but intervene and supervise when necessary  - Involve family in performance of ADLs  - Assess for home care needs following discharge   - Consider OT consult to assist with ADL evaluation and planning for discharge  - Provide patient education as appropriate  Outcome: Progressing  Goal: Maintain proper alignment of affected body  part  Description: INTERVENTIONS:  - Support, maintain and protect limb and body alignment  - Provide patient/ family with appropriate education  Outcome: Progressing     Problem: Nutrition/Hydration-ADULT  Goal: Nutrient/Hydration intake appropriate for improving, restoring or maintaining nutritional needs  Description: Monitor and assess patient's nutrition/hydration status for malnutrition. Collaborate with interdisciplinary team and initiate plan and interventions as ordered.  Monitor patient's weight and dietary intake as ordered or per policy. Utilize nutrition screening tool and intervene as necessary. Determine patient's food preferences and provide high-protein, high-caloric foods as appropriate.     INTERVENTIONS:  - Monitor oral intake, urinary output, labs, and treatment plans  - Assess nutrition and hydration status and recommend course of action  - Evaluate amount of meals eaten  - Assist patient with eating if necessary   - Allow adequate time for meals  - Recommend/ encourage appropriate diets, oral nutritional supplements, and vitamin/mineral supplements  - Order, calculate, and assess calorie counts as needed  - Recommend, monitor, and adjust tube feedings and TPN/PPN based on assessed needs  - Assess need for intravenous fluids  - Provide specific nutrition/hydration education as appropriate  - Include patient/family/caregiver in decisions related to nutrition  Outcome: Progressing

## 2024-09-17 NOTE — DISCHARGE INSTR - AVS FIRST PAGE
Please see your PCP to discuss recent hospitalization  Outpatient neurology follow up  Take aspirin daily  Take brilinta twice daily  Stop taking plavix

## 2024-09-17 NOTE — PLAN OF CARE
Problem: Potential for Falls  Goal: Patient will remain free of falls  Description: INTERVENTIONS:  - Educate patient/family on patient safety including physical limitations  - Instruct patient to call for assistance with activity   - Consult OT/PT to assist with strengthening/mobility   - Keep Call bell within reach  - Keep bed low and locked with side rails adjusted as appropriate  - Keep care items and personal belongings within reach  - Initiate and maintain comfort rounds  - Make Fall Risk Sign visible to staff  - Offer Toileting every 2 Hours, in advance of need  - Initiate/Maintain bed alarm  - Obtain necessary fall risk management equipment  - Apply yellow socks and bracelet for high fall risk patients  - Consider moving patient to room near nurses station  Outcome: Progressing     Problem: PAIN - ADULT  Goal: Verbalizes/displays adequate comfort level or baseline comfort level  Description: Interventions:  - Encourage patient to monitor pain and request assistance  - Assess pain using appropriate pain scale  - Administer analgesics based on type and severity of pain and evaluate response  - Implement non-pharmacological measures as appropriate and evaluate response  - Consider cultural and social influences on pain and pain management  - Notify physician/advanced practitioner if interventions unsuccessful or patient reports new pain  Outcome: Progressing     Problem: INFECTION - ADULT  Goal: Absence or prevention of progression during hospitalization  Description: INTERVENTIONS:  - Assess and monitor for signs and symptoms of infection  - Monitor lab/diagnostic results  - Monitor all insertion sites, i.e. indwelling lines, tubes, and drains  - Monitor endotracheal if appropriate and nasal secretions for changes in amount and color  - [place] appropriate cooling/warming therapies per order  - Administer medications as ordered  - Instruct and encourage patient and family to use good hand hygiene  technique  - Identify and instruct in appropriate isolation precautions for identified infection/condition  Outcome: Progressing  Goal: Absence of fever/infection during neutropenic period  Description: INTERVENTIONS:  - Monitor WBC    Outcome: Progressing     Problem: SAFETY ADULT  Goal: Patient will remain free of falls  Description: INTERVENTIONS:  - Educate patient/family on patient safety including physical limitations  - Instruct patient to call for assistance with activity   - Consult OT/PT to assist with strengthening/mobility   - Keep Call bell within reach  - Keep bed low and locked with side rails adjusted as appropriate  - Keep care items and personal belongings within reach  - Initiate and maintain comfort rounds  - Make Fall Risk Sign visible to staff  - Offer Toileting every 2 Hours, in advance of need  - Initiate/Maintain bed alarm  - Obtain necessary fall risk management equipment  - Apply yellow socks and bracelet for high fall risk patients  - Consider moving patient to room near nurses station  Outcome: Progressing  Goal: Maintain or return to baseline ADL function  Description: INTERVENTIONS:  -  Assess patient's ability to carry out ADLs; assess patient's baseline for ADL function and identify physical deficits which impact ability to perform ADLs (bathing, care of mouth/teeth, toileting, grooming, dressing, etc.)  - Assess/evaluate cause of self-care deficits   - Assess range of motion  - Assess patient's mobility; develop plan if impaired  - Assess patient's need for assistive devices and provide as appropriate  - Encourage maximum independence but intervene and supervise when necessary  - Involve family in performance of ADLs  - Assess for home care needs following discharge   - Consider OT consult to assist with ADL evaluation and planning for discharge  - Provide patient education as appropriate  Outcome: Progressing  Goal: Maintains/Returns to pre admission functional level  Description:  INTERVENTIONS:  - Perform AM-PAC 6 Click Basic Mobility/ Daily Activity assessment daily.  - Set and communicate daily mobility goal to care team and patient/family/caregiver.   - Collaborate with rehabilitation services on mobility goals if consulted  - Perform Range of Motion 3 times a day.  - Reposition patient every 2 hours.  - Dangle patient 3 times a day  - Stand patient 3 times a day  - Ambulate patient 3 times a day  - Out of bed to chair 3 times a day   - Out of bed for meals 3 times a day  - Out of bed for toileting  - Record patient progress and toleration of activity level   Outcome: Progressing     Problem: DISCHARGE PLANNING  Goal: Discharge to home or other facility with appropriate resources  Description: INTERVENTIONS:  - Identify barriers to discharge w/patient and caregiver  - Arrange for needed discharge resources and transportation as appropriate  - Identify discharge learning needs (meds, wound care, etc.)  - Arrange for interpretive services to assist at discharge as needed  - Refer to Case Management Department for coordinating discharge planning if the patient needs post-hospital services based on physician/advanced practitioner order or complex needs related to functional status, cognitive ability, or social support system  Outcome: Progressing     Problem: Knowledge Deficit  Goal: Patient/family/caregiver demonstrates understanding of disease process, treatment plan, medications, and discharge instructions  Description: Complete learning assessment and assess knowledge base.  Interventions:  - Provide teaching at level of understanding  - Provide teaching via preferred learning methods  Outcome: Progressing     Problem: Neurological Deficit  Goal: Neurological status is stable or improving  Description: Interventions:  - Monitor and assess patient's level of consciousness, motor function, sensory function, and level of assistance needed for ADLs.   - Monitor and report changes from  baseline. Collaborate with interdisciplinary team to initiate plan and implement interventions as ordered.   - Provide and maintain a safe environment.  - Consider seizure precautions.  - Consider fall precautions.  - Consider aspiration precautions.  - Consider bleeding precautions.  Outcome: Progressing     Problem: Activity Intolerance/Impaired Mobility  Goal: Mobility/activity is maintained at optimum level for patient  Description: Interventions:  - Assess and monitor patient  barriers to mobility and need for assistive/adaptive devices.  - Assess patient's emotional response to limitations.  - Collaborate with interdisciplinary team and initiate plans and interventions as ordered.  - Encourage independent activity per ability.  - Maintain proper body alignment.  - Perform active/passive rom as tolerated/ordered.  - Plan activities to conserve energy.  - Turn patient as appropriate  Outcome: Progressing     Problem: Communication Impairment  Goal: Ability to express needs and understand communication  Description: Assess patient's communication skills and ability to understand information.  Patient will demonstrate use of effective communication techniques, alternative methods of communication and understanding even if not able to speak.     - Encourage communication and provide alternate methods of communication as needed.  - Collaborate with case management/ for discharge needs.  - Include patient/family/caregiver in decisions related to communication.  Outcome: Progressing     Problem: Potential for Aspiration  Goal: Non-ventilated patient's risk of aspiration is minimized  Description: Assess and monitor vital signs, respiratory status, and labs (WBC).  Monitor for signs of aspiration (tachypnea, cough, rales, wheezing, cyanosis, fever).    - Assess and monitor patient's ability to swallow.  - Place patient up in chair to eat if possible.  - HOB up at 90 degrees to eat if unable to get  patient up into chair.  - Supervise patient during oral intake.   - Instruct patient/ family to take small bites.  - Instruct patient/ family to take small single sips when taking liquids.  - Follow patient-specific strategies generated by speech pathologist.  Outcome: Progressing  Goal: Ventilated patient's risk of aspiration is minimized  Description: Assess and monitor vital signs, respiratory status, airway cuff pressure, and labs (WBC).  Monitor for signs of aspiration (tachypnea, cough, rales, wheezing, cyanosis, fever).    - Elevate head of bed 30 degrees if patient has tube feeding.  - Monitor tube feeding.  Outcome: Progressing     Problem: Nutrition  Goal: Nutrition/Hydration status is improving  Description: Monitor and assess patient's nutrition/hydration status for malnutrition (ex- brittle hair, bruises, dry skin, pale skin and conjunctiva, muscle wasting, smooth red tongue, and disorientation). Collaborate with interdisciplinary team and initiate plan and interventions as ordered.  Monitor patient's weight and dietary intake as ordered or per policy. Utilize nutrition screening tool and intervene per policy. Determine patient's food preferences and provide high-protein, high-caloric foods as appropriate.     - Assist patient with eating.  - Allow adequate time for meals.  - Encourage patient to take dietary supplement as ordered.  - Collaborate with clinical nutritionist.  - Include patient/family/caregiver in decisions related to nutrition.  Outcome: Progressing     Problem: NEUROSENSORY - ADULT  Goal: Achieves stable or improved neurological status  Description: INTERVENTIONS  - Monitor and report changes in neurological status  - Monitor vital signs such as temperature, blood pressure, glucose, and any other labs ordered   - Initiate measures to prevent increased intracranial pressure  - Monitor for seizure activity and implement precautions if appropriate      Outcome: Progressing  Goal: Remains  free of injury related to seizures activity  Description: INTERVENTIONS  - Maintain airway, patient safety  and administer oxygen as ordered  - Monitor patient for seizure activity, document and report duration and description of seizure to physician/advanced practitioner  - If seizure occurs,  ensure patient safety during seizure  - Reorient patient post seizure  - Seizure pads on all 4 side rails  - Instruct patient/family to notify RN of any seizure activity including if an aura is experienced  - Instruct patient/family to call for assistance with activity based on nursing assessment  - Administer anti-seizure medications if ordered    Outcome: Progressing  Goal: Achieves maximal functionality and self care  Description: INTERVENTIONS  - Monitor swallowing and airway patency with patient fatigue and changes in neurological status  - Encourage and assist patient to increase activity and self care.   - Encourage visually impaired, hearing impaired and aphasic patients to use assistive/communication devices  Outcome: Progressing     Problem: MUSCULOSKELETAL - ADULT  Goal: Maintain or return mobility to safest level of function  Description: INTERVENTIONS:  - Assess patient's ability to carry out ADLs; assess patient's baseline for ADL function and identify physical deficits which impact ability to perform ADLs (bathing, care of mouth/teeth, toileting, grooming, dressing, etc.)  - Assess/evaluate cause of self-care deficits   - Assess range of motion  - Assess patient's mobility  - Assess patient's need for assistive devices and provide as appropriate  - Encourage maximum independence but intervene and supervise when necessary  - Involve family in performance of ADLs  - Assess for home care needs following discharge   - Consider OT consult to assist with ADL evaluation and planning for discharge  - Provide patient education as appropriate  Outcome: Progressing  Goal: Maintain proper alignment of affected body  part  Description: INTERVENTIONS:  - Support, maintain and protect limb and body alignment  - Provide patient/ family with appropriate education  Outcome: Progressing     Problem: Nutrition/Hydration-ADULT  Goal: Nutrient/Hydration intake appropriate for improving, restoring or maintaining nutritional needs  Description: Monitor and assess patient's nutrition/hydration status for malnutrition. Collaborate with interdisciplinary team and initiate plan and interventions as ordered.  Monitor patient's weight and dietary intake as ordered or per policy. Utilize nutrition screening tool and intervene as necessary. Determine patient's food preferences and provide high-protein, high-caloric foods as appropriate.     INTERVENTIONS:  - Monitor oral intake, urinary output, labs, and treatment plans  - Assess nutrition and hydration status and recommend course of action  - Evaluate amount of meals eaten  - Assist patient with eating if necessary   - Allow adequate time for meals  - Recommend/ encourage appropriate diets, oral nutritional supplements, and vitamin/mineral supplements  - Order, calculate, and assess calorie counts as needed  - Recommend, monitor, and adjust tube feedings and TPN/PPN based on assessed needs  - Assess need for intravenous fluids  - Provide specific nutrition/hydration education as appropriate  - Include patient/family/caregiver in decisions related to nutrition  Outcome: Progressing

## 2024-09-18 NOTE — TELEPHONE ENCOUNTER
Pts wife Virginia called to advise that pt was released from hospital on 9/17.    Advised Virginia pt is scheduled for HFU on 11/12 at 1030am w/ Dr Pearson. Address was provided. Added to wait list.

## 2024-09-18 NOTE — UTILIZATION REVIEW
NOTIFICATION OF ADMISSION DISCHARGE   This is a Notification of Discharge from [facility]. Please be advised that this patient has been discharge from our facility. Below you will find the admission and discharge date and time including the patient’s disposition.   UTILIZATION REVIEW CONTACT:  Su Miller  Utilization   Network Utilization Review Department  Phone: [phone] x carefully listen to the prompts. All voicemails are confidential.  Email: [email or web address]     ADMISSION INFORMATION  PRESENTATION DATE: 9/13/2024  8:20 PM  OBERVATION ADMISSION DATE: N/A  INPATIENT ADMISSION DATE: 9/13/24  9:25 PM   DISCHARGE DATE: 9/17/2024  3:19 PM   DISPOSITION:Home/Self Care    Network Utilization Review Department  ATTENTION: Please call with any questions or concerns to [phone] and carefully listen to the prompts so that you are directed to the right person. All voicemails are confidential.   For Discharge needs, contact Care Management DC Support Team at [phone] opt. 2  Send all requests for admission clinical reviews, approved or denied determinations and any other requests to dedicated fax number below belonging to the campus where the patient is receiving treatment. List of dedicated fax numbers for the Facilities:  FACILITY NAME UR FAX NUMBER   ADMISSION DENIALS (Administrative/Medical Necessity) [phone]   DISCHARGE SUPPORT TEAM ([facility]) [phone]   PARENT CHILD HEALTH (Maternity/NICU/Pediatrics) [phone]   [facility] [phone]   [facility] [phone]   [facility] [phone]   [facility] [phone]   [facility] [phone]   [facility] [phone]   General acute hospital [phone]   [facility] [phone]    [facility] [phone]   [facility] [phone]   [facility] [phone]   [facility] [phone]

## 2024-09-24 NOTE — DISCHARGE SUMMARY
"Discharge Summary - Hospitalist   Name: Tevin aBzan 79 y.o. adult I MRN: [id number]  Unit/Bed#: -[facility] I Date of Admission: 9/13/2024   Date of Service: 9/23/2024 I Hospital Day: 4     Assessment & Plan  CVA (cerebral vascular accident) (HCC)  9/13 sudden onset garbled speech at 1900 as witnessed by patient's wife   Associated dizziness en route to ER per wife   9/13 CTH -- neg   9/13 CTA H/N --  No proximal large vessel occlusion or high-grade vascular stenosis in the head and neck. Redemonstrated left thyroid nodule, for which dedicated thyroid sonogram is recommended if not previously performed.  9/13 TNK @ 2151  Follows with Dr. Pearson as OP, last seen 5/10/24  Previous CVA Hx:   2/29/24 MRI -- Punctate acute to subacute infarcts involving the high left frontoparietal cortex, left parietal subcortical white matter, and posterior left cerebellum -- thought to be 2/2 embolic as this was 2 days after undergoing cardiac catheterization   Pt reports embolic stroke in 2003, no further records available   OP CVA optimization -- statin, asa, plavix   Patient reports being on coumadin for several years previously, however coumadin was stopped in 2021 2/2 epistaxis and he was transitioned to plavix   24 hours post TNK CTH over the night: No acute change  MRI brain-Acute/recent infarct involving the corona radiata white matter of the right frontal lobe with associated cytotoxic edema. No hemorrhage.   Echo-completed, final reading pending  Plan:     Goal SBP <180  Cont home statin   Start SQ heparin 5000 units TID  CTH negative-continue dual antiplatelet therapy, aspirin and Brilinta, Plavix discontinued  PT/OT-no rehab needs  Speech-ordered VPS  SLP evaluation- Dysphagia 3 with nectar thick liquids   Essential hypertension  Home regimen: diltiazem qd  Resumed 9/16  Goal SBP <180 given CVA   /75   Pulse 74   Temp 97.8 °F (36.6 °C) (Oral)   Resp 18   Ht 5' 6\" (1.676 m)   Wt 73 kg (161 lb)   SpO2 " "94%   BMI 25.99 kg/m²     Symptomatic carotid artery stenosis, left  Status post left carotid endarterectomy 2021 in the setting of CVA of the central semiovale on the left   Hypercholesterolemia  Home regimen: atorvastain 80 qd, although patient states he only takes statin on days he has substernal CP   Continue qd now   Lipid panel with LDL of 37, triglycerides 33  Stage 2 chronic kidney disease  Lab Results   Component Value Date    EGFR 76 02/28/2024    EGFR 72 02/27/2024    EGFR 79 01/30/2024    CREATININE 0.87 09/17/2024    CREATININE 0.82 09/15/2024    CREATININE 0.92 09/15/2024     Baseline creat appears to be 0.9-1.1  Admission creat 1.18  Urinary retention protocol   Trend I&O   Avoid hypotension, nephrotoxic agents  COPD (chronic obstructive pulmonary disease) ([facility])  Not on daily maintenance therapies   Previous smoker, quit in 1970s   PRN abbuterol   Not in AE on admission, no SOB or wheezing, though baseline productive cough noted   IS, pulm toileting   Goal spo2 > 90%  Coronary artery disease involving native coronary artery of native heart with unstable angina pectoris ([facility])  Card cath 2/26/24 with Dr. Devaughn Dubois RCA lesion is 70% stenosed.  Mid LAD lesion is 50% stenosed.  1st Mrg lesion is 40% stenosed.  Mid RCA lesion is 100% stenosed.  Continue DAPT/Statin therapy   BPH (benign prostatic hyperplasia)  Urinary retention protocol   Type 2 diabetes mellitus without complication ([facility])  Lab Results   Component Value Date    HGBA1C 6.3 (H) 09/14/2024       No results for input(s): \"POCGLU\" in the last 72 hours.      Not on home regimen  Hgba1c 6.6   SSI ACHS, goal blood glucose 140-180  Hypoglycemia protocol   Leukocytosis  Admission wbc 10.77  Likely 2/2 reactive in the setting of CVA   Did not otherwise meet sirs on admission, no concern for infectious source     Garbled speech  See plan under stroke like symptoms      Admission Date: 9/13/2024 2020  Discharge Date: 09/23/24  Admitting Diagnosis: " Slurred speech [R47.81]  Cerebrovascular accident (CVA) due to other mechanism (HCC) [I63.89]  Discharge Diagnosis:   Medical Problems       Resolved Problems  Date Reviewed: 9/16/2024   None         HPI: as per, ANNIE Arias , on 9/14 Tevin Bazan is a 79 y.o. with a past medical history of previous CVA, carotid artery stenosis status post CEA in 2021, CKD 2, HLD, DM2, BPH, COPD not on home maintenance therapies who presents to the ICU with strokelike symptoms, status post TNK.  Per the patient, on Friday, 9/13 at approximately 1900, he had a sudden onset of garbled speech, which progressively worsened.  En route to the hospital, his wife reports that he experienced some mild dizziness.  In the ER, NIH 1 for garbled speech, CT head negative, CTA without large occlusion.  TNK administered at 2151. Persistent garbled speech, otherwise neurologically intact. Admit to CC for serial neuro checks.     Procedures Performed:   Orders Placed This Encounter   Procedures    Critical Care       Summary of Hospital Course: Patient presented with strokelike symptoms and received TNK on 9/13.  Patient was seen and evaluated by neurology.  MRI brain depicted acute/recent infarct involving the corona radiator white matter of the right frontal lobe with associated cytotoxic edema with no hemorrhage.  She was advised to continue with aspirin, statin.  Plavix was discontinued, and patient was started on Brilinta.  Patient remained hemodynamically stable throughout inpatient stay.  Evaluated by PT/OT who recommended no rehab needs.  Speech also closely followed and ordered VPS and deemed patient appropriate for dysphagia 3 with nectar thick liquids    This is a brief summary of patient's inpatient stay, for more details please see daily progress notes    Significant Findings, Care, Treatment and Services Provided: see above     Complications: none    Condition at Discharge: stable       Discharge instructions/Information  to patient and family:   See After Visit Summary (AVS) for information provided to patient and family.      Provisions for Follow-Up Care:  See after visit summary for information related to follow-up care and any pertinent home health orders.      PCP: Catherine Youngblood MD    Disposition: Home    Planned Readmission: No     Discharge Medications:  See after visit summary for reconciled discharge medications provided to patient and family.      Discharge Statement:  I have spent a total time of 36 minutes in caring for this patient on the day of the visit/encounter. >30 minutes of time was spent on: Instructions for management, Patient and family education, Counseling / Coordination of care, Documenting in the medical record, Reviewing / ordering tests, medicine, procedures  , and Communicating with other healthcare professionals .

## 2024-09-24 NOTE — ASSESSMENT & PLAN NOTE
"Lab Results   Component Value Date    HGBA1C 6.3 (H) 09/14/2024       No results for input(s): \"POCGLU\" in the last 72 hours.      Not on home regimen  Hgba1c 6.6   SSI ACHS, goal blood glucose 140-180  Hypoglycemia protocol   "

## 2024-09-24 NOTE — ASSESSMENT & PLAN NOTE
Lab Results   Component Value Date    EGFR 76 02/28/2024    EGFR 72 02/27/2024    EGFR 79 01/30/2024    CREATININE 0.87 09/17/2024    CREATININE 0.82 09/15/2024    CREATININE 0.92 09/15/2024     Baseline creat appears to be 0.9-1.1  Admission creat 1.18  Urinary retention protocol   Trend I&O   Avoid hypotension, nephrotoxic agents

## 2024-09-24 NOTE — ASSESSMENT & PLAN NOTE
"Home regimen: diltiazem qd  Resumed 9/16  Goal SBP <180 given CVA   /75   Pulse 74   Temp 97.8 °F (36.6 °C) (Oral)   Resp 18   Ht 5' 6\" (1.676 m)   Wt 73 kg (161 lb)   SpO2 94%   BMI 25.99 kg/m²     "

## 2024-10-02 ENCOUNTER — EVALUATION (OUTPATIENT)
Dept: SPEECH THERAPY | Facility: CLINIC | Age: 79
End: 2024-10-02
Payer: COMMERCIAL

## 2024-10-02 DIAGNOSIS — I69.922 DYSARTHRIA AS LATE EFFECT OF CEREBROVASCULAR DISEASE: Primary | ICD-10-CM

## 2024-10-02 DIAGNOSIS — I63.89 CEREBROVASCULAR ACCIDENT (CVA) DUE TO OTHER MECHANISM (HCC): ICD-10-CM

## 2024-10-02 PROCEDURE — 92522 EVALUATE SPEECH PRODUCTION: CPT

## 2024-10-02 PROCEDURE — 92610 EVALUATE SWALLOWING FUNCTION: CPT

## 2024-10-02 NOTE — PROGRESS NOTES
Speech-Language Pathology Initial Evaluation    Today's date: 10/2/2024   Patient’s name: Tevin Bazan  : 1945  MRN: [id number]  Safety measures:   Referring provider: Jessica Connolly MD        Assessment:  Patient presents with moderate dysarthria and mild oral / pharyngeal dysphagia.  Patient with misarticulations, weakened oral motor musculature & fast rate of speech negatively impacting intelligibility.  Patient with slowed oral function of swallow and mild delayed onset of swallow.  Recommend: Motor Speech & Dysphagia therapy services focused on education, compensatory strategy training and retraining of speech and swallow function for maximum communication and swallowing function and safety.        Short Term  Patient will perform oral motor exercises 30x each (with and without resistance) to facilitate improved strength and function for increased speech intelligibility.    Patient will utilize over-articulation 80% of the time while orally reading sentence/paragraph length material to facilitate increased speech intelligibility.    Patient will complete structured oral reading and conversational tasks with the consistent use of compensatory strategies >90% of the time to facilitate increased speech intelligibility.    Patient will perform oral motor and diadochokinetic speech rate exercises with > 90% accuracy to facilitate increased speech intelligibility.    Patient will complete pharyngeal strengthening exercises (EMST?) for increased safety & improved swallow function.       Patient will complete daily oral motor exercises (IOPI as appropriate) to increase lingual/labial range of motion, strength, and coordination with min cues to 90% effectiveness to improve bolus formation and strengthen oral musculature.    Patient will perform compensatory strategies (e.g., upright positioning, small bites/sips, slow rate, alternation of consistencies, multiple swallows, effortful swallow, chin tuck, head  turn, etc.) with 80% accuracy with minimized overt s/sx penetration/aspiration of least restrictive food/liquid consistencies.     Patient will tolerate least restrictive diet with minimized overt s/sx of penetration or aspiration.      Long Term     Patient will utilize compensatory strategies with optimum safety and efficacy of swallowing function on P.O. intake without overt s/sx of penetration or aspiration by discharge.       Patient will demonstrate independent implementation of compensatory strategies to improve speech intelligibility by discharge.     Patient will improve the ability to facilitate functional speech production skills for intelligible communication including compensatory strategies to promote quality of life and to maximize level of independence with communication.      Plan:  Patient would benefit from outpatient skilled Speech Therapy services: Speech-language therapy and Dysphagia therapy    Frequency: 2x weekly  Duration: 6-8 weeks    Intervention certification from: 10/2/2024  Intervention certification to: 11/27/24      Subjective:  History of present illness: Patient is a 79 y.o. adult who was referred to outpatient skilled Speech Therapy services for a dysphagia and motor speech evaluation.  Patient notes he is very fatigued after completing speech & swallowing exercises.        MRI: 9/15/24:  IMPRESSION:        1. 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 No hemorrhage.  2. 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  3. Mild chronic white matter microangiopathic changes again demonstrated.         Pt presents w/ mild oral dysphagia, min-mild pharyngeal dysphagia.      Min-mild prolonged rotary mastication w/ slowed bolus transfer and reduced bolus control w/ premature spill over BOT. Reduced  BOT retraction w/ mild oral residue on lingual  "surface and BOT. Pt noted to have piecemeal deglutition throughout study. Delayed swallow initiation w/ bolus head in the pyriforms w/ most trials except solids. WFL laryngeal elevation, epiglottic inversion and vestibule closure though reduced anterior hyoid excursion. Penetration before the swallow d/t residual requiring secondary swallow ultimately w/ thin liquids contacting vocal folds. Penetration noted during the swallow with cup sip, sequential cup sip and chin tuck thin liquids w/ epiglottic undercoating. No penetration or aspiration w/ other materials administered. WFL pharyngeal stripping wave, w/ min pharyngeal residue in the valleculae and pyriforms.         Recommendations:  Diet: Regular textures w/ choice of softer solids as needed  Liquids: Thin liquids   Meds: whole w/ puree/crushed as needed   Strategies: small bites/sips, added moisture  Frequent oral care  Upright position  F/u ST tx: as able and appropriate at the acute care level, pt would benefit from OP ST following d/c   Therapy Prognosis: good   Prognosis considerations: age, motivation/participation  Aspiration Precautions  Results reviewed with: pt, physician  Aspiration precautions posted.  Repeat MBS as necessary  If a dedicated assessment of the esophagus is desired, consider esophagram/barium swallow or EGD.        Patient's goal(s): Improve speech & swallowing    Hearing: WFL but \"not the best\" per patient, hearing aides.   Vision: WFL    Home environment/lifestyle: lives with wife, was working as       Objective (testing):  Dysphagia Evaluation:    -Reason for referral: Signs/symptoms of dysphagia    -Subjective report of swallowing difficulty: Coughing, poor secretion management    -Eating Assessment Tool (EAT-10) Swallowing Screening Tool is a patient self-assessment tool that rates the percieved impairment a swallowing disorder has on the Functional, Physical, and Emotional aspects of one's life.  EAT-10 score: " 6/40    -Difficulty swallowing: Solids and Liquids    -Current diet (solids): Regular  -Current diet (liquids): Thin  -Current pill intake method: with liquid or dry  -Alternative Feeding Method?: No    -Facial appearance Symmetrical   -Mandible function Adequate ROM   -Dentition Limited dentition   -Labial function Decreased strength (bilateral)   -Lingual function Decreased strength (bilateral)   -Velar function Unable to visualize   -Oral apraxia? Absent   -Vocal quality Dysphonic   -Volitional cough Weak   -Respiration Support appears weak   -Drooling? Yes   -Tremor/involuntary movement? Not present     LIQUID CONSISTENCY TESTING:   Item(s) tested: (Thin) - water  Administered by: Self-fed      Clinical findings:  -Oral phase impairments: reduced labial seal, anterior leakage, and suspected premature spillage  -Pharyngeal phase impairments: delayed swallow initiation    Other notes: N/A    Strategies, attempts, and responses: small sips      SOLID CONSISTENCY TESTING:  Item(s) tested: (Regular, Mixed, and Puree) -   Administered by: Self-fed    Clinical findings:  -Oral phase impairments: prolonged mastication, prolonged anterior-posterior transit time, piecemeal deglutition, and oral residue after the swallow  -Pharyngeal phase impairments: delayed swallow initiation, decreased anterior hyoid excursion    Other notes: N/A    Strategies, attempts, and responses: small bites and small sips        -Factors affecting performance: Endurance    -Safety concerns: Risk for aspiration    -Risk factors: Complex medical history      SWALLOWING SAFETY PRECAUTIONS:  -Recommended solids: Regular    -Recommended liquids: Thin    -Recommended medication form: with liquid    -Frequent/thorough oral care     -Aspiration precautions   *Monitor for signs/symptoms concerning for aspiration (e.g., low grade fever, increase in WBC, change in chest x-ray, increased congestion, increased coughing with P.O. intake)    -Reflux  precautions     -Strategies: Small sips and bites when eating and Slow rate, swallow between bites    -Positioning: Upright position during meals and Upright position at least 30 minutes after P.O. intake    -Compensatory strategies: n/a    -Supervision: Independent     -Referrals: Other Consider PT for strengthening      Motor Speech Evaluation:    -Facial appearance Symmetrical   -Mandible function Adequate ROM   -Dentition Limited dentition   -Labial function Decreased strength (bilateral)   -Lingual function Decreased strength (bilateral)   -Velar function Unable to visualize   -Oral apraxia? Absent   -Vocal quality Dysphonic   -Volitional cough Strong/productive   -Respiration Support appears weak   -Drooling? Yes   -Tremor/involuntary movement? Not present   -Tracheostomy present? No       Sustained phonation    -Vowel prolongation (norm=15-20 sec)  7 sec,        Diadochokinetic (DDK) Rates    -“puh-puh-puh” (norm=3.0-5.5 rep/1 sec) WFL   -“tuh-tuh-tuh” (norm=25 rep/10 sec) Inconsistent articulation   -“kuh-kuh-kuh” (norm=25 rep/10 sec) Irregular, decreased articulation   -“puh-tuh-kuh” (norm=8 rep/10 sec) Fast, inconsistent articulation       Articulation    -Single syllable words 100%   -Multisyllabic words 80%   -Repetition of multisyllabic words 5x 100%   -Words of progressive length 100%   -Sentences 80%   -Reading (“[place]”) DNT   -Conversation Decreased clarity, clear 75-80% of the time.       Counting    -1 to 20 2 breaths taken       Overall speech intelligibility rating 80% intelligible         Patient presents with moderate dysarthria characterized by Imprecise articulation and Decreased breath support for speech.     Visit Tracking:  POC   Expires Auth Expiration Date ST Visit Limit   11/27/24 12/31/24 BOMN          Visit/Unit Tracking:  Auth Status Date 10/2/24   BOMN Used 1    Remaining BOMN       Intervention Comments:  Emst-75?  [place]? Dysarthria strategies, oral motor strategies for  labial closure, lingual / labial strength, dysphagia strategies

## 2024-10-09 ENCOUNTER — TELEPHONE (OUTPATIENT)
Dept: VASCULAR SURGERY | Facility: CLINIC | Age: 79
End: 2024-10-09

## 2024-10-09 NOTE — TELEPHONE ENCOUNTER
----- Message from ANNIE Esqueda sent at 8/26/2024  1:10 PM EDT -----  Call patient for yearly OV to review study

## 2024-10-11 ENCOUNTER — OFFICE VISIT (OUTPATIENT)
Dept: SPEECH THERAPY | Facility: CLINIC | Age: 79
End: 2024-10-11
Payer: COMMERCIAL

## 2024-10-11 ENCOUNTER — APPOINTMENT (EMERGENCY)
Dept: CT IMAGING | Facility: HOSPITAL | Age: 79
DRG: 379 | End: 2024-10-11
Payer: COMMERCIAL

## 2024-10-11 ENCOUNTER — APPOINTMENT (EMERGENCY)
Dept: RADIOLOGY | Facility: HOSPITAL | Age: 79
DRG: 379 | End: 2024-10-11
Payer: COMMERCIAL

## 2024-10-11 ENCOUNTER — HOSPITAL ENCOUNTER (INPATIENT)
Facility: HOSPITAL | Age: 79
LOS: 1 days | Discharge: HOME | DRG: 379 | End: 2024-10-13
Attending: EMERGENCY MEDICINE | Admitting: INTERNAL MEDICINE
Payer: COMMERCIAL

## 2024-10-11 DIAGNOSIS — R07.9 CHEST PAIN: Primary | ICD-10-CM

## 2024-10-11 DIAGNOSIS — R93.89 ABNORMAL CT SCAN: ICD-10-CM

## 2024-10-11 DIAGNOSIS — N40.0 ENLARGED PROSTATE: ICD-10-CM

## 2024-10-11 DIAGNOSIS — K62.5 RECTAL BLEEDING: ICD-10-CM

## 2024-10-11 DIAGNOSIS — I63.89 CEREBROVASCULAR ACCIDENT (CVA) DUE TO OTHER MECHANISM (HCC): Primary | ICD-10-CM

## 2024-10-11 DIAGNOSIS — N28.89 LEFT RENAL MASS: ICD-10-CM

## 2024-10-11 DIAGNOSIS — K92.1 MELENA: ICD-10-CM

## 2024-10-11 DIAGNOSIS — K86.9 PANCREATIC LESION: ICD-10-CM

## 2024-10-11 DIAGNOSIS — I69.922 DYSARTHRIA AS LATE EFFECT OF CEREBROVASCULAR DISEASE: ICD-10-CM

## 2024-10-11 PROBLEM — I67.9 CEREBROVASCULAR DISEASE: Status: ACTIVE | Noted: 2024-02-28

## 2024-10-11 LAB
2HR DELTA HS TROPONIN: 0 NG/L
ABO GROUP BLD: NORMAL
ALBUMIN SERPL BCG-MCNC: 4.1 G/DL (ref 3.5–5)
ALP SERPL-CCNC: 113 U/L (ref 34–104)
ALT SERPL W P-5'-P-CCNC: 18 U/L (ref 7–52)
ANION GAP SERPL CALCULATED.3IONS-SCNC: 7 MMOL/L (ref 4–13)
APTT PPP: 32 SECONDS (ref 23–34)
AST SERPL W P-5'-P-CCNC: 18 U/L (ref 5–45)
ATRIAL RATE: 85 BPM
BASOPHILS # BLD AUTO: 0.04 THOUSANDS/ΜL (ref 0–0.1)
BASOPHILS NFR BLD AUTO: 0 % (ref 0–1)
BILIRUB SERPL-MCNC: 0.63 MG/DL (ref 0.2–1)
BILIRUB UR QL STRIP: NEGATIVE
BLD GP AB SCN SERPL QL: NEGATIVE
BNP SERPL-MCNC: 38 PG/ML (ref 0–100)
BUN SERPL-MCNC: 29 MG/DL (ref 5–25)
CALCIUM SERPL-MCNC: 9.1 MG/DL (ref 8.4–10.2)
CARDIAC TROPONIN I PNL SERPL HS: 6 NG/L
CARDIAC TROPONIN I PNL SERPL HS: 6 NG/L
CHLORIDE SERPL-SCNC: 103 MMOL/L (ref 96–108)
CLARITY UR: CLEAR
CO2 SERPL-SCNC: 27 MMOL/L (ref 21–32)
COLOR UR: YELLOW
CREAT SERPL-MCNC: 0.95 MG/DL (ref 0.6–1.3)
EOSINOPHIL # BLD AUTO: 0.25 THOUSAND/ΜL (ref 0–0.61)
EOSINOPHIL NFR BLD AUTO: 3 % (ref 0–6)
ERYTHROCYTE [DISTWIDTH] IN BLOOD BY AUTOMATED COUNT: 13.5 % (ref 11.6–15.1)
GLUCOSE SERPL-MCNC: 97 MG/DL (ref 65–140)
GLUCOSE UR STRIP-MCNC: NEGATIVE MG/DL
HCT VFR BLD AUTO: 37.8 % (ref 36.5–46.1)
HGB BLD-MCNC: 12.3 G/DL (ref 12–15.4)
HGB UR QL STRIP.AUTO: NEGATIVE
IMM GRANULOCYTES # BLD AUTO: 0.06 THOUSAND/UL (ref 0–0.2)
IMM GRANULOCYTES NFR BLD AUTO: 1 % (ref 0–2)
INR PPP: 1.12 (ref 0.85–1.19)
KETONES UR STRIP-MCNC: NEGATIVE MG/DL
LEUKOCYTE ESTERASE UR QL STRIP: NEGATIVE
LIPASE SERPL-CCNC: 24 U/L (ref 11–82)
LYMPHOCYTES # BLD AUTO: 1.91 THOUSANDS/ΜL (ref 0.6–4.47)
LYMPHOCYTES NFR BLD AUTO: 21 % (ref 14–44)
MCH RBC QN AUTO: 31.1 PG (ref 26.8–34.3)
MCHC RBC AUTO-ENTMCNC: 32.5 G/DL (ref 31.4–37.4)
MCV RBC AUTO: 96 FL (ref 82–98)
MONOCYTES # BLD AUTO: 1.06 THOUSAND/ΜL (ref 0.17–1.22)
MONOCYTES NFR BLD AUTO: 12 % (ref 4–12)
NEUTROPHILS # BLD AUTO: 5.83 THOUSANDS/ΜL (ref 1.85–7.62)
NEUTS SEG NFR BLD AUTO: 63 % (ref 43–75)
NITRITE UR QL STRIP: NEGATIVE
NRBC BLD AUTO-RTO: 0 /100 WBCS
P AXIS: 41 DEGREES
PH UR STRIP.AUTO: 6.5 [PH]
PLATELET # BLD AUTO: 211 THOUSANDS/UL (ref 149–390)
PMV BLD AUTO: 9.5 FL (ref 8.9–12.7)
POTASSIUM SERPL-SCNC: 3.9 MMOL/L (ref 3.5–5.3)
PR INTERVAL: 152 MS
PROT SERPL-MCNC: 7.3 G/DL (ref 6.4–8.4)
PROT UR STRIP-MCNC: NEGATIVE MG/DL
PROTHROMBIN TIME: 14.9 SECONDS (ref 12.3–15)
QRS AXIS: 36 DEGREES
QRSD INTERVAL: 80 MS
QT INTERVAL: 364 MS
QTC INTERVAL: 433 MS
RBC # BLD AUTO: 3.96 MILLION/UL (ref 3.88–5.12)
RH BLD: POSITIVE
SODIUM SERPL-SCNC: 137 MMOL/L (ref 135–147)
SP GR UR STRIP.AUTO: <1.005 (ref 1–1.03)
SPECIMEN EXPIRATION DATE: NORMAL
T WAVE AXIS: 51 DEGREES
UROBILINOGEN UR STRIP-ACNC: <2 MG/DL
VENTRICULAR RATE: 85 BPM
WBC # BLD AUTO: 9.15 THOUSAND/UL (ref 4.31–10.16)

## 2024-10-11 PROCEDURE — 99285 EMERGENCY DEPT VISIT HI MDM: CPT

## 2024-10-11 PROCEDURE — 80053 COMPREHEN METABOLIC PANEL: CPT | Performed by: PHYSICIAN ASSISTANT

## 2024-10-11 PROCEDURE — 74177 CT ABD & PELVIS W/CONTRAST: CPT

## 2024-10-11 PROCEDURE — 83690 ASSAY OF LIPASE: CPT | Performed by: PHYSICIAN ASSISTANT

## 2024-10-11 PROCEDURE — 92507 TX SP LANG VOICE COMM INDIV: CPT

## 2024-10-11 PROCEDURE — 36415 COLL VENOUS BLD VENIPUNCTURE: CPT | Performed by: PHYSICIAN ASSISTANT

## 2024-10-11 PROCEDURE — 85730 THROMBOPLASTIN TIME PARTIAL: CPT | Performed by: PHYSICIAN ASSISTANT

## 2024-10-11 PROCEDURE — 81003 URINALYSIS AUTO W/O SCOPE: CPT | Performed by: PHYSICIAN ASSISTANT

## 2024-10-11 PROCEDURE — 85025 COMPLETE CBC W/AUTO DIFF WBC: CPT | Performed by: PHYSICIAN ASSISTANT

## 2024-10-11 PROCEDURE — 71045 X-RAY EXAM CHEST 1 VIEW: CPT

## 2024-10-11 PROCEDURE — 99223 1ST HOSP IP/OBS HIGH 75: CPT | Performed by: FAMILY MEDICINE

## 2024-10-11 PROCEDURE — 93005 ELECTROCARDIOGRAM TRACING: CPT

## 2024-10-11 PROCEDURE — 86901 BLOOD TYPING SEROLOGIC RH(D): CPT | Performed by: PHYSICIAN ASSISTANT

## 2024-10-11 PROCEDURE — 86900 BLOOD TYPING SEROLOGIC ABO: CPT | Performed by: PHYSICIAN ASSISTANT

## 2024-10-11 PROCEDURE — 86850 RBC ANTIBODY SCREEN: CPT | Performed by: PHYSICIAN ASSISTANT

## 2024-10-11 PROCEDURE — 83880 ASSAY OF NATRIURETIC PEPTIDE: CPT | Performed by: PHYSICIAN ASSISTANT

## 2024-10-11 PROCEDURE — 85610 PROTHROMBIN TIME: CPT | Performed by: PHYSICIAN ASSISTANT

## 2024-10-11 PROCEDURE — 84484 ASSAY OF TROPONIN QUANT: CPT | Performed by: PHYSICIAN ASSISTANT

## 2024-10-11 PROCEDURE — 99285 EMERGENCY DEPT VISIT HI MDM: CPT | Performed by: PHYSICIAN ASSISTANT

## 2024-10-11 RX ORDER — INSULIN LISPRO 100 [IU]/ML
1-5 INJECTION, SOLUTION INTRAVENOUS; SUBCUTANEOUS EVERY 6 HOURS SCHEDULED
Status: DISCONTINUED | OUTPATIENT
Start: 2024-10-12 | End: 2024-10-13 | Stop reason: HOSPADM

## 2024-10-11 RX ORDER — SODIUM CHLORIDE 9 MG/ML
75 INJECTION, SOLUTION INTRAVENOUS CONTINUOUS
Status: DISCONTINUED | OUTPATIENT
Start: 2024-10-11 | End: 2024-10-13

## 2024-10-11 RX ORDER — PANTOPRAZOLE SODIUM 40 MG/10ML
40 INJECTION, POWDER, LYOPHILIZED, FOR SOLUTION INTRAVENOUS EVERY 12 HOURS
Status: DISCONTINUED | OUTPATIENT
Start: 2024-10-11 | End: 2024-10-13 | Stop reason: HOSPADM

## 2024-10-11 RX ADMIN — PANTOPRAZOLE SODIUM 40 MG: 40 INJECTION, POWDER, FOR SOLUTION INTRAVENOUS at 22:39

## 2024-10-11 RX ADMIN — SODIUM CHLORIDE 75 ML/HR: 0.9 INJECTION, SOLUTION INTRAVENOUS at 22:36

## 2024-10-11 RX ADMIN — IOHEXOL 100 ML: 350 INJECTION, SOLUTION INTRAVENOUS at 18:21

## 2024-10-11 NOTE — ED PROVIDER NOTES
Time reflects when diagnosis was documented in both MDM as applicable and the Disposition within this note       Time User Action Codes Description Comment    10/11/2024  8:39 PM Trinity Beach Add [R07.9] Chest pain     10/11/2024  8:39 PM Trinity Beach Add [K62.5] Rectal bleeding     10/11/2024  8:39 PM Trinity Beach Add [K86.9] Pancreatic lesion     10/11/2024  8:40 PM Trinity Beach Add [N28.89] Left renal mass     10/11/2024  8:40 PM Trinity Beach Add [N40.0] Enlarged prostate     10/11/2024  8:40 PM Trinity Beach Add [R93.89] Abnormal CT scan     10/11/2024  8:40 PM Trinity Beach Modify [R93.89] Abnormal CT scan Narrowing of the left renal vein as it courses between the aorta and the SMA with upstream dilatation,     10/11/2024  9:59 PM Raciel Stanton Add [K92.1] Melena           ED Disposition       ED Disposition   Admit    Condition   Stable    Date/Time   Fri Oct 11, 2024  8:59 PM    Comment   Case was discussed with Dr. Stanton and the patient's admission status was agreed to be Admission Status: observation status to the service of Dr. Stanton .               Assessment & Plan       Medical Decision Making  Amount and/or Complexity of Data Reviewed  Labs: ordered.  Radiology: ordered.    Risk  Prescription drug management.  Decision regarding hospitalization.    Patient with weakness, SOB on exertion, black stools, will order labs, CT scan to r/o GI bleed, anemia, CHF, ACS, cardiac arrhythmia.  Patient with heme positive stool, on Brilinta and aspirin, will admit for further monitoring.  Patient with multiple incidental findings, instructed patient to f/u with PCP for MRI to further evaluate pancreatic and renal lesions.            Medications   iohexol (OMNIPAQUE) 350 MG/ML injection (MULTI-DOSE) 100 mL (100 mL Intravenous Given 10/11/24 1821)       ED Risk Strat Scores                                               History of Present Illness       Chief Complaint  "  Patient presents with    Chest Pain     Pt states \"I went to my doctor and told them I had black stools, I'm tired, and told me I had to come to the hospital.\" Pt reports being on brilinta and aspirin with recent stroke last month.\"Reports going for walk and developed chest pain and pressure sensation. Noticed dark stools that started about a week ago.        Past Medical History:   Diagnosis Date    Chronic bilateral low back pain without sciatica 2020    Hypertension     Scoliosis     Stroke (HCC)     Stroke-like symptoms 2021      Past Surgical History:   Procedure Laterality Date    BACK SURGERY      BOWEL RESECTION      CARDIAC CATHETERIZATION N/A 2024    Procedure: Cardiac Coronary Angiogram;  Surgeon: Rivas Cantor MD;  Location: [facility];  Service: Cardiology    CARDIAC CATHETERIZATION  2024    Procedure: Cardiac catheterization;  Surgeon: Rivas Cantor MD;  Location: [facility];  Service: Cardiology    CARDIAC CATHETERIZATION N/A 2024    Procedure: Cardiac pci;  Surgeon: Rivas Cantor MD;  Location: [facility];  Service: Cardiology    MT TEAEC W/PATCH GRF CAROTID VERTB SUBCLAV NECK INC Left 2021    Procedure: ENDARTERECTOMY ARTERY CAROTID;  Surgeon: Vito Mo MD;  Location: [facility];  Service: Vascular    TONSILLECTOMY        Family History   Adopted: Yes   Family history unknown: Yes      Social History     Tobacco Use    Smoking status: Former     Current packs/day: 0.00     Types: Cigarettes     Quit date:      Years since quittin.8    Smokeless tobacco: Never   Vaping Use    Vaping status: Never Used   Substance Use Topics    Alcohol use: Not Currently    Drug use: No      E-Cigarette/Vaping    E-Cigarette Use Never User       E-Cigarette/Vaping Substances    Nicotine No     THC No     CBD No     Flavoring No     Other No     Unknown No       I have reviewed and agree with the history as documented.       Chest " Pain  Associated symptoms: fatigue, shortness of breath and weakness    Associated symptoms: no abdominal pain, no back pain, no cough, no dizziness, no fever, no nausea, no palpitations and not vomiting    Patient is an 78 y/o M COPD, CVA, CAD on Brilinta and aspirin that presents to the ED with black stools, generalized weakness, SOB on exertion for over 1 week.  He denies abdominal pain, nausea or vomiting.  He states he developed chest pain today.  No fevers/chills.      Review of Systems   Constitutional:  Positive for fatigue. Negative for chills and fever.   HENT: Negative.     Respiratory:  Positive for shortness of breath. Negative for cough.    Cardiovascular:  Positive for chest pain. Negative for palpitations and leg swelling.   Gastrointestinal:  Positive for blood in stool. Negative for abdominal pain, diarrhea, nausea and vomiting.   Genitourinary:  Negative for dysuria. Hematuria: generalized.  Musculoskeletal:  Negative for back pain and neck pain.   Skin:  Positive for pallor.   Neurological:  Positive for weakness. Negative for dizziness and light-headedness.   Psychiatric/Behavioral:  Negative for confusion.    All other systems reviewed and are negative.          Objective       ED Triage Vitals [10/11/24 1721]   Temperature Pulse Blood Pressure Respirations SpO2 Patient Position - Orthostatic VS   98.2 °F (36.8 °C) 81 148/78 20 98 % Sitting      Temp Source Heart Rate Source BP Location FiO2 (%) Pain Score    Temporal Monitor Right arm -- 4      Vitals      Date and Time Temp Pulse SpO2 Resp BP Pain Score FACES Pain Rating User   10/11/24 2207 -- -- -- -- -- No Pain -- NG   10/11/24 2155 -- 80 94 % -- 146/73 -- -- DII   10/11/24 2122 -- 89 94 % 20 135/65 -- -- EC   10/11/24 1930 -- 62 97 % 19 177/79 -- -- EC   10/11/24 1900 -- 61 98 % 19 168/79 -- -- EC   10/11/24 1830 -- 73 97 % 22 163/77 -- -- KP   10/11/24 1745 -- 78 95 % 20 119/65 -- -- EC   10/11/24 1721 98.2 °F (36.8 °C) 81 98 % 20 148/78  4 --             Physical Exam  Vitals and nursing note reviewed. Exam conducted with a chaperone present (RIDGE Branham).   Constitutional:       General: Danielle is not in acute distress.     Appearance: Danielle is well-developed and well-groomed. Danielle is not ill-appearing.   HENT:      Head: Normocephalic and atraumatic.      Mouth/Throat:      Mouth: Mucous membranes are moist.   Eyes:      Conjunctiva/sclera: Conjunctivae normal.   Cardiovascular:      Rate and Rhythm: Normal rate and regular rhythm.   Pulmonary:      Effort: Pulmonary effort is normal.      Breath sounds: Normal breath sounds.   Abdominal:      General: Abdomen is flat. Bowel sounds are normal.      Palpations: Abdomen is soft.      Tenderness: There is no abdominal tenderness.   Genitourinary:     Rectum: Guaiac result positive (trace postive stool, brown in color.).   Musculoskeletal:      Cervical back: Normal range of motion.   Skin:     General: Skin is warm and dry.      Coloration: Skin is pale.   Neurological:      Mental Status: Danielle is alert and oriented to person, place, and time.      Sensory: Sensation is intact.   Psychiatric:         Behavior: Behavior is cooperative.         Results Reviewed       Procedure Component Value Units Date/Time    UA w Reflex to Microscopic w Reflex to Culture [[id number]]  (Abnormal) Collected: 10/11/24 2008    Lab Status: Final result Specimen: Urine, Clean Catch Updated: 10/11/24 2020     Color, UA Yellow     Clarity, UA Clear     Specific Gravity, UA <1.005     pH, UA 6.5     Leukocytes, UA Negative     Nitrite, UA Negative     Protein, UA Negative mg/dl      Glucose, UA Negative mg/dl      Ketones, UA Negative mg/dl      Urobilinogen, UA <2.0 mg/dl      Bilirubin, UA Negative     Occult Blood, UA Negative    HS Troponin I 2hr [[id number]]  (Normal) Collected: 10/11/24 1946    Lab Status: Final result Specimen: Blood from Arm, Right Updated: 10/11/24 2017     hs TnI 2hr 6 ng/L      Delta 2hr hsTnI 0  ng/L     Protime-INR [[id number]]  (Normal) Collected: 10/11/24 1738    Lab Status: Final result Specimen: Blood from Arm, Left Updated: 10/11/24 1854     Protime 14.9 seconds      INR 1.12    Narrative:      INR Therapeutic Range    Indication                                             INR Range      Atrial Fibrillation                                               2.0-3.0  Hypercoagulable State                                    2.0.2.3  Left Ventricular Asist Device                            2.0-3.0  Mechanical Heart Valve                                  -    Aortic(with afib, MI, embolism, HF, LA enlargement,    and/or coagulopathy)                                     2.0-3.0 (2.5-3.5)     Mitral                                                             2.5-3.5  Prosthetic/Bioprosthetic Heart Valve               2.0-3.0  Venous thromboembolism (VTE: VT, PE        2.0-3.0    APTT [[id number]]  (Normal) Collected: 10/11/24 1738    Lab Status: Final result Specimen: Blood from Arm, Left Updated: 10/11/24 1854     PTT 32 seconds     B-Type Natriuretic Peptide(BNP) [[id number]]  (Normal) Collected: 10/11/24 1738    Lab Status: Final result Specimen: Blood from Arm, Left Updated: 10/11/24 1822     BNP 38 pg/mL     HS Troponin 0hr (reflex protocol) [[id number]]  (Normal) Collected: 10/11/24 1738    Lab Status: Final result Specimen: Blood from Arm, Left Updated: 10/11/24 1815     hs TnI 0hr 6 ng/L     Comprehensive metabolic panel [[id number]]  (Abnormal) Collected: 10/11/24 1738    Lab Status: Final result Specimen: Blood from Arm, Left Updated: 10/11/24 1808     Sodium 137 mmol/L      Potassium 3.9 mmol/L      Chloride 103 mmol/L      CO2 27 mmol/L      ANION GAP 7 mmol/L      BUN 29 mg/dL      Creatinine 0.95 mg/dL      Glucose 97 mg/dL      Calcium 9.1 mg/dL      AST 18 U/L      ALT 18 U/L      Alkaline Phosphatase 113 U/L      Total Protein 7.3 g/dL      Albumin 4.1 g/dL      Total Bilirubin 0.63 mg/dL      eGFR  --    Narrative:      Notes:     1. eGFR calculation is only valid for adults 18 years and older.  2. EGFR calculation cannot be performed for patients who are transgender, non-binary, or whose legal sex, sex at birth, and gender identity differ.    Lipase [[id number]]  (Normal) Collected: 10/11/24 1738    Lab Status: Final result Specimen: Blood from Arm, Left Updated: 10/11/24 1808     Lipase 24 u/L     CBC and differential [[id number]] Collected: 10/11/24 1738    Lab Status: Final result Specimen: Blood from Arm, Left Updated: 10/11/24 1754     WBC 9.15 Thousand/uL      RBC 3.96 Million/uL      Hemoglobin 12.3 g/dL      Hematocrit 37.8 %      MCV 96 fL      MCH 31.1 pg      MCHC 32.5 g/dL      RDW 13.5 %      MPV 9.5 fL      Platelets 211 Thousands/uL      nRBC 0 /100 WBCs      Segmented % 63 %      Immature Grans % 1 %      Lymphocytes % 21 %      Monocytes % 12 %      Eosinophils Relative 3 %      Basophils Relative 0 %      Absolute Neutrophils 5.83 Thousands/µL      Absolute Immature Grans 0.06 Thousand/uL      Absolute Lymphocytes 1.91 Thousands/µL      Absolute Monocytes 1.06 Thousand/µL      Eosinophils Absolute 0.25 Thousand/µL      Basophils Absolute 0.04 Thousands/µL             CT abdomen pelvis with contrast   Final Interpretation by Jenniffer Chen MD (10/11 2035)      1) Numerous colonic diverticula, however no evidence of acute diverticulitis or other bowel inflammation.      2) Tortuous vessel in the anterior wall of the lower rectum, most likely an internal hemorrhoid, or possibly an arteriovenous malformation.      3) 1.6 x 1.5 cm indeterminate lesion in the uncinate process of the pancreas, which may represent a complex cystic lesion, however pancreatic adenocarcinoma may have a similar appearance. Recommend further evaluation with abdominal MRI (without and with    IV contrast) if there are no contraindications and gastroenterology consultation.      4) 1.4 x 1.3 cm hyperdense soft tissue  density lesion in upper pole of left kidney highly suspicious for renal cell carcinoma. This can also be confirmed on the abdominal MRI. Recommend urology consultation.      5) Narrowing of the left renal vein as it courses between the aorta and the SMA with upstream dilatation, as well as presence of adjacent collateral vessels. 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 Clinical correlation is    recommended.      6) Multiple additional findings as above.      The study was marked in EPIC for immediate notification.            Workstation performed: [employer]         XR chest 1 view portable    (Results Pending)       ECG 12 Lead Documentation Only    Date/Time: 10/11/2024 5:28 PM    Performed by: Trinity Beach PA-C  Authorized by: Trinity Beach PA-C    Indications / Diagnosis:  Chest pain  Patient location:  ED  Previous ECG:     Previous ECG:  Compared to current    Similarity:  No change  Rate:     ECG rate:  85  Rhythm:     Rhythm: sinus rhythm    ST segments:     ST segments:  Normal  T waves:     T waves: normal        ED Medication and Procedure Management   Prior to Admission Medications   Prescriptions Last Dose Informant Patient Reported? Taking?   Ascorbic Acid (vitamin C) 1000 MG tablet 10/10/2024 Self Yes Yes   Sig: every 24 hours   Chelated Zinc 50 MG TABS  Self Yes No   Sig: every 24 hours   Omega 3 1000 MG CAPS Not Taking Self Yes No   Si capsule Every 12 hours   Patient not taking: Reported on 10/11/2024   acetaminophen (TYLENOL) 500 mg tablet Not Taking Self Yes No   Sig: Take 1,000 mg by mouth every 6 (six) hours as needed for mild pain   Patient not taking: Reported on 10/11/2024   albuterol (Proventil HFA) 90 mcg/act inhaler  Self No No   Sig: Inhale 1-2 puffs every 6 (six) hours as needed for wheezing   aspirin (ECOTRIN LOW STRENGTH) 81 mg EC tablet  Self No No   Sig: Take 1 tablet (81 mg total) by mouth daily for 21 days   atorvastatin (LIPITOR)  80 mg tablet   No No   Sig: Take 1 tablet (80 mg total) by mouth daily   diltiazem (TIAZAC) 120 MG 24 hr capsule  Self Yes No   Sig: Take 120 mg by mouth daily   famotidine (PEPCID) 40 MG tablet  Self Yes No   Si (two) times a day as needed   ticagrelor (BRILINTA) 90 MG   No No   Sig: Take 1 tablet (90 mg total) by mouth every 12 (twelve) hours      Facility-Administered Medications: None     Current Discharge Medication List        CONTINUE these medications which have NOT CHANGED    Details   Ascorbic Acid (vitamin C) 1000 MG tablet every 24 hours      acetaminophen (TYLENOL) 500 mg tablet Take 1,000 mg by mouth every 6 (six) hours as needed for mild pain      albuterol (Proventil HFA) 90 mcg/act inhaler Inhale 1-2 puffs every 6 (six) hours as needed for wheezing  Qty: 18 g, Refills: 0    Comments: Substitution to a formulary equivalent within the same pharmaceutical class is authorized.  Associated Diagnoses: Chest pain      aspirin (ECOTRIN LOW STRENGTH) 81 mg EC tablet Take 1 tablet (81 mg total) by mouth daily for 21 days  Qty: 21 tablet, Refills: 0    Associated Diagnoses: Stroke-like symptoms      atorvastatin (LIPITOR) 80 mg tablet Take 1 tablet (80 mg total) by mouth daily  Qty: 30 tablet, Refills: 3    Associated Diagnoses: CVA (cerebral vascular accident) (HCC)      Chelated Zinc 50 MG TABS every 24 hours      diltiazem (TIAZAC) 120 MG 24 hr capsule Take 120 mg by mouth daily      famotidine (PEPCID) 40 MG tablet 2 (two) times a day as needed      Omega 3 1000 MG CAPS 1 capsule Every 12 hours      ticagrelor (BRILINTA) 90 MG Take 1 tablet (90 mg total) by mouth every 12 (twelve) hours  Qty: 60 tablet, Refills: 2    Associated Diagnoses: Cerebrovascular accident (CVA) due to other mechanism (HCC)           No discharge procedures on file.  ED SEPSIS DOCUMENTATION   Time reflects when diagnosis was documented in both MDM as applicable and the Disposition within this note       Time User Action Codes  Description Comment    10/11/2024  8:39 PM Trinity Beach Add [R07.9] Chest pain     10/11/2024  8:39 PM Trinity Beach Add [K62.5] Rectal bleeding     10/11/2024  8:39 PM Trinity Beach Add [K86.9] Pancreatic lesion     10/11/2024  8:40 PM Trinity Beach Add [N28.89] Left renal mass     10/11/2024  8:40 PM Trinity Beach Add [N40.0] Enlarged prostate     10/11/2024  8:40 PM Trinity Beach Add [R93.89] Abnormal CT scan     10/11/2024  8:40 PM Trinity Beach Modify [R93.89] Abnormal CT scan Narrowing of the left renal vein as it courses between the aorta and the SMA with upstream dilatation,     10/11/2024  9:59 PM Raciel Stanton Add [K92.1] Radha Beach PA-C  10/11/24 [id number]

## 2024-10-11 NOTE — PROGRESS NOTES
Daily Speech Treatment Note    Today's date: 10/11/2024   Patient’s name: Tevin Bazan  : 1945  MRN: [id number]  Safety measures:   Referring provider: Jessica Connolly MD    Encounter Diagnosis     ICD-10-CM    1. Cerebrovascular accident (CVA) due to other mechanism (HCC)  I63.89       2. 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  Results as stated below.       Short Term  Patient will perform oral motor exercises 30x each (with and without resistance) to facilitate improved strength and function for increased speech intelligibility.      Strength and Endurance Testing:  The IOPI Pro is used by medical professionals to measure, evaluate, and increase the strength and endurance of the tongue and lip in patients with oral motor disorders, including dysphagia and dysarthria.  The IOPI measures the maximum pressure (Pmax) a patient can produce in an air-filled bulb when it is compressed as hard as possible by the tongue or lip against a hard surface (e.g. The palate or teeth, respectively). Pmax is a measure of strength, expressed in kilopascals (kPa, an international unit of pressure).       For patients with dysphagia or dysarthria, oral motor fatigability may be of interest.  The IOPI Pro can be used to assess tongue fatigability.  Low endurance values are an indicator of a high fatigability.  Endurance is measured with the IOPI Pro by quantifying the length of time that a patient can maintain 50% of his or her Pmax.  This procedure is conducted in Target Mode by setting the target value to 50% of the patient's Pmax and timing how long the patient can hold the top (green) light on.       The medical professional determines what target value is appropriate for exercise therapy purposes and provides specific  instructions to the patient for a particular exercise protocol.  In Target Mode, the pressure required to illuminate the green light a the top of the light array can be adjusted using the Set Target arrow buttons.  This green light is used as a visual target for the patient.     Tongue tip Peak Max after 3 trials: 61 Norm for age/gender is 56   Tongue back Peak Max after 3 trials: 35 Norm for age/gender is 50   Right lip Peak Max after 3 trials: 13 Norm for age/gender is 35   Left lip Peak Max after 3 trials: 18 Norm for age/gender is 35    Endurance Training: @ 30 MAX - HELD FOR 2 SECONDS    IOPI PEAK MEASUREMENT WEEKLY GRID  61    10/11/24           Tongue tip  (Goal = 56) 61           Tongue back  (Goal = 50) 35           Right Lip  (Goal = 35) 13        Left Lip  (Goal = 35) 18            IOPI -- Today's Exercise Targets    Tongue tip  (Goal = 56) Peak Max after 3 trials: 61 Effort level: % Target for treatment:    Tongue back  (Goal = 50) Peak Max after 3 trials: 35 Effort level: 65% Target for treatment: 23   Right Lip  (Goal = 35) Peak Max after 3 trials: 13 Effort level: 65% Target for treatment: 8   Left Lip  (Goal = 35) Peak Max after 3 trials: 18 Effort level: 65 and 60% Target for treatment: 12 and 11        10/11/24        Tongue tip          Tongue back 23 x 30        Right Lip 8 x 15         Left Lip 12 and 11 (20)                      Patient will utilize over-articulation 80% of the time while orally reading sentence/paragraph length material to facilitate increased speech intelligibility.  10/11: Educated on compensatory strategies to increase clarity, provided handout.      Patient will complete structured oral reading and conversational tasks with the consistent use of compensatory strategies >90% of the time to facilitate increased speech intelligibility.     Patient will perform oral motor and diadochokinetic speech rate exercises with > 90% accuracy to facilitate increased speech  intelligibility.     Patient will complete pharyngeal strengthening exercises (EMST?) for increased safety & improved swallow function.        Patient will complete daily oral motor exercises (IOPI as appropriate) to increase lingual/labial range of motion, strength, and coordination with min cues to 90% effectiveness to improve bolus formation and strengthen oral musculature.   (See above)     Patient will perform compensatory strategies (e.g., upright positioning, small bites/sips, slow rate, alternation of consistencies, multiple swallows, effortful swallow, chin tuck, head turn, etc.) with 80% accuracy with minimized overt s/sx penetration/aspiration of least restrictive food/liquid consistencies.     Patient will tolerate least restrictive diet with minimized overt s/sx of penetration or aspiration.        Long Term      Patient will utilize compensatory strategies with optimum safety and efficacy of swallowing function on P.O. intake without overt s/sx of penetration or aspiration by discharge.        Patient will demonstrate independent implementation of compensatory strategies to improve speech intelligibility by discharge.      Patient will improve the ability to facilitate functional speech production skills for intelligible communication including compensatory strategies to promote quality of life and to maximize level of independence with communication.                   Plan:  -Continue with current plan of care.    Emst-75?  Iopi? Dysarthria strategies, oral motor strategies for labial closure, lingual / labial strength, dysphagia strategies

## 2024-10-11 NOTE — ED NOTES
Pt up to the bathroom for urine sample, refused hospital grippy socks.      Bonnie Chappell RN  10/11/24 1950

## 2024-10-12 PROBLEM — K86.9 PANCREATIC LESION: Status: ACTIVE | Noted: 2024-10-12

## 2024-10-12 PROBLEM — Z98.890 H/O CAROTID ENDARTERECTOMY: Status: RESOLVED | Noted: 2021-12-01 | Resolved: 2024-10-12

## 2024-10-12 PROBLEM — N28.9 KIDNEY LESION: Status: ACTIVE | Noted: 2024-10-12

## 2024-10-12 PROBLEM — I87.1 NUTCRACKER PHENOMENON OF RENAL VEIN: Status: ACTIVE | Noted: 2024-10-12

## 2024-10-12 LAB
ALBUMIN SERPL BCG-MCNC: 3.7 G/DL (ref 3.5–5)
ALP SERPL-CCNC: 88 U/L (ref 34–104)
ALT SERPL W P-5'-P-CCNC: 15 U/L (ref 7–52)
ANION GAP SERPL CALCULATED.3IONS-SCNC: 7 MMOL/L (ref 4–13)
AST SERPL W P-5'-P-CCNC: 16 U/L (ref 5–45)
BILIRUB SERPL-MCNC: 0.63 MG/DL (ref 0.2–1)
BUN SERPL-MCNC: 27 MG/DL (ref 5–25)
CALCIUM SERPL-MCNC: 8.7 MG/DL (ref 8.4–10.2)
CHLORIDE SERPL-SCNC: 108 MMOL/L (ref 96–108)
CO2 SERPL-SCNC: 26 MMOL/L (ref 21–32)
CREAT SERPL-MCNC: 1 MG/DL (ref 0.6–1.3)
ERYTHROCYTE [DISTWIDTH] IN BLOOD BY AUTOMATED COUNT: 13.4 % (ref 11.6–15.1)
GLUCOSE SERPL-MCNC: 104 MG/DL (ref 65–140)
GLUCOSE SERPL-MCNC: 110 MG/DL (ref 65–140)
GLUCOSE SERPL-MCNC: 117 MG/DL (ref 65–140)
GLUCOSE SERPL-MCNC: 169 MG/DL (ref 65–140)
GLUCOSE SERPL-MCNC: 96 MG/DL (ref 65–140)
GLUCOSE SERPL-MCNC: 99 MG/DL (ref 65–140)
HCT VFR BLD AUTO: 38.5 % (ref 36.5–46.1)
HGB BLD-MCNC: 12.6 G/DL (ref 12–15.4)
MCH RBC QN AUTO: 31.2 PG (ref 26.8–34.3)
MCHC RBC AUTO-ENTMCNC: 32.7 G/DL (ref 31.4–37.4)
MCV RBC AUTO: 95 FL (ref 82–98)
PLATELET # BLD AUTO: 212 THOUSANDS/UL (ref 149–390)
PMV BLD AUTO: 9.7 FL (ref 8.9–12.7)
POTASSIUM SERPL-SCNC: 4.3 MMOL/L (ref 3.5–5.3)
PROT SERPL-MCNC: 6.7 G/DL (ref 6.4–8.4)
RBC # BLD AUTO: 4.04 MILLION/UL (ref 3.88–5.12)
SODIUM SERPL-SCNC: 141 MMOL/L (ref 135–147)
WBC # BLD AUTO: 8.68 THOUSAND/UL (ref 4.31–10.16)

## 2024-10-12 PROCEDURE — 85027 COMPLETE CBC AUTOMATED: CPT | Performed by: FAMILY MEDICINE

## 2024-10-12 PROCEDURE — 82948 REAGENT STRIP/BLOOD GLUCOSE: CPT

## 2024-10-12 PROCEDURE — 80053 COMPREHEN METABOLIC PANEL: CPT | Performed by: FAMILY MEDICINE

## 2024-10-12 PROCEDURE — 99232 SBSQ HOSP IP/OBS MODERATE 35: CPT | Performed by: PHYSICIAN ASSISTANT

## 2024-10-12 PROCEDURE — 99222 1ST HOSP IP/OBS MODERATE 55: CPT | Performed by: INTERNAL MEDICINE

## 2024-10-12 RX ORDER — ATORVASTATIN CALCIUM 40 MG/1
80 TABLET, FILM COATED ORAL DAILY
Status: DISCONTINUED | OUTPATIENT
Start: 2024-10-12 | End: 2024-10-12

## 2024-10-12 RX ORDER — DILTIAZEM HYDROCHLORIDE 120 MG/1
120 CAPSULE, COATED, EXTENDED RELEASE ORAL DAILY
Status: DISCONTINUED | OUTPATIENT
Start: 2024-10-12 | End: 2024-10-13 | Stop reason: HOSPADM

## 2024-10-12 RX ORDER — ATORVASTATIN CALCIUM 40 MG/1
80 TABLET, FILM COATED ORAL
Status: DISCONTINUED | OUTPATIENT
Start: 2024-10-12 | End: 2024-10-13 | Stop reason: HOSPADM

## 2024-10-12 RX ADMIN — INSULIN LISPRO 1 UNITS: 100 INJECTION, SOLUTION INTRAVENOUS; SUBCUTANEOUS at 13:00

## 2024-10-12 RX ADMIN — PANTOPRAZOLE SODIUM 40 MG: 40 INJECTION, POWDER, FOR SOLUTION INTRAVENOUS at 11:00

## 2024-10-12 RX ADMIN — DILTIAZEM HYDROCHLORIDE 120 MG: 120 CAPSULE, COATED, EXTENDED RELEASE ORAL at 12:35

## 2024-10-12 RX ADMIN — SODIUM CHLORIDE 75 ML/HR: 0.9 INJECTION, SOLUTION INTRAVENOUS at 15:14

## 2024-10-12 RX ADMIN — ASPIRIN 81 MG: 81 TABLET, COATED ORAL at 12:35

## 2024-10-12 RX ADMIN — TICAGRELOR 90 MG: 90 TABLET ORAL at 21:43

## 2024-10-12 RX ADMIN — ATORVASTATIN CALCIUM 80 MG: 40 TABLET, FILM COATED ORAL at 17:14

## 2024-10-12 RX ADMIN — PANTOPRAZOLE SODIUM 40 MG: 40 INJECTION, POWDER, FOR SOLUTION INTRAVENOUS at 21:43

## 2024-10-12 NOTE — DISCHARGE INSTR - AVS FIRST PAGE
Follow-up with PCP within 1 week for posthospitalization follow-up  Outpatient follow-up with neurology as previously scheduled  Please reference incidental findings listed later in these discharge instructions.  Recommend outpatient abdominal MRI and follow-up with urology and gastroenterology    Return to the emergency department for further evaluation with any chest pain/palpitations, shortness of breath, nausea/vomiting, abdominal pain, fever/chills

## 2024-10-12 NOTE — ASSESSMENT & PLAN NOTE
Incidental finding on CT  1.4 x 1.3 cm hyperdense soft tissue density lesion in upper pole of left kidney highly suspicious for renal cell carcinoma.  Recommend urology consultation &  abdominal MRI  Follow-up instructions written in patient's discharge; incidental findings note created; results reviewed with patient/wife at bedside

## 2024-10-12 NOTE — ASSESSMENT & PLAN NOTE
Recently admitted for acute CVA from 09/13-09/23   MRI brain: Punctate acute to subacute infarcts involving high left frontoparietal cortex, left parietal subcortical white matter, and posterior left cerebellum-thought embolic  C/w high intensity statin, aspirin; resume PTA Brilinta when able  Need to verify w/ neuro at D/C if dual antiplatelet therapy is still necessary as patient is out of 21-day window

## 2024-10-12 NOTE — UTILIZATION REVIEW
"Initial Clinical Review    OBSERVATION   10/11 @  2100 CHANGED TO IP ADMISSIOn  10/12 @   1319  The patient will continue to require additional inpatient hospital stay due to GI bleed     Admission: Date/Time/Statement:   Admission Orders (From admission, onward)       Ordered        10/11/24 2059  Place in Observation  Once                          10/12/24 1319  INPATIENT ADMISSION  Once        Transfer Service: Hospitalist   Question Answer Comment   Level of Care Med Surg    Estimated length of stay More than 2 Midnights    Certification I certify that inpatient services are medically necessary for this patient for a duration of greater than two midnights. See H&P and MD Progress Notes for additional information about the patient's course of treatment.        10/12/24 1318       ED Arrival Information       Expected   -    Arrival   10/11/2024 17:11    Acuity   Emergent              Means of arrival   Walk-In    Escorted by   Spouse    Service   Hospitalist    Admission type   Emergency              Arrival complaint   chest pain SOB  black stool             Chief Complaint   Patient presents with    Chest Pain     Pt states \"I went to my doctor and told them I had black stools, I'm tired, and told me I had to come to the hospital.\" Pt reports being on brilinta and aspirin with recent stroke last month.\"Reports going for walk and developed chest pain and pressure sensation. Noticed dark stools that started about a week ago.        Initial Presentation: 79 y.o. adult presents to ED  at  PCP  request from home with  dark black solid stools intermittently for past  2 weeks.  Denies tarry stools  or  BRBPR.  No hematemesis, abdominal pain, nausea or vomiting.  Has  increasing  SOB  for  past several days, electric  shooting  chest pain  while walking the day of admission.  PMH  is  NIDDM,  HTN and CVA.  Hem  + stool in ED.   Recently  initiated  DAPT.  Admit  Observation with  Melena  and plan is  monitor labs,  IV  " PPI,  GI consult,  NPO for now  and continue home meds.    10/12  IP ADMISSION  GI consult  No further melena or rectal bleeding since admission,  no BM.   No overt signs of bleeding.   Monitor  labs.   May proceed with EGD.  Can have   bland diet  today.      ED Treatment-Medication Administration from 10/11/2024 1711 to 10/11/2024 2142         Date/Time Order Dose Route Action     10/11/2024 1821 iohexol (OMNIPAQUE) 350 MG/ML injection (MULTI-DOSE) 100 mL 100 mL Intravenous Given            Scheduled Medications:  insulin lispro, 1-5 Units, Subcutaneous, Q6H KELLI  pantoprazole, 40 mg, Intravenous, Q12H      Continuous IV Infusions:  sodium chloride, 75 mL/hr, Intravenous, Continuous      PRN Meds:     ED Triage Vitals [10/11/24 1721]   Temperature Pulse Respirations Blood Pressure SpO2 Pain Score   98.2 °F (36.8 °C) 81 20 148/78 98 % 4           Vital Signs (last 3 days)       Date/Time Temp Pulse Resp BP MAP (mmHg) SpO2 O2 Device Patient Position - Orthostatic VS Pain    10/12/24 07:15:29 96.6 °F (35.9 °C) 60 -- 140/87 105 94 % -- -- --    10/11/24 2207 -- -- -- -- -- -- -- -- No Pain    10/11/24 21:55:09 -- 80 -- 146/73 97 94 % -- -- --    10/11/24 2122 -- 89 20 135/65 90 94 % None (Room air) Sitting --    10/11/24 1930 -- 62 19 177/79 113 97 % None (Room air) Sitting --    10/11/24 1900 -- 61 19 168/79 114 98 % -- -- --    10/11/24 1830 -- 73 22 163/77 110 97 % -- -- --    10/11/24 1746 -- -- -- -- -- -- None (Room air) -- --    10/11/24 1745 -- 78 20 119/65 86 95 % -- Sitting --    10/11/24 1721 98.2 °F (36.8 °C) 81 20 148/78 103 98 % None (Room air) Sitting 4              Pertinent Labs/Diagnostic Test Results:   Radiology:  CT abdomen pelvis with contrast   Final Interpretation by Jenniffer Chen MD (10/11 2035)      1) Numerous colonic diverticula, however no evidence of acute diverticulitis or other bowel inflammation.      2) Tortuous vessel in the anterior wall of the lower rectum, most likely an  internal hemorrhoid, or possibly an arteriovenous malformation.      3) 1.6 x 1.5 cm indeterminate lesion in the uncinate process of the pancreas, which may represent a complex cystic lesion, however pancreatic adenocarcinoma may have a similar appearance. Recommend further evaluation with abdominal MRI (without and with    IV contrast) if there are no contraindications and gastroenterology consultation.      4) 1.4 x 1.3 cm hyperdense soft tissue density lesion in upper pole of left kidney highly suspicious for renal cell carcinoma. This can also be confirmed on the abdominal MRI. Recommend urology consultation.      5) Narrowing of the left renal vein as it courses between the aorta and the SMA with upstream dilatation, as well as presence of adjacent collateral vessels. 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 Clinical correlation is    recommended.      6) Multiple additional findings as above.      The study was marked in EPIC for immediate notification.            Workstation performed: [id number]         XR chest 1 view portable    (Results Pending)         Results from last 7 days   Lab Units 10/12/24  0547 10/11/24  1738   WBC Thousand/uL 8.68 9.15   HEMOGLOBIN g/dL 12.6 12.3   HEMATOCRIT % 38.5 37.8   PLATELETS Thousands/uL 212 211   TOTAL NEUT ABS Thousands/µL  --  5.83         Results from last 7 days   Lab Units 10/12/24  0547 10/11/24  1738   SODIUM mmol/L 141 137   POTASSIUM mmol/L 4.3 3.9   CHLORIDE mmol/L 108 103   CO2 mmol/L 26 27   ANION GAP mmol/L 7 7   BUN mg/dL 27* 29*   CREATININE mg/dL 1.00 0.95   CALCIUM mg/dL 8.7 9.1     Results from last 7 days   Lab Units 10/12/24  0547 10/11/24  1738   AST U/L 16 18   ALT U/L 15 18   ALK PHOS U/L 88 113*   TOTAL PROTEIN g/dL 6.7 7.3   ALBUMIN g/dL 3.7 4.1   TOTAL BILIRUBIN mg/dL 0.63 0.63     Results from last 7 days   Lab Units 10/12/24  0546 10/12/24  0000   POC GLUCOSE mg/dl 96 104     Results from last 7 days   Lab Units  10/12/24  0547 10/11/24  1738   GLUCOSE RANDOM mg/dL 99 97           Results from last 7 days   Lab Units 10/11/24  1946 10/11/24  1738   HS TNI 0HR ng/L  --  6   HS TNI 2HR ng/L 6  --    HSTNI D2 ng/L 0  --          Results from last 7 days   Lab Units 10/11/24  1738   PROTIME seconds 14.9   INR  1.12   PTT seconds 32                         Results from last 7 days   Lab Units 10/11/24  1738   BNP pg/mL 38                     Results from last 7 days   Lab Units 10/11/24  1738   LIPASE u/L 24                 Results from last 7 days   Lab Units 10/11/24  2008   CLARITY UA  Clear   COLOR UA  Yellow   SPEC GRAV UA  <1.005*   PH UA  6.5   GLUCOSE UA mg/dl Negative   KETONES UA mg/dl Negative   BLOOD UA  Negative   PROTEIN UA mg/dl Negative   NITRITE UA  Negative   BILIRUBIN UA  Negative   UROBILINOGEN UA (BE) mg/dl <2.0   LEUKOCYTES UA  Negative               Present on Admission:   Cerebrovascular disease   Essential hypertension   Nonrheumatic aortic valve stenosis   Hypercholesterolemia   Type 2 diabetes mellitus without complication (HCC)      Admitting Diagnosis: Rectal bleeding [K62.5]  Chest pain [R07.9]  Enlarged prostate [N40.0]  Abnormal CT scan [R93.89]  Left renal mass [N28.89]  Pancreatic lesion [K86.9]  Age/Sex: 79 y.o. adult    Network Utilization Review Department  ATTENTION: Please call with any questions or concerns to [phone] and carefully listen to the prompts so that you are directed to the right person. All voicemails are confidential.   For Discharge needs, contact Care Management DC Support Team at [phone] opt. 2  Send all requests for admission clinical reviews, approved or denied determinations and any other requests to dedicated fax number below belonging to the campus where the patient is receiving treatment. List of dedicated fax numbers for the Facilities:  FACILITY NAME UR FAX NUMBER   ADMISSION DENIALS (Administrative/Medical Necessity) [phone]   DISCHARGE SUPPORT TEAM  (NETWORK) [phone]   PARENT CHILD HEALTH (Maternity/NICU/Pediatrics) [phone]   [facility] [phone]   [facility] [phone]   [facility] [phone]   [facility] [phone]   [facility] [phone]   [facility] [phone]   [facility] [phone]   [facility] [phone]   [facility] [phone]   [facility] [phone]   [facility] [phone]   [facility] [phone]

## 2024-10-12 NOTE — ASSESSMENT & PLAN NOTE
Presented to ED w/ melenic stool reported 3-4 BMs daily x 1-1.5 wks; denies BM since admit   Hemoccult positive in ED   W/ recent CVA in 09/2024 pt was on dual antiplatelet therapy PTA  Aspirin x 21 days (which he was still taking)   His Plavix changed to Brilinta on 09/17/24   GI following; appreciate continued recommendations  Ok to restart aspirin; continue to hold Brilinta  Possible EGD to evaluate for erosive disease after Brilinta washed out   Trend Hgb (baseline 13-14); transfuse PRN   C/w IV PPI BID   [place] diet     Recent Labs     10/11/24  1738 10/12/24  0547   HGB 12.3 12.6

## 2024-10-12 NOTE — ASSESSMENT & PLAN NOTE
Lab Results   Component Value Date    HGBA1C 6.3 (H) 09/14/2024       Recent Labs     10/12/24  0000   POCGLU 104       Blood Sugar Average: Last 72 hrs:  (P) 104Hemoglobin A1c 6.3 indicating adequate outpatient control/compliance  Patient observes dietary and lifestyle modification in the outpatient setting  Sliding scale insulin while hospitalized  Chronic condition/stable

## 2024-10-12 NOTE — ASSESSMENT & PLAN NOTE
"Patient reports this evening (10/11) from home with reports of intermittent black stools over the past 2 weeks  Patient reports stools are formed but black-denies taking iron or eating red beets/red food coloring  Patient denies associated abdominal pain, nausea, vomiting, diarrhea, bright red blood per rectum  Patient denies ever having the symptoms before-states presented to PCP who referred to ED secondary to symptoms  Patient notes he has developed some shortness of breath with exertion  Patient reports also \"electric shooting\" chest pain that was self-limited while taking a walk today    In ED, patient hemodynamically stable, in absence of SIRS criteria, without oxygen requirement  Had episode of stooling which was brown in nature however Hemoccult positive  Hemoglobin without significant deviation from baseline-12  Cardiac enzymes without significant elevation/delta-EKG without ischemic change  Referred for observation secondary to DAPT use and report of black stools with Hemoccult positive    Plan:  Observe under hospitalist service  Consultation placed to gastroenterology  Twice daily IV PPI for now  N.p.o. for now  Check hemoglobin in the morning  Low index of suspicion for significant hemorrhagic event given stable hemoglobin and hemodynamic stability    "

## 2024-10-12 NOTE — ASSESSMENT & PLAN NOTE
Patient recently hospitalized (9/13/2024 - 9/23/2024)  MRI (9/29/2024)-punctate acute to subacute infarcts involving high left frontoparietal cortex, left parietal subcortical white matter, and posterior left cerebellum-thought embolic  Evaluated/followed by neurology over the course of hospitalization with initiation of DAPT-aspirin/Marichuy  Initiated on high intensity statin  Patient without neurologic deficit on presentation  Recent initiation of DAPT risk factor for slow GI bleed-see above  Hold aspirin and Brilinta pending GI evaluation

## 2024-10-12 NOTE — PLAN OF CARE
Problem: Potential for Falls  Goal: Patient will remain free of falls  Description: INTERVENTIONS:  - Educate patient/family on patient safety including physical limitations  - Instruct patient to call for assistance with activity   - Consult OT/PT to assist with strengthening/mobility   - Keep Call bell within reach  - Keep bed low and locked with side rails adjusted as appropriate  - Keep care items and personal belongings within reach  - Initiate and maintain comfort rounds  - Make Fall Risk Sign visible to staff  - Offer Toileting every 2 Hours, in advance of need  - Initiate/Maintain alarm  - Obtain necessary fall risk management equipment  - Apply yellow socks and bracelet for high fall risk patients  - Consider moving patient to room near nurses station  Outcome: Progressing     Problem: PAIN - ADULT  Goal: Verbalizes/displays adequate comfort level or baseline comfort level  Description: Interventions:  - Encourage patient to monitor pain and request assistance  - Assess pain using appropriate pain scale  - Administer analgesics based on type and severity of pain and evaluate response  - Implement non-pharmacological measures as appropriate and evaluate response  - Consider cultural and social influences on pain and pain management  - Notify physician/advanced practitioner if interventions unsuccessful or patient reports new pain  Outcome: Progressing     Problem: DISCHARGE PLANNING  Goal: Discharge to home or other facility with appropriate resources  Description: INTERVENTIONS:  - Identify barriers to discharge w/patient and caregiver  - Arrange for needed discharge resources and transportation as appropriate  - Identify discharge learning needs (meds, wound care, etc.)  - Arrange for interpretive services to assist at discharge as needed  - Refer to Case Management Department for coordinating discharge planning if the patient needs post-hospital services based on physician/advanced practitioner order or  complex needs related to functional status, cognitive ability, or social support system  Outcome: Progressing     Problem: Knowledge Deficit  Goal: Patient/family/caregiver demonstrates understanding of disease process, treatment plan, medications, and discharge instructions  Description: Complete learning assessment and assess knowledge base.  Interventions:  - Provide teaching at level of understanding  - Provide teaching via preferred learning methods  Outcome: Progressing     Problem: HEMATOLOGIC - ADULT  Goal: Maintains hematologic stability  Description: INTERVENTIONS  - Assess for signs and symptoms of bleeding or hemorrhage  - Monitor labs  - Administer supportive blood products/factors as ordered and appropriate  Outcome: Progressing

## 2024-10-12 NOTE — CONSULTS
Consultation - GI   Tevin Bazan 79 y.o. adult MRN: [id number]  Unit/Bed#: -01 Encounter: [id number]      Assessment & Plan   79-year-old male with past medical history including DM2, HTN, hyperlipidemia, PAD including carotid stenosis s/p CEA, CAD and multiple prior CVAs, most recent 9/2024 with MRI showing small infarct right frontotemporal area likely due to small vessel disease  Has been on dual antiplatelet therapy for several years.  Plavix recently changed to Brilinta 9/17/2024.  Treated with aspirin 81 mg x 21 days, still taking  Presented to ED with 1 week history of melena with 3-4 bowel movements daily.  Denies bright red blood per rectum.  No abdominal pain, no epigastric pain  Hemoglobin stable at 12.6 with baseline 13-14  Last Brilinta was 10/11 AM    1.  Melena  No further melena or rectal bleeding since admission, no BM  Hemoglobin stable  No overt signs of GI bleeding but may have slow bleeding in setting of antiplatelet therapy    -Okay for bland diet today  -Trend hemoglobin, transfuse to keep greater than 7.0  -Agree with pantoprazole 40 mg twice daily  -Recommend resuming Brilinta today and monitor for further melena or signs of active bleeding  -Okay to continue aspirin 81 mg daily  -Consider proceeding with EGD to evaluate for erosive disease if recurrent melena or drop in hemoglobin once Brilinta resumed    2.  History of CVA with recent acute CVA  Recent hospitalization with dysarthria  MRI with small frontotemporal infarct  Antiplatelet therapy changed from Plavix to Brilinta  Last dose of Brilinta 10/11 AM    Inpatient consult to gastroenterology  Consult performed by: ANNIE Nur  Consult ordered by: ANNIE Nur          Physician Requesting Consult: Stefan Jeffries MD  Reason for Consult / Principal Problem: Melena    HPI: Tevin Bazan is a 79 y.o. year old adult with PMH including DM2, HTN, HLD, CAD, s/p carotid endarterectomy 2021, prior CVA  and TIAs 2003 in 2021.  Recent [facility]'s admission 9/2024 with acute onset of dysarthria, initial CT of the head was unremarkable for acute intracranial abnormalities, received TNK.  MRI of the brain confirmed small infarct right fronto temporal area likely related to small vessel disease  Dual antiplatelet therapy was continued however he was switched to Brilinta from Plavix and continued aspirin 81 mg  Presented to ED with 1 week history of black tarry stools with 3-4 bowel movements daily  Hemoglobin stable in ED at 12.3 and 12.6 this a.m., baseline hemoglobin 13-14    No bowel movement since admission, no rectal bleeding  Patient denies history of PUD, currently takes famotidine 40 mg daily for GERD  Denies epigastric pain, dysphagia, nausea or vomiting.  No recent change in appetite  No exacerbation of reflux symptoms  Denies prior EGD  Remote colonoscopy greater than 10 years ago        Review of Systems   Constitutional:  Positive for fatigue.   HENT:  Negative for trouble swallowing.    Respiratory: Negative.     Cardiovascular: Negative.    Gastrointestinal:  Negative for abdominal pain.        Reports black stool x 1 week   Genitourinary: Negative.    Musculoskeletal: Negative.    Skin: Negative.    Neurological:  Positive for speech difficulty and weakness.   Hematological: Negative.    Psychiatric/Behavioral: Negative.           Historical Information   Past Medical History:   Diagnosis Date    Chronic bilateral low back pain without sciatica 7/17/2020    Hypertension     Scoliosis     Stroke (HCC)     Stroke-like symptoms 11/22/2021     Past Surgical History:   Procedure Laterality Date    BACK SURGERY      BOWEL RESECTION      CARDIAC CATHETERIZATION N/A 2/26/2024    Procedure: Cardiac Coronary Angiogram;  Surgeon: Rivas Cantor MD;  Location: [facility];  Service: Cardiology    CARDIAC CATHETERIZATION  2/26/2024    Procedure: Cardiac catheterization;  Surgeon: Rivas Cantor MD;  Location:  [facility];  Service: Cardiology    CARDIAC CATHETERIZATION N/A 2024    Procedure: Cardiac pci;  Surgeon: Rivas Cantor MD;  Location: [facility];  Service: Cardiology    OH TEAEC W/PATCH GRF CAROTID VERTB SUBCLAV NECK INC Left 2021    Procedure: ENDARTERECTOMY ARTERY CAROTID;  Surgeon: Vito Mo MD;  Location: [facility];  Service: Vascular    TONSILLECTOMY       Social History   Social History     Substance and Sexual Activity   Alcohol Use Not Currently     Social History     Substance and Sexual Activity   Drug Use No     Social History     Tobacco Use   Smoking Status Former    Current packs/day: 0.00    Types: Cigarettes    Quit date:     Years since quittin.8   Smokeless Tobacco Never     Family History   Adopted: Yes   Family history unknown: Yes       Meds/Allergies     Current Facility-Administered Medications:     insulin lispro (HumALOG/ADMELOG) 100 units/mL subcutaneous injection 1-5 Units, Q6H KELLI **AND** Fingerstick Glucose (POCT), Q6H    pantoprazole (PROTONIX) injection 40 mg, Q12H    sodium chloride 0.9 % infusion, Continuous, Last Rate: 75 mL/hr (10/11/24 7654)    Medications Prior to Admission:     Ascorbic Acid (vitamin C) 1000 MG tablet    acetaminophen (TYLENOL) 500 mg tablet    albuterol (Proventil HFA) 90 mcg/act inhaler    aspirin (ECOTRIN LOW STRENGTH) 81 mg EC tablet    atorvastatin (LIPITOR) 80 mg tablet    Chelated Zinc 50 MG TABS    diltiazem (TIAZAC) 120 MG 24 hr capsule    famotidine (PEPCID) 40 MG tablet    Omega 3 1000 MG CAPS    ticagrelor (BRILINTA) 90 MG    Allergies   Allergen Reactions    Black Cohosh Rash    Minoxidil Rash           Physical Exam   Constitutional: Mild dysarthria and left mouth droop noted  HENT: WNL  Head: Normocephalic and atraumatic.   Eyes: Anicteric  Neck: Normal range of motion. Neck supple.   Cardiovascular: Normal rate and regular rhythm.    Pulmonary/Chest: Effort normal and breath sounds normal.   Abdominal:  Soft, nondistended, nontender with palpation. Bowel sounds are normal.   Musculoskeletal: Normal range of motion.   Extremities:  No edema  Neurological: alert and oriented to person, place, and time.   Skin: Skin is warm and dry.   Psychiatric: normal mood and affect.       Lab Results:   Admission on 10/11/2024   Component Date Value    Ventricular Rate 10/11/2024 85     Atrial Rate 10/11/2024 85     IN Interval 10/11/2024 152     QRSD Interval 10/11/2024 80     QT Interval 10/11/2024 364     QTC Interval 10/11/2024 433     P Axis 10/11/2024 41     QRS Axis 10/11/2024 36     T Wave [place] 10/11/2024 51     WBC 10/11/2024 9.15     RBC 10/11/2024 3.96     Hemoglobin 10/11/2024 12.3     Hematocrit 10/11/2024 37.8     MCV 10/11/2024 96     MCH 10/11/2024 31.1     MCHC 10/11/2024 32.5     RDW 10/11/2024 13.5     MPV 10/11/2024 9.5     Platelets 10/11/2024 211     nRBC 10/11/2024 0     Segmented % 10/11/2024 63     Immature Grans % 10/11/2024 1     Lymphocytes % 10/11/2024 21     Monocytes % 10/11/2024 12     Eosinophils Relative 10/11/2024 3     Basophils Relative 10/11/2024 0     Absolute Neutrophils 10/11/2024 5.83     Absolute Immature Grans 10/11/2024 0.06     Absolute Lymphocytes 10/11/2024 1.91     Absolute Monocytes 10/11/2024 1.06     Eosinophils Absolute 10/11/2024 0.25     Basophils Absolute 10/11/2024 0.04     Protime 10/11/2024 14.9     INR 10/11/2024 1.12     PTT 10/11/2024 32     ABO Grouping 10/11/2024 O     Rh Factor 10/11/2024 Positive     Antibody Screen 10/11/2024 Negative     Specimen Expiration Date 10/11/2024 [id number]     Sodium 10/11/2024 137     Potassium 10/11/2024 3.9     Chloride 10/11/2024 103     CO2 10/11/2024 27     ANION GAP 10/11/2024 7     BUN 10/11/2024 29 (H)     Creatinine 10/11/2024 0.95     Glucose 10/11/2024 97     Calcium 10/11/2024 9.1     AST 10/11/2024 18     ALT 10/11/2024 18     Alkaline Phosphatase 10/11/2024 113 (H)     Total Protein 10/11/2024 7.3     Albumin 10/11/2024  4.1     Total Bilirubin 10/11/2024 0.63     Lipase 10/11/2024 24     hs TnI 0hr 10/11/2024 6     Color, UA 10/11/2024 Yellow     Clarity, UA 10/11/2024 Clear     Specific Gravity, UA 10/11/2024 <1.005 (L)     pH, UA 10/11/2024 6.5     Leukocytes, UA 10/11/2024 Negative     Nitrite, UA 10/11/2024 Negative     Protein, UA 10/11/2024 Negative     Glucose, UA 10/11/2024 Negative     Ketones, UA 10/11/2024 Negative     Urobilinogen, UA 10/11/2024 <2.0     Bilirubin, UA 10/11/2024 Negative     Occult Blood, UA 10/11/2024 Negative     BNP 10/11/2024 38     hs TnI 2hr 10/11/2024 6     Delta 2hr hsTnI 10/11/2024 0     POC Glucose 10/12/2024 104     WBC 10/12/2024 8.68     RBC 10/12/2024 4.04     Hemoglobin 10/12/2024 12.6     Hematocrit 10/12/2024 38.5     MCV 10/12/2024 95     MCH 10/12/2024 31.2     MCHC 10/12/2024 32.7     RDW 10/12/2024 13.4     Platelets 10/12/2024 212     MPV 10/12/2024 9.7     Sodium 10/12/2024 141     Potassium 10/12/2024 4.3     Chloride 10/12/2024 108     CO2 10/12/2024 26     ANION GAP 10/12/2024 7     BUN 10/12/2024 27 (H)     Creatinine 10/12/2024 1.00     Glucose 10/12/2024 99     Calcium 10/12/2024 8.7     AST 10/12/2024 16     ALT 10/12/2024 15     Alkaline Phosphatase 10/12/2024 88     Total Protein 10/12/2024 6.7     Albumin 10/12/2024 3.7     Total Bilirubin 10/12/2024 0.63     POC Glucose 10/12/2024 96      Imaging Studies:     EKG, Pathology, and Other Studies:   Counseling / Coordination of Care  Total floor / unit time spent today 45 minutes.

## 2024-10-12 NOTE — INCIDENTAL FINDINGS
The following findings require follow up:  Radiographic finding   Finding:     1.4 x 1.3 cm hyperdense soft tissue density lesion in upper pole of left kidney highly suspicious for renal cell carcinoma.  Recommend urology consultation &  abdominal MRI   1.6 x 1.5 cm indeterminate lesion in the uncinate process of the pancreas, which may represent a complex cystic lesion, however pancreatic adenocarcinoma may have a similar appearance  Recommend further evaluation with abdominal MRI & gastroenterology consultation   Narrowing of the left renal vein as it courses between the aorta and the SMA with upstream dilatation, as well as presence of adjacent collateral vessels.   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 HUDSON CARTY [[id number]]     Incidental finding results were discussed with the Patient and wife at bedside by Zuleima Gaitan PA-C on 10/12/24.   They expressed understanding and all questions answered.

## 2024-10-12 NOTE — PROGRESS NOTES
"Progress Note - Hospitalist   Name: Tevin Bazan 79 y.o. adult I MRN: [id number]  Unit/Bed#: [facility] I Date of Admission: 10/11/2024   Date of Service: 10/12/2024 I Hospital Day: 0    Assessment & Plan  Melena  Presented to ED w/ melenic stool reported 3-4 BMs daily x 1-1.5 wks; denies BM since admit   Hemoccult positive in ED   W/ recent CVA in 09/2024 pt was on dual antiplatelet therapy PTA  Aspirin x 21 days (which he was still taking)   His Plavix changed to Brilinta on 09/17/24   GI following; appreciate continued recommendations  Ok to restart aspirin; continue to hold Brilinta  Possible EGD to evaluate for erosive disease after Brilinta washed out   Trend Hgb (baseline 13-14); transfuse PRN   C/w IV PPI BID   [place] diet     Recent Labs     10/11/24  1738 10/12/24  0547   HGB 12.3 12.6     Cerebrovascular disease  Recently admitted for acute CVA from 09/13-09/23   MRI brain: Punctate acute to subacute infarcts involving high left frontoparietal cortex, left parietal subcortical white matter, and posterior left cerebellum-thought embolic  C/w high intensity statin, aspirin; resume PTA Brilinta when able  Need to verify w/ neuro at D/C if dual antiplatelet therapy is still necessary as patient is out of 21-day window  Chest pain  Reported \"electric shooting\" chest pain that was self-limited while taking a walk on day of admit  Denies any current symptoms of CP/unstable angina  Cardiac enzymes w/o elevation  ECG w/o ischemic change  No abnormalities on telemetry  C/w aspirin & statin, resume Brilinta when able  Essential hypertension  Normotensive on presentation  Chronic condition/stable  Continue home regimen-diltiazem 120 mg p.o. daily  Nonrheumatic aortic valve stenosis  Noted; under volume status  Hypercholesterolemia  C/w atorvastatin 80 mg   Type 2 diabetes mellitus without complication (HCC)  Lab Results   Component Value Date    HGBA1C 6.3 (H) 09/14/2024     Recent Labs     10/12/24  0000 " 10/12/24  0546   POCGLU 104 96     Blood Sugar Average: Last 72 hrs:  (P) 100  C/w SSI; encourage dietary control    Kidney lesion  Incidental finding on CT  1.4 x 1.3 cm hyperdense soft tissue density lesion in upper pole of left kidney highly suspicious for renal cell carcinoma.  Recommend urology consultation &  abdominal MRI  Follow-up instructions written in patient's discharge; incidental findings note created; results reviewed with patient/wife at bedside  Pancreatic lesion  Incidental finding on CT   1.6 x 1.5 cm indeterminate lesion in the uncinate process of the pancreas, which may represent a complex cystic lesion, however pancreatic adenocarcinoma may have a similar appearance  Recommend further evaluation with abdominal MRI & gastroenterology consultation   Follow-up instructions written in patient's discharge; incidental findings note created; results reviewed with patient/wife at bedside  Nutcracker phenomenon of renal vein  Incidental finding on CT  Narrowing of the left renal vein as it courses between the aorta and the SMA with upstream dilatation, as well as presence of adjacent collateral vessels.   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 Sequential Compression Devices Ordered.    Mobility:   Basic Mobility Inpatient Raw Score: 18  JH-HLM Goal: 6: Walk 10 steps or more  JH-HLM Achieved: 6: Walk 10 steps or more  JH-HLM Goal achieved. Continue to encourage appropriate mobility.    Patient Centered Rounds: I performed bedside rounds with nursing staff today.   Discussions with Specialists or Other Care Team Provider: Case reviewed w/ GI    Education and Discussions with Family / Patient: Updated  (wife) at  bedside.    Current Length of Stay: 0 day(s)  Current Patient Status: Observation   Certification Statement: The patient will continue to require additional inpatient hospital stay due to GI bleed  Discharge Plan: Anticipate discharge in 48-72 hrs to home.    Code Status: Level 1 - Full Code    Subjective   Patient is doing okay.  He notes he has not had a bloody bowel movement since admission.  He denies dark-colored stools since admission.  He is without abdominal pain.  He is tolerating p.o. intake.  He denies chest pain, shortness of breath, diaphoresis, sweating.    Objective :  Temp:  [96.6 °F (35.9 °C)-98.2 °F (36.8 °C)] 96.6 °F (35.9 °C)  HR:  [60-89] 60  BP: (119-177)/(65-87) 140/87  Resp:  [19-22] 20  SpO2:  [94 %-98 %] 94 %  O2 Device: None (Room air)    There is no height or weight on file to calculate BMI.     Input and Output Summary (last 24 hours):     Intake/Output Summary (Last 24 hours) at 10/12/2024 1117  Last data filed at 10/12/2024 0601  Gross per 24 hour   Intake 0 ml   Output 200 ml   Net -200 ml       Physical Exam  Constitutional:       General: Danielle is not in acute distress.     Appearance: Danielle is not toxic-appearing.      Comments: Somewhat frail, elderly, pleasant   HENT:      Head: Normocephalic.      Right Ear: External ear normal.      Left Ear: External ear normal.      Nose: Nose normal.      Mouth/Throat:      Mouth: Mucous membranes are moist.      Pharynx: Oropharynx is clear.   Eyes:      Extraocular Movements: Extraocular movements intact.      Conjunctiva/sclera: Conjunctivae normal.   Cardiovascular:      Rate and Rhythm: Normal rate and regular rhythm.      Heart sounds: Normal heart sounds.   Pulmonary:      Effort: Pulmonary effort is normal.      Breath sounds: Normal breath sounds.   Abdominal:      General: Abdomen is flat. Bowel sounds are normal. There is no distension.      Palpations: Abdomen is soft.      Tenderness: There is no abdominal tenderness. There is  no guarding.   Musculoskeletal:         General: No swelling or deformity. Normal range of motion.      Cervical back: Normal range of motion.   Skin:     General: Skin is warm and dry.   Neurological:      General: No focal deficit present.      Mental Status: Danielle is alert. Mental status is at baseline.   Psychiatric:         Mood and Affect: Mood normal.         Lines/Drains:              Lab Results: I have reviewed the following results:   Results from last 7 days   Lab Units 10/12/24  0547 10/11/24  1738   WBC Thousand/uL 8.68 9.15   HEMOGLOBIN g/dL 12.6 12.3   HEMATOCRIT % 38.5 37.8   PLATELETS Thousands/uL 212 211   SEGS PCT %  --  63   LYMPHO PCT %  --  21   MONO PCT %  --  12   EOS PCT %  --  3     Results from last 7 days   Lab Units 10/12/24  0547   SODIUM mmol/L 141   POTASSIUM mmol/L 4.3   CHLORIDE mmol/L 108   CO2 mmol/L 26   BUN mg/dL 27*   CREATININE mg/dL 1.00   ANION GAP mmol/L 7   CALCIUM mg/dL 8.7   ALBUMIN g/dL 3.7   TOTAL BILIRUBIN mg/dL 0.63   ALK PHOS U/L 88   ALT U/L 15   AST U/L 16   GLUCOSE RANDOM mg/dL 99     Results from last 7 days   Lab Units 10/11/24  1738   INR  1.12     Results from last 7 days   Lab Units 10/12/24  0546 10/12/24  0000   POC GLUCOSE mg/dl 96 104               Recent Cultures (last 7 days):         Imaging Results Review: I reviewed radiology reports from this admission including: CT abdomen/pelvis.  Other Study Results Review: No additional pertinent studies reviewed.    Last 24 Hours Medication List:     Current Facility-Administered Medications:     aspirin (ECOTRIN LOW STRENGTH) EC tablet 81 mg, Daily    insulin lispro (HumALOG/ADMELOG) 100 units/mL subcutaneous injection 1-5 Units, Q6H KELLI **AND** Fingerstick Glucose (POCT), Q6H    pantoprazole (PROTONIX) injection 40 mg, Q12H    sodium chloride 0.9 % infusion, Continuous, Last Rate: 75 mL/hr (10/11/24 2236)    Administrative Statements   Today, Patient Was Seen By: Zuleima Gaitan PA-C  I have spent a total  time of 40 minutes in caring for this patient on the day of the visit/encounter including Diagnostic results, Prognosis, Risks and benefits of tx options, Instructions for management, Patient and family education, Importance of tx compliance, Risk factor reductions, Impressions, Counseling / Coordination of care, Documenting in the medical record, Reviewing / ordering tests, medicine, procedures  , Obtaining or reviewing history  , and Communicating with other healthcare professionals .    **Please Note: This note may have been constructed using a voice recognition system.**

## 2024-10-12 NOTE — ASSESSMENT & PLAN NOTE
Incidental finding on CT  Narrowing of the left renal vein as it courses between the aorta and the SMA with upstream dilatation, as well as presence of adjacent collateral vessels.   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

## 2024-10-12 NOTE — ASSESSMENT & PLAN NOTE
Lab Results   Component Value Date    HGBA1C 6.3 (H) 09/14/2024     Recent Labs     10/12/24  0000 10/12/24  0546   POCGLU 104 96     Blood Sugar Average: Last 72 hrs:  (P) 100  C/w SSI; encourage dietary control

## 2024-10-12 NOTE — ASSESSMENT & PLAN NOTE
"Reported \"electric shooting\" chest pain that was self-limited while taking a walk on day of admit  Denies any current symptoms of CP/unstable angina  Cardiac enzymes w/o elevation  ECG w/o ischemic change  No abnormalities on telemetry  C/w aspirin & statin, resume Brilinta when able  "

## 2024-10-12 NOTE — ASSESSMENT & PLAN NOTE
Incidental finding on CT   1.6 x 1.5 cm indeterminate lesion in the uncinate process of the pancreas, which may represent a complex cystic lesion, however pancreatic adenocarcinoma may have a similar appearance  Recommend further evaluation with abdominal MRI & gastroenterology consultation   Follow-up instructions written in patient's discharge; incidental findings note created; results reviewed with patient/wife at bedside

## 2024-10-12 NOTE — H&P
"H&P - Hospitalist   Name: Tevin Bazan 79 y.o. adult I MRN: [id number]  Unit/Bed#: -01 I Date of Admission: 10/11/2024   Date of Service: 10/12/2024 I Hospital Day: 0     Assessment & Plan  Melena  Patient reports this evening (10/11) from home with reports of intermittent black stools over the past 2 weeks  Patient reports stools are formed but black-denies taking iron or eating red beets/red food coloring  Patient denies associated abdominal pain, nausea, vomiting, diarrhea, bright red blood per rectum  Patient denies ever having the symptoms before-states presented to PCP who referred to ED secondary to symptoms  Patient notes he has developed some shortness of breath with exertion  Patient reports also \"electric shooting\" chest pain that was self-limited while taking a walk today    In ED, patient hemodynamically stable, in absence of SIRS criteria, without oxygen requirement  Had episode of stooling which was brown in nature however Hemoccult positive  Hemoglobin without significant deviation from baseline-12  Cardiac enzymes without significant elevation/delta-EKG without ischemic change  Referred for observation secondary to DAPT use and report of black stools with Hemoccult positive    Plan:  Observe under hospitalist service  Consultation placed to gastroenterology  Twice daily IV PPI for now  N.p.o. for now  Check hemoglobin in the morning  Low index of suspicion for significant hemorrhagic event given stable hemoglobin and hemodynamic stability    Cerebrovascular disease  Patient recently hospitalized (9/13/2024 - 9/23/2024)  MRI (9/29/2024)-punctate acute to subacute infarcts involving high left frontoparietal cortex, left parietal subcortical white matter, and posterior left cerebellum-thought embolic  Evaluated/followed by neurology over the course of hospitalization with initiation of DAPT-aspirin/[place]  Initiated on high intensity statin  Patient without neurologic deficit on " "presentation  Recent initiation of DAPT risk factor for slow GI bleed-see above  Hold aspirin and Brilinta pending GI evaluation    Essential hypertension  Normotensive on presentation  Chronic condition/stable  Continue home regimen-diltiazem 120 mg p.o. daily  Nonrheumatic aortic valve stenosis  Chronic condition/stable  Hypercholesterolemia  Chronic condition/stable  Continue atorvastatin 80 mg p.o. daily  Type 2 diabetes mellitus without complication (HCC)  Lab Results   Component Value Date    HGBA1C 6.3 (H) 09/14/2024       Recent Labs     10/12/24  0000   POCGLU 104       Blood Sugar Average: Last 72 hrs:  (P) 104Hemoglobin A1c 6.3 indicating adequate outpatient control/compliance  Patient observes dietary and lifestyle modification in the outpatient setting  Sliding scale insulin while hospitalized  Chronic condition/stable        VTE Pharmacologic Prophylaxis:   High Risk (Score >/= 5) - Pharmacological DVT Prophylaxis Contraindicated. Sequential Compression Devices Ordered.  Code Status: Level 1 - Full Code   Discussion with family: Patient declined call to .     Anticipated Length of Stay: Patient will be admitted on an observation basis with an anticipated length of stay of less than 2 midnights secondary to melena.    History of Present Illness   Chief Complaint: Radha Bazan is a 79 y.o. adult with a PMH of non-insulin-dependent diabetes, hypertension, hyperlipidemia, cerebrovascular disease who presents with complaints of 2 to 3 weeks of black stools.    Patient reports \"dark black\" solid stools intermittently over the past 2 weeks.  Denies tarry stools or bright red blood per rectum.  Denies hematemesis, abdominal pain or cramping, nausea, vomiting.  Denies eating red beets or using iron.  Denies use of red food coloring.    Patient notes increasing shortness of breath over the past several days\" electric shooting\" chest pain during a walk today which was self-limited.  " PCP referred to emergency department.    In ED, patient hemodynamically stable, without oxygen requirement, hemoglobin at baseline.  However, Hemoccult positive.  At risk for GI bleed given recent initiation of DAPT.  Referred for observation.    Review of Systems   All other systems reviewed and are negative.      Historical Information   Past Medical History:   Diagnosis Date    Chronic bilateral low back pain without sciatica 2020    Hypertension     Scoliosis     Stroke (HCC)     Stroke-like symptoms 2021     Past Surgical History:   Procedure Laterality Date    BACK SURGERY      BOWEL RESECTION      CARDIAC CATHETERIZATION N/A 2024    Procedure: Cardiac Coronary Angiogram;  Surgeon: Rivas Cantor MD;  Location: [facility];  Service: Cardiology    CARDIAC CATHETERIZATION  2024    Procedure: Cardiac catheterization;  Surgeon: Rivas Cantor MD;  Location: [facility];  Service: Cardiology    CARDIAC CATHETERIZATION N/A 2024    Procedure: Cardiac pci;  Surgeon: Rivas Cantor MD;  Location: [facility];  Service: Cardiology    DC TEAEC W/PATCH GRF CAROTID VERTB SUBCLAV NECK INC Left 2021    Procedure: ENDARTERECTOMY ARTERY CAROTID;  Surgeon: Vito Mo MD;  Location: [facility];  Service: Vascular    TONSILLECTOMY       Social History     Tobacco Use    Smoking status: Former     Current packs/day: 0.00     Types: Cigarettes     Quit date:      Years since quittin.8    Smokeless tobacco: Never   Vaping Use    Vaping status: Never Used   Substance and Sexual Activity    Alcohol use: Not Currently    Drug use: No    Sexual activity: Not on file     E-Cigarette/Vaping    E-Cigarette Use Never User      E-Cigarette/Vaping Substances    Nicotine No     THC No     CBD No     Flavoring No     Other No     Unknown No      Family history non-contributory  Social History:  Marital Status: /Civil Union   Meds/Allergies   Medications Prior to Admission    Medication Sig Dispense Refill Last Dispense    Ascorbic Acid (vitamin C) 1000 MG tablet every 24 hours   Unknown (patient-reported)    acetaminophen (TYLENOL) 500 mg tablet Take 1,000 mg by mouth every 6 (six) hours as needed for mild pain (Patient not taking: Reported on 10/11/2024)   Unknown (patient-reported)    albuterol (Proventil HFA) 90 mcg/act inhaler Inhale 1-2 puffs every 6 (six) hours as needed for wheezing 18 g 0 Unknown (outside pharmacy)    aspirin (ECOTRIN LOW STRENGTH) 81 mg EC tablet Take 1 tablet (81 mg total) by mouth daily for 21 days 21 tablet 0 Unknown (outside pharmacy)    atorvastatin (LIPITOR) 80 mg tablet Take 1 tablet (80 mg total) by mouth daily 30 tablet 3 Unknown (outside pharmacy)    Chelated Zinc 50 MG TABS every 24 hours   Unknown (patient-reported)    diltiazem (TIAZAC) 120 MG 24 hr capsule Take 120 mg by mouth daily   Unknown (patient-reported)    famotidine (PEPCID) 40 MG tablet 2 (two) times a day as needed   Unknown (patient-reported)    Omega 3 1000 MG CAPS 1 capsule Every 12 hours (Patient not taking: Reported on 10/11/2024)   Unknown (patient-reported)    ticagrelor (BRILINTA) 90 MG Take 1 tablet (90 mg total) by mouth every 12 (twelve) hours 60 tablet 2 Unknown (outside pharmacy)     Allergies   Allergen Reactions    Black Cohosh Rash    Minoxidil Rash       Objective :  Temp:  [98.2 °F (36.8 °C)] 98.2 °F (36.8 °C)  HR:  [61-89] 80  BP: (119-177)/(65-79) 146/73  Resp:  [19-22] 20  SpO2:  [94 %-98 %] 94 %  O2 Device: None (Room air)    Physical Exam  Constitutional:       General: Danielle is not in acute distress.     Appearance: Normal appearance. Danielle is not ill-appearing, toxic-appearing or diaphoretic.   HENT:      Head: Normocephalic and atraumatic.      Nose: Nose normal.      Mouth/Throat:      Mouth: Mucous membranes are moist.      Pharynx: Oropharynx is clear.   Eyes:      General: No scleral icterus.     Conjunctiva/sclera: Conjunctivae normal.   Cardiovascular:       Rate and Rhythm: Normal rate and regular rhythm.      Pulses: Normal pulses.      Heart sounds: No murmur heard.     No gallop.   Pulmonary:      Effort: Pulmonary effort is normal.      Breath sounds: No rales.   Chest:      Chest wall: No tenderness.   Abdominal:      General: Abdomen is flat. Bowel sounds are normal. There is no distension.      Palpations: Abdomen is soft.      Tenderness: There is no guarding.   Musculoskeletal:         General: Normal range of motion.      Right lower leg: No edema.      Left lower leg: No edema.   Skin:     General: Skin is warm.      Capillary Refill: Capillary refill takes less than 2 seconds.      Findings: No lesion or rash.   Neurological:      General: No focal deficit present.      Mental Status: Danielle is alert and oriented to person, place, and time. Mental status is at baseline.   Psychiatric:         Mood and Affect: Mood normal.         Behavior: Behavior normal.         Thought Content: Thought content normal.         Judgment: Judgment normal.         Lines/Drains:            Lab Results: I have reviewed the following results:  Results from last 7 days   Lab Units 10/11/24  1738   WBC Thousand/uL 9.15   HEMOGLOBIN g/dL 12.3   HEMATOCRIT % 37.8   PLATELETS Thousands/uL 211   SEGS PCT % 63   LYMPHO PCT % 21   MONO PCT % 12   EOS PCT % 3     Results from last 7 days   Lab Units 10/11/24  1738   SODIUM mmol/L 137   POTASSIUM mmol/L 3.9   CHLORIDE mmol/L 103   CO2 mmol/L 27   BUN mg/dL 29*   CREATININE mg/dL 0.95   ANION GAP mmol/L 7   CALCIUM mg/dL 9.1   ALBUMIN g/dL 4.1   TOTAL BILIRUBIN mg/dL 0.63   ALK PHOS U/L 113*   ALT U/L 18   AST U/L 18   GLUCOSE RANDOM mg/dL 97     Results from last 7 days   Lab Units 10/11/24  1738   INR  1.12     Results from last 7 days   Lab Units 10/12/24  0000   POC GLUCOSE mg/dl 104     Lab Results   Component Value Date    HGBA1C 6.3 (H) 09/14/2024    HGBA1C 6.0 (H) 02/29/2024    HGBA1C 5.7 (H) 11/22/2021           Imaging  Results Review: I reviewed radiology reports from this admission including: chest xray.  Other Study Results Review: EKG was reviewed.     Administrative Statements   I have spent a total time of 48 minutes in caring for this patient on the day of the visit/encounter including Prognosis, Instructions for management, Risk factor reductions, Documenting in the medical record, Obtaining or reviewing history  , and Communicating with other healthcare professionals .    ** Please Note: This note has been constructed using a voice recognition system. **

## 2024-10-12 NOTE — QUICK NOTE
GI consultation reviewed.  Will restart Brilinta.  Aspirin also reinitiated today.  Will observe overnight to monitor for signs of melena/trend hemoglobin.

## 2024-10-12 NOTE — PLAN OF CARE
Problem: Potential for Falls  Goal: Patient will remain free of falls  Description: INTERVENTIONS:  - Educate patient/family on patient safety including physical limitations  - Instruct patient to call for assistance with activity   - Consult OT/PT to assist with strengthening/mobility   - Keep Call bell within reach  - Keep bed low and locked with side rails adjusted as appropriate  - Keep care items and personal belongings within reach  - Initiate and maintain comfort rounds  - Make Fall Risk Sign visible to staff  - Offer Toileting every 2 Hours, in advance of need  - Initiate/Maintain bed alarm  - Obtain necessary fall risk management equipment: non skid footwear  - Apply yellow socks and bracelet for high fall risk patients  - Consider moving patient to room near nurses station  Outcome: Progressing     Problem: PAIN - ADULT  Goal: Verbalizes/displays adequate comfort level or baseline comfort level  Description: Interventions:  - Encourage patient to monitor pain and request assistance  - Assess pain using appropriate pain scale  - Administer analgesics based on type and severity of pain and evaluate response  - Implement non-pharmacological measures as appropriate and evaluate response  - Consider cultural and social influences on pain and pain management  - Notify physician/advanced practitioner if interventions unsuccessful or patient reports new pain  Outcome: Progressing     Problem: DISCHARGE PLANNING  Goal: Discharge to home or other facility with appropriate resources  Description: INTERVENTIONS:  - Identify barriers to discharge w/patient and caregiver  - Arrange for needed discharge resources and transportation as appropriate  - Identify discharge learning needs (meds, wound care, etc.)  - Arrange for interpretive services to assist at discharge as needed  - Refer to Case Management Department for coordinating discharge planning if the patient needs post-hospital services based on  physician/advanced practitioner order or complex needs related to functional status, cognitive ability, or social support system  Outcome: Progressing     Problem: Knowledge Deficit  Goal: Patient/family/caregiver demonstrates understanding of disease process, treatment plan, medications, and discharge instructions  Description: Complete learning assessment and assess knowledge base.  Interventions:  - Provide teaching at level of understanding  - Provide teaching via preferred learning methods  Outcome: Progressing     Problem: HEMATOLOGIC - ADULT  Goal: Maintains hematologic stability  Description: INTERVENTIONS  - Assess for signs and symptoms of bleeding or hemorrhage  - Monitor labs  - Administer supportive blood products/factors as ordered and appropriate  Outcome: Progressing

## 2024-10-12 NOTE — ASSESSMENT & PLAN NOTE
Normotensive on presentation  Chronic condition/stable  Continue home regimen-diltiazem 120 mg p.o. daily

## 2024-10-13 VITALS
OXYGEN SATURATION: 93 % | RESPIRATION RATE: 20 BRPM | BODY MASS INDEX: 24.8 KG/M2 | WEIGHT: 154.32 LBS | TEMPERATURE: 97.6 F | HEIGHT: 66 IN | SYSTOLIC BLOOD PRESSURE: 122 MMHG | HEART RATE: 81 BPM | DIASTOLIC BLOOD PRESSURE: 52 MMHG

## 2024-10-13 LAB
ALBUMIN SERPL BCG-MCNC: 3.6 G/DL (ref 3.5–5)
ALP SERPL-CCNC: 79 U/L (ref 34–104)
ALT SERPL W P-5'-P-CCNC: 13 U/L (ref 7–52)
ANION GAP SERPL CALCULATED.3IONS-SCNC: 7 MMOL/L (ref 4–13)
AST SERPL W P-5'-P-CCNC: 12 U/L (ref 5–45)
BASOPHILS # BLD AUTO: 0.06 THOUSANDS/ΜL (ref 0–0.1)
BASOPHILS NFR BLD AUTO: 1 % (ref 0–1)
BILIRUB SERPL-MCNC: 0.57 MG/DL (ref 0.2–1)
BUN SERPL-MCNC: 18 MG/DL (ref 5–25)
CALCIUM SERPL-MCNC: 8.4 MG/DL (ref 8.4–10.2)
CHLORIDE SERPL-SCNC: 107 MMOL/L (ref 96–108)
CO2 SERPL-SCNC: 25 MMOL/L (ref 21–32)
CREAT SERPL-MCNC: 0.86 MG/DL (ref 0.6–1.3)
EOSINOPHIL # BLD AUTO: 0.33 THOUSAND/ΜL (ref 0–0.61)
EOSINOPHIL NFR BLD AUTO: 4 % (ref 0–6)
ERYTHROCYTE [DISTWIDTH] IN BLOOD BY AUTOMATED COUNT: 13.4 % (ref 11.6–15.1)
GLUCOSE SERPL-MCNC: 106 MG/DL (ref 65–140)
GLUCOSE SERPL-MCNC: 113 MG/DL (ref 65–140)
HCT VFR BLD AUTO: 38.4 % (ref 36.5–46.1)
HGB BLD-MCNC: 12.7 G/DL (ref 12–15.4)
IMM GRANULOCYTES # BLD AUTO: 0.06 THOUSAND/UL (ref 0–0.2)
IMM GRANULOCYTES NFR BLD AUTO: 1 % (ref 0–2)
LYMPHOCYTES # BLD AUTO: 1.81 THOUSANDS/ΜL (ref 0.6–4.47)
LYMPHOCYTES NFR BLD AUTO: 19 % (ref 14–44)
MCH RBC QN AUTO: 31.6 PG (ref 26.8–34.3)
MCHC RBC AUTO-ENTMCNC: 33.1 G/DL (ref 31.4–37.4)
MCV RBC AUTO: 96 FL (ref 82–98)
MONOCYTES # BLD AUTO: 1.15 THOUSAND/ΜL (ref 0.17–1.22)
MONOCYTES NFR BLD AUTO: 12 % (ref 4–12)
NEUTROPHILS # BLD AUTO: 6.05 THOUSANDS/ΜL (ref 1.85–7.62)
NEUTS SEG NFR BLD AUTO: 63 % (ref 43–75)
NRBC BLD AUTO-RTO: 0 /100 WBCS
PLATELET # BLD AUTO: 208 THOUSANDS/UL (ref 149–390)
PMV BLD AUTO: 9.6 FL (ref 8.9–12.7)
POTASSIUM SERPL-SCNC: 3.8 MMOL/L (ref 3.5–5.3)
PROT SERPL-MCNC: 6.5 G/DL (ref 6.4–8.4)
RBC # BLD AUTO: 4.02 MILLION/UL (ref 3.88–5.12)
SODIUM SERPL-SCNC: 139 MMOL/L (ref 135–147)
WBC # BLD AUTO: 9.46 THOUSAND/UL (ref 4.31–10.16)

## 2024-10-13 PROCEDURE — 85025 COMPLETE CBC W/AUTO DIFF WBC: CPT | Performed by: NURSE PRACTITIONER

## 2024-10-13 PROCEDURE — 80053 COMPREHEN METABOLIC PANEL: CPT | Performed by: NURSE PRACTITIONER

## 2024-10-13 PROCEDURE — 99232 SBSQ HOSP IP/OBS MODERATE 35: CPT | Performed by: INTERNAL MEDICINE

## 2024-10-13 PROCEDURE — 82948 REAGENT STRIP/BLOOD GLUCOSE: CPT

## 2024-10-13 PROCEDURE — 99239 HOSP IP/OBS DSCHRG MGMT >30: CPT | Performed by: PHYSICIAN ASSISTANT

## 2024-10-13 RX ORDER — PANTOPRAZOLE SODIUM 40 MG/1
40 TABLET, DELAYED RELEASE ORAL DAILY
Qty: 30 TABLET | Refills: 0 | Status: SHIPPED | OUTPATIENT
Start: 2024-10-13

## 2024-10-13 RX ADMIN — ASPIRIN 81 MG: 81 TABLET, COATED ORAL at 09:57

## 2024-10-13 RX ADMIN — DILTIAZEM HYDROCHLORIDE 120 MG: 120 CAPSULE, COATED, EXTENDED RELEASE ORAL at 09:57

## 2024-10-13 RX ADMIN — TICAGRELOR 90 MG: 90 TABLET ORAL at 09:57

## 2024-10-13 RX ADMIN — SODIUM CHLORIDE 75 ML/HR: 0.9 INJECTION, SOLUTION INTRAVENOUS at 05:35

## 2024-10-13 RX ADMIN — PANTOPRAZOLE SODIUM 40 MG: 40 INJECTION, POWDER, FOR SOLUTION INTRAVENOUS at 09:58

## 2024-10-13 NOTE — ASSESSMENT & PLAN NOTE
Recently admitted for acute CVA from 09/13-09/23   MRI brain: Punctate acute to subacute infarcts involving high left frontoparietal cortex, left parietal subcortical white matter, and posterior left cerebellum-thought embolic  Continue ASA/Brilinta, statin  Has follow-up with neurology scheduled in November

## 2024-10-13 NOTE — ASSESSMENT & PLAN NOTE
Lab Results   Component Value Date    HGBA1C 6.3 (H) 09/14/2024     Recent Labs     10/12/24  1233 10/12/24  1805 10/12/24  2342 10/13/24  0536   POCGLU 169* 117 110 113     Blood Sugar Average: Last 72 hrs:  (P) 118.0481411310620194  Continue diet management

## 2024-10-13 NOTE — ASSESSMENT & PLAN NOTE
Presented to ED w/ melenic stool reported 3-4 BMs daily x 1-1.5 wks; denies BM since admit   Hemoccult positive in ED   W/ recent CVA in 09/2024 pt was on dual antiplatelet therapy PTA  Aspirin x 21 days (which he was still taking)   His Plavix changed to Brilinta on 09/17/24   GI following; appreciate continued recommendations  Continue ASA, Brilinta restarted 10/12  Hemoglobin remains stable without any recurrent bleeding  Stable for discharge home per GI on PPI    Recent Labs     10/11/24  1738 10/12/24  0547 10/13/24  0536   HGB 12.3 12.6 12.7

## 2024-10-13 NOTE — DISCHARGE SUMMARY
"Discharge Summary - Hospitalist   Name: Tevin Bazan 79 y.o. adult I MRN: [id number]  Unit/Bed#: -01 I Date of Admission: 10/11/2024   Date of Service: 10/13/2024 I Hospital Day: 1     Assessment & Plan  Melena  Presented to ED w/ melenic stool reported 3-4 BMs daily x 1-1.5 wks; denies BM since admit   Hemoccult positive in ED   W/ recent CVA in 09/2024 pt was on dual antiplatelet therapy PTA  Aspirin x 21 days (which he was still taking)   His Plavix changed to Brilinta on 09/17/24   GI following; appreciate continued recommendations  Continue ASA, Brilinta restarted 10/12  Hemoglobin remains stable without any recurrent bleeding  Stable for discharge home per GI on PPI    Recent Labs     10/11/24  1738 10/12/24  0547 10/13/24  0536   HGB 12.3 12.6 12.7     Cerebrovascular disease  Recently admitted for acute CVA from 09/13-09/23   MRI brain: Punctate acute to subacute infarcts involving high left frontoparietal cortex, left parietal subcortical white matter, and posterior left cerebellum-thought embolic  Continue ASA/Brilinta, statin  Has follow-up with neurology scheduled in November  Chest pain  Reported \"electric shooting\" chest pain that was self-limited while taking a walk on day of admit  Denies any current symptoms of CP/unstable angina  Cardiac enzymes w/o elevation  ECG w/o ischemic change  No abnormalities on telemetry  Resolved  Essential hypertension  Normotensive on presentation  Chronic condition/stable  Continue home regimen-diltiazem 120 mg p.o. daily  Nonrheumatic aortic valve stenosis  Noted  Hypercholesterolemia  C/w atorvastatin 80 mg   Type 2 diabetes mellitus without complication (HCC)  Lab Results   Component Value Date    HGBA1C 6.3 (H) 09/14/2024     Recent Labs     10/12/24  1233 10/12/24  1805 10/12/24  2342 10/13/24  0536   POCGLU 169* 117 110 113     Blood Sugar Average: Last 72 hrs:  (P) 118.3488637950078195  Continue diet management  Kidney lesion  Incidental finding " on CT  1.4 x 1.3 cm hyperdense soft tissue density lesion in upper pole of left kidney highly suspicious for renal cell carcinoma.  Recommend urology consultation &  abdominal MRI  Prior provider provided instructions written in patient's discharge; incidental findings note created; results reviewed with patient/wife at bedside.    Pancreatic lesion  Incidental finding on CT   1.6 x 1.5 cm indeterminate lesion in the uncinate process of the pancreas, which may represent a complex cystic lesion, however pancreatic adenocarcinoma may have a similar appearance  Recommend further evaluation with abdominal MRI & gastroenterology consultation   Prior provider provided instructions written in patient's discharge; incidental findings note created; results reviewed with patient/wife at bedside  Nutcracker phenomenon of renal vein  Incidental finding on CT  Narrowing of the left renal vein as it courses between the aorta and the SMA with upstream dilatation, as well as presence of adjacent collateral vessels.   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   Prior provider provided follow-up instructions written in patient's discharge; incidental findings note created; results reviewed with patient/wife at bedside     Medical Problems       Resolved Problems  Date Reviewed: 10/2/2024            Resolved    H/O carotid endarterectomy 10/12/2024     Resolved by  Raciel Stanton DO        Discharging Physician / Practitioner: Bere Farias PA-C  PCP: Catherine Youngblood MD  Admission Date:   Admission Orders (From admission, onward)       Ordered        10/12/24 1318  INPATIENT ADMISSION  Once            10/11/24 2059  Place in Observation  Once                          Discharge Date: 10/13/24    Consultations During Hospital Stay:  GI    Procedures Performed:   None    Significant Findings / Test Results:   CXR: No acute cardiopulmonary disease  CT abdomen pelvis: 1) Numerous colonic diverticula,  however no evidence of acute diverticulitis or other bowel inflammation.  2) Tortuous vessel in the anterior wall of the lower rectum, most likely an internal hemorrhoid, or possibly an arteriovenous malformation.  3) 1.6 x 1.5 cm indeterminate lesion in the uncinate process of the pancreas, which may represent a complex cystic lesion, however pancreatic adenocarcinoma may have a similar appearance. Recommend further evaluation with abdominal MRI (without and with IV contrast) if there are no contraindications and gastroenterology consultation.  4) 1.4 x 1.3 cm hyperdense soft tissue density lesion in upper pole of left kidney highly suspicious for renal cell carcinoma. This can also be confirmed on the abdominal MRI. Recommend urology consultation.  5) Narrowing of the left renal vein as it courses between the aorta and the SMA with upstream dilatation, as well as presence of adjacent collateral vessels. 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 Clinical correlation is recommended.    Incidental Findings:   As above   I reviewed the above mentioned incidental findings with the patient and/or family and they expressed understanding.    Test Results Pending at Discharge (will require follow up):   None     Outpatient Tests Requested:  Recommend outpatient MRI abdomen     Complications:  None    Reason for Admission: Melena    Hospital Course:   Tevin Bazan is a 79 y.o. adult patient who originally presented to the hospital on 10/11/2024 due to melena.    Past medical history significant for type 2 diabetes, hyperlipidemia, hypertension, CVA.  Patient presented to the emergency department due to reports of melena.  Gastroenterology was consulted.  Antiplatelets were initially held but resumed 10/12 without any recurrent bleeding and hemoglobin remained stable.  Also to have multiple incidental findings and ultimately outpatient MRI abdomen recommended to which patient verbalized  "understanding.  On day of discharge patient was afebrile, hemodynamically stable and tolerating diet.  Verbalized understanding for requested outpatient follow-up.    Please see above list of diagnoses and related plan for additional information.     Condition at Discharge: stable    Discharge Day Visit / Exam:   Subjective: Feels well, no further episodes of melena.  Tolerating diet.  Vitals: Blood Pressure: 122/52 (10/13/24 0724)  Pulse: 81 (10/13/24 0724)  Temperature: 97.6 °F (36.4 °C) (10/12/24 2245)  Temp Source: Temporal (10/12/24 2245)  Respirations: 20 (10/11/24 2122)  Height: 5' 6\" (167.6 cm) (10/11/24 2230)  Weight - Scale: 70 kg (154 lb 5.2 oz) (10/11/24 2230)  SpO2: 93 % (10/13/24 0724)  Physical Exam  Vitals and nursing note reviewed.   Constitutional:       Appearance: Normal appearance.      Comments: No acute distress   HENT:      Head: Normocephalic.   Eyes:      General: No scleral icterus.     Extraocular Movements: Extraocular movements intact.      Conjunctiva/sclera: Conjunctivae normal.   Cardiovascular:      Rate and Rhythm: Normal rate and regular rhythm.   Pulmonary:      Effort: Pulmonary effort is normal.      Breath sounds: Normal breath sounds. No wheezing, rhonchi or rales.   Abdominal:      General: Bowel sounds are normal.      Palpations: Abdomen is soft.      Tenderness: There is no abdominal tenderness. There is no guarding or rebound.   Musculoskeletal:         General: No swelling, tenderness or deformity.      Cervical back: Normal range of motion.      Comments: Ambulating room without difficulty, no edema   Skin:     General: Skin is warm and dry.   Neurological:      Mental Status: Danielle is alert and oriented to person, place, and time.   Psychiatric:         Mood and Affect: Mood normal.         Speech: Speech normal.         Behavior: Behavior normal.          Discussion with Family: Patient declined call to .     Discharge instructions/Information to " patient and family:   See after visit summary for information provided to patient and family.      Provisions for Follow-Up Care:  See after visit summary for information related to follow-up care and any pertinent home health orders.      Mobility at time of Discharge:   Basic Mobility Inpatient Raw Score: 23  JH-HLM Goal: 7: Walk 25 feet or more  JH-HLM Achieved: 7: Walk 25 feet or more  HLM Goal achieved. Continue to encourage appropriate mobility.     Disposition:   Home    Planned Readmission: None    Discharge Medications:  See after visit summary for reconciled discharge medications provided to patient and/or family.      Administrative Statements   Discharge Statement:  I have spent a total time of 60 minutes in caring for this patient on the day of the visit/encounter. >30 minutes of time was spent on: Diagnostic results, Instructions for management, Patient and family education, Counseling / Coordination of care, Documenting in the medical record, Reviewing / ordering tests, medicine, procedures  , and Communicating with other healthcare professionals .    **Please Note: This note may have been constructed using a voice recognition system**

## 2024-10-13 NOTE — PROGRESS NOTES
"Progress note - [facility]   [facility] 79 y.o. adult MRN: [id number]  Unit/Bed#: -[place] Encounter: [id number]    ASSESSMENT and PLAN    79-year-old male with past medical history including DM2, HTN, hyperlipidemia, PAD including carotid stenosis s/p CEA, CAD and multiple prior CVAs, most recent 9/2024 with MRI showing small infarct right frontotemporal area likely due to small vessel disease  Has been on dual antiplatelet therapy for several years.  Plavix recently changed to Brilinta 9/17/2024.  Treated with aspirin 81 mg x 21 days, still taking  Presented to ED with 1 week history of melena with 3-4 bowel movements daily.  Denies bright red blood per rectum.  No abdominal pain, no epigastric pain  Hemoglobin stable at 12.6 with baseline 13-14  Last Brilinta was 10/11 AM     1.  Melena  No further melena or rectal bleeding since admission, no BM  Hemoglobin stable  No overt signs of GI bleeding but may have slow bleeding in setting of antiplatelet therapy   -Would discharge on PPI I encouraged him to resume Brilinta.  No GI barriers to discharge       2.  History of CVA with recent acute CVA  Recent hospitalization with dysarthria  MRI with small frontotemporal infarct  Antiplatelet therapy changed from Plavix to Brilinta  Last dose of Brilinta 10/11 AM  Chief Complaint   Patient presents with    Chest Pain     Pt states \"I went to my doctor and told them I had black stools, I'm tired, and told me I had to come to the hospital.\" Pt reports being on brilinta and aspirin with recent stroke last month.\"Reports going for walk and developed chest pain and pressure sensation. Noticed dark stools that started about a week ago.        SUBJECTIVE/HPI   Tolerating p.o. no melena    /52   Pulse 81   Temp 97.6 °F (36.4 °C) (Temporal)   Resp 20   Ht 5' 6\" (1.676 m)   Wt 70 kg (154 lb 5.2 oz)   SpO2 93%   BMI 24.91 kg/m²     PHYSICALEXAM  General appearance: alert, appears " "stated age and cooperative  Eyes: PERLLA, EOMI, no icterus   Head: Normocephalic, without obvious abnormality, atraumatic  Extremities: extremities normal, atraumatic, no cyanosis or edema  Neurologic: Grossly normal    Lab Results   Component Value Date    CALCIUM 8.4 10/13/2024    K 3.8 10/13/2024    CO2 25 10/13/2024     10/13/2024    BUN 18 10/13/2024    CREATININE 0.86 10/13/2024     Lab Results   Component Value Date    WBC 9.46 10/13/2024    HGB 12.7 10/13/2024    HCT 38.4 10/13/2024    MCV 96 10/13/2024     10/13/2024     Lab Results   Component Value Date    ALT 13 10/13/2024    AST 12 10/13/2024    ALKPHOS 79 10/13/2024     No results found for: \"AMYLASE\"  Lab Results   Component Value Date    LIPASE 24 10/11/2024     No results found for: \"IRON\", \"TIBC\", \"FERRITIN\"  Lab Results   Component Value Date    INR 1.12 10/11/2024     "

## 2024-10-13 NOTE — ASSESSMENT & PLAN NOTE
"Reported \"electric shooting\" chest pain that was self-limited while taking a walk on day of admit  Denies any current symptoms of CP/unstable angina  Cardiac enzymes w/o elevation  ECG w/o ischemic change  No abnormalities on telemetry  Resolved  "

## 2024-10-13 NOTE — ASSESSMENT & PLAN NOTE
Incidental finding on CT  Narrowing of the left renal vein as it courses between the aorta and the SMA with upstream dilatation, as well as presence of adjacent collateral vessels.   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

## 2024-10-13 NOTE — ASSESSMENT & PLAN NOTE
Incidental finding on CT  1.4 x 1.3 cm hyperdense soft tissue density lesion in upper pole of left kidney highly suspicious for renal cell carcinoma.  Recommend urology consultation &  abdominal MRI  Prior provider provided instructions written in patient's discharge; incidental findings note created; results reviewed with patient/wife at bedside.

## 2024-10-13 NOTE — ASSESSMENT & PLAN NOTE
Incidental finding on CT   1.6 x 1.5 cm indeterminate lesion in the uncinate process of the pancreas, which may represent a complex cystic lesion, however pancreatic adenocarcinoma may have a similar appearance  Recommend further evaluation with abdominal MRI & gastroenterology consultation   Prior provider provided instructions written in patient's discharge; incidental findings note created; results reviewed with patient/wife at bedside

## 2024-10-13 NOTE — UTILIZATION REVIEW
Continued Stay Review    SEE INITIAL REVIEW AT BOTTOM     Date: 10/13/24                           Current Patient Class: Inpatient  Current Level of Care: med surg    HPI:79 y.o. adult initially admitted on 10/11/24 to observation and converted 110/12/24 to inpatient.    Presented due to Melena. Baseline hgb 13 - 14.   Antiplatelets held - asa and Brilinta.   ASA & Brilinta  Resumed 10/12.       Assessment/Plan: Patient has crossed 3 midnights and requires ongoing care    10/13/2024 .  Patient presents with  no further melena  On exam no distress.   Abnormal labs or imaging:  K 3.8.   H&H 12.7/38.4.   Diagnosis/Plan    Melena/Recent CVA/Chest pain/hypertensive/type 2 DM.  Per GI may have slow bleeding in setting of antiplatelet therapy.   PPI.   Continue antiplatelets. IVF dc.  Diet as tolerated.     Medications:   Scheduled Medications:    aspirin (ECOTRIN LOW STRENGTH) EC tablet 81 mg, Daily    insulin lispro (HumALOG/ADMELOG) 100 units/mL subcutaneous injection 1-5 Units, Q6H KELLI **AND** Fingerstick Glucose (POCT), Q6H    pantoprazole (PROTONIX) injection 40 mg, Q12H    sodium chloride 0.9 % infusion, Continuous, Last Rate: 75 mL/hr (10/11/24 2233)     Discharge Plan: discharged 10/13/24     Vital Signs (last 3 days) before discharge       Date/Time Temp Pulse Resp BP MAP (mmHg) SpO2 O2 Device Patient Position - Orthostatic VS Pain    10/13/24 1007 -- -- -- -- -- -- -- -- No Pain    10/13/24 07:24:29 -- 81 -- 122/52 75 93 % -- -- --    10/12/24 22:45:47 97.6 °F (36.4 °C) 73 -- 138/81 100 94 % None (Room air) Lying --    10/12/24 2000 -- -- -- -- -- -- -- -- No Pain    10/12/24 15:47:09 96.4 °F (35.8 °C) 71 -- 141/80 100 96 % -- -- --    10/12/24 1235 -- -- -- 118/60 -- -- -- -- --    10/12/24 1100 -- -- -- -- -- -- -- -- No Pain    10/12/24 07:15:29 96.6 °F (35.9 °C) 60 -- 140/87 105 94 % -- -- --    10/11/24 2207 -- -- -- -- -- -- -- -- No Pain    10/11/24 21:55:09 -- 80 -- 146/73 97 94 % -- -- --    10/11/24  2122 -- 89 20 135/65 90 94 % None (Room air) Sitting --    10/11/24 1930 -- 62 19 177/79 113 97 % None (Room air) Sitting --    10/11/24 1900 -- 61 19 168/79 114 98 % -- -- --    10/11/24 1830 -- 73 22 163/77 110 97 % -- -- --    10/11/24 1746 -- -- -- -- -- -- None (Room air) -- --    10/11/24 1745 -- 78 20 119/65 86 95 % -- Sitting --    10/11/24 1721 98.2 °F (36.8 °C) 81 20 148/78 103 98 % None (Room air) Sitting 4          Weight (last 2 days) before discharge       Date/Time Weight    10/11/24 2230 70 (154.32)            Pertinent Labs/Diagnostic Results:   Radiology:  CT abdomen pelvis with contrast   Final Interpretation by Jenniffer Chen MD (10/11 2035)      1) Numerous colonic diverticula, however no evidence of acute diverticulitis or other bowel inflammation.      2) Tortuous vessel in the anterior wall of the lower rectum, most likely an internal hemorrhoid, or possibly an arteriovenous malformation.      3) 1.6 x 1.5 cm indeterminate lesion in the uncinate process of the pancreas, which may represent a complex cystic lesion, however pancreatic adenocarcinoma may have a similar appearance. Recommend further evaluation with abdominal MRI (without and with    IV contrast) if there are no contraindications and gastroenterology consultation.      4) 1.4 x 1.3 cm hyperdense soft tissue density lesion in upper pole of left kidney highly suspicious for renal cell carcinoma. This can also be confirmed on the abdominal MRI. Recommend urology consultation.      5) Narrowing of the left renal vein as it courses between the aorta and the SMA with upstream dilatation, as well as presence of adjacent collateral vessels. 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 Clinical correlation is    recommended.      6) Multiple additional findings as above.      The study was marked in EPIC for immediate notification.            Workstation performed: [id number]         XR chest 1 view portable   Final  Interpretation by Maikel Coughlin MD (10/12 0957)      No acute cardiopulmonary disease.               Resident: Michael Betancur I, the attending radiologist, have reviewed the images and agree with the final report above.      Workstation performed: [id number]           Cardiology:  No orders to display   10/11/24 ecg - Date/Time: 10/11/2024 5:28 PM   Indications / Diagnosis:  Chest pain   Patient location:  ED   Previous ECG:     Previous ECG:  Compared to current     Similarity:  No change   Rate:     ECG rate:  85   Rhythm:     Rhythm: sinus rhythm    ST segments:     ST segments:  Normal   T waves:     T waves: normal       Results from last 7 days   Lab Units 10/13/24  0536 10/12/24  0547 10/11/24  1738   WBC Thousand/uL 9.46 8.68 9.15   HEMOGLOBIN g/dL 12.7 12.6 12.3   HEMATOCRIT % 38.4 38.5 37.8   PLATELETS Thousands/uL 208 212 211   TOTAL NEUT ABS Thousands/µL 6.05  --  5.83     Results from last 7 days   Lab Units 10/13/24  0536 10/12/24  0547 10/11/24  1738   SODIUM mmol/L 139 141 137   POTASSIUM mmol/L 3.8 4.3 3.9   CHLORIDE mmol/L 107 108 103   CO2 mmol/L 25 26 27   ANION GAP mmol/L 7 7 7   BUN mg/dL 18 27* 29*   CREATININE mg/dL 0.86 1.00 0.95   CALCIUM mg/dL 8.4 8.7 9.1     Results from last 7 days   Lab Units 10/13/24  0536 10/12/24  0547 10/11/24  1738   AST U/L 12 16 18   ALT U/L 13 15 18   ALK PHOS U/L 79 88 113*   TOTAL PROTEIN g/dL 6.5 6.7 7.3   ALBUMIN g/dL 3.6 3.7 4.1   TOTAL BILIRUBIN mg/dL 0.57 0.63 0.63     Results from last 7 days   Lab Units 10/13/24  0536 10/12/24  2342 10/12/24  1805 10/12/24  1233 10/12/24  0546 10/12/24  0000   POC GLUCOSE mg/dl 113 110 117 169* 96 104     Results from last 7 days   Lab Units 10/13/24  0536 10/12/24  0547 10/11/24  1738   GLUCOSE RANDOM mg/dL 106 99 97     Results from last 7 days   Lab Units 10/11/24  1946 10/11/24  1738   HS TNI 0HR ng/L  --  6   HS TNI 2HR ng/L 6  --    HSTNI D2 ng/L 0  --      Results from last 7 days   Lab Units  10/11/24  1738   PROTIME seconds 14.9   INR  1.12   PTT seconds 32     Results from last 7 days   Lab Units 10/11/24  1738   BNP pg/mL 38     Results from last 7 days   Lab Units 10/11/24  1738   LIPASE u/L 24     Results from last 7 days   Lab Units 10/11/24  2008   CLARITY UA  Clear   COLOR UA  Yellow   SPEC GRAV UA  <1.005*   PH UA  6.5   GLUCOSE UA mg/dl Negative   KETONES UA mg/dl Negative   BLOOD UA  Negative   PROTEIN UA mg/dl Negative   NITRITE UA  Negative   BILIRUBIN UA  Negative   UROBILINOGEN UA (BE) mg/dl <2.0   LEUKOCYTES UA  Negative       Network Utilization Review Department  ATTENTION: Please call with any questions or concerns to [phone] and carefully listen to the prompts so that you are directed to the right person. All voicemails are confidential.   For Discharge needs, contact Care Management DC Support Team at [phone] opt. 2  Send all requests for admission clinical reviews, approved or denied determinations and any other requests to dedicated fax number below belonging to the [facility] where the patient is receiving treatment. List of dedicated fax numbers for the Facilities:  FACILITY NAME UR FAX NUMBER   ADMISSION DENIALS (Administrative/Medical Necessity) [phone]   DISCHARGE SUPPORT TEAM (NETWORK) [phone]   PARENT CHILD HEALTH (Maternity/NICU/Pediatrics) [phone]   [facility] [phone]   [facility] [phone]   [facility] [phone]   [facility] [phone]   [facility] [phone]   [facility] [phone]   [facility] [phone]   [facility] [phone]   [facility] [phone]   [facility] [phone]   [facility] [phone]   [facility]  [facility] [phone]

## 2024-10-14 ENCOUNTER — TELEPHONE (OUTPATIENT)
Age: 79
End: 2024-10-14

## 2024-10-14 LAB
ATRIAL RATE: 85 BPM
P AXIS: 41 DEGREES
PR INTERVAL: 152 MS
QRS AXIS: 36 DEGREES
QRSD INTERVAL: 80 MS
QT INTERVAL: 364 MS
QTC INTERVAL: 433 MS
T WAVE AXIS: 51 DEGREES
VENTRICULAR RATE: 85 BPM

## 2024-10-14 PROCEDURE — 93010 ELECTROCARDIOGRAM REPORT: CPT | Performed by: INTERNAL MEDICINE

## 2024-10-14 NOTE — TELEPHONE ENCOUNTER
Patient's spouse called today to establish the patient for Abnormal CT scan.     Pt is scheduled in [place] due to it being the only location they're willing to travel to. On 11/4 10 AM with JOVANY Rico.

## 2024-10-14 NOTE — UTILIZATION REVIEW
NOTIFICATION OF ADMISSION DISCHARGE   This is a Notification of Discharge from [facility]. Please be advised that this patient has been discharge from our facility. Below you will find the admission and discharge date and time including the patient’s disposition.   UTILIZATION REVIEW CONTACT:  Su Miller  Utilization   Network Utilization Review Department  Phone: [phone] x carefully listen to the prompts. All voicemails are confidential.  Email: [email or web address]     ADMISSION INFORMATION  PRESENTATION DATE: 10/11/2024  5:26 PM  OBERVATION ADMISSION DATE: 10/11/2024 2059  INPATIENT ADMISSION DATE: 10/12/24  1:18 PM   DISCHARGE DATE: 10/13/2024  2:05 PM   DISPOSITION:Home/Self Care    Network Utilization Review Department  ATTENTION: Please call with any questions or concerns to [phone] and carefully listen to the prompts so that you are directed to the right person. All voicemails are confidential.   For Discharge needs, contact Care Management DC Support Team at [phone] opt. 2  Send all requests for admission clinical reviews, approved or denied determinations and any other requests to dedicated fax number below belonging to the campus where the patient is receiving treatment. List of dedicated fax numbers for the Facilities:  FACILITY NAME UR FAX NUMBER   ADMISSION DENIALS (Administrative/Medical Necessity) [phone]   DISCHARGE SUPPORT TEAM ([facility]) [phone]   PARENT CHILD HEALTH (Maternity/NICU/Pediatrics) [phone]   [facility] [phone]   [facility] [phone]   [facility] [phone]   [facility] [phone]   [facility] [phone]   [facility] [phone]   [facility] [phone]   [facility]  [place] [phone]   [facility] [phone]   [facility] [phone]   [facility] [phone]   [facility] [phone]

## 2024-10-15 ENCOUNTER — OFFICE VISIT (OUTPATIENT)
Dept: SPEECH THERAPY | Facility: CLINIC | Age: 79
End: 2024-10-15
Payer: COMMERCIAL

## 2024-10-15 DIAGNOSIS — I69.922 DYSARTHRIA AS LATE EFFECT OF CEREBROVASCULAR DISEASE: ICD-10-CM

## 2024-10-15 DIAGNOSIS — I63.89 CEREBROVASCULAR ACCIDENT (CVA) DUE TO OTHER MECHANISM (HCC): Primary | ICD-10-CM

## 2024-10-15 PROCEDURE — 92507 TX SP LANG VOICE COMM INDIV: CPT

## 2024-10-15 NOTE — PROGRESS NOTES
Daily Speech Treatment Note    Today's date: 10/15/2024   Patient’s name: Tevin Bazan  : 1945  MRN: [id number]  Safety measures:   Referring provider: Jessica Connolly MD    Encounter Diagnosis     ICD-10-CM    1. Cerebrovascular accident (CVA) due to other mechanism (HCC)  I63.89       2. 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  Results as stated below.       Short Term  Patient will perform oral motor exercises 30x each (with and without resistance) to facilitate improved strength and function for increased speech intelligibility.  10/15: Completed education on buccal and labial and lingual exercises for range of motion and strengthening. Patient able to demonstrate, provided handout. Encouraged home practice.      Strength and Endurance Testing:  The IOPI Pro is used by medical professionals to measure, evaluate, and increase the strength and endurance of the tongue and lip in patients with oral motor disorders, including dysphagia and dysarthria.  The IOPI measures the maximum pressure (Pmax) a patient can produce in an air-filled bulb when it is compressed as hard as possible by the tongue or lip against a hard surface (e.g. The palate or teeth, respectively). Pmax is a measure of strength, expressed in kilopascals (kPa, an international unit of pressure).       For patients with dysphagia or dysarthria, oral motor fatigability may be of interest.  The IOPI Pro can be used to assess tongue fatigability.  Low endurance values are an indicator of a high fatigability.  Endurance is measured with the IOPI Pro by quantifying the length of time that a patient can maintain 50% of his or her Pmax.  This procedure is conducted in Target Mode by setting the target value to  50% of the patient's Pmax and timing how long the patient can hold the top (green) light on.       The medical professional determines what target value is appropriate for exercise therapy purposes and provides specific instructions to the patient for a particular exercise protocol.  In Target Mode, the pressure required to illuminate the green light a the top of the light array can be adjusted using the Set Target arrow buttons.  This green light is used as a visual target for the patient.     Tongue tip Peak Max after 3 trials: 61 Norm for age/gender is 56   Tongue back Peak Max after 3 trials: 35 Norm for age/gender is 50   Right lip Peak Max after 3 trials: 13 Norm for age/gender is 35   Left lip Peak Max after 3 trials: 18 Norm for age/gender is 35    Endurance Training: @ 30 MAX - HELD FOR 2 SECONDS    IOPI PEAK MEASUREMENT WEEKLY GRID  61    10/11/24           Tongue tip  (Goal = 56) 61           Tongue back  (Goal = 50) 35           Right Lip  (Goal = 35) 13        Left Lip  (Goal = 35) 18            IOPI -- Today's Exercise Targets    Tongue tip  (Goal = 56) Peak Max after 3 trials: 61 Effort level: % Target for treatment:    Tongue back  (Goal = 50) Peak Max after 3 trials: 35 Effort level: 65% Target for treatment: 23   Right Lip  (Goal = 35) Peak Max after 3 trials: 13 Effort level: 65% Target for treatment: 8   Left Lip  (Goal = 35) Peak Max after 3 trials: 18 Effort level: 65 and 60% Target for treatment: 12 and 11        10/11/24        Tongue tip          Tongue back 23 x 30        Right Lip 8 x 15         Left Lip 12 and 11 (20)                      Patient will utilize over-articulation 80% of the time while orally reading sentence/paragraph length material to facilitate increased speech intelligibility.  10/11: Educated on compensatory strategies to increase clarity, provided handout. 10/15: Min-mod cues to utilize overarticulation in conversation.     Patient will complete structured oral reading  and conversational tasks with the consistent use of compensatory strategies >90% of the time to facilitate increased speech intelligibility.     Patient will perform oral motor and diadochokinetic speech rate exercises with > 90% accuracy to facilitate increased speech intelligibility.     Patient will complete pharyngeal strengthening exercises (EMST?) for increased safety & improved swallow function.        Patient will complete daily oral motor exercises (IOPI as appropriate) to increase lingual/labial range of motion, strength, and coordination with min cues to 90% effectiveness to improve bolus formation and strengthen oral musculature.   (See above)     Patient will perform compensatory strategies (e.g., upright positioning, small bites/sips, slow rate, alternation of consistencies, multiple swallows, effortful swallow, chin tuck, head turn, etc.) with 80% accuracy with minimized overt s/sx penetration/aspiration of least restrictive food/liquid consistencies.     Patient will tolerate least restrictive diet with minimized overt s/sx of penetration or aspiration.        Long Term      Patient will utilize compensatory strategies with optimum safety and efficacy of swallowing function on P.O. intake without overt s/sx of penetration or aspiration by discharge.        Patient will demonstrate independent implementation of compensatory strategies to improve speech intelligibility by discharge.      Patient will improve the ability to facilitate functional speech production skills for intelligible communication including compensatory strategies to promote quality of life and to maximize level of independence with communication.                   Plan:  -Continue with current plan of care.    Emst-75?  Iopi? Dysarthria strategies, oral motor strategies for labial closure, lingual / labial strength, dysphagia strategies

## 2024-10-16 ENCOUNTER — OFFICE VISIT (OUTPATIENT)
Dept: CARDIOLOGY CLINIC | Facility: CLINIC | Age: 79
End: 2024-10-16
Payer: COMMERCIAL

## 2024-10-16 VITALS
HEART RATE: 87 BPM | WEIGHT: 159.4 LBS | SYSTOLIC BLOOD PRESSURE: 120 MMHG | BODY MASS INDEX: 25.62 KG/M2 | HEIGHT: 66 IN | DIASTOLIC BLOOD PRESSURE: 72 MMHG

## 2024-10-16 DIAGNOSIS — E78.00 HYPERCHOLESTEROLEMIA: ICD-10-CM

## 2024-10-16 DIAGNOSIS — I25.110 CORONARY ARTERY DISEASE INVOLVING NATIVE CORONARY ARTERY OF NATIVE HEART WITH UNSTABLE ANGINA PECTORIS (HCC): Primary | ICD-10-CM

## 2024-10-16 DIAGNOSIS — I10 ESSENTIAL HYPERTENSION: ICD-10-CM

## 2024-10-16 DIAGNOSIS — R06.09 DYSPNEA ON EXERTION: ICD-10-CM

## 2024-10-16 DIAGNOSIS — I34.2 NONRHEUMATIC MITRAL VALVE STENOSIS: ICD-10-CM

## 2024-10-16 DIAGNOSIS — I35.0 NONRHEUMATIC AORTIC VALVE STENOSIS: ICD-10-CM

## 2024-10-16 DIAGNOSIS — I20.0 UNSTABLE ANGINA PECTORIS (HCC): ICD-10-CM

## 2024-10-16 PROCEDURE — 99214 OFFICE O/P EST MOD 30 MIN: CPT | Performed by: INTERNAL MEDICINE

## 2024-10-16 RX ORDER — METFORMIN HYDROCHLORIDE 500 MG/1
TABLET, EXTENDED RELEASE ORAL EVERY 24 HOURS
COMMUNITY
Start: 2024-09-27

## 2024-10-16 RX ORDER — TAMSULOSIN HYDROCHLORIDE 0.4 MG/1
1 CAPSULE ORAL EVERY 24 HOURS
COMMUNITY
Start: 2024-05-16

## 2024-10-16 NOTE — PROGRESS NOTES
Cardiology Follow Up    Tevin Bazan  1945  [id number]  [facility] CARDIAC CATH LAB  [address] [facility] [address]  [phone]    1. Coronary artery disease involving native coronary artery of native heart with unstable angina pectoris (HCC)        2. Essential hypertension        3. Nonrheumatic aortic valve stenosis        4. Nonrheumatic mitral valve stenosis        5. Unstable angina pectoris (HCC)        6. Dyspnea on exertion        7. Hypercholesterolemia            Interval History: Cardiology follow-up.  Patient did suffer a CVA on 9/24.  Multifocal on MRI left frontoparietal left cerebellar and subcortical as well.  To complete MRIs, he was admitted on 10/24 with melena.  He did not require transfusion his antiplatelet therapy was withheld transiently.  He did complain of chest discomfort during that admission.  States been compliant low-cholesterol diet, lipids this year total cholesterol 83, HDL 39, LDL 37 on high intensity statin therapy.  No further bleeding problems on antiplatelet therapy.  Incidental lesions in the kidneys and pancreas, questionable neoplastic.  This is being investigated at the present time.  The patient's chest discomfort appears to be quiescent.  He has had a few episodes where he noticed his blood pressure dropped significantly, he feels weak, no syncope.  He tells me the blood pressure readings as low as 70/40.  Spontaneous recovery.  Today's blood pressure is 120/72.  He is on low-dose calcium channel blocker only.    Patient Active Problem List   Diagnosis    Transgender    Stroke due to embolism (HCC)    Primary osteoarthritis of left knee    Effusion of left knee    Cellulitis of right knee    Lumbar spondylosis    Chronic bilateral low back pain without sciatica    Lumbar radiculopathy    Spinal stenosis of lumbar region with neurogenic claudication    Chronic pain of left knee     Thoracogenic scoliosis of thoracolumbar region    Chronic pain syndrome    Essential hypertension    Symptomatic carotid artery stenosis, left    History of stroke    Nonrheumatic aortic valve stenosis    Nonrheumatic mitral valve stenosis    Chest heaviness    Abnormal stress test    Dyspnea on exertion    Hypercholesterolemia    Chest pain    Stage 2 chronic kidney disease    COPD (chronic obstructive pulmonary disease) (HCC)    Abnormal computed tomography angiography (CTA)    Coronary artery disease involving native coronary artery of native heart with unstable angina pectoris (HCC)    Unstable angina (HCC)    Coronary artery disease involving native coronary artery    Elevated troponin    Cerebrovascular disease    Tremor    BPH (benign prostatic hyperplasia)    Type 2 diabetes mellitus without complication (HCC)    Leukocytosis    Garbled speech    Melena    Kidney lesion    Pancreatic lesion    Nutcracker phenomenon of renal vein     Past Medical History:   Diagnosis Date    Chronic bilateral low back pain without sciatica 2020    Hypertension     Scoliosis     Stroke ([facility])     Stroke-like symptoms 2021     Social History     Socioeconomic History    Marital status: /Civil Union     Spouse name: Not on file    Number of children: Not on file    Years of education: Not on file    Highest education level: Not on file   Occupational History    Not on file   Tobacco Use    Smoking status: Former     Current packs/day: 0.00     Types: Cigarettes     Quit date:      Years since quittin.8    Smokeless tobacco: Never   Vaping Use    Vaping status: Never Used   Substance and Sexual Activity    Alcohol use: Not Currently    Drug use: No    Sexual activity: Not on file   Other Topics Concern    Not on file   Social History Narrative    Not on file     Social Determinants of Health     Financial Resource Strain: Low Risk  (2022)    Overall Financial Resource Strain (CARDIA)      Difficulty of Paying Living Expenses: Not hard at all   Food Insecurity: No Food Insecurity (10/13/2024)    Hunger Vital Sign     Worried About Running Out of Food in the Last Year: Never true     Ran Out of Food in the Last Year: Never true   Transportation Needs: No Transportation Needs (10/13/2024)    PRAPARE - Transportation     Lack of Transportation (Medical): No     Lack of Transportation (Non-Medical): No   Physical Activity: Sufficiently Active (5/20/2022)    Exercise Vital Sign     Days of Exercise per Week: 7 days     Minutes of Exercise per Session: 30 min   Stress: No Stress Concern Present (5/20/2022)    [employer] [place] [employer] - Occupational Stress Questionnaire     Feeling of Stress : Not at all   Social Connections: Socially Isolated (5/20/2022)    Social Connection and Isolation Panel [NHANES]     Frequency of Communication with Friends and Family: Never     Frequency of Social Gatherings with Friends and Family: Never     Attends [employer] Services: Never     Active Member of Clubs or Organizations: No     Attends Club or Organization Meetings: Never     Marital Status:    Intimate Partner Violence: Not At Risk (10/13/2024)    Humiliation, Afraid, Rape, and Kick questionnaire     Fear of Current or Ex-Partner: No     Emotionally Abused: No     Physically Abused: No     Sexually Abused: No   Housing Stability: Low Risk  (10/13/2024)    Housing Stability Vital Sign     Unable to Pay for Housing in the Last Year: No     Number of Times Moved in the Last Year: 0     Homeless in the Last Year: No      Family History   Adopted: Yes   Family history unknown: Yes     Past Surgical History:   Procedure Laterality Date    BACK SURGERY      BOWEL RESECTION      CARDIAC CATHETERIZATION N/A 2/26/2024    Procedure: Cardiac Coronary Angiogram;  Surgeon: Rivas Cantor MD;  Location: [facility] CARDIAC CATH LAB;  Service: Cardiology    CARDIAC CATHETERIZATION  2/26/2024    Procedure: Cardiac  catheterization;  Surgeon: Rivas Cantor MD;  Location: [facility] CARDIAC CATH LAB;  Service: Cardiology    CARDIAC CATHETERIZATION N/A 2/26/2024    Procedure: Cardiac pci;  Surgeon: Rivas Cantor MD;  Location: [facility] CARDIAC CATH LAB;  Service: Cardiology    MA TEAEC W/PATCH GRF CAROTID VERTB SUBCLAV NECK INC Left 12/1/2021    Procedure: ENDARTERECTOMY ARTERY CAROTID;  Surgeon: Vito Mo MD;  Location: [facility] Main OR;  Service: Vascular    TONSILLECTOMY         Current Outpatient Medications:     albuterol (Proventil HFA) 90 mcg/act inhaler, Inhale 1-2 puffs every 6 (six) hours as needed for wheezing, Disp: 18 g, Rfl: 0    Ascorbic Acid (vitamin C) 1000 MG tablet, every 24 hours, Disp: , Rfl:     aspirin (ECOTRIN LOW STRENGTH) 81 mg EC tablet, Take 1 tablet (81 mg total) by mouth daily for 21 days, Disp: 21 tablet, Rfl: 0    atorvastatin (LIPITOR) 80 mg tablet, Take 1 tablet (80 mg total) by mouth daily, Disp: 30 tablet, Rfl: 3    Chelated Zinc 50 MG TABS, every 24 hours, Disp: , Rfl:     diltiazem (TIAZAC) 120 MG 24 hr capsule, Take 120 mg by mouth daily, Disp: , Rfl:     famotidine (PEPCID) 40 MG tablet, 2 (two) times a day as needed, Disp: , Rfl:     metFORMIN (GLUCOPHAGE-XR) 500 mg 24 hr tablet, every 24 hours, Disp: , Rfl:     pantoprazole (PROTONIX) 40 mg tablet, Take 1 tablet (40 mg total) by mouth daily, Disp: 30 tablet, Rfl: 0    tamsulosin (FLOMAX) 0.4 mg, 1 capsule every 24 hours, Disp: , Rfl:     ticagrelor (BRILINTA) 90 MG, Take 1 tablet (90 mg total) by mouth every 12 (twelve) hours, Disp: 60 tablet, Rfl: 2    Omega 3 1000 MG CAPS, 1 capsule Every 12 hours (Patient not taking: Reported on 10/16/2024), Disp: , Rfl:   Allergies   Allergen Reactions    Black Cohosh Rash    Minoxidil Rash       Labs:  Admission on 10/11/2024, Discharged on 10/13/2024   Component Date Value    Ventricular Rate 10/11/2024 85     Atrial Rate 10/11/2024 85     MA Interval 10/11/2024 152     QRSD Interval 10/11/2024 80     QT Interval  10/11/2024 364     QTC Interval 10/11/2024 433     P Axis 10/11/2024 41     QRS Axis 10/11/2024 36     T Wave [place] 10/11/2024 51     WBC 10/11/2024 9.15     RBC 10/11/2024 3.96     Hemoglobin 10/11/2024 12.3     Hematocrit 10/11/2024 37.8     MCV 10/11/2024 96     MCH 10/11/2024 31.1     MCHC 10/11/2024 32.5     RDW 10/11/2024 13.5     MPV 10/11/2024 9.5     Platelets 10/11/2024 211     nRBC 10/11/2024 0     Segmented % 10/11/2024 63     Immature Grans % 10/11/2024 1     Lymphocytes % 10/11/2024 21     Monocytes % 10/11/2024 12     Eosinophils Relative 10/11/2024 3     Basophils Relative 10/11/2024 0     Absolute Neutrophils 10/11/2024 5.83     Absolute Immature Grans 10/11/2024 0.06     Absolute Lymphocytes 10/11/2024 1.91     Absolute Monocytes 10/11/2024 1.06     Eosinophils Absolute 10/11/2024 0.25     Basophils Absolute 10/11/2024 0.04     Protime 10/11/2024 14.9     INR 10/11/2024 1.12     PTT 10/11/2024 32     ABO Grouping 10/11/2024 O     Rh Factor 10/11/2024 Positive     Antibody Screen 10/11/2024 Negative     Specimen Expiration Date 10/11/2024 [id number]     Sodium 10/11/2024 137     Potassium 10/11/2024 3.9     Chloride 10/11/2024 103     CO2 10/11/2024 27     ANION GAP 10/11/2024 7     BUN 10/11/2024 29 (H)     Creatinine 10/11/2024 0.95     Glucose 10/11/2024 97     Calcium 10/11/2024 9.1     AST 10/11/2024 18     ALT 10/11/2024 18     Alkaline Phosphatase 10/11/2024 113 (H)     Total Protein 10/11/2024 7.3     Albumin 10/11/2024 4.1     Total Bilirubin 10/11/2024 0.63     Lipase 10/11/2024 24     hs TnI 0hr 10/11/2024 6     Color, UA 10/11/2024 Yellow     Clarity, UA 10/11/2024 Clear     Specific Gravity, UA 10/11/2024 <1.005 (L)     pH, UA 10/11/2024 6.5     Leukocytes, UA 10/11/2024 Negative     Nitrite, UA 10/11/2024 Negative     Protein, UA 10/11/2024 Negative     Glucose, UA 10/11/2024 Negative     Ketones, UA 10/11/2024 Negative     Urobilinogen, UA 10/11/2024 <2.0     Bilirubin, UA 10/11/2024  Negative     Occult Blood, UA 10/11/2024 Negative     BNP 10/11/2024 38     hs TnI 2hr 10/11/2024 6     Delta 2hr hsTnI 10/11/2024 0     POC Glucose 10/12/2024 104     WBC 10/12/2024 8.68     RBC 10/12/2024 4.04     Hemoglobin 10/12/2024 12.6     Hematocrit 10/12/2024 38.5     MCV 10/12/2024 95     MCH 10/12/2024 31.2     MCHC 10/12/2024 32.7     RDW 10/12/2024 13.4     Platelets 10/12/2024 212     MPV 10/12/2024 9.7     Sodium 10/12/2024 141     Potassium 10/12/2024 4.3     Chloride 10/12/2024 108     CO2 10/12/2024 26     ANION GAP 10/12/2024 7     BUN 10/12/2024 27 (H)     Creatinine 10/12/2024 1.00     Glucose 10/12/2024 99     Calcium 10/12/2024 8.7     AST 10/12/2024 16     ALT 10/12/2024 15     Alkaline Phosphatase 10/12/2024 88     Total Protein 10/12/2024 6.7     Albumin 10/12/2024 3.7     Total Bilirubin 10/12/2024 0.63     POC Glucose 10/12/2024 96     POC Glucose 10/12/2024 169 (H)     POC Glucose 10/12/2024 117     POC Glucose 10/12/2024 110     WBC 10/13/2024 9.46     RBC 10/13/2024 4.02     Hemoglobin 10/13/2024 12.7     Hematocrit 10/13/2024 38.4     MCV 10/13/2024 96     MCH 10/13/2024 31.6     MCHC 10/13/2024 33.1     RDW 10/13/2024 13.4     MPV 10/13/2024 9.6     Platelets 10/13/2024 208     nRBC 10/13/2024 0     Segmented % 10/13/2024 63     Immature Grans % 10/13/2024 1     Lymphocytes % 10/13/2024 19     Monocytes % 10/13/2024 12     Eosinophils Relative 10/13/2024 4     Basophils Relative 10/13/2024 1     Absolute Neutrophils 10/13/2024 6.05     Absolute Immature Grans 10/13/2024 0.06     Absolute Lymphocytes 10/13/2024 1.81     Absolute Monocytes 10/13/2024 1.15     Eosinophils Absolute 10/13/2024 0.33     Basophils Absolute 10/13/2024 0.06     Sodium 10/13/2024 139     Potassium 10/13/2024 3.8     Chloride 10/13/2024 107     CO2 10/13/2024 25     ANION GAP 10/13/2024 7     BUN 10/13/2024 18     Creatinine 10/13/2024 0.86     Glucose 10/13/2024 106     Calcium 10/13/2024 8.4     AST  10/13/2024 12     ALT 10/13/2024 13     Alkaline Phosphatase 10/13/2024 79     Total Protein 10/13/2024 6.5     Albumin 10/13/2024 3.6     Total Bilirubin 10/13/2024 0.57     POC Glucose 10/13/2024 113    No results displayed because visit has over 200 results.        Imaging: XR chest 1 view portable    Result Date: 10/12/2024  Narrative: XR CHEST PORTABLE INDICATION: chest pain. With associated shortness of breath.. COMPARISON: Chest radiograph 2/28/2024.; January 29, 2024. FINDINGS: Monitoring leads and clips project over the chest. Low lung volumes, which causes crowding of bronchovascular markings.  Within that limitation, there is no focal lung opacity. No pneumothorax or pleural effusion. Mild cardiomegaly.. Severe dextroscoliosis. Normal upper abdomen.     Impression: No acute cardiopulmonary disease. Resident: Michael Betancur I, the attending radiologist, have reviewed the images and agree with the final report above. Workstation performed: [id number]     CT abdomen pelvis with contrast    Result Date: 10/11/2024  Narrative: CT ABDOMEN AND PELVIS WITH IV CONTRAST INDICATION: Black stools. COMPARISON: No similar prior studies available for comparison. TECHNIQUE: CT examination of the abdomen and pelvis was performed. Multiplanar 2D reformatted images were created from the source data. This examination, like all CT scans performed in the [facility], was performed utilizing techniques to minimize radiation dose exposure, including the use of iterative reconstruction and automated exposure control. Radiation dose length product (DLP) for this visit: 475.99 mGy-cm IV Contrast: 100 mL of iohexol (OMNIPAQUE) Enteric Contrast: Enteric contrast was not administered. FINDINGS: ABDOMEN LOWER CHEST: Clear imaged lung bases. No pleural effusions. At least a moderate hiatal hernia. Bilateral gynecomastia. LIVER/BILIARY TREE:  Normal liver morphology.  No focal hepatic lesions. No biliary ductal  dilation. GALLBLADDER: The small gallstone is seen within the gallbladder. No gallbladder wall thickening or pericholecystic fluid to suggest acute cholecystitis. SPLEEN: Unremarkable. PANCREAS: There is a hypodense lesion in the uncinate process of the pancreas measuring approximately 1.6 x 1.5 cm (series 2 image 75). It measures higher than simple fluid in density, however evaluation is somewhat limited by streak artifact from surgical clips in the right upper quadrant. No evidence of acute pancreatitis. No dilatation of the main pancreatic duct. ADRENAL GLANDS: Unremarkable. KIDNEYS/URETERS: Symmetric renal enhancement. Intrarenal calculi measuring up to 3 mm in the left kidney. No ureteral calculi. No hydronephrosis. There is a 1.4 x 1.3 cm exophytic, hyperdense soft tissue density lesion in the upper pole of the left kidney. There are also several subcentimeter hypodense lesions in the kidneys, too small to accurately characterize with CT technique, however statistically most likely cysts. STOMACH AND BOWEL:  Evaluation of the gastrointestinal tract is somewhat limited by underdistention and lack of oral contrast. The patient is status post right hemicolectomy with an ileocolic anastomosis in the right upper quadrant. Residual colon is somewhat tortuous. No bowel obstruction or convincing inflammation. Numerous diverticuli throughout the colon, however no evidence of diverticulitis. There is a prominent tortuous vessel in the anterior wall of the lower rectum (series 2 image 159 and series 602 image 98). It connects to a tortuous vessel in the pelvis which ultimately appears to drain into the superior rectal vein. APPENDIX: Surgically absent. ABDOMINOPELVIC CAVITY:  No ascites or pneumoperitoneum. LYMPH NODES: No abdominal or pelvic lymphadenopathy. VESSELS: Normal caliber aorta. Patent major branch vessels. Scattered atherosclerotic calcifications in the abdominal aorta and its major branches. There is  narrowing of the left renal vein as it courses between the aorta and the SMA with upstream dilatation. There is also dilatation of an adjacent left-sided lumbar vein which is tortuous. PELVIS REPRODUCTIVE ORGANS: The prostate gland is enlarged, measuring 5.8 cm in transverse dimension. URINARY BLADDER:  Unremarkable. ABDOMINAL WALL/INGUINAL REGIONS: Tiny hernia containing fat in the supraumbilical ventral abdominal wall in the midline. MUSCULOSKELETAL:  No focal aggressive osseous lesions. Degenerative changes of the spine. Severe scoliosis of the thoracolumbar spine with a rotatory component.     Impression: 1) Numerous colonic diverticula, however no evidence of acute diverticulitis or other bowel inflammation. 2) Tortuous vessel in the anterior wall of the lower rectum, most likely an internal hemorrhoid, or possibly an arteriovenous malformation. 3) 1.6 x 1.5 cm indeterminate lesion in the uncinate process of the pancreas, which may represent a complex cystic lesion, however pancreatic adenocarcinoma may have a similar appearance. Recommend further evaluation with abdominal MRI (without and with IV contrast) if there are no contraindications and gastroenterology consultation. 4) 1.4 x 1.3 cm hyperdense soft tissue density lesion in upper pole of left kidney highly suspicious for renal cell carcinoma. This can also be confirmed on the abdominal MRI. Recommend urology consultation. 5) Narrowing of the left renal vein as it courses between the aorta and the SMA with upstream dilatation, as well as presence of adjacent collateral vessels. 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 Clinical correlation is recommended. 6) Multiple additional findings as above. The study was marked in EPIC for immediate notification. Workstation performed: [id number]     FL barium swallow video w speech    Result Date: 9/17/2024  Narrative: A video barium swallow study was performed by the Department of Speech  Pathology. Please refer to the report for the official interpretation. The images are stored for archival purposes only. Study images were not formally reviewed by the Radiology Department.      Review of Systems:  Review of Systems   Constitutional:  Positive for fatigue.   HENT:  Negative for nosebleeds.    Respiratory:  Positive for shortness of breath. Negative for apnea, wheezing and stridor.    Cardiovascular:  Positive for chest pain and palpitations. Negative for leg swelling.   Gastrointestinal:  Positive for anal bleeding. Negative for abdominal pain and blood in stool.   Genitourinary:  Negative for hematuria.   Musculoskeletal:  Positive for arthralgias and gait problem.   Neurological:  Positive for dizziness and weakness. Negative for syncope.   Hematological:  Bruises/bleeds easily.       Physical Exam:  Physical Exam  Vitals reviewed.   Constitutional:       General: Danielle is not in acute distress.     Appearance: Danielle is normal weight. Danielle is not ill-appearing, toxic-appearing or diaphoretic.   Eyes:      General: No scleral icterus.  Neck:      Vascular: No carotid bruit.   Cardiovascular:      Rate and Rhythm: Normal rate and regular rhythm.      Heart sounds: Murmur (soft systolic ejection murmur at the base) heard.      No friction rub. No gallop.   Pulmonary:      Effort: Pulmonary effort is normal. No respiratory distress.      Breath sounds: Normal breath sounds. No stridor. No wheezing, rhonchi or rales.   Musculoskeletal:      Right lower leg: No edema.      Left lower leg: No edema.   Skin:     General: Skin is warm and dry.      Capillary Refill: Capillary refill takes less than 2 seconds.      Coloration: Skin is not jaundiced or pale.      Findings: No bruising or erythema.   Neurological:      Mental Status: Danielle is alert and oriented to person, place, and time.   Psychiatric:         Mood and Affect: Mood normal.         Discussion/Summary: Coronary artery disease.  Chest pain  syndrome.  Previously improved with inhalers.  He does have several risk factors.  Stress test 2023, he did almost 10 minutes on a modified protocol achieving target rate, he did experience chest discomfort, but there were no EKG criteria for ischemia.  Echocardiogram 2022, revealed normal left ventricular systolic function.  Normal diastolic parameters, there was very mild AV stenosis, mean gradient of 4 mmHg, and very mild mitral stenosis mean gradient of 2 mmHg.  There was mild to moderate tricuspid insufficiency with estimated normal pulmonary artery pressures suggested by Doppler criteria.   CT of the coronary 2024, suggested coronary disease of uncertain severity because of extensive calcification..  catheterization on 2/24, revealed two-vessel coronary disease with a  of the mid right coronary artery.  Mild to moderate disease in the LAD system, manage medically..  Continue current medications, however he is not taking the nitrates regularly, his symptoms continue to be improved nevertheless.  Will hold on pushing the issue unless he has more symptoms.  Post catheterization cardioembolic CVA small.  With full recovery .  Valvular heart disease, mild severity, echocardiogram 2024 during the last admission revealed minimal changes, mild aortic stenosis mean gradient of 10, mild mitral stenosis mean gradient 3.  Normal left ventricular systolic function, diastolic parameters described as normal.  In the setting of chronic dyspnea with restrictive lung disease from significant scoliosis.  48-hour Holter monitor 2023 revealed normal sinus rhythm and no significant arrhythmias. , I did explain to him that he would be best for him to be on lipid-lowering therapy given he is peripheral vascular disease, he explained to me that he is antistatin therapy patient.  I explained the patient that his medications are safe and very effective in reducing clinical events including myocardial infarction, further CVAs and need  for revascularization, he is now compliant with a statin therapy.  Vascular disease.  Status post left carotid endarterectomy 2021 in the setting of CVA of the central semiovale on the left.  Patent on duplex in 2023, less than 50% on the right.  Recent new CVAs, will proceed with a 2-week monitor to rule out atrial fibrillation.  He is not very keen on taking any stronger anticoagulation..        This note was completed in part utilizing [employer] direct voice recognition software.   Grammatical errors, random word insertion, spelling mistakes, and incomplete sentences may be an occasional consequence of the system secondary to software limitations, ambient noise and hardware issues. At the time of dictation, efforts were made to edit, clarify and /or correct errors.  Please read the chart carefully and recognize, using context, where substitutions have occurred.  If you have any questions or concerns about the context, text or information contained within the body of this dictation, please contact myself, the provider, for further clarification.

## 2024-10-17 ENCOUNTER — TELEPHONE (OUTPATIENT)
Dept: CARDIOLOGY CLINIC | Facility: CLINIC | Age: 79
End: 2024-10-17

## 2024-10-17 ENCOUNTER — OFFICE VISIT (OUTPATIENT)
Dept: SPEECH THERAPY | Facility: CLINIC | Age: 79
End: 2024-10-17
Payer: COMMERCIAL

## 2024-10-17 DIAGNOSIS — I63.89 CEREBROVASCULAR ACCIDENT (CVA) DUE TO OTHER MECHANISM (HCC): Primary | ICD-10-CM

## 2024-10-17 DIAGNOSIS — I69.922 DYSARTHRIA AS LATE EFFECT OF CEREBROVASCULAR DISEASE: ICD-10-CM

## 2024-10-17 PROCEDURE — 92507 TX SP LANG VOICE COMM INDIV: CPT

## 2024-10-17 NOTE — PROGRESS NOTES
Daily Speech Treatment Note    Today's date: 10/17/2024   Patient’s name: Tevin Bazan  : 1945  MRN: [id number]  Safety measures:   Referring provider: Jessica Connolly MD    Encounter Diagnosis     ICD-10-CM    1. Cerebrovascular accident (CVA) due to other mechanism (HCC)  I63.89       2. 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  Results as stated below.       Short Term  Patient will perform oral motor exercises 30x each (with and without resistance) to facilitate improved strength and function for increased speech intelligibility.  10/15: Completed education on buccal and labial and lingual exercises for range of motion and strengthening. Patient able to demonstrate, provided handout. Encouraged home practice.      Strength and Endurance Testing:  The IOPI Pro is used by medical professionals to measure, evaluate, and increase the strength and endurance of the tongue and lip in patients with oral motor disorders, including dysphagia and dysarthria.  The IOPI measures the maximum pressure (Pmax) a patient can produce in an air-filled bulb when it is compressed as hard as possible by the tongue or lip against a hard surface (e.g. The palate or teeth, respectively). Pmax is a measure of strength, expressed in kilopascals (kPa, an international unit of pressure).       For patients with dysphagia or dysarthria, oral motor fatigability may be of interest.  The IOPI Pro can be used to assess tongue fatigability.  Low endurance values are an indicator of a high fatigability.  Endurance is measured with the IOPI Pro by quantifying the length of time that a patient can maintain 50% of his or her Pmax.  This procedure is conducted in Target Mode by setting the target value to 50% of the patient's Pmax and timing how  long the patient can hold the top (green) light on.       The medical professional determines what target value is appropriate for exercise therapy purposes and provides specific instructions to the patient for a particular exercise protocol.  In Target Mode, the pressure required to illuminate the green light a the top of the light array can be adjusted using the Set Target arrow buttons.  This green light is used as a visual target for the patient.     Tongue tip Peak Max after 3 trials: 46 Norm for age/gender is 56   Tongue back Peak Max after 3 trials: 52 Norm for age/gender is 50   Right lip Peak Max after 3 trials: 16 Norm for age/gender is 35   Left lip Peak Max after 3 trials: 20 Norm for age/gender is 35    Endurance Training: @ 30 MAX - HELD FOR 2 SECONDS    IOPI PEAK MEASUREMENT WEEKLY GRID  61    10/11/24 10/17          Tongue tip  (Goal = 56) 61 46          Tongue back  (Goal = 50) 35 52          Right Lip  (Goal = 35) 13 16       Left Lip  (Goal = 35) 18 20           IOPI -- Today's Exercise Targets    Tongue tip  (Goal = 56) Peak Max after 3 trials: 46 Effort level: 70% Target for treatment: 32   Tongue back  (Goal = 50) Peak Max after 3 trials: 52 Effort level: 70% Target for treatment: 36, 33 x 4, 31 x    Right Lip  (Goal = 35) Peak Max after 3 trials: 16 Effort level: 70% Target for treatment: 11   Left Lip  (Goal = 35) Peak Max after 3 trials: 20 Effort level: 70% Target for treatment: 14, 11        10/11/24 10/17/24       Tongue tip   32 x 30       Tongue back 23 x 30 36 x 7, 33 x 4, 31 x 19        Right Lip 8 x 15 11 x 30        Left Lip 12 and 11 (20) 14 x 10; 11 x 20                      Patient will utilize over-articulation 80% of the time while orally reading sentence/paragraph length material to facilitate increased speech intelligibility.  10/11: Educated on compensatory strategies to increase clarity, provided handout. 10/15: Min-mod cues to utilize overarticulation in conversation.      Patient will complete structured oral reading and conversational tasks with the consistent use of compensatory strategies >90% of the time to facilitate increased speech intelligibility.     Patient will perform oral motor and diadochokinetic speech rate exercises with > 90% accuracy to facilitate increased speech intelligibility.     Patient will complete pharyngeal strengthening exercises (EMST?) for increased safety & improved swallow function.        Patient will complete daily oral motor exercises (IOPI as appropriate) to increase lingual/labial range of motion, strength, and coordination with min cues to 90% effectiveness to improve bolus formation and strengthen oral musculature.   (See above)     Patient will perform compensatory strategies (e.g., upright positioning, small bites/sips, slow rate, alternation of consistencies, multiple swallows, effortful swallow, chin tuck, head turn, etc.) with 80% accuracy with minimized overt s/sx penetration/aspiration of least restrictive food/liquid consistencies.     Patient will tolerate least restrictive diet with minimized overt s/sx of penetration or aspiration.        Long Term      Patient will utilize compensatory strategies with optimum safety and efficacy of swallowing function on P.O. intake without overt s/sx of penetration or aspiration by discharge.        Patient will demonstrate independent implementation of compensatory strategies to improve speech intelligibility by discharge.      Patient will improve the ability to facilitate functional speech production skills for intelligible communication including compensatory strategies to promote quality of life and to maximize level of independence with communication.                   Plan:  -Continue with current plan of care.    Emst-75?  Iopi? Dysarthria strategies, oral motor strategies for labial closure, lingual / labial strength, dysphagia strategies

## 2024-10-17 NOTE — TELEPHONE ENCOUNTER
AMB Extended Holter Monitor ([id number]) and ([id number]):    No Authorization Required per IVR  (precert) on  10/17/2024 at 1:44pm    Reference #[id number]

## 2024-10-21 ENCOUNTER — OFFICE VISIT (OUTPATIENT)
Dept: SPEECH THERAPY | Facility: CLINIC | Age: 79
End: 2024-10-21
Payer: COMMERCIAL

## 2024-10-21 DIAGNOSIS — I63.89 CEREBROVASCULAR ACCIDENT (CVA) DUE TO OTHER MECHANISM (HCC): Primary | ICD-10-CM

## 2024-10-21 DIAGNOSIS — I69.922 DYSARTHRIA AS LATE EFFECT OF CEREBROVASCULAR DISEASE: ICD-10-CM

## 2024-10-21 PROCEDURE — 92507 TX SP LANG VOICE COMM INDIV: CPT

## 2024-10-21 NOTE — PROGRESS NOTES
Daily Speech Treatment Note    Today's date: 10/21/2024   Patient’s name: Tevin Bazan  : 1945  MRN: [id number]  Safety measures:   Referring provider: Jessica Connolly MD    Encounter Diagnosis     ICD-10-CM    1. Cerebrovascular accident (CVA) due to other mechanism (HCC)  I63.89       2. 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  Results as stated below.       Short Term  Patient will perform oral motor exercises 30x each (with and without resistance) to facilitate improved strength and function for increased speech intelligibility.  10/15: Completed education on buccal and labial and lingual exercises for range of motion and strengthening. Patient able to demonstrate, provided handout. Encouraged home practice.      Strength and Endurance Testing:  The IOPI Pro is used by medical professionals to measure, evaluate, and increase the strength and endurance of the tongue and lip in patients with oral motor disorders, including dysphagia and dysarthria.  The IOPI measures the maximum pressure (Pmax) a patient can produce in an air-filled bulb when it is compressed as hard as possible by the tongue or lip against a hard surface (e.g. The palate or teeth, respectively). Pmax is a measure of strength, expressed in kilopascals (kPa, an international unit of pressure).       For patients with dysphagia or dysarthria, oral motor fatigability may be of interest.  The IOPI Pro can be used to assess tongue fatigability.  Low endurance values are an indicator of a high fatigability.  Endurance is measured with the IOPI Pro by quantifying the length of time that a patient can maintain 50% of his or her Pmax.  This procedure is conducted in Target Mode by setting the target value to 50% of the patient's Pmax and timing how  long the patient can hold the top (green) light on.       The medical professional determines what target value is appropriate for exercise therapy purposes and provides specific instructions to the patient for a particular exercise protocol.  In Target Mode, the pressure required to illuminate the green light a the top of the light array can be adjusted using the Set Target arrow buttons.  This green light is used as a visual target for the patient.     Tongue tip Peak Max after 3 trials: 46 Norm for age/gender is 56   Tongue back Peak Max after 3 trials: 52 Norm for age/gender is 50   Right lip Peak Max after 3 trials: 16 Norm for age/gender is 35   Left lip Peak Max after 3 trials: 20 Norm for age/gender is 35    Endurance Training: @ 30 MAX - HELD FOR 2 SECONDS    IOPI PEAK MEASUREMENT WEEKLY GRID  61    10/11/24 10/17          Tongue tip  (Goal = 56) 61 46          Tongue back  (Goal = 50) 35 52          Right Lip  (Goal = 35) 13 16       Left Lip  (Goal = 35) 18 20           IOPI -- Today's Exercise Targets    Tongue tip  (Goal = 56) Peak Max after 3 trials: 46 Effort level: 70% Target for treatment: 32   Tongue back  (Goal = 50) Peak Max after 3 trials: 52 Effort level: 70% Target for treatment: 36, 33 x 4, 31 x    Right Lip  (Goal = 35) Peak Max after 3 trials: 16 Effort level: 70% Target for treatment: 11   Left Lip  (Goal = 35) Peak Max after 3 trials: 20 Effort level: 70% Target for treatment: 14, 11        10/11/24 10/17/24       Tongue tip   32 x 30       Tongue back 23 x 30 36 x 7, 33 x 4, 31 x 19        Right Lip 8 x 15 11 x 30        Left Lip 12 and 11 (20) 14 x 10; 11 x 20                      Patient will utilize over-articulation 80% of the time while orally reading sentence/paragraph length material to facilitate increased speech intelligibility.  10/11: Educated on compensatory strategies to increase clarity, provided handout. 10/15: Min-mod cues to utilize overarticulation in conversation.   10/21: Reading sentences using strategies: Over 90% use of strategies in both sentences and conversation.       Patient will complete structured oral reading and conversational tasks with the consistent use of compensatory strategies >90% of the time to facilitate increased speech intelligibility.  10/21: Using overarticulation in conversation well and functionally in conversation, over 90% of the time.       Patient will perform oral motor and diadochokinetic speech rate exercises with > 90% accuracy to facilitate increased speech intelligibility.     Patient will complete pharyngeal strengthening exercises (EMST?) for increased safety & improved swallow function.        Patient will complete daily oral motor exercises (IOPI as appropriate) to increase lingual/labial range of motion, strength, and coordination with min cues to 90% effectiveness to improve bolus formation and strengthen oral musculature.   (See above)     Patient will perform compensatory strategies (e.g., upright positioning, small bites/sips, slow rate, alternation of consistencies, multiple swallows, effortful swallow, chin tuck, head turn, etc.) with 80% accuracy with minimized overt s/sx penetration/aspiration of least restrictive food/liquid consistencies.     Patient will tolerate least restrictive diet with minimized overt s/sx of penetration or aspiration.        Long Term      Patient will utilize compensatory strategies with optimum safety and efficacy of swallowing function on P.O. intake without overt s/sx of penetration or aspiration by discharge.        Patient will demonstrate independent implementation of compensatory strategies to improve speech intelligibility by discharge.      Patient will improve the ability to facilitate functional speech production skills for intelligible communication including compensatory strategies to promote quality of life and to maximize level of independence with communication.                    Plan:  -Continue with current plan of care.    Emst-75?  Iopi? Dysarthria strategies, oral motor strategies for labial closure, lingual / labial strength, dysphagia strategies

## 2024-10-24 ENCOUNTER — OFFICE VISIT (OUTPATIENT)
Dept: SPEECH THERAPY | Facility: CLINIC | Age: 79
End: 2024-10-24
Payer: COMMERCIAL

## 2024-10-24 ENCOUNTER — CONSULT (OUTPATIENT)
Dept: GASTROENTEROLOGY | Facility: CLINIC | Age: 79
End: 2024-10-24
Payer: COMMERCIAL

## 2024-10-24 VITALS
DIASTOLIC BLOOD PRESSURE: 58 MMHG | BODY MASS INDEX: 25.23 KG/M2 | HEIGHT: 66 IN | WEIGHT: 157 LBS | SYSTOLIC BLOOD PRESSURE: 100 MMHG

## 2024-10-24 DIAGNOSIS — I69.922 DYSARTHRIA AS LATE EFFECT OF CEREBROVASCULAR DISEASE: ICD-10-CM

## 2024-10-24 DIAGNOSIS — I63.9 CEREBROVASCULAR ACCIDENT (CVA), UNSPECIFIED MECHANISM (HCC): ICD-10-CM

## 2024-10-24 DIAGNOSIS — I63.89 CEREBROVASCULAR ACCIDENT (CVA) DUE TO OTHER MECHANISM (HCC): Primary | ICD-10-CM

## 2024-10-24 DIAGNOSIS — Z98.890 HX OF COLONOSCOPY: ICD-10-CM

## 2024-10-24 DIAGNOSIS — K92.1 MELENA: Primary | ICD-10-CM

## 2024-10-24 DIAGNOSIS — Z90.49 HISTORY OF HEMICOLECTOMY: ICD-10-CM

## 2024-10-24 DIAGNOSIS — K86.9 PANCREATIC LESION: ICD-10-CM

## 2024-10-24 DIAGNOSIS — Z79.01 LONG TERM (CURRENT) USE OF ANTICOAGULANTS: ICD-10-CM

## 2024-10-24 PROCEDURE — 92507 TX SP LANG VOICE COMM INDIV: CPT

## 2024-10-24 PROCEDURE — 99204 OFFICE O/P NEW MOD 45 MIN: CPT | Performed by: NURSE PRACTITIONER

## 2024-10-24 NOTE — PROGRESS NOTES
[facility] - Outpatient Consultation  Tevin Bazan 79 y.o. adult MRN: [id number]  Encounter: [id number]    ASSESSMENT AND PLAN:      1. Melena  Patient originally was seen in the [facility] emergency room due to having increased black liquid melena 2 days prior to coming to the emergency room.  Per EMR patient did not have any further melena noted.  Patient's hemoglobin remained stable and with most recent lab work from 10/13 patient's hemoglobin 12.7.  Patient did not require blood transfusions during hospital stay.  He was placed on pantoprazole 40 mg daily which he is not taking at this time however he does take 40 mg of famotidine daily.  Compliance is suspect medication after discussion with patient and his wife.  Unclear source of melena upon admission; consider anticoagulation medication, gastric versus peptic ulcer.  Patient currently prescribed Brilinta 90 mg twice daily however patient states he is only taking it daily.  Discussion will need to be had with prescribing physician of Brilinta which patient is unsure of and possibly prescribed at discharge by hospitalist at [facility], in order to have possible EGD and/or colonoscopy completed May need to hold medication however with most recent history of CVA may need to defer as long as patient remains stable.    2. Pancreatic lesion  Per CT A/P from 10/11; There is a hypodense lesion in the uncinate process of the pancreas measuring approximately 1.6 x 1.5 cm (series 2 image 75). It measures higher than simple fluid in density, however evaluation is somewhat limited by streak artifact from surgical clips in the right upper quadrant.  No evidence of acute pancreatitis. No dilatation of the main pancreatic duct.    Patient currently scheduled for MRI/MRCP to evaluate multiple incidental findings from this CAT scan in/11/2024.    3. History of hemicolectomy  Per patient he does note of previous abdominal surgery many years  ago however unable to elaborate.  Per CT A/P from 10/11; the patient is s/p right hemicolectomy with end ileocolonic anastomosis in the right upper quadrant.    4. Cerebrovascular accident (CVA), unspecified mechanism (HCC)  Per EMR patient recently had presented to the hospital and was diagnosed with strokelike symptoms in September of this year.  Patient also notes that his current speech has been affected by the CVA.    5. Long term (current) use of anticoagulants  Patient is currently prescribed Brilinta 90 mg every 12 hours however patient states that he is only taking the medication daily because of how it makes him feel.  Encouraged patient to communicate with his prescribing physicians before not taking medications as prescribed.    6. Hx of colonoscopy  Patient states he believes his last colonoscopy was approximately 25 to 30 years ago in [place].  Does not recall results.  Consider colonoscopy pending conversation with patient's Provider's regarding anticoagulation medication and recent CVA diagnosis September 2024.    Reviewed patient's hospitalization notes.  Reviewed patient's hospitalization lab work and imaging.  Reviewed patient's posthospitalization physician specialty notes.      Followup Appointment: Pending MRI results and communication with patient's current Provider's.  ______________________________________________________________________    Chief Complaint   Patient presents with    Follow-up     Pt was in hospital       HPI:   79-year-old adult accompanied by wife Virginia and with past medical history of hypertension, CAD, CVA, right hemicolectomy presents for follow-up after admission from 10/11-10/13 at [facility].  Patient had presented to the emergency room with black stool.  Per EMR, patient did not experience any further melena and did not require endoscopic or transfusions during hospitalization.    On exam, patient denies nausea, vomiting or abdominal pain.   States his acid reflux symptoms are well-controlled on Pepcid 40 mg daily.  States his current bowel movements are normal.  Denies hematochezia however notes his bowel movements have been dark brown.  Former smoker, denies EtOH use.  States his weight is stable.    Patient's most recent lab work 10/13; WBCs 9.26, hemoglobin 12.7, platelets 208.  CMP essentially normal.    CT abdomen and pelvis from 10/11; Numerous colonic diverticula, however no evidence of acute diverticulitis or other bowel inflammation.   Tortuous vessel in the anterior wall of the lower rectum, most likely an internal hemorrhoid, or possibly an arteriovenous malformation.  1.6 x 1.5 cm indeterminate lesion in the uncinate process of the pancreas, which may represent a complex cystic lesion, however pancreatic adenocarcinoma may have a similar appearance. Recommend further evaluation with abdominal MRI (without and with   IV contrast) if there are no contraindications and gastroenterology consultation.    Patient states he believes he did have a colonoscopy in [place] approximately 25 to 30 years ago however does not recall results.    Historical Information   Past Medical History:   Diagnosis Date    Chronic bilateral low back pain without sciatica 7/17/2020    Hypertension     Scoliosis     Stroke (HCC)     Stroke-like symptoms 11/22/2021     Past Surgical History:   Procedure Laterality Date    BACK SURGERY      BOWEL RESECTION      CARDIAC CATHETERIZATION N/A 02/26/2024    Procedure: Cardiac Coronary Angiogram;  Surgeon: Rivas Cantor MD;  Location: [facility];  Service: Cardiology    CARDIAC CATHETERIZATION  02/26/2024    Procedure: Cardiac catheterization;  Surgeon: Rivas Cantor MD;  Location: [facility];  Service: Cardiology    CARDIAC CATHETERIZATION N/A 02/26/2024    Procedure: Cardiac pci;  Surgeon: Rivas Cantor MD;  Location: [facility];  Service: Cardiology    COLONOSCOPY      MS TEAEC W/PATCH GRF  "CAROTID VERTB SUBCLAV NECK INC Left 2021    Procedure: ENDARTERECTOMY ARTERY CAROTID;  Surgeon: Vito Mo MD;  Location: [facility] OR;  Service: Vascular    TONSILLECTOMY      UPPER GASTROINTESTINAL ENDOSCOPY       Social History     Substance and Sexual Activity   Alcohol Use Not Currently     Social History     Substance and Sexual Activity   Drug Use No     Social History     Tobacco Use   Smoking Status Former    Current packs/day: 0.00    Types: Cigarettes    Quit date:     Years since quittin.8   Smokeless Tobacco Never     Family History   Adopted: Yes   Family history unknown: Yes       Meds/Allergies     Current Outpatient Medications:     albuterol (Proventil HFA) 90 mcg/act inhaler    Ascorbic Acid (vitamin C) 1000 MG tablet    atorvastatin (LIPITOR) 80 mg tablet    Chelated Zinc 50 MG TABS    diltiazem (TIAZAC) 120 MG 24 hr capsule    famotidine (PEPCID) 40 MG tablet    metFORMIN (GLUCOPHAGE-XR) 500 mg 24 hr tablet    pantoprazole (PROTONIX) 40 mg tablet    tamsulosin (FLOMAX) 0.4 mg    ticagrelor (BRILINTA) 90 MG    aspirin (ECOTRIN LOW STRENGTH) 81 mg EC tablet    Omega 3 1000 MG CAPS    Allergies   Allergen Reactions    Black Cohosh Rash    Minoxidil Rash       PHYSICAL EXAM:    Blood pressure 100/58, height 5' 6\" (1.676 m), weight 71.2 kg (157 lb). Body mass index is 25.34 kg/m².    General Appearance: NAD, cooperative, alert.  Eyes: Anicteric, PERRLA, EOMI  ENT:  Normocephalic, atraumatic, normal mucosa.    Neck:  Supple, symmetrical, trachea midline,   Resp: Decreased auscultation bilaterally; dyspnea on exertion.  CV:  S1 S2, Regular rate and rhythm; no murmur, rub, or gallop.  GI:  Soft, non-tender, non-distended; normal bowel sounds; no masses, no organomegaly   Rectal: Deferred  Musculoskeletal: No cyanosis, clubbing or edema. Normal ROM.  Skin:  No jaundice, rashes, or lesions   Heme/Lymph: No palpable cervical lymphadenopathy  Psych: Normal affect, good eye contact  Neuro: No " "gross deficits, AAOx3    Lab Results:   Lab Results   Component Value Date    WBC 9.46 10/13/2024    HGB 12.7 10/13/2024    HCT 38.4 10/13/2024    MCV 96 10/13/2024     10/13/2024     Lab Results   Component Value Date    K 3.8 10/13/2024     10/13/2024    CO2 25 10/13/2024    BUN 18 10/13/2024    CREATININE 0.86 10/13/2024    GLUF 95 02/29/2024    CALCIUM 8.4 10/13/2024    CORRECTEDCA 8.6 12/01/2021    AST 12 10/13/2024    ALT 13 10/13/2024    ALKPHOS 79 10/13/2024    EGFR 76 02/28/2024     No results found for: \"IRON\", \"TIBC\", \"FERRITIN\"  Lab Results   Component Value Date    LIPASE 24 10/11/2024       Radiology Results:   XR chest 1 view portable    Result Date: 10/12/2024  Narrative: XR CHEST PORTABLE INDICATION: chest pain. With associated shortness of breath.. COMPARISON: Chest radiograph 2/28/2024.; January 29, 2024. FINDINGS: Monitoring leads and clips project over the chest. Low lung volumes, which causes crowding of bronchovascular markings.  Within that limitation, there is no focal lung opacity. No pneumothorax or pleural effusion. Mild cardiomegaly.. Severe dextroscoliosis. Normal upper abdomen.     Impression: No acute cardiopulmonary disease. Resident: Michael Betancur I, the attending radiologist, have reviewed the images and agree with the final report above. Workstation performed: [id number]     CT abdomen pelvis with contrast    Result Date: 10/11/2024  Narrative: CT ABDOMEN AND PELVIS WITH IV CONTRAST INDICATION: Black stools. COMPARISON: No similar prior studies available for comparison. TECHNIQUE: CT examination of the abdomen and pelvis was performed. Multiplanar 2D reformatted images were created from the source data. This examination, like all CT scans performed in the [facility], was performed utilizing techniques to minimize radiation dose exposure, including the use of iterative reconstruction and automated exposure control. Radiation dose length product " (DLP) for this visit: 475.99 mGy-cm IV Contrast: 100 mL of iohexol (OMNIPAQUE) Enteric Contrast: Enteric contrast was not administered. FINDINGS: ABDOMEN LOWER CHEST: Clear imaged lung bases. No pleural effusions. At least a moderate hiatal hernia. Bilateral gynecomastia. LIVER/BILIARY TREE:  Normal liver morphology.  No focal hepatic lesions. No biliary ductal dilation. GALLBLADDER: The small gallstone is seen within the gallbladder. No gallbladder wall thickening or pericholecystic fluid to suggest acute cholecystitis. SPLEEN: Unremarkable. PANCREAS: There is a hypodense lesion in the uncinate process of the pancreas measuring approximately 1.6 x 1.5 cm (series 2 image 75). It measures higher than simple fluid in density, however evaluation is somewhat limited by streak artifact from surgical clips in the right upper quadrant. No evidence of acute pancreatitis. No dilatation of the main pancreatic duct. ADRENAL GLANDS: Unremarkable. KIDNEYS/URETERS: Symmetric renal enhancement. Intrarenal calculi measuring up to 3 mm in the left kidney. No ureteral calculi. No hydronephrosis. There is a 1.4 x 1.3 cm exophytic, hyperdense soft tissue density lesion in the upper pole of the left kidney. There are also several subcentimeter hypodense lesions in the kidneys, too small to accurately characterize with CT technique, however statistically most likely cysts. STOMACH AND BOWEL:  Evaluation of the gastrointestinal tract is somewhat limited by underdistention and lack of oral contrast. The patient is status post right hemicolectomy with an ileocolic anastomosis in the right upper quadrant. Residual colon is somewhat tortuous. No bowel obstruction or convincing inflammation. Numerous diverticuli throughout the colon, however no evidence of diverticulitis. There is a prominent tortuous vessel in the anterior wall of the lower rectum (series 2 image 159 and series 602 image 98). It connects to a tortuous vessel in the pelvis  which ultimately appears to drain into the superior rectal vein. APPENDIX: Surgically absent. ABDOMINOPELVIC CAVITY:  No ascites or pneumoperitoneum. LYMPH NODES: No abdominal or pelvic lymphadenopathy. VESSELS: Normal caliber aorta. Patent major branch vessels. Scattered atherosclerotic calcifications in the abdominal aorta and its major branches. There is narrowing of the left renal vein as it courses between the aorta and the SMA with upstream dilatation. There is also dilatation of an adjacent left-sided lumbar vein which is tortuous. PELVIS REPRODUCTIVE ORGANS: The prostate gland is enlarged, measuring 5.8 cm in transverse dimension. URINARY BLADDER:  Unremarkable. ABDOMINAL WALL/INGUINAL REGIONS: Tiny hernia containing fat in the supraumbilical ventral abdominal wall in the midline. MUSCULOSKELETAL:  No focal aggressive osseous lesions. Degenerative changes of the spine. Severe scoliosis of the thoracolumbar spine with a rotatory component.     Impression: 1) Numerous colonic diverticula, however no evidence of acute diverticulitis or other bowel inflammation. 2) Tortuous vessel in the anterior wall of the lower rectum, most likely an internal hemorrhoid, or possibly an arteriovenous malformation. 3) 1.6 x 1.5 cm indeterminate lesion in the uncinate process of the pancreas, which may represent a complex cystic lesion, however pancreatic adenocarcinoma may have a similar appearance. Recommend further evaluation with abdominal MRI (without and with IV contrast) if there are no contraindications and gastroenterology consultation. 4) 1.4 x 1.3 cm hyperdense soft tissue density lesion in upper pole of left kidney highly suspicious for renal cell carcinoma. This can also be confirmed on the abdominal MRI. Recommend urology consultation. 5) Narrowing of the left renal vein as it courses between the aorta and the SMA with upstream dilatation, as well as presence of adjacent collateral vessels. These findings may be  seen in the setting of Nutcracker syndrome. Clinical correlation is recommended. 6) Multiple additional findings as above. The study was marked in EPIC for immediate notification. Workstation performed: [id number]         REVIEW OF SYSTEMS:    CONSTITUTIONAL: Denies any fever, chills, rigors, and weight loss.  HEENT: No earache or tinnitus. Denies hearing loss or visual disturbances.  CARDIOVASCULAR: No chest pain or palpitations.   RESPIRATORY: Denies any cough, hemoptysis, shortness of breath or dyspnea on exertion.  GASTROINTESTINAL: As noted in the History of Present Illness.   GENITOURINARY: No problems with urination. Denies any hematuria or dysuria.  NEUROLOGIC: No dizziness or vertigo, denies headaches.   MUSCULOSKELETAL: Denies any muscle or joint pain.   SKIN: Denies skin rashes or itching.   ENDOCRINE: Denies excessive thirst. Denies intolerance to heat or cold.  PSYCHOSOCIAL: Denies depression or anxiety. Denies any recent memory loss.

## 2024-10-28 ENCOUNTER — OFFICE VISIT (OUTPATIENT)
Dept: SPEECH THERAPY | Facility: CLINIC | Age: 79
End: 2024-10-28
Payer: COMMERCIAL

## 2024-10-28 DIAGNOSIS — I63.89 CEREBROVASCULAR ACCIDENT (CVA) DUE TO OTHER MECHANISM (HCC): Primary | ICD-10-CM

## 2024-10-28 DIAGNOSIS — I69.922 DYSARTHRIA AS LATE EFFECT OF CEREBROVASCULAR DISEASE: ICD-10-CM

## 2024-10-28 PROCEDURE — 92507 TX SP LANG VOICE COMM INDIV: CPT

## 2024-10-28 NOTE — PROGRESS NOTES
Daily Speech Treatment Note    Today's date: 10/28/2024   Patient’s name: Tevin Bazan  : 1945  MRN: [id number]  Safety measures:   Referring provider: Jessica Connolly MD    Encounter Diagnosis     ICD-10-CM    1. Cerebrovascular accident (CVA) due to other mechanism (HCC)  I63.89       2. 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 Patient yawning frequently.  Suggested talking to PCP & ? Taking a short nap daily, increasing hydration.       Objective/Assessment:  Completed structured activities with patient to target goals below.  Results as stated below.       Short Term  Patient will perform oral motor exercises 30x each (with and without resistance) to facilitate improved strength and function for increased speech intelligibility.  10/15: Completed education on buccal and labial and lingual exercises for range of motion and strengthening. Patient able to demonstrate, provided handout. Encouraged home practice.      Strength and Endurance Testing:  The IOPI Pro is used by medical professionals to measure, evaluate, and increase the strength and endurance of the tongue and lip in patients with oral motor disorders, including dysphagia and dysarthria.  The IOPI measures the maximum pressure (Pmax) a patient can produce in an air-filled bulb when it is compressed as hard as possible by the tongue or lip against a hard surface (e.g. The palate or teeth, respectively). Pmax is a measure of strength, expressed in kilopascals (kPa, an international unit of pressure).       For patients with dysphagia or dysarthria, oral motor fatigability may be of interest.  The IOPI Pro can be used to assess tongue fatigability.  Low endurance values are an indicator of a high fatigability.  Endurance is measured with the IOPI Pro by quantifying the length of time that a patient can  maintain 50% of his or her Pmax.  This procedure is conducted in Target Mode by setting the target value to 50% of the patient's Pmax and timing how long the patient can hold the top (green) light on.       The medical professional determines what target value is appropriate for exercise therapy purposes and provides specific instructions to the patient for a particular exercise protocol.  In Target Mode, the pressure required to illuminate the green light a the top of the light array can be adjusted using the Set Target arrow buttons.  This green light is used as a visual target for the patient.     Tongue tip Peak Max after 3 trials: 46 Norm for age/gender is 56   Tongue back Peak Max after 3 trials: 52 Norm for age/gender is 50   Right lip Peak Max after 3 trials: 16 Norm for age/gender is 35   Left lip Peak Max after 3 trials: 20 Norm for age/gender is 35    Endurance Training: @ 30 MAX - HELD FOR 2 SECONDS    IOPI PEAK MEASUREMENT WEEKLY GRID  61    10/11/24 10/17          Tongue tip  (Goal = 56) 61 46          Tongue back  (Goal = 50) 35 52          Right Lip  (Goal = 35) 13 16       Left Lip  (Goal = 35) 18 20           IOPI -- Today's Exercise Targets    Tongue tip  (Goal = 56) Peak Max after 3 trials: 46 Effort level: 70% Target for treatment: 32   Tongue back  (Goal = 50) Peak Max after 3 trials: 52 Effort level: 70% Target for treatment: 36, 33 x 4, 31 x    Right Lip  (Goal = 35) Peak Max after 3 trials: 16 Effort level: 70% Target for treatment: 11   Left Lip  (Goal = 35) Peak Max after 3 trials: 20 Effort level: 70% Target for treatment: 14, 11        10/11/24 10/17/24       Tongue tip   32 x 30       Tongue back 23 x 30 36 x 7, 33 x 4, 31 x 19        Right Lip 8 x 15 11 x 30        Left Lip 12 and 11 (20) 14 x 10; 11 x 20                      Patient will utilize over-articulation 80% of the time while orally reading sentence/paragraph length material to facilitate increased speech intelligibility.   10/11: Educated on compensatory strategies to increase clarity, provided handout. 10/15: Min-mod cues to utilize overarticulation in conversation.  10/21: Reading sentences using strategies: Over 90% use of strategies in both sentences and conversation. 10/24: Reading sentences /t/ and /d/ & /n/ focused and multisyllabic words: 80% accuracy in using strategies and speaking with clarity. Increased fatigue noted with increased talking / sentence, education provided.   10/28: Decreased use of strategies by third paragraph- able to increase use of strategies and clarity with minimal verbal cues/reminders.  Encouraged home practice out loud.      Patient will complete structured oral reading and conversational tasks with the consistent use of compensatory strategies >90% of the time to facilitate increased speech intelligibility.  10/21: Using overarticulation in conversation well and functionally in conversation, over 90% of the time.  10/28 Patient able to explain about home pictures clearly, 100% accuracy.      Patient will perform oral motor and diadochokinetic speech rate exercises with > 90% accuracy to facilitate increased speech intelligibility.     Patient will complete pharyngeal strengthening exercises (EMST?) for increased safety & improved swallow function.        Patient will complete daily oral motor exercises (IOPI as appropriate) to increase lingual/labial range of motion, strength, and coordination with min cues to 90% effectiveness to improve bolus formation and strengthen oral musculature.   (See above)     Patient will perform compensatory strategies (e.g., upright positioning, small bites/sips, slow rate, alternation of consistencies, multiple swallows, effortful swallow, chin tuck, head turn, etc.) with 80% accuracy with minimized overt s/sx penetration/aspiration of least restrictive food/liquid consistencies.     Patient will tolerate least restrictive diet with minimized overt s/sx of  penetration or aspiration.        Long Term      Patient will utilize compensatory strategies with optimum safety and efficacy of swallowing function on P.O. intake without overt s/sx of penetration or aspiration by discharge.        Patient will demonstrate independent implementation of compensatory strategies to improve speech intelligibility by discharge.      Patient will improve the ability to facilitate functional speech production skills for intelligible communication including compensatory strategies to promote quality of life and to maximize level of independence with communication.                   Plan:  -Continue with current plan of care.    Emst-75?  Iopi? Dysarthria strategies, oral motor strategies for labial closure, lingual / labial strength, dysphagia strategies

## 2024-10-30 ENCOUNTER — OFFICE VISIT (OUTPATIENT)
Dept: SPEECH THERAPY | Facility: CLINIC | Age: 79
End: 2024-10-30
Payer: COMMERCIAL

## 2024-10-30 DIAGNOSIS — I63.89 CEREBROVASCULAR ACCIDENT (CVA) DUE TO OTHER MECHANISM (HCC): Primary | ICD-10-CM

## 2024-10-30 DIAGNOSIS — I69.922 DYSARTHRIA AS LATE EFFECT OF CEREBROVASCULAR DISEASE: ICD-10-CM

## 2024-10-30 PROCEDURE — 92507 TX SP LANG VOICE COMM INDIV: CPT

## 2024-10-30 NOTE — PROGRESS NOTES
Daily Speech Treatment Note    Today's date: 10/30/2024   Patient’s name: Tevin Bazan  : 1945  MRN: [id number]  Safety measures:   Referring provider: Jessica Connolly MD    Encounter Diagnosis     ICD-10-CM    1. Cerebrovascular accident (CVA) due to other mechanism (HCC)  I63.89       2. Dysarthria as late effect of cerebrovascular disease  I69.922             Visit Trackin    Subjective/Behavioral:  -   Pleasant/Cooperative       Objective/Assessment:  Completed structured activities with patient to target goals below.  Results as stated below.       Short Term  Patient will perform oral motor exercises 30x each (with and without resistance) to facilitate improved strength and function for increased speech intelligibility.  10/15: Completed education on buccal and labial and lingual exercises for range of motion and strengthening. Patient able to demonstrate, provided handout. Encouraged home practice.      Strength and Endurance Testing:  The IOPI Pro is used by medical professionals to measure, evaluate, and increase the strength and endurance of the tongue and lip in patients with oral motor disorders, including dysphagia and dysarthria.  The IOPI measures the maximum pressure (Pmax) a patient can produce in an air-filled bulb when it is compressed as hard as possible by the tongue or lip against a hard surface (e.g. The palate or teeth, respectively). Pmax is a measure of strength, expressed in kilopascals (kPa, an international unit of pressure).       For patients with dysphagia or dysarthria, oral motor fatigability may be of interest.  The IOPI Pro can be used to assess tongue fatigability.  Low endurance values are an indicator of a high fatigability.  Endurance is measured with the IOPI Pro by quantifying the length of time that a patient can maintain 50% of his or her Pmax.  This procedure is conducted in Target Mode by setting the target value to 50% of the patient's Pmax and timing  how long the patient can hold the top (green) light on.       The medical professional determines what target value is appropriate for exercise therapy purposes and provides specific instructions to the patient for a particular exercise protocol.  In Target Mode, the pressure required to illuminate the green light a the top of the light array can be adjusted using the Set Target arrow buttons.  This green light is used as a visual target for the patient.     Tongue tip Peak Max after 3 trials: 48 Norm for age/gender is 56   Tongue back Peak Max after 3 trials: 52 Norm for age/gender is 50   Right lip Peak Max after 3 trials: 18 Norm for age/gender is 35   Left lip Peak Max after 3 trials: 18 Norm for age/gender is 35    Endurance Training: @ 24 MAX - HELD FOR 2 SECONDS    IOPI PEAK MEASUREMENT WEEKLY GRID  61    10/11/24 10/17 10/30         Tongue tip  (Goal = 56) 61 46 48         Tongue back  (Goal = 50) 35 52 52         Right Lip  (Goal = 35) 13 16 18      Left Lip  (Goal = 35) 18 20 18          IOPI -- Today's Exercise Targets    Tongue tip  (Goal = 56) Peak Max after 3 trials: 48 Effort level: 70% Target for treatment: 34   Tongue back  (Goal = 50) Peak Max after 3 trials: 52 Effort level: 70% Target for treatment: 36   Right Lip  (Goal = 35) Peak Max after 3 trials: 18 Effort level: 70% Target for treatment: 11   Left Lip  (Goal = 35) Peak Max after 3 trials: 18 Effort level: 70% Target for treatment: 11        10/11/24 10/17/24 10/30      Tongue tip   32 x 30 34 x 30      Tongue back 23 x 30 36 x 7, 33 x 4, 31 x 19  36 x 6      Right Lip 8 x 15 11 x 30  11 x 30      Left Lip 12 and 11 (20) 14 x 10; 11 x 20  11 x 31                    Patient will utilize over-articulation 80% of the time while orally reading sentence/paragraph length material to facilitate increased speech intelligibility.  10/11: Educated on compensatory strategies to increase clarity, provided handout. 10/15: Min-mod cues to utilize  overarticulation in conversation.  10/21: Reading sentences using strategies: Over 90% use of strategies in both sentences and conversation. 10/24: Reading sentences /t/ and /d/ & /n/ focused and multisyllabic words: 80% accuracy in using strategies and speaking with clarity. Increased fatigue noted with increased talking / sentence, education provided.   10/28: Decreased use of strategies by third paragraph- able to increase use of strategies and clarity with minimal verbal cues/reminders.  Encouraged home practice out loud.      Patient will complete structured oral reading and conversational tasks with the consistent use of compensatory strategies >90% of the time to facilitate increased speech intelligibility.  10/21: Using overarticulation in conversation well and functionally in conversation, over 90% of the time.  10/28 Patient able to explain about home pictures clearly, 100% accuracy. 10/30: Required moderate cues initially to utilize strategies for increased clarity.      Patient will perform oral motor and diadochokinetic speech rate exercises with > 90% accuracy to facilitate increased speech intelligibility.     Patient will complete pharyngeal strengthening exercises (EMST?) for increased safety & improved swallow function.        Patient will complete daily oral motor exercises (IOPI as appropriate) to increase lingual/labial range of motion, strength, and coordination with min cues to 90% effectiveness to improve bolus formation and strengthen oral musculature.   (See above)     Patient will perform compensatory strategies (e.g., upright positioning, small bites/sips, slow rate, alternation of consistencies, multiple swallows, effortful swallow, chin tuck, head turn, etc.) with 80% accuracy with minimized overt s/sx penetration/aspiration of least restrictive food/liquid consistencies.     Patient will tolerate least restrictive diet with minimized overt s/sx of penetration or aspiration.        Long  Term      Patient will utilize compensatory strategies with optimum safety and efficacy of swallowing function on P.O. intake without overt s/sx of penetration or aspiration by discharge.        Patient will demonstrate independent implementation of compensatory strategies to improve speech intelligibility by discharge.      Patient will improve the ability to facilitate functional speech production skills for intelligible communication including compensatory strategies to promote quality of life and to maximize level of independence with communication.                   Plan:  -Continue with current plan of care.    Emst-75?  Iopi? Dysarthria strategies, oral motor strategies for labial closure, lingual / labial strength, dysphagia strategies

## 2024-11-04 ENCOUNTER — OFFICE VISIT (OUTPATIENT)
Dept: SPEECH THERAPY | Facility: CLINIC | Age: 79
End: 2024-11-04
Payer: COMMERCIAL

## 2024-11-04 DIAGNOSIS — I63.89 CEREBROVASCULAR ACCIDENT (CVA) DUE TO OTHER MECHANISM (HCC): Primary | ICD-10-CM

## 2024-11-04 DIAGNOSIS — I69.922 DYSARTHRIA AS LATE EFFECT OF CEREBROVASCULAR DISEASE: ICD-10-CM

## 2024-11-04 PROCEDURE — 92507 TX SP LANG VOICE COMM INDIV: CPT

## 2024-11-04 NOTE — TELEPHONE ENCOUNTER
Patient's wife calling to find out if she should reschedule her 's appt due to his MRI being rescheduled until the end of November     Informed her that the appt will need to be rescheduled for after MRI. Confirmed new appt on 12/10 at 930 with Dr Prieto in [place] with patients wife after speaking with clinical coordinator

## 2024-11-04 NOTE — PROGRESS NOTES
Daily Speech Treatment Note    Today's date: 2024   Patient’s name: Tevin Bazan  : 1945  MRN: [id number]  Safety measures:   Referring provider: Jessica Connolly MD    Encounter Diagnosis     ICD-10-CM    1. Cerebrovascular accident (CVA) due to other mechanism (HCC)  I63.89       2. Dysarthria as late effect of cerebrovascular disease  I69.922             Visit Trackin    Subjective/Behavioral:  -   Pleasant/Cooperative . Patient taking nap during days and appears more rested.       Objective/Assessment:  Completed structured activities with patient to target goals below.  Results as stated below.       Short Term  Patient will perform oral motor exercises 30x each (with and without resistance) to facilitate improved strength and function for increased speech intelligibility.  10/15: Completed education on buccal and labial and lingual exercises for range of motion and strengthening. Patient able to demonstrate, provided handout. Encouraged home practice.      Strength and Endurance Testing:  The IOPI Pro is used by medical professionals to measure, evaluate, and increase the strength and endurance of the tongue and lip in patients with oral motor disorders, including dysphagia and dysarthria.  The IOPI measures the maximum pressure (Pmax) a patient can produce in an air-filled bulb when it is compressed as hard as possible by the tongue or lip against a hard surface (e.g. The palate or teeth, respectively). Pmax is a measure of strength, expressed in kilopascals (kPa, an international unit of pressure).       For patients with dysphagia or dysarthria, oral motor fatigability may be of interest.  The IOPI Pro can be used to assess tongue fatigability.  Low endurance values are an indicator of a high fatigability.  Endurance is measured with the IOPI Pro by quantifying the length of time that a patient can maintain 50% of his or her Pmax.  This procedure is conducted in Target Mode by setting  the target value to 50% of the patient's Pmax and timing how long the patient can hold the top (green) light on.       The medical professional determines what target value is appropriate for exercise therapy purposes and provides specific instructions to the patient for a particular exercise protocol.  In Target Mode, the pressure required to illuminate the green light a the top of the light array can be adjusted using the Set Target arrow buttons.  This green light is used as a visual target for the patient.     Tongue tip Peak Max after 3 trials: 48 Norm for age/gender is 56   Tongue back Peak Max after 3 trials: 52 Norm for age/gender is 50   Right lip Peak Max after 3 trials: 18 Norm for age/gender is 35   Left lip Peak Max after 3 trials: 18 Norm for age/gender is 35    Endurance Training: @ 24 MAX - HELD FOR 2 SECONDS    IOPI PEAK MEASUREMENT WEEKLY GRID  61    10/11/24 10/17 10/30         Tongue tip  (Goal = 56) 61 46 48         Tongue back  (Goal = 50) 35 52 52         Right Lip  (Goal = 35) 13 16 18      Left Lip  (Goal = 35) 18 20 18          IOPI -- Today's Exercise Targets    Tongue tip  (Goal = 56) Peak Max after 3 trials: 48 Effort level: 70% Target for treatment: 34   Tongue back  (Goal = 50) Peak Max after 3 trials: 52 Effort level: 70% Target for treatment: 36   Right Lip  (Goal = 35) Peak Max after 3 trials: 18 Effort level: 70% Target for treatment: 11   Left Lip  (Goal = 35) Peak Max after 3 trials: 18 Effort level: 70% Target for treatment: 11        10/11/24 10/17/24 10/30      Tongue tip   32 x 30 34 x 30      Tongue back 23 x 30 36 x 7, 33 x 4, 31 x 19  36 x 6      Right Lip 8 x 15 11 x 30  11 x 30      Left Lip 12 and 11 (20) 14 x 10; 11 x 20  11 x 31                    Patient will utilize over-articulation 80% of the time while orally reading sentence/paragraph length material to facilitate increased speech intelligibility.  10/11: Educated on compensatory strategies to increase clarity,  provided handout. 10/15: Min-mod cues to utilize overarticulation in conversation.  10/21: Reading sentences using strategies: Over 90% use of strategies in both sentences and conversation. 10/24: Reading sentences /t/ and /d/ & /n/ focused and multisyllabic words: 80% accuracy in using strategies and speaking with clarity. Increased fatigue noted with increased talking / sentence, education provided.   10/28: Decreased use of strategies by third paragraph- able to increase use of strategies and clarity with minimal verbal cues/reminders.  Encouraged home practice out loud.      Patient will complete structured oral reading and conversational tasks with the consistent use of compensatory strategies >90% of the time to facilitate increased speech intelligibility.  10/21: Using overarticulation in conversation well and functionally in conversation, over 90% of the time.  10/28 Patient able to explain about home pictures clearly, 100% accuracy. 10/30: Required moderate cues initially to utilize strategies for increased clarity.   11/4: Conversation clear and articulate, 90% accuracy, rare min cues.      Patient will perform oral motor and diadochokinetic speech rate exercises with > 90% accuracy to facilitate increased speech intelligibility.  11/4: Completed lingual strengthening and agility exercises with patient, 90% accuracy.      Patient will complete pharyngeal strengthening exercises (EMST?) for increased safety & improved swallow function.        Patient will complete daily oral motor exercises (IOPI as appropriate) to increase lingual/labial range of motion, strength, and coordination with min cues to 90% effectiveness to improve bolus formation and strengthen oral musculature.   (See above)     Patient will perform compensatory strategies (e.g., upright positioning, small bites/sips, slow rate, alternation of consistencies, multiple swallows, effortful swallow, chin tuck, head turn, etc.) with 80% accuracy  with minimized overt s/sx penetration/aspiration of least restrictive food/liquid consistencies.     Patient will tolerate least restrictive diet with minimized overt s/sx of penetration or aspiration.        Long Term      Patient will utilize compensatory strategies with optimum safety and efficacy of swallowing function on P.O. intake without overt s/sx of penetration or aspiration by discharge.        Patient will demonstrate independent implementation of compensatory strategies to improve speech intelligibility by discharge.      Patient will improve the ability to facilitate functional speech production skills for intelligible communication including compensatory strategies to promote quality of life and to maximize level of independence with communication.                   Plan:  -Continue with current plan of care.    Emst-75?  Iopi? Dysarthria strategies, oral motor strategies for labial closure, lingual / labial strength, dysphagia strategies

## 2024-11-06 ENCOUNTER — OFFICE VISIT (OUTPATIENT)
Dept: SPEECH THERAPY | Facility: CLINIC | Age: 79
End: 2024-11-06
Payer: COMMERCIAL

## 2024-11-06 DIAGNOSIS — I69.922 DYSARTHRIA AS LATE EFFECT OF CEREBROVASCULAR DISEASE: ICD-10-CM

## 2024-11-06 DIAGNOSIS — I63.89 CEREBROVASCULAR ACCIDENT (CVA) DUE TO OTHER MECHANISM (HCC): Primary | ICD-10-CM

## 2024-11-06 PROCEDURE — 92507 TX SP LANG VOICE COMM INDIV: CPT

## 2024-11-06 NOTE — PROGRESS NOTES
Discharge Report    Today's date: 2024   Patient’s name: Tevin Bazan  : 1945  MRN: [id number]  Safety measures:   Referring provider: Jessica Connolly MD    Encounter Diagnosis     ICD-10-CM    1. Cerebrovascular accident (CVA) due to other mechanism (HCC)  I63.89       2. 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 Patient taking nap during days and appears more rested.       Objective/Assessment:  Completed structured activities with patient to target goals below.  Results as stated below.       Short Term  Patient will perform oral motor exercises 30x each (with and without resistance) to facilitate improved strength and function for increased speech intelligibility.  10/15: Completed education on buccal and labial and lingual exercises for range of motion and strengthening. Patient able to demonstrate, provided handout. Encouraged home practice.      Strength and Endurance Testing:  The IOPI Pro is used by medical professionals to measure, evaluate, and increase the strength and endurance of the tongue and lip in patients with oral motor disorders, including dysphagia and dysarthria.  The IOPI measures the maximum pressure (Pmax) a patient can produce in an air-filled bulb when it is compressed as hard as possible by the tongue or lip against a hard surface (e.g. The palate or teeth, respectively). Pmax is a measure of strength, expressed in kilopascals (kPa, an international unit of pressure).       For patients with dysphagia or dysarthria, oral motor fatigability may be of interest.  The IOPI Pro can be used to assess tongue fatigability.  Low endurance values are an indicator of a high fatigability.  Endurance is measured with the IOPI Pro by quantifying the length of time that a patient can maintain 50% of his or her Pmax.  This procedure is conducted in Target Mode by setting the  target value to 50% of the patient's Pmax and timing how long the patient can hold the top (green) light on.       The medical professional determines what target value is appropriate for exercise therapy purposes and provides specific instructions to the patient for a particular exercise protocol.  In Target Mode, the pressure required to illuminate the green light a the top of the light array can be adjusted using the Set Target arrow buttons.  This green light is used as a visual target for the patient.     Tongue tip Peak Max after 3 trials: 48 Norm for age/gender is 56   Tongue back Peak Max after 3 trials: 52 Norm for age/gender is 50   Right lip Peak Max after 3 trials: 18 Norm for age/gender is 35   Left lip Peak Max after 3 trials: 18 Norm for age/gender is 35    Endurance Training: @ 24 MAX - HELD FOR 2 SECONDS    IOPI PEAK MEASUREMENT WEEKLY GRID  61    10/11/24 10/17 10/30         Tongue tip  (Goal = 56) 61 46 48         Tongue back  (Goal = 50) 35 52 52         Right Lip  (Goal = 35) 13 16 18      Left Lip  (Goal = 35) 18 20 18          IOPI -- Today's Exercise Targets    Tongue tip  (Goal = 56) Peak Max after 3 trials: 48 Effort level: 70% Target for treatment: 34   Tongue back  (Goal = 50) Peak Max after 3 trials: 52 Effort level: 70% Target for treatment: 36   Right Lip  (Goal = 35) Peak Max after 3 trials: 18 Effort level: 70% Target for treatment: 11   Left Lip  (Goal = 35) Peak Max after 3 trials: 18 Effort level: 70% Target for treatment: 11        10/11/24 10/17/24 10/30      Tongue tip   32 x 30 34 x 30      Tongue back 23 x 30 36 x 7, 33 x 4, 31 x 19  36 x 6      Right Lip 8 x 15 11 x 30  11 x 30      Left Lip 12 and 11 (20) 14 x 10; 11 x 20  11 x 31                    Patient will utilize over-articulation 80% of the time while orally reading sentence/paragraph length material to facilitate increased speech intelligibility.  10/11: Educated on compensatory strategies to increase clarity,  provided handout. 10/15: Min-mod cues to utilize overarticulation in conversation.  10/21: Reading sentences using strategies: Over 90% use of strategies in both sentences and conversation. 10/24: Reading sentences /t/ and /d/ & /n/ focused and multisyllabic words: 80% accuracy in using strategies and speaking with clarity. Increased fatigue noted with increased talking / sentence, education provided.   10/28: Decreased use of strategies by third paragraph- able to increase use of strategies and clarity with minimal verbal cues/reminders.  Encouraged home practice out loud. 11/6: Utilized at paragraph level 100% of the reading time. Provided home practice encouragement. ACHIEVED     Patient will complete structured oral reading and conversational tasks with the consistent use of compensatory strategies >90% of the time to facilitate increased speech intelligibility.  10/21: Using overarticulation in conversation well and functionally in conversation, over 90% of the time.  10/28 Patient able to explain about home pictures clearly, 100% accuracy. 10/30: Required moderate cues initially to utilize strategies for increased clarity.   11/4: Conversation clear and articulate, 90% accuracy, rare min cues.   11/6: Patient using strategies to increase clarity greater than 90% of the time, ACHIEVED.      Patient will perform oral motor and diadochokinetic speech rate exercises with > 90% accuracy to facilitate increased speech intelligibility.  11/4: Completed lingual strengthening and agility exercises with patient, 90% accuracy. ACHIEVED     Patient will complete pharyngeal strengthening exercises (EMST?) for increased safety & improved swallow function.   N/A     Patient will complete daily oral motor exercises (IOPI as appropriate) to increase lingual/labial range of motion, strength, and coordination with min cues to 90% effectiveness to improve bolus formation and strengthen oral musculature.   (See above) ACHIEVED      Patient will perform compensatory strategies (e.g., upright positioning, small bites/sips, slow rate, alternation of consistencies, multiple swallows, effortful swallow, chin tuck, head turn, etc.) with 80% accuracy with minimized overt s/sx penetration/aspiration of least restrictive food/liquid consistencies.  N/A      Patient will tolerate least restrictive diet with minimized overt s/sx of penetration or aspiration.   11/6: Patient tolerating regular diet , no issues. ACHIEVED     Long Term      Patient will utilize compensatory strategies with optimum safety and efficacy of swallowing function on P.O. intake without overt s/sx of penetration or aspiration by discharge.  ACHIEVED        Patient will demonstrate independent implementation of compensatory strategies to improve speech intelligibility by discharge. ACHIEVED     Patient will improve the ability to facilitate functional speech production skills for intelligible communication including compensatory strategies to promote quality of life and to maximize level of independence with communication.  ACHIEVED                 Plan:  Discharge with home exercise program.

## 2024-11-19 ENCOUNTER — OFFICE VISIT (OUTPATIENT)
Dept: NEUROLOGY | Facility: CLINIC | Age: 79
End: 2024-11-19
Payer: COMMERCIAL

## 2024-11-19 VITALS
BODY MASS INDEX: 25.91 KG/M2 | DIASTOLIC BLOOD PRESSURE: 72 MMHG | HEIGHT: 66 IN | TEMPERATURE: 97.9 F | WEIGHT: 161.2 LBS | HEART RATE: 70 BPM | OXYGEN SATURATION: 96 % | SYSTOLIC BLOOD PRESSURE: 100 MMHG

## 2024-11-19 DIAGNOSIS — I63.039 CEREBRAL INFARCTION DUE TO THROMBOSIS OF CAROTID ARTERY, UNSPECIFIED BLOOD VESSEL LATERALITY (HCC): Primary | ICD-10-CM

## 2024-11-19 DIAGNOSIS — I63.9 CVA (CEREBRAL VASCULAR ACCIDENT) (HCC): ICD-10-CM

## 2024-11-19 DIAGNOSIS — Z86.73 HISTORY OF STROKE: ICD-10-CM

## 2024-11-19 PROCEDURE — G2211 COMPLEX E/M VISIT ADD ON: HCPCS | Performed by: PSYCHIATRY & NEUROLOGY

## 2024-11-19 PROCEDURE — 99215 OFFICE O/P EST HI 40 MIN: CPT | Performed by: PSYCHIATRY & NEUROLOGY

## 2024-11-19 RX ORDER — ATORVASTATIN CALCIUM 20 MG/1
20 TABLET, FILM COATED ORAL DAILY
Qty: 30 TABLET | Refills: 2 | Status: SHIPPED | OUTPATIENT
Start: 2024-11-19

## 2024-11-26 ENCOUNTER — HOSPITAL ENCOUNTER (OUTPATIENT)
Dept: MRI IMAGING | Facility: HOSPITAL | Age: 79
Discharge: HOME | End: 2024-11-26
Payer: COMMERCIAL

## 2024-11-26 DIAGNOSIS — K86.9 DISEASE OF PANCREAS, UNSPECIFIED: ICD-10-CM

## 2024-11-26 DIAGNOSIS — N28.9 DISORDER OF KIDNEY AND URETER, UNSPECIFIED: ICD-10-CM

## 2024-11-26 PROCEDURE — 74183 MRI ABD W/O CNTR FLWD CNTR: CPT

## 2024-11-26 PROCEDURE — A9585 GADOBUTROL INJECTION: HCPCS | Performed by: RADIOLOGY

## 2024-11-26 RX ORDER — GADOBUTROL 604.72 MG/ML
7 INJECTION INTRAVENOUS
Status: COMPLETED | OUTPATIENT
Start: 2024-11-26 | End: 2024-11-26

## 2024-11-26 RX ADMIN — GADOBUTROL 7 ML: 604.72 INJECTION INTRAVENOUS at 23:09

## 2024-11-26 NOTE — PROGRESS NOTES
Name: Tevin Bazan      : 1945      MRN: [id number]  Encounter Provider: Grace Pearson MD  Encounter Date: 2024   Encounter department: [facility] [place]    This is a 79-year-old LHD adult with prior TIA's (, ), prior left hemispheric CVA, CAD s/p CEA ( on DAPT), CKD, HLD, DM, BPH and COPD here as a hospital follow up. MRI brain showed another small corona radiata stroke, etiology is small vessel disease.   Assessment & Plan  Cerebral infarction due to thrombosis of carotid artery, unspecified blood vessel laterality (HCC)  -for secondary stroke prevention, recommend continuation of combination of Brilinta and atorvastatin  -Blood Pressure goal < 130/80, BP is at goal   -LDL goal <70  -snoring - no snoring    Counseling/stroke education -   -I advised patient to avoid using NSAIDs for headaches or other pain and to stick to tylenol if needed  -Recommend lifestyle modifications such as mediterranean diet & regular exercise regimen atleast 4-5 times a week for 20-30 minutes.   -I educated patient/family regarding medication compliance  -encourage control of hypertension; defer management to primary   Orders:    P2Y12 Platelet inhibitor; Future    CVA (cerebral vascular accident) (HCC)    Orders:    atorvastatin (LIPITOR) 20 mg tablet; Take 1 tablet (20 mg total) by mouth daily    History of stroke        Follow up in 6 months     I would be happy to see the patient sooner if any new questions/concerns arise.  Patient/Guardian was advised to the call the office if they have any questions and concerns in the meantime.     Patient/Guardian does understand that if they have any new stroke like symptoms such as facial droop on one side, weakness/paralysis on either side, speech trouble, numbness on one side, balance issues, any vision changes, extreme dizziness or any new headache, to call -- immediately or to proceed to the nearest ER immediately.   "      History of Present Illness   HPI    This is a 78 y/o  Male with history of CVA, CAD s/p CEA, CKD, HLD, DM, BPH and COPD who is here as a hospital follow up. Patient presented with mild facial asymmetry, no weakness in the face arms or legs. Mri brain showed right frontotemporal corona radiata stroke, related to small vessel disease. Patient was recommended to be placed on DAPT, but P2y12 assay was subtherapeutic, and was switched from Brilinta to Plavix. Echocardiogram was negative.     Patient is doing well otherwise, and denies any new TIA/CVA like symptoms. Patient is complaint w/ Brilinta for stroke prevention. He is otherwise healthy, he does not have any SOB or any other issues.     Review of Systems   Constitutional: Negative.    HENT: Negative.     Eyes: Negative.    Respiratory: Negative.     Cardiovascular: Negative.    Gastrointestinal: Negative.    Endocrine: Negative.    Genitourinary: Negative.    Musculoskeletal: Negative.    Skin: Negative.    Allergic/Immunologic: Negative.    Neurological: Negative.    Hematological: Negative.    Psychiatric/Behavioral: Negative.     All other systems reviewed and are negative.    I have personally reviewed the MA's review of systems and made changes as necessary.         Objective   /72 (Patient Position: Sitting, Cuff Size: Standard)   Pulse 70   Temp 97.9 °F (36.6 °C) (Temporal)   Ht 5' 6\" (1.676 m)   Wt 73.1 kg (161 lb 3.2 oz)   SpO2 96%   BMI 26.02 kg/m²     Physical Exam  General - patient is alert   Speech - no dysarthria noted, no aphasia noted.     Neuro:   Cranial nerves: PERRL, EOMI, facial sensation intact to soft touch in V1, V2 and V3, no facial asymmetry noted, uvula/palate midline, tongue midline.   Motor: 5/5 throughout, normal tone, no pronator drift noted.   Sensory - intact to soft touch throughout  Reflexes - 2+ throughout  Coordination - no ataxia/dysmetria noted  Gait - normal    Neurologic Exam    Results/Data:  I " have reviewed the results and images from the in detail with the patient.        Administrative Statements   I have spent a total time of 40 minutes in caring for this patient on the day of the visit/encounter including Risks and benefits of tx options, Instructions for management, Counseling / Coordination of care, Documenting in the medical record, Reviewing / ordering tests, medicine, procedures  , Obtaining or reviewing history  , and Communicating with other healthcare professionals .

## 2024-11-26 NOTE — ASSESSMENT & PLAN NOTE
Follow up in 6 months     I would be happy to see the patient sooner if any new questions/concerns arise.  Patient/Guardian was advised to the call the office if they have any questions and concerns in the meantime.     Patient/Guardian does understand that if they have any new stroke like symptoms such as facial droop on one side, weakness/paralysis on either side, speech trouble, numbness on one side, balance issues, any vision changes, extreme dizziness or any new headache, to call 9-1-1 immediately or to proceed to the nearest ER immediately.

## 2024-12-09 NOTE — PROGRESS NOTES
Name: Tevin Bazan      : 1945      MRN: [id number]  Encounter Provider: Henok Prieto MD  Encounter Date: 12/10/2024   Encounter [facility]  :  Assessment & Plan  Abnormal CT scan    Orders:    Ambulatory Referral to Urology    Left renal mass  Independent interpretation of imaging today, left upper pole renal mass, small, benign-appearing.  I spoke with them regarding intervention in the form of biopsy with cryoablation versus surgical approaches such as partial and radical nephrectomy and the differences tween active surveillance and watchful waiting.  Given comorbid conditions and age I recommend follow-up imaging in 1 year.  We can consider operative intervention as needed at that time if the rate of growth is concerning.  The patient does have impressive scoliosis which can make robotic surgery or open surgery difficult in terms of opening of the rib space for access to the upper pole of the left kidney.    All questions and concerns answered and addressed.  I will see them back in 1 year with imaging prior  Orders:    Basic metabolic panel; Future    CT abdomen w contrast; Future      Referred in consultation by Bere Farias PA-C and today's consultation sent to the referring provider    History of Present Illness   Tevin Bazan is a 79 y.o. adult who presents for a left renal mass  This is upper pole and posterior and medial.  Asymptomatic at this time.    Previous smoker, some exposure to wood-burning fire smoke as well.    The following portions of the patient's history were reviewed and updated as appropriate: allergies, current medications, past family history, past medical history, past social history, past surgical history and problem list.    Review of Systems   Constitutional: Negative.    HENT: Negative.     Eyes: Negative.    Respiratory: Negative.     Cardiovascular: Negative.    Gastrointestinal: Negative.    Endocrine:  Negative.    Genitourinary: Negative.    Musculoskeletal: Negative.    Skin: Negative.    Allergic/Immunologic: Negative.    Neurological: Negative.    Hematological: Negative.    Psychiatric/Behavioral: Negative.            Objective   There were no vitals taken for this visit.    Physical Exam  Vitals reviewed.   Constitutional:       General: Danielle is not in acute distress.     Appearance: Normal appearance. Danielle is well-developed. Danielle is not ill-appearing, toxic-appearing or diaphoretic.   HENT:      Head: Normocephalic and atraumatic.   Neck:      Thyroid: No thyromegaly.      Trachea: No tracheal deviation.   Pulmonary:      Effort: Pulmonary effort is normal.   Abdominal:      General: There is no distension.   Musculoskeletal:         General: No deformity or signs of injury.      Cervical back: Normal range of motion.   Skin:     Coloration: Skin is not jaundiced.   Neurological:      General: No focal deficit present.      Mental Status: Danielle is alert and oriented to person, place, and time.      Motor: No abnormal muscle tone.      Coordination: Coordination normal.   Psychiatric:         Mood and Affect: Mood normal.         Behavior: Behavior normal.         Thought Content: Thought content normal.         Judgment: Judgment normal.          Results    Lab Results   Component Value Date    CALCIUM 8.4 10/13/2024    K 3.8 10/13/2024    CO2 25 10/13/2024     10/13/2024    BUN 18 10/13/2024    CREATININE 0.86 10/13/2024     Lab Results   Component Value Date    WBC 9.46 10/13/2024    HGB 12.7 10/13/2024    HCT 38.4 10/13/2024    MCV 96 10/13/2024     10/13/2024       Office Urine Dip  No results found for this or any previous visit (from the past hour).]

## 2024-12-10 ENCOUNTER — OFFICE VISIT (OUTPATIENT)
Dept: UROLOGY | Facility: HOSPITAL | Age: 79
End: 2024-12-10
Payer: COMMERCIAL

## 2024-12-10 VITALS
DIASTOLIC BLOOD PRESSURE: 80 MMHG | HEART RATE: 72 BPM | OXYGEN SATURATION: 98 % | HEIGHT: 66 IN | WEIGHT: 160 LBS | SYSTOLIC BLOOD PRESSURE: 144 MMHG | BODY MASS INDEX: 25.71 KG/M2

## 2024-12-10 DIAGNOSIS — N28.89 LEFT RENAL MASS: Primary | ICD-10-CM

## 2024-12-10 DIAGNOSIS — R93.89 ABNORMAL CT SCAN: ICD-10-CM

## 2024-12-10 PROCEDURE — 99204 OFFICE O/P NEW MOD 45 MIN: CPT | Performed by: UROLOGY

## 2024-12-16 ENCOUNTER — TELEPHONE (OUTPATIENT)
Dept: GASTROENTEROLOGY | Facility: CLINIC | Age: 79
End: 2024-12-16

## 2024-12-16 NOTE — TELEPHONE ENCOUNTER
Patients GI provider:  LAVONNE Gonzales      Number to return call: [phone]    Reason for call: Pt's wife Virginia (on consent) calling to schedule a FU appt with Marylou. She says Pt was to have his MRI then come back to see us but my first available is not til 02.07. Virginia worries that is very far off. I offered to check other providers but she decided to book that and go on a wait list but she would like Tulio to let them know if he feels she should bring Pt in sooner.     Scheduled procedure/appointment date if applicable: 02.07.25

## 2024-12-20 ENCOUNTER — TELEPHONE (OUTPATIENT)
Age: 79
End: 2024-12-20

## 2024-12-20 NOTE — TELEPHONE ENCOUNTER
Pt calling back and aware of new appt date. Pt states if the doctor has any questions he can call back.

## 2024-12-20 NOTE — TELEPHONE ENCOUNTER
PT pcp calling for the last office visit note.  Requesting the last office note for our office to be faxed to them at [phone]

## 2024-12-23 ENCOUNTER — TELEPHONE (OUTPATIENT)
Age: 79
End: 2024-12-23

## 2024-12-23 NOTE — TELEPHONE ENCOUNTER
Faxed the office visit note from 12-10-24 Ashok Youngblood MD PCP - General at [phone].    Fax confirmation received.

## 2024-12-23 NOTE — TELEPHONE ENCOUNTER
Pt called stated he has cancer and looking to schedule biopsy. Warm transferred over to central scheduling to schedule biopsy.

## 2024-12-27 ENCOUNTER — TELEPHONE (OUTPATIENT)
Age: 79
End: 2024-12-27

## 2024-12-27 ENCOUNTER — OFFICE VISIT (OUTPATIENT)
Dept: GASTROENTEROLOGY | Facility: CLINIC | Age: 79
End: 2024-12-27
Payer: COMMERCIAL

## 2024-12-27 VITALS
HEIGHT: 66 IN | WEIGHT: 154 LBS | DIASTOLIC BLOOD PRESSURE: 49 MMHG | BODY MASS INDEX: 24.75 KG/M2 | SYSTOLIC BLOOD PRESSURE: 86 MMHG

## 2024-12-27 DIAGNOSIS — N28.89 RENAL MASS: ICD-10-CM

## 2024-12-27 DIAGNOSIS — Z98.890 HISTORY OF COLONOSCOPY: ICD-10-CM

## 2024-12-27 DIAGNOSIS — K86.9 PANCREATIC LESION: ICD-10-CM

## 2024-12-27 DIAGNOSIS — Z90.49 HISTORY OF HEMICOLECTOMY: ICD-10-CM

## 2024-12-27 DIAGNOSIS — I63.9 CEREBROVASCULAR ACCIDENT (CVA), UNSPECIFIED MECHANISM (HCC): ICD-10-CM

## 2024-12-27 DIAGNOSIS — K92.1 MELENA: Primary | ICD-10-CM

## 2024-12-27 DIAGNOSIS — Z79.01 CURRENT USE OF LONG TERM ANTICOAGULATION: ICD-10-CM

## 2024-12-27 PROCEDURE — 99214 OFFICE O/P EST MOD 30 MIN: CPT | Performed by: NURSE PRACTITIONER

## 2024-12-27 NOTE — PATIENT INSTRUCTIONS
Frequent small meals    Increase shortness of breath, chest pain, black stools need to be seen in the emergency room.

## 2024-12-27 NOTE — TELEPHONE ENCOUNTER
Patients GI provider:  ANNIE Gonzales     Number to return call: [phone]    Reason for call: Patients wife, Virginia, calling office to inform provider that the prescribing provider for the Brilinta is Dr. Catherine Youngblood, patients PCP.     Scheduled procedure/appointment date if applicable: Apt 2/7/25

## 2024-12-29 NOTE — PROGRESS NOTES
[facility] - Outpatient Follow-up Note  Tevin Bazan 79 y.o. adult MRN: [id number]  Encounter: [id number]    ASSESSMENT AND PLAN:      1. Melena  Patient denies any melena at this time.  Most recent lab work from 10/13 patient's hemoglobin 12.7.  Discussed with patient and Virginia observe for GI bleeding, melena.  Avoid NSAIDs.  Patient prescribed Brilinta 90 mg twice daily.  Unclear source of patient's initial melena, consider anticoagulation medication, gastric versus peptic ulcer.    Discussed EGD and colonoscopy however need approval from cardiology and provider of Brilinta.    2. Pancreatic lesion  Per CT A/P from 10/11; There is a hypodense lesion in the uncinate process of the pancreas measuring approximately 1.6 x 1.5 cm (series 2 image 75). It measures higher than simple fluid in density, however evaluation is somewhat limited by streak artifact from surgical clips in the right upper quadrant.  No evidence of acute pancreatitis. No dilatation of the main pancreatic duct.     MRI/MRCP revealed; 3.0 cm cystic pancreatic neck mass. 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 The differential would include pancreatic serous cystadenoma. Given patient age, and low risk imaging features, follow-up is recommended every 2 years x 2.    Advanced endoscopy reviewed patient's chart.  If patient is agreeable EUS would be recommended in the future.  Discussed with patient and Virginia and he is willing to undergo EUS however did review with him stability both pulmonary and cardiac and holding medications is essential.     3. History of hemicolectomy  Per patient he does note of previous abdominal surgery many years ago however unable to elaborate.  Per CT A/P from 10/11; the patient is s/p right hemicolectomy with end ileocolonic anastomosis in the right upper quadrant.     4. Cerebrovascular  "accident (CVA), unspecified mechanism (HCC)  Per EMR patient recently had presented to the hospital and was diagnosed with strokelike symptoms in September of this year.  Patient also notes that his current speech has been affected by the CVA.     5. Long term (current) use of anticoagulants  Patient is currently prescribed Brilinta 90 mg every 12 hours.   Patient understands the risk factors of medication for procedures and is willing to move forward with having EUS and/or EGD and colonoscopy completed however approval required by patient's prescribing provider, Dr. Catherine Youngblood.     6. Hx of colonoscopy  Patient states he believes his last colonoscopy was approximately 25 to 30 years ago in [place].  Does not recall results.  Consider colonoscopy pending conversation with patient's Provider's regarding anticoagulation medication and recent CVA diagnosis September 2024.    7.  Possible renal mass  Per recent MRI/MRCP; solid left renal nodule. Most likely renal cell carcinoma.    Patient presented with shortness of breath with exertion.  Blood pressure 86/49.  Discussed with patient and wife Virginia that if patient was having chest pain this morning that required nitroglycerin and medication was not sustaining his chest discomfort he needed to go to the emergency room.  Repeat blood pressure 95/53.    Reviewed patient's recent imaging, CT, MRI.  Reviewed patient's recent PCP, urology and neurology reports.    8. Male-to-Female transgender person  Per EMR, PCP note 12/27/2024,  \"Pt is frustrated that estrogen is no longer prescribed, although inderstands it may increase the risk of stroke. Provided contact info for [facility] in [place].\"        Followup Appointment: 3 months or after procedures.  Will reach out to patient's PCP ( Dr. Catherine Youngblood, [phone] and communicate with [facility]'s advanced endoscopy.  ______________________________________________________________________      HPI:  "   79-year-old adult accompanied by wife Virginia and past medical history of hypertension, CAD, CVA, right hemicolectomy, diabetes, pancreatic lesion presents for follow-up MRI results.    MRI/MRCP revealed; 3.0 cm cystic pancreatic neck mass. 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 The differential would include pancreatic serous cystadenoma. Given patient age, and low risk imaging features, follow-up is recommended every 2 years x 2.    Advanced endoscopy reviewed patient's chart.  If patient is agreeable EUS for biopsy would be recommended in the future.  Discussed with patient and Virginia and he is willing to undergo EUS however did review with him stability both pulmonary and cardiac and holding medications is essential.    MRCP also noted renal nodule with concern for renal mass.    Patient had obvious increased distress with exertion in the office.  He admitted to taking nitroglycerin approximately 1 to 2 hours prior to coming to the office.  Blood pressure was 86/49 initially.  Repeat blood pressure prior to patient leaving was 95/53.  Explained to patient and wife Virginia that if patient's chest pain/shortness of breath with exertion were increased he needs to go to the emergency room.    Patient had denied any further melena.  Discussed that colonoscopy and EGD were recommended for future as long as patient was stable both cardiac and pulmonary.                  Historical Information   Past Medical History:   Diagnosis Date    Anemia     Chronic bilateral low back pain without sciatica 07/17/2020    Hypertension     Scoliosis     Stroke (HCC)     Stroke-like symptoms 11/22/2021     Past Surgical History:   Procedure Laterality Date    BACK SURGERY      BOWEL RESECTION      CARDIAC CATHETERIZATION N/A 02/26/2024    Procedure: Cardiac Coronary Angiogram;  Surgeon: Rivas Cantor MD;  Location: [facility];   "Service: Cardiology    CARDIAC CATHETERIZATION  2024    Procedure: Cardiac catheterization;  Surgeon: Rivas Cantor MD;  Location: [facility];  Service: Cardiology    CARDIAC CATHETERIZATION N/A 2024    Procedure: Cardiac pci;  Surgeon: Rivas Cantor MD;  Location: [facility];  Service: Cardiology    COLONOSCOPY      MI TEAEC W/PATCH GRF CAROTID VERTB SUBCLAV NECK INC Left 2021    Procedure: ENDARTERECTOMY ARTERY CAROTID;  Surgeon: Vito Mo MD;  Location: [facility];  Service: Vascular    TONSILLECTOMY      UPPER GASTROINTESTINAL ENDOSCOPY       Social History     Substance and Sexual Activity   Alcohol Use Not Currently     Social History     Substance and Sexual Activity   Drug Use No     Social History     Tobacco Use   Smoking Status Former    Current packs/day: 0.00    Types: Cigarettes    Quit date:     Years since quittin.0   Smokeless Tobacco Never     Family History   Adopted: Yes   Family history unknown: Yes         Current Outpatient Medications:     albuterol (Proventil HFA) 90 mcg/act inhaler    Ascorbic Acid (vitamin C) 1000 MG tablet    aspirin (ECOTRIN LOW STRENGTH) 81 mg EC tablet    atorvastatin (LIPITOR) 20 mg tablet    Chelated Zinc 50 MG TABS    diltiazem (TIAZAC) 120 MG 24 hr capsule    famotidine (PEPCID) 40 MG tablet    metFORMIN (GLUCOPHAGE-XR) 500 mg 24 hr tablet    Omega 3 1000 MG CAPS    pantoprazole (PROTONIX) 40 mg tablet    tamsulosin (FLOMAX) 0.4 mg    ticagrelor (BRILINTA) 90 MG  Allergies   Allergen Reactions    Black Cohosh Rash    Minoxidil Rash     Reviewed medications and allergies and updated as indicated    PHYSICAL EXAM:    Blood pressure (!) 86/49, height 5' 6\" (1.676 m), weight 69.9 kg (154 lb). Body mass index is 24.86 kg/m².  General Appearance: NAD, cooperative, alert  Eyes: Anicteric, PERRLA, EOMI  ENT:  Normocephalic, atraumatic, normal mucosa.    Neck:  Supple, symmetrical, trachea midline  Resp:  Clear to " "auscultation bilaterally; no rales, rhonchi or wheezing; noted dyspnea with and without exertion  CV:  S1 S2, Regular rate and rhythm; no murmur, rub, or gallop.  GI:  Soft, right lower quadrant abdominal discomfort with palpation, non-distended; normal bowel sounds; no masses, no organomegaly   Rectal: Deferred  Musculoskeletal: No cyanosis, clubbing or edema. Normal ROM.  Skin:  No jaundice, rashes, or lesions   Heme/Lymph: No palpable cervical lymphadenopathy  Psych: Normal affect, good eye contact  Neuro: No gross deficits, AAOx3    Lab Results:   Lab Results   Component Value Date    WBC 9.46 10/13/2024    HGB 12.7 10/13/2024    HCT 38.4 10/13/2024    MCV 96 10/13/2024     10/13/2024     Lab Results   Component Value Date    K 3.8 10/13/2024     10/13/2024    CO2 25 10/13/2024    BUN 18 10/13/2024    CREATININE 0.86 10/13/2024    GLUF 95 02/29/2024    CALCIUM 8.4 10/13/2024    CORRECTEDCA 8.6 12/01/2021    AST 12 10/13/2024    ALT 13 10/13/2024    ALKPHOS 79 10/13/2024    EGFR 76 02/28/2024     No results found for: \"IRON\", \"TIBC\", \"FERRITIN\"  Lab Results   Component Value Date    LIPASE 24 10/11/2024       Radiology Results:   No results found.  "

## 2024-12-30 ENCOUNTER — TELEPHONE (OUTPATIENT)
Dept: GASTROENTEROLOGY | Facility: CLINIC | Age: 79
End: 2024-12-30

## 2024-12-30 NOTE — TELEPHONE ENCOUNTER
Spoke to patient's PCP, Dr. Catherine Youngblood.  Her affiliation is with [facility].  Wanted to touch base and get an update on her knowledge of the patient since the family was under the impression that she was writing for the patient's Brilinta which she told me that the patient's cardiologist likely Dr. Cantor was writing for the patient's Brilinta.  Also, not being in the [facility] and being a private practice she has not been getting the patient's medical information when he is seen through [facility].  Information was received and will be forwarded to have patient's PCP phone number and fax number updated so hopefully she will receive his future information.  Will also have staff fax most recent patient information to her office once I speak with the patient and family and get approval.    PCP phone number: [phone]  Fax number [phone].

## 2025-01-07 ENCOUNTER — TELEPHONE (OUTPATIENT)
Age: 80
End: 2025-01-07

## 2025-01-07 ENCOUNTER — TELEPHONE (OUTPATIENT)
Dept: GASTROENTEROLOGY | Facility: CLINIC | Age: 80
End: 2025-01-07

## 2025-01-07 NOTE — TELEPHONE ENCOUNTER
Spoke to the patient and his spouse Virginia regarding timeline for possible EUS and colonoscopy.  Very complex situation.  Patient had a stroke in September and was placed on Brilinta.  Primary care initially wrote for the medication however in speaking with her she told me that cardiology was responsible for the Brilinta.  I sent a message to Dr. Cantor and he is okay with stopping the medication however patient also sees neurology who would be important to make sure that they are on board with him stopping this medication since his stroke was just in September.  Paperwork will be sent to both the cardiology and the neurology office in order to receive back information stating that patient is okay to stop his Brilinta in order to move forward with scheduling EUS and colonoscopy.  Patient does have concerns that he is continuing to lose weight.  Encouraged patient or his spouse Virginia to contact the office with any other questions or concerns and we will be getting back to them as soon as possible for moving forward with possible EUS and colonoscopy.  Also mention to them that will most likely be done at either [facility] or [facility].

## 2025-01-07 NOTE — TELEPHONE ENCOUNTER
Herm - I will definitely help with this but after reading your note re: discussion with PCP I was not sure how to proceed.  Please let me know what you need done for patient.  Thank you.

## 2025-01-07 NOTE — TELEPHONE ENCOUNTER
Patients GI provider:  ANNIE Gonzales     Number to return call: [phone]    Reason for call: Patients spouse calling in regards to clearances needed for EGD and colonoscopy. States she knows provider was going to reach out to PCP in regards to medication. Brilinta was originally prescribed when patient was discharged from the hospital on 9/17/24. States that the current prescription bottle patient is has, has the prescribing provider name as Dr.Marina Youngblood. Patient does see Dr.Luis Cantor at [facility] cardiology. Is trying to get clearances together to have procedures completed.     Scheduled procedure/appointment date if applicable: Apt 4/22/25

## 2025-01-07 NOTE — TELEPHONE ENCOUNTER
Patient needs EUS and colonoscopy.  Due to stroke history we would appreciate input from neurology on holding Brilinta for 5 days prior to a procedure.      Provider: Janet  Procedure: EUS and colonoscopy  Scheduled Date: to be determined  Location: [facility]  Medication(s) to stop: Brilinta  Days to hold: 5 days  Last dose should be: to be determined  Requested from: SILVIA Haas MD  Date requested: 01/07/2025  Date received:  TBD  Patient alerted: [id number]

## 2025-01-15 ENCOUNTER — TELEPHONE (OUTPATIENT)
Age: 80
End: 2025-01-15

## 2025-01-15 NOTE — TELEPHONE ENCOUNTER
Pls assist! Plan: Hx of Crohn's disease - currently on Inflectra 100 mg Intravenously. Render In Strict Bullet Format?: No Detail Level: Zone Initiate Treatment: Ketoconazole tablets oral QD x 5 days then stop. \\n\\nKetoconazole 2 % shampoo BIW\\nQuantity: 120.0 ml  Days Supply: 30\\nSig: Lather on to damp skin in the shower on affected areas once a week. Leave on for 3-5 mins then rinse off.

## 2025-01-15 NOTE — TELEPHONE ENCOUNTER
Pt called in to request or get a clearance or advise for the following medication. Patient needs EUS and colonoscopy. Due to stroke history we would appreciate input from neurology on holding Brilinta for 5 days prior to a procedure. Message from GI provider.    ticagrelor (BRILINTA) 90 MG [[id number]]    Order Details  Dose: 90 mg Route: Oral Frequency: Every 12 hours scheduled  Dispense Quantity: 60 tablet Refills: 2     Sig: Take 1 tablet (90 mg total) by mouth every 12 (twelve) hours       Start Date: 09/17/24 End Date: --  Written Date: 09/17/24 Expiration Date: 09/17/25     He said the the Gastro provider is requesting to have him off of this medication for 5 days in order for him to have a procedure.  He said that this is a life or death and is very time sensative issue.     Please have Dr. Pearson call pt back ASAP to Advise him of this medication.

## 2025-01-15 NOTE — TELEPHONE ENCOUNTER
Pt wife calling to confirm if we received a clearance from Dr Pearson, please contact the pt back to confirm.

## 2025-01-15 NOTE — TELEPHONE ENCOUNTER
I spoke with Bella (on [facility] Passed along that as of yet we have not received the Neurology clearance, but I see that an Urgent request has been sent. They were both appreciative of the call, and had no further questions.

## 2025-01-17 ENCOUNTER — TELEPHONE (OUTPATIENT)
Age: 80
End: 2025-01-17

## 2025-01-17 NOTE — TELEPHONE ENCOUNTER
Patients GI provider:  TULIO RODRIGUEZ    Number to return call: [phone]    Reason for call: Wife and patient contacted office very confused as to what is going on with patients care. They are requesting a call back from Tulio specifically to discuss.      Please return patients call.

## 2025-01-17 NOTE — TELEPHONE ENCOUNTER
Patients GI provider:  SAQIB RODRIGUEZ    Number to return call: [phone]    Reason for call: Pt calling requesting update on Neurology clearance for procedure. As per documentation in chart from Neurology. Please see note on 1/15/2025          Please contact pt to schedule EUS and colonoscopy

## 2025-01-20 ENCOUNTER — TELEPHONE (OUTPATIENT)
Dept: GASTROENTEROLOGY | Facility: CLINIC | Age: 80
End: 2025-01-20

## 2025-01-20 ENCOUNTER — PREP FOR PROCEDURE (OUTPATIENT)
Dept: GASTROENTEROLOGY | Facility: CLINIC | Age: 80
End: 2025-01-20

## 2025-01-20 DIAGNOSIS — K86.9 PANCREATIC LESION: ICD-10-CM

## 2025-01-20 DIAGNOSIS — K92.1 MELENA: Primary | ICD-10-CM

## 2025-01-20 RX ORDER — SODIUM CHLORIDE, SODIUM LACTATE, POTASSIUM CHLORIDE, CALCIUM CHLORIDE 600; 310; 30; 20 MG/100ML; MG/100ML; MG/100ML; MG/100ML
125 INJECTION, SOLUTION INTRAVENOUS CONTINUOUS
OUTPATIENT
Start: 2025-01-20

## 2025-01-20 NOTE — TELEPHONE ENCOUNTER
----- Message from Lien Rodriguez PA-C sent at 1/20/2025 12:22 PM EST -----  Regarding: FW: Contact patient  Order is in ready to schedule! Urgent. TY!  ----- Message -----  From: ANNIE Reynolds  Sent: 1/17/2025   2:26 PM EST  To: [facility]  Subject: Contact patient                                  Good afternoon,    I received a inbox message from the patient and his wife today because they have not heard from anyone about scheduling appointment. Not sure if we were just waiting on receiving messages back from cardiology and neurology to hold the patient's medications.  I guess they feel comfortable reaching out to me because they do not have a primary and I am pretty much the link they have at [facility].  I am okay with that.   If someone could just reach out to them and let them know where we are with getting the patient set up for his procedures (EGD/EUS and colonoscopy) that would be very helpful for them.  Thank you so much for your help.    Have a great day.  ANNIE Santoyo

## 2025-01-20 NOTE — TELEPHONE ENCOUNTER
Scheduled date of colonoscopy/eus (as of today):  1/27/25  Physician performing colonoscopy: Dr. Escalera   Location of colonoscopy: [facility]   Bowel prep reviewed with patient: miralax/dulcolax   Instructions reviewed with patient by: Juana - emailed to   [email or web address]   Clearances: Brilinta - already obtained 5 day hold, pt aware

## 2025-01-23 ENCOUNTER — TELEPHONE (OUTPATIENT)
Dept: GASTROENTEROLOGY | Facility: CLINIC | Age: 80
End: 2025-01-23

## 2025-01-23 NOTE — TELEPHONE ENCOUNTER
Patient needs EUS and colonoscopy. Due to stroke history we would appreciate input from neurology on holding Brilinta for 5 days prior to a procedure. Message from GI provider.       Provider: JAZYZ  Procedure: EUS and colonoscopy  Scheduled Date: TBD  Location: [place]  Medication(s) to stop: Brilinta  Days to hold: 5  Last dose should be: tbd  Requested from: Dr. Cantor  Date requested: 01/23/2025  Date received:  tbd  Patient alerted: tbd

## 2025-01-24 RX ORDER — SODIUM CHLORIDE 9 MG/ML
125 INJECTION, SOLUTION INTRAVENOUS CONTINUOUS
Status: CANCELLED | OUTPATIENT
Start: 2025-01-24

## 2025-01-27 ENCOUNTER — ANESTHESIA (OUTPATIENT)
Dept: GASTROENTEROLOGY | Facility: HOSPITAL | Age: 80
End: 2025-01-27
Payer: COMMERCIAL

## 2025-01-27 ENCOUNTER — HOSPITAL ENCOUNTER (OUTPATIENT)
Dept: GASTROENTEROLOGY | Facility: HOSPITAL | Age: 80
Setting detail: OUTPATIENT SURGERY
Discharge: HOME | End: 2025-01-27
Payer: COMMERCIAL

## 2025-01-27 ENCOUNTER — ANESTHESIA EVENT (OUTPATIENT)
Dept: GASTROENTEROLOGY | Facility: HOSPITAL | Age: 80
End: 2025-01-27
Payer: COMMERCIAL

## 2025-01-27 VITALS
OXYGEN SATURATION: 97 % | SYSTOLIC BLOOD PRESSURE: 146 MMHG | DIASTOLIC BLOOD PRESSURE: 63 MMHG | TEMPERATURE: 97.5 F | HEART RATE: 65 BPM | RESPIRATION RATE: 18 BRPM

## 2025-01-27 DIAGNOSIS — K86.9 PANCREATIC LESION: ICD-10-CM

## 2025-01-27 DIAGNOSIS — K92.1 MELENA: ICD-10-CM

## 2025-01-27 PROCEDURE — 88305 TISSUE EXAM BY PATHOLOGIST: CPT | Performed by: STUDENT IN AN ORGANIZED HEALTH CARE EDUCATION/TRAINING PROGRAM

## 2025-01-27 RX ORDER — PROPOFOL 10 MG/ML
INJECTION, EMULSION INTRAVENOUS CONTINUOUS PRN
Status: DISCONTINUED | OUTPATIENT
Start: 2025-01-27 | End: 2025-01-27

## 2025-01-27 RX ORDER — LIDOCAINE HYDROCHLORIDE 20 MG/ML
INJECTION, SOLUTION EPIDURAL; INFILTRATION; INTRACAUDAL; PERINEURAL AS NEEDED
Status: DISCONTINUED | OUTPATIENT
Start: 2025-01-27 | End: 2025-01-27

## 2025-01-27 RX ORDER — SODIUM CHLORIDE 9 MG/ML
125 INJECTION, SOLUTION INTRAVENOUS CONTINUOUS
Status: DISCONTINUED | OUTPATIENT
Start: 2025-01-27 | End: 2025-01-31 | Stop reason: HOSPADM

## 2025-01-27 RX ORDER — SODIUM CHLORIDE, SODIUM LACTATE, POTASSIUM CHLORIDE, CALCIUM CHLORIDE 600; 310; 30; 20 MG/100ML; MG/100ML; MG/100ML; MG/100ML
125 INJECTION, SOLUTION INTRAVENOUS CONTINUOUS
Status: DISCONTINUED | OUTPATIENT
Start: 2025-01-27 | End: 2025-01-31 | Stop reason: HOSPADM

## 2025-01-27 RX ORDER — PROPOFOL 10 MG/ML
INJECTION, EMULSION INTRAVENOUS AS NEEDED
Status: DISCONTINUED | OUTPATIENT
Start: 2025-01-27 | End: 2025-01-27

## 2025-01-27 RX ADMIN — SODIUM CHLORIDE, SODIUM LACTATE, POTASSIUM CHLORIDE, AND CALCIUM CHLORIDE 125 ML/HR: .6; .31; .03; .02 INJECTION, SOLUTION INTRAVENOUS at 08:10

## 2025-01-27 RX ADMIN — LIDOCAINE HYDROCHLORIDE 3 ML: 20 INJECTION, SOLUTION EPIDURAL; INFILTRATION; INTRACAUDAL at 08:25

## 2025-01-27 RX ADMIN — PROPOFOL 100 MG: 10 INJECTION, EMULSION INTRAVENOUS at 08:25

## 2025-01-27 RX ADMIN — PROPOFOL 100 MCG/KG/MIN: 10 INJECTION, EMULSION INTRAVENOUS at 08:25

## 2025-01-27 NOTE — ANESTHESIA PREPROCEDURE EVALUATION
Procedure:  ENDOSCOPIC ULTRASOUND (UPPER)  COLONOSCOPY    Relevant Problems   CARDIO   (+) Chest pain   (+) Coronary artery disease involving native coronary artery   (+) Coronary artery disease involving native coronary artery of native heart with unstable angina pectoris (HCC)   (+) Dyspnea on exertion   (+) Essential hypertension   (+) Hypercholesterolemia   (+) Nonrheumatic aortic valve stenosis   (+) Nonrheumatic mitral valve stenosis   (+) Nutcracker phenomenon of renal vein   (+) Symptomatic carotid artery stenosis, left   (+) Unstable angina (HCC)      ENDO   (+) Type 2 diabetes mellitus without complication (HCC)      GI/HEPATIC   (+) Pancreatic lesion      /RENAL   (+) BPH (benign prostatic hyperplasia)   (+) Kidney lesion   (+) Stage 2 chronic kidney disease      MUSCULOSKELETAL   (+) Chronic bilateral low back pain without sciatica   (+) Lumbar spondylosis   (+) Primary osteoarthritis of left knee   (+) Thoracogenic scoliosis of thoracolumbar region      NEURO/PSYCH   (+) Chronic bilateral low back pain without sciatica   (+) Chronic pain syndrome   (+) Stroke due to embolism (HCC)      PULMONARY   (+) COPD (chronic obstructive pulmonary disease) (HCC)   (+) Dyspnea on exertion      Orthopedic/Musculoskeletal   (+) Spinal stenosis of lumbar region with neurogenic claudication        Physical Exam    Airway    Mallampati score: II         Dental       Cardiovascular  Cardiovascular exam normal    Pulmonary  Pulmonary exam normal     Other Findings  post-pubertal.      Anesthesia Plan  ASA Score- 3     Anesthesia Type- general with ASA Monitors.         Additional Monitors:     Airway Plan:            Plan Factors-    Chart reviewed.        Patient is not a current smoker.              Induction- intravenous.    Postoperative Plan-         Informed Consent- Anesthetic plan and risks discussed with patient.        NPO Status:  No vitals data found for the desired time range.

## 2025-01-27 NOTE — H&P
History and Physical - [facility]  Tevin Bazan 79 y.o. adult MRN: [id number]                  HPI: Tevin Bazan is a 79 y.o. year old adult who presents for pancreatic cystic mass, melena, colon cancer screening.      REVIEW OF SYSTEMS: Per the HPI, and otherwise unremarkable.    Historical Information   Past Medical History:   Diagnosis Date    Anemia     Chronic bilateral low back pain without sciatica 2020    Hypertension     Scoliosis     Stroke (HCC)     Stroke-like symptoms 2021     Past Surgical History:   Procedure Laterality Date    BACK SURGERY      BOWEL RESECTION      CARDIAC CATHETERIZATION N/A 2024    Procedure: Cardiac Coronary Angiogram;  Surgeon: Rivas Cantor MD;  Location: [facility];  Service: Cardiology    CARDIAC CATHETERIZATION  2024    Procedure: Cardiac catheterization;  Surgeon: Rivas Cantor MD;  Location: [facility];  Service: Cardiology    CARDIAC CATHETERIZATION N/A 2024    Procedure: Cardiac pci;  Surgeon: Rivas Cantor MD;  Location: [facility];  Service: Cardiology    COLONOSCOPY      ND TEAEC W/PATCH GRF CAROTID VERTB SUBCLAV NECK INC Left 2021    Procedure: ENDARTERECTOMY ARTERY CAROTID;  Surgeon: Vito Mo MD;  Location: [facility];  Service: Vascular    TONSILLECTOMY      UPPER GASTROINTESTINAL ENDOSCOPY       Social History   Social History     Substance and Sexual Activity   Alcohol Use Not Currently     Social History     Substance and Sexual Activity   Drug Use No     Social History     Tobacco Use   Smoking Status Former    Current packs/day: 0.00    Types: Cigarettes    Quit date:     Years since quittin.0   Smokeless Tobacco Never     Family History   Adopted: Yes   Family history unknown: Yes       Meds/Allergies       Current Outpatient Medications:     albuterol (Proventil HFA) 90 mcg/act inhaler    Ascorbic Acid (vitamin C) 1000 MG tablet    aspirin  (ECOTRIN LOW STRENGTH) 81 mg EC tablet    atorvastatin (LIPITOR) 20 mg tablet    Chelated Zinc 50 MG TABS    diltiazem (TIAZAC) 120 MG 24 hr capsule    famotidine (PEPCID) 40 MG tablet    metFORMIN (GLUCOPHAGE-XR) 500 mg 24 hr tablet    Omega 3 1000 MG CAPS    pantoprazole (PROTONIX) 40 mg tablet    tamsulosin (FLOMAX) 0.4 mg    ticagrelor (BRILINTA) 90 MG    Allergies   Allergen Reactions    Black Cohosh Rash    Minoxidil Rash       Objective     There were no vitals taken for this visit.      PHYSICAL EXAM    Gen: NAD  Head: NCAT  CV: RRR  CHEST: Clear  ABD: soft, NT/ND  EXT: no edema      ASSESSMENT/PLAN:  This is a 79 y.o. year old adult here for EUS/colonoscopy, and Danielle is stable and optimized for Danielle's procedure.

## 2025-01-27 NOTE — ANESTHESIA POSTPROCEDURE EVALUATION
Post-Op Assessment Note    CV Status:  Stable    Pain management: adequate       Mental Status:  Alert and awake   Hydration Status:  Euvolemic   PONV Controlled:  Controlled   Airway Patency:  Patent     Post Op Vitals Reviewed: Yes    No anethesia notable event occurred.    Staff: Anesthesiologist           Last Filed PACU Vitals:  Vitals Value Taken Time   Temp 97.5 °F (36.4 °C) 01/27/25 0911   Pulse 65 01/27/25 1000   /63 01/27/25 1000   Resp 18 01/27/25 1000   SpO2 97 % 01/27/25 1000       Modified Dipak:     Vitals Value Taken Time   Activity 2 01/27/25 1000   Respiration 2 01/27/25 1000   Circulation 2 01/27/25 1000   Consciousness 1 01/27/25 1000   Oxygen Saturation 2 01/27/25 1000     Modified Dipak Score: 9

## 2025-01-29 PROCEDURE — 88305 TISSUE EXAM BY PATHOLOGIST: CPT | Performed by: STUDENT IN AN ORGANIZED HEALTH CARE EDUCATION/TRAINING PROGRAM

## 2025-01-30 ENCOUNTER — RESULTS FOLLOW-UP (OUTPATIENT)
Dept: GASTROENTEROLOGY | Facility: CLINIC | Age: 80
End: 2025-01-30

## 2025-02-03 ENCOUNTER — TELEPHONE (OUTPATIENT)
Age: 80
End: 2025-02-03

## 2025-02-03 ENCOUNTER — TELEPHONE (OUTPATIENT)
Dept: GASTROENTEROLOGY | Facility: CLINIC | Age: 80
End: 2025-02-03

## 2025-02-03 ENCOUNTER — PREP FOR PROCEDURE (OUTPATIENT)
Dept: GASTROENTEROLOGY | Facility: CLINIC | Age: 80
End: 2025-02-03

## 2025-02-03 DIAGNOSIS — K92.1 MELENA: Primary | ICD-10-CM

## 2025-02-03 RX ORDER — POLYETHYLENE GLYCOL 3350, SODIUM SULFATE ANHYDROUS, SODIUM BICARBONATE, SODIUM CHLORIDE, POTASSIUM CHLORIDE 236; 22.74; 6.74; 5.86; 2.97 G/4L; G/4L; G/4L; G/4L; G/4L
4000 POWDER, FOR SOLUTION ORAL ONCE
Qty: 4000 ML | Refills: 0 | Status: SHIPPED | OUTPATIENT
Start: 2025-02-03 | End: 2025-02-07

## 2025-02-03 RX ORDER — SODIUM CHLORIDE, SODIUM LACTATE, POTASSIUM CHLORIDE, CALCIUM CHLORIDE 600; 310; 30; 20 MG/100ML; MG/100ML; MG/100ML; MG/100ML
125 INJECTION, SOLUTION INTRAVENOUS CONTINUOUS
OUTPATIENT
Start: 2025-02-03

## 2025-02-03 NOTE — TELEPHONE ENCOUNTER
Our mutual patient is scheduled for procedure: Colonoscopy    On: __05__/_ 05   _/_ 25   _      With:  __Tammy_______    He/She is taking the following blood thinner:  Brilinta        Can this be stopped ___5___ days prior to the procedure?      Physician Approving clearance: ________________________    Faxed to Dr. Catherine Youngblood  Phone:  [phone]   Fax: [phone]    Request Scanned in Media

## 2025-02-03 NOTE — TELEPHONE ENCOUNTER
----- Message from Karin JUAREZ sent at 1/31/2025  6:42 PM EST -----  Regarding: Discoloration of stool  I spoke to the pt to schedule the repeat colonoscopy.  The pt stated they were experiencing discolored stools.  I offered to have a member of the clinical pod team give the pt a call, they would appreciate that.  I let them know it might not be until Monday, they were fine it the call came Monday.  ----- Message -----  From: Zuleima Fernandes MA  Sent: 1/31/2025   4:12 PM EST  To: #    When reviewing pathology from 1/27/25, Colon and EUS, Patient needs repeat MRI/MRCP in March 2025 and repeat colonoscopy in 3 months with 2 day prep due to inadequate prep, please review, Thank you

## 2025-02-03 NOTE — TELEPHONE ENCOUNTER
----- Message from Pat Escalera MD sent at 2/3/2025 11:28 AM EST -----  Regarding: RE: Procedure Location  Hi Karin,    Order is in     he will need a 2-day prep    Thank you!  ----- Message -----  From: Karin Rosen  Sent: 2/3/2025  10:49 AM EST  To: Pat Escalera MD  Subject: RE: Procedure Location                           Hi Dr. Escalera,     Thanks for getting back to me.  I am glad to schedule the colonoscopy - could you place the order.  Thanks so much.  Karin  ----- Message -----  From: Pat Escalera MD  Sent: 2/3/2025   7:59 AM EST  To: Karin Rosen  Subject: RE: Procedure Location                           Sure   colonoscopy in [place] would be fine     thank you  ----- Message -----  From: Karin Rosen  Sent: 1/31/2025   6:42 PM EST  To: Karin Rosen; Pat Escalera MD  Subject: Procedure Location                               Hi Dr. Escalera,     The pt asked if there is anyway the repeat colon could be done in [place]?  Let me know, Thanks, Karin    Also ASHANTI - the pt was experiencing some problems with bowel movements after the last procedure.  I forwarded a request to the GI Pod to reach out.  Thanks again.  ----- Message -----  From: Zuleima Fernandes MA  Sent: 1/31/2025   4:12 PM EST  To: #    When reviewing pathology from 1/27/25, Colon and EUS, Patient needs repeat MRI/MRCP in March 2025 and repeat colonoscopy in 3 months with 2 day prep due to inadequate prep, please review, Thank you

## 2025-02-03 NOTE — TELEPHONE ENCOUNTER
"Pharmacy is wondering if it is ok to fill Gavilyte G as that is what they have in stock. Please call them back to let them know if appropriate.    They also wanted to make the doctor aware there is a caution that pops up stating to \"use caution if pt has unstable angina\". Pharmacist is unsure if this actually pertains to the pt but they have to make the doctor aware of the caution.  "

## 2025-02-03 NOTE — TELEPHONE ENCOUNTER
Scheduled date of colonoscopy (as of today): 05/05/2025  Physician performing colonoscopy: Dr. Munoz   Location of colonoscopy: [facility]  Bowel prep reviewed with patient: 2 day Timi   Instructions reviewed with patient by: Karin Mailed   Clearances: Carolina Medel is Diabetic, provided Diabetes medication instruction sheet.

## 2025-02-05 ENCOUNTER — OFFICE VISIT (OUTPATIENT)
Dept: FAMILY MEDICINE CLINIC | Facility: HOSPITAL | Age: 80
End: 2025-02-05
Payer: COMMERCIAL

## 2025-02-05 VITALS
WEIGHT: 157 LBS | OXYGEN SATURATION: 96 % | DIASTOLIC BLOOD PRESSURE: 70 MMHG | SYSTOLIC BLOOD PRESSURE: 130 MMHG | BODY MASS INDEX: 25.34 KG/M2 | HEART RATE: 85 BPM

## 2025-02-05 DIAGNOSIS — E11.9 TYPE 2 DIABETES MELLITUS WITHOUT COMPLICATION, WITHOUT LONG-TERM CURRENT USE OF INSULIN (HCC): ICD-10-CM

## 2025-02-05 DIAGNOSIS — E78.00 HYPERCHOLESTEROLEMIA: ICD-10-CM

## 2025-02-05 DIAGNOSIS — I25.110 CORONARY ARTERY DISEASE INVOLVING NATIVE CORONARY ARTERY OF NATIVE HEART WITH UNSTABLE ANGINA PECTORIS (HCC): ICD-10-CM

## 2025-02-05 DIAGNOSIS — E11.49 TYPE 2 DIABETES MELLITUS WITH OTHER DIABETIC NEUROLOGICAL COMPLICATION (HCC): ICD-10-CM

## 2025-02-05 DIAGNOSIS — Z20.1 EXPOSURE TO TB: ICD-10-CM

## 2025-02-05 DIAGNOSIS — Z13.29 SCREENING FOR THYROID DISORDER: ICD-10-CM

## 2025-02-05 DIAGNOSIS — E11.9 TYPE 2 DIABETES MELLITUS WITHOUT COMPLICATION, UNSPECIFIED WHETHER LONG TERM INSULIN USE (HCC): ICD-10-CM

## 2025-02-05 DIAGNOSIS — Z76.89 ENCOUNTER TO ESTABLISH CARE WITH NEW DOCTOR: Primary | ICD-10-CM

## 2025-02-05 DIAGNOSIS — J44.9 CHRONIC OBSTRUCTIVE PULMONARY DISEASE, UNSPECIFIED COPD TYPE (HCC): ICD-10-CM

## 2025-02-05 DIAGNOSIS — R35.0 BENIGN PROSTATIC HYPERPLASIA WITH URINARY FREQUENCY: ICD-10-CM

## 2025-02-05 DIAGNOSIS — E55.9 VITAMIN D DEFICIENCY: ICD-10-CM

## 2025-02-05 DIAGNOSIS — Z13.0 SCREENING FOR DEFICIENCY ANEMIA: ICD-10-CM

## 2025-02-05 DIAGNOSIS — N40.1 BENIGN PROSTATIC HYPERPLASIA WITH URINARY FREQUENCY: ICD-10-CM

## 2025-02-05 PROBLEM — M25.462 EFFUSION OF LEFT KNEE: Status: RESOLVED | Noted: 2020-04-10 | Resolved: 2025-02-05

## 2025-02-05 PROBLEM — L03.115 CELLULITIS OF RIGHT KNEE: Status: RESOLVED | Noted: 2020-05-28 | Resolved: 2025-02-05

## 2025-02-05 PROBLEM — D72.829 LEUKOCYTOSIS: Status: RESOLVED | Noted: 2024-09-13 | Resolved: 2025-02-05

## 2025-02-05 PROBLEM — I25.10 CORONARY ARTERY DISEASE INVOLVING NATIVE CORONARY ARTERY: Status: RESOLVED | Noted: 2024-02-26 | Resolved: 2025-02-05

## 2025-02-05 PROBLEM — R06.09 DYSPNEA ON EXERTION: Status: RESOLVED | Noted: 2023-11-08 | Resolved: 2025-02-05

## 2025-02-05 PROBLEM — R47.89 GARBLED SPEECH: Status: RESOLVED | Noted: 2024-09-14 | Resolved: 2025-02-05

## 2025-02-05 PROBLEM — R07.9 CHEST PAIN: Status: RESOLVED | Noted: 2024-01-29 | Resolved: 2025-02-05

## 2025-02-05 PROCEDURE — 99203 OFFICE O/P NEW LOW 30 MIN: CPT | Performed by: NURSE PRACTITIONER

## 2025-02-05 NOTE — PROGRESS NOTES
[facility]   Jesenia VALENCIA    Assessment/Plan:   1. Encounter to establish care with new doctor  2. Chronic obstructive pulmonary disease, unspecified COPD type (HCC)  Assessment & Plan:  PMH:  CVA x4, spinal stenosis, scoliosis, COPD unspecified (likely combination of restrictive as well as obstructive given hx tobacco (75 pack years) use as well as pronounced scoliosis), DM2 with CKD, AS, MS with increasing REDDING for the past 6 months.  Denies chest pressure/pain/palpitations presyncopal/syncopal symptoms with REDDING.  Denies paroxysmal dyspnea, orthopnea.  No fever/chills/change in appetite nor unexplained weight loss/night sweats.  No cough/congestion.  States he has had dyspnea since childhood (adopted -bio mom was nurse and got TB) reports +PPD and did CXR which was negative in the past.  He feels this caused his shortness of breath.  Unclear if and when last PFT was?  No formal allergy testing but thinks he has environmental season allergies.  Does not use of albuterol 1-2x daily with improvement of symptoms.    -NAD, VSS.  S1S2 RRR.   Lungs with occasional scattered expiratory wheeze without cough.  Euvolemic with moist mucus membranes.  -will obtain prior medical records/diagnostics.  Update PFT.  Continue albuterol as needed.  Optimize nutrition and physical activity. Consider pulmonary consult.    Orders:  -     Complete PFT with post bronchodilator; Future  3. Coronary artery disease involving native coronary artery of native heart with unstable angina pectoris (HCC)  Assessment & Plan:  Hx CAD with medication adherence to ASA 81mg daily, lipitor 20mg daily, along with brilinta 90mg q12hr.  Denies chest pressure/pain/palpitations.  Does have ongoing REDDING which he reports is worsening over the past 6 months.  See COPD A/P  Orders:  -     Comprehensive metabolic panel; Future  -     Comprehensive metabolic panel  4. Exposure to TB  -     QuantiFERON-TB Gold Plus [facility]; Future  -      QuantiFERON-TB Gold Plus [facility]  5. Hypercholesterolemia  Assessment & Plan:   Latest Reference Range & Units 09/14/24 06:12   Cholesterol See Comment mg/dL 83   Triglycerides See Comment mg/dL 33   HDL >=40 mg/dL 39 (L)   LDL Calculated 0 - 100 mg/dL 37   (L): Data is abnormally low    Hx HLD managed on atorvastatin 20mg daily, reports taking 2000mg omega 3 twice daily along with optimizing diet and physical activity.  Will recheck LP  Orders:  -     Lipid panel; Future  -     Lipid panel  6. Screening for thyroid disorder  -     TSH, 3rd generation with Free T4 reflex; Future  -     TSH, 3rd generation with Free T4 reflex  7. Screening for deficiency anemia  -     CBC and differential; Future  -     CBC and differential  8. Vitamin D deficiency  -     Vitamin D 25 hydroxy; Future  -     Vitamin D 25 hydroxy  9. Type 2 diabetes mellitus without complication, without long-term current use of insulin (HCC)  -     Hemoglobin A1C; Future  -     Hemoglobin A1C  10. Type 2 diabetes mellitus without complication, unspecified whether long term insulin use (HCC)  -     Albumin / creatinine urine ratio; Future  -     Albumin / creatinine urine ratio  11. Type 2 diabetes mellitus with other diabetic neurological complication (HCC)  Assessment & Plan:    Lab Results   Component Value Date    HGBA1C 6.3 (H) 09/14/2024     HX DM2 with medication adherence to metformin 500mg daily. Does not check BS at home; denies s/s hypoglycemia.  Optimizes hydration and nutrition.  Will recheck A1C along with albumin/creatinine ratio.    12. Benign prostatic hyperplasia with urinary frequency  Assessment & Plan:  Hx BPH.  Reports nocturia x1-2 at night.  Symptoms managed with flomax.  Will check PSA.   Orders:  -     PSA, total and free; Future  -     PSA, total and free      Return for F/U after bloodwork, F/U after Imaging.  Patient may call or return to office with any questions or concerns.      ______________________________________________________________________  Subjective:     Patient ID: Tevin Bazan is a 79 y.o. adult.  Tevin Bazan  Chief Complaint   Patient presents with    Cough     Shortness x 6 months    Establish Care     Here to establish care accompanied by spouse who assists with HPI/assessment.  Prior PCP Dr. Youngblood [facility].  Would like to discuss ongoing REDDING for the past six months.                     The following portions of the patient's history were reviewed and updated as appropriate: allergies, current medications, past family history, past medical history, past social history, past surgical history, and problem list.    Review of Systems   Constitutional: Negative.  Negative for activity change, appetite change, chills, fatigue and fever.   HENT: Negative.  Negative for congestion, ear pain, postnasal drip and sinus pain.    Eyes: Negative.    Respiratory:  Positive for shortness of breath. Negative for cough and chest tightness.    Cardiovascular: Negative.  Negative for chest pain and leg swelling.   Gastrointestinal: Negative.  Negative for constipation and diarrhea.   Endocrine: Negative.    Genitourinary: Negative.  Negative for dysuria.   Musculoskeletal: Negative.    Skin: Negative.    Allergic/Immunologic: Negative.  Negative for immunocompromised state.   Neurological:  Positive for speech difficulty. Negative for dizziness and light-headedness.   Hematological: Negative.    Psychiatric/Behavioral:  Positive for sleep disturbance.         Due to nocturia.  Denies paroxysmal dyspnea, snoring nor periods of apnea.          Objective:      Vitals:    02/05/25 1204   BP: 130/70   Pulse: 85   SpO2: 96%      Physical Exam  Vitals and nursing note reviewed.   Constitutional:       Appearance: Normal appearance.   HENT:      Head: Normocephalic and atraumatic.      Right Ear: Tympanic membrane, ear canal and external ear normal.      Left Ear: Tympanic  "membrane, ear canal and external ear normal.      Nose: Nose normal.      Mouth/Throat:      Mouth: Mucous membranes are moist.      Dentition: Abnormal dentition.      Pharynx: Oropharynx is clear.   Eyes:      Extraocular Movements: Extraocular movements intact.      Conjunctiva/sclera: Conjunctivae normal.      Pupils: Pupils are equal, round, and reactive to light.   Cardiovascular:      Rate and Rhythm: Normal rate and regular rhythm.      Pulses: Normal pulses.      Heart sounds: Murmur heard.   Pulmonary:      Effort: Pulmonary effort is normal.      Breath sounds: Normal breath sounds.      Comments: Occasional expiratory wheeze.  No cough.   Abdominal:      General: Bowel sounds are normal.      Palpations: Abdomen is soft.   Musculoskeletal:         General: Deformity present. Normal range of motion.      Cervical back: Normal range of motion and neck supple.      Comments: Pronounced scoliosis   Skin:     General: Skin is warm and dry.      Coloration: Skin is pale.   Neurological:      General: No focal deficit present.      Mental Status: Danielle is alert and oriented to person, place, and time.      Cranial Nerves: Dysarthria present.      Motor: Tremor present.      Comments: MALLIKA action tremor   Psychiatric:         Mood and Affect: Mood normal.         Behavior: Behavior normal.         Thought Content: Thought content normal.         Judgment: Judgment normal.           Portions of the record may have been created with voice recognition software. Occasional wrong word or \"sound alike\" substitutions may have occurred due to the inherent limitations of voice recognition software. Please review the chart carefully and recognize, using context, where substitutions/typographical errors may have occurred.       "

## 2025-02-05 NOTE — PATIENT INSTRUCTIONS
Labwork as requested.  No food/caffeine for 8 hours prior.  Drink plenty of water.   Complete pulmonary function test.   Obtain prior medical records.

## 2025-02-05 NOTE — ASSESSMENT & PLAN NOTE
PMH:  CVA x4, spinal stenosis, scoliosis, COPD unspecified (likely combination of restrictive as well as obstructive given hx tobacco (75 pack years) use as well as pronounced scoliosis), DM2 with CKD, AS, MS with increasing REDDING for the past 6 months.  Denies chest pressure/pain/palpitations presyncopal/syncopal symptoms with REDDING.  Denies paroxysmal dyspnea, orthopnea.  No fever/chills/change in appetite nor unexplained weight loss/night sweats.  No cough/congestion.  States he has had dyspnea since childhood (adopted -bio mom was nurse and got TB) reports +PPD and did CXR which was negative in the past.  He feels this caused his shortness of breath.  Unclear if and when last PFT was?  No formal allergy testing but thinks he has environmental season allergies.  Does not use of albuterol 1-2x daily with improvement of symptoms.    -NAD, VSS.  S1S2 RRR.   Lungs with occasional scattered expiratory wheeze without cough.  Euvolemic with moist mucus membranes.  -will obtain prior medical records/diagnostics.  Update PFT.  Continue albuterol as needed.  Optimize nutrition and physical activity. Consider pulmonary consult.

## 2025-02-05 NOTE — ASSESSMENT & PLAN NOTE
Hx CAD with medication adherence to ASA 81mg daily, lipitor 20mg daily, along with brilinta 90mg q12hr.  Denies chest pressure/pain/palpitations.  Does have ongoing REDDING which he reports is worsening over the past 6 months.  See COPD A/P

## 2025-02-06 NOTE — ASSESSMENT & PLAN NOTE
Lab Results   Component Value Date    HGBA1C 6.3 (H) 09/14/2024     HX DM2 with medication adherence to metformin 500mg daily. Does not check BS at home; denies s/s hypoglycemia.  Optimizes hydration and nutrition.  Will recheck A1C along with albumin/creatinine ratio.

## 2025-02-06 NOTE — ASSESSMENT & PLAN NOTE
Latest Reference Range & Units 09/14/24 06:12   Cholesterol See Comment mg/dL 83   Triglycerides See Comment mg/dL 33   HDL >=40 mg/dL 39 (L)   LDL Calculated 0 - 100 mg/dL 37   (L): Data is abnormally low    Hx HLD managed on atorvastatin 20mg daily, reports taking 2000mg omega 3 twice daily along with optimizing diet and physical activity.  Will recheck LP

## 2025-02-07 ENCOUNTER — OFFICE VISIT (OUTPATIENT)
Dept: GASTROENTEROLOGY | Facility: CLINIC | Age: 80
End: 2025-02-07
Payer: COMMERCIAL

## 2025-02-07 VITALS
DIASTOLIC BLOOD PRESSURE: 76 MMHG | BODY MASS INDEX: 25.71 KG/M2 | SYSTOLIC BLOOD PRESSURE: 148 MMHG | WEIGHT: 160 LBS | HEIGHT: 66 IN

## 2025-02-07 DIAGNOSIS — N28.89 RENAL MASS: ICD-10-CM

## 2025-02-07 DIAGNOSIS — Z98.890 HISTORY OF COLONOSCOPY: ICD-10-CM

## 2025-02-07 DIAGNOSIS — I63.9 CEREBROVASCULAR ACCIDENT (CVA), UNSPECIFIED MECHANISM (HCC): ICD-10-CM

## 2025-02-07 DIAGNOSIS — Z79.01 CURRENT USE OF LONG TERM ANTICOAGULATION: ICD-10-CM

## 2025-02-07 DIAGNOSIS — Z90.49 HISTORY OF HEMICOLECTOMY: Primary | ICD-10-CM

## 2025-02-07 DIAGNOSIS — K86.9 PANCREATIC LESION: ICD-10-CM

## 2025-02-07 DIAGNOSIS — Z78.9 MALE-TO-FEMALE TRANSGENDER PERSON: ICD-10-CM

## 2025-02-07 PROCEDURE — 99214 OFFICE O/P EST MOD 30 MIN: CPT | Performed by: NURSE PRACTITIONER

## 2025-02-07 RX ORDER — POLYETHYLENE GLYCOL 3350 17 G/17G
POWDER, FOR SOLUTION ORAL
Qty: 238 G | Refills: 0 | Status: SHIPPED | OUTPATIENT
Start: 2025-02-07

## 2025-02-07 NOTE — PATIENT INSTRUCTIONS
30 g fiber per day  Adequate fluids  May use fiber supplements; Benefiber, Metamucil, gummy fibers, psyllium husk    MiraLAX 1 capful 1-2 times daily 5 days prior to the start of your 2-day prep.

## 2025-02-07 NOTE — TELEPHONE ENCOUNTER
[facility] calling in because need confirmation that it is ok to dispense Gavilyte G with a history of Angina.  States that their system gives them a warning.  Dr. Escalera recommended using the same prep as last time since he did ok with that. I believe that was just Miralax/dulcolax prep?   IF that is the case should he do Miralax 1 capful BID for 5 days leading up to the 2 day bowel prep and then do a 2 day bowel prep with just Miralax/dulcolax and mag citrate which our Miralax/dulcolax 2 day prep includes mag citrate, the one day prep does not.   Please review and advise.

## 2025-02-08 NOTE — PROGRESS NOTES
Name: Tevin Bazan      : 1945      MRN: [id number]  Encounter Provider: ANNIE Reynolds  Encounter Date: 2025   Encounter department: [facility]  :  Assessment & Plan  Pancreatic lesion  Patient was followed up by [facility] advanced endoscopy [facility].  EGD and EUS was performed.  EGD/EUS; see H&P.  Recommended for repeat MR I/MRCP in 2025.     Cerebrovascular accident (CVA), unspecified mechanism (HCC)  Per EMR, patient was diagnosed in 2024.  Placed on Brilinta 90 mg twice daily.  Currently following with both cardiology and neurology.     Current use of long term anticoagulation  Patient currently prescribed Brilinta 90 mg every 12 hours.  Patient denies any chest pain, shortness of breath or palpitations.  Patient and Virginia understand the risk factors and benefits of medications being held for procedures and agreed to continue with scheduling of colonoscopy if approved by patient's providers, Dr. Cantor (cardiology) and Dr. Pearson (neurology).     Male-to-female transgender person       History of hemicolectomy  Per patient he does note history of previous abdominal surgery many years ago however unable to elaborate.  Per CT A/P from 10/11/2024; s/p right hemicolectomy with end ileocolonic anastomosis in the right upper quadrant.  Orders:    polyethylene glycol (GLYCOLAX) 17 GM/SCOOP powder; Bowel Prep: One (1) 238-gram container of Miralax (polyethylene glycol 3350) as directed    History of colonoscopy  Patient states his last full colonoscopy was approximately 25 to 30 years ago in [place].  Attempted colonoscopy on ; noted solid stool in the rectum.  Procedure aborted due to poor prep and risk of perforation.  Patient to be rescheduled for colonoscopy with 2-day prep and 5-day use of MiraLAX prior to prep.    Orders:    polyethylene glycol (GLYCOLAX) 17 GM/SCOOP powder; Bowel Prep: One (1)  238-gram container of Miralax (polyethylene glycol 3350) as directed  Patient instructions provided and explained to patient and Virginia regarding use of MiraLAX twice daily prior to 2-day prep.  Discussed and patient information regarding increased fiber intake attached to patient discharge summary.  Renal mass  Per recent MRI/MRCP and also on EUS, solid left renal nodule.  Most likely renal cell carcinoma.     Patient currently has follow-up with urology.    Reviewed patient's recent EGD, EUS and colonoscopy.  Reviewed biopsy report from EGD/EUS.  Reviewed most recent lab work and imaging reports  Reviewed patient's recent PCP evaluation.    Follow-up in 4 months or after MRI or sooner if needed.    History of Present Illness   HPI  Tevin Bazan is a 79 y.o. adult accompanied by his significant other Virginia and past medical history of hypertension, CAD, CVA, right hemicolectomy, diabetes, pancreatic lesion and current male to female transgender presents for follow-up MRI, EGD, EUS and colonoscopy.  Previous MRI revealed 3.0 cm cystic pancreatic neck mass.  See EGD/EUS procedure results.  EGD:  Endoscopic views of the esophagus appeared normal.  6 cm type III hiatal hernia, hill grade 4.   The stomach otherwise appeared normal. Performed random biopsy to rule out H pylori.  Moderate erythematous mucosa with erosion in the duodenal bulb.  Small but otherwise unremarkable ampulla.      EUS:  One round, lobulated, heterogeneous, anechoic, microcystic, multilocular and septated cyst measuring 27 mm x 27 mm with well-defined and smooth margins in the head of the pancreas and neck of the pancreas.  No obvious associated mass lesion or nodularity, and no associated pancreatic duct dilatation.  Central stellate scar noted, suggestive of serous cystadenoma.  Microcystic lesion without a loculation large enough to meet size criteria for fine needle aspiration.   The parenchyma was visualized in the entire pancreas.  "The parenchyma was heterogeneous and had lobularity. The pancreatic duct appeared normal.  Irregular solid lesion noted on left kidney consistent with known renal cell carcinoma.    Patient recommended for follow-up MRI/MRCP in March 2025.  Patient also recommended for repeat colonoscopy due to previous colonoscopy aborted with increased stool in the rectum and risk of perforation.  Patient is currently scheduled for follow-up colonoscopy 5/5/2025 [facility].  On exam, patient denied nausea, vomiting or abdominal pain.  States acid reflux symptoms are well-controlled with famotidine 40 mg twice daily.  States is having daily normal bowel movements.  Denies hematochezia or melena.  States weight is stable  Discussed with patient and Virginia that message will be forwarded to urology who has requested a CT of the abdomen and pelvis to let them know that a MRI/MRCP has been completed.        Review of Systems       Objective   /76 (BP Location: Left arm, Patient Position: Sitting)   Ht 5' 6\" (1.676 m)   Wt 72.6 kg (160 lb)   BMI 25.82 kg/m²      Physical Exam  Vitals and nursing note reviewed.   Constitutional:       General: Danielle is not in acute distress.     Appearance: Danielle is well-developed.   HENT:      Head: Normocephalic.      Nose: Nose normal.   Eyes:      Extraocular Movements: Extraocular movements intact.   Cardiovascular:      Rate and Rhythm: Normal rate and regular rhythm.   Pulmonary:      Effort: Pulmonary effort is normal. No respiratory distress.      Breath sounds: Normal breath sounds.   Abdominal:      General: Bowel sounds are normal. There is no distension.      Palpations: Abdomen is soft.      Tenderness: There is no abdominal tenderness.   Musculoskeletal:         General: No swelling. Normal range of motion.      Cervical back: Normal range of motion.   Skin:     General: Skin is warm and dry.   Neurological:      Mental Status: Danielle is alert and oriented to " person, place, and time.   Psychiatric:         Mood and Affect: Mood normal.

## 2025-02-08 NOTE — ASSESSMENT & PLAN NOTE
Patient was followed up by [facility] advanced endoscopy [facility].  EGD and EUS was performed.  EGD/EUS; see H&P.  Recommended for repeat MR I/MRCP in March 2025.

## 2025-02-10 NOTE — TELEPHONE ENCOUNTER
Please confirm so we can let pharmacy know that patient may use Golytely (or other generic versions).  That is the question to resolve in this message.   (Dr. Escalera had prescribed golytely for the first colonoscopy)

## 2025-02-13 PROBLEM — J98.4 RESTRICTIVE LUNG DISEASE DUE TO KYPHOSCOLIOSIS: Status: ACTIVE | Noted: 2025-02-13

## 2025-02-13 PROBLEM — M41.9 RESTRICTIVE LUNG DISEASE DUE TO KYPHOSCOLIOSIS: Status: ACTIVE | Noted: 2025-02-13

## 2025-02-24 ENCOUNTER — APPOINTMENT (EMERGENCY)
Dept: CT IMAGING | Facility: HOSPITAL | Age: 80
End: 2025-02-24
Payer: COMMERCIAL

## 2025-02-24 ENCOUNTER — HOSPITAL ENCOUNTER (OUTPATIENT)
Facility: HOSPITAL | Age: 80
Setting detail: OBSERVATION
Discharge: HOME | End: 2025-02-25
Attending: EMERGENCY MEDICINE | Admitting: INTERNAL MEDICINE
Payer: COMMERCIAL

## 2025-02-24 ENCOUNTER — APPOINTMENT (OUTPATIENT)
Dept: RADIOLOGY | Facility: HOSPITAL | Age: 80
End: 2025-02-24
Payer: COMMERCIAL

## 2025-02-24 DIAGNOSIS — R07.89 CHEST HEAVINESS: ICD-10-CM

## 2025-02-24 DIAGNOSIS — K21.9 GERD (GASTROESOPHAGEAL REFLUX DISEASE): ICD-10-CM

## 2025-02-24 DIAGNOSIS — I25.110 CORONARY ARTERY DISEASE INVOLVING NATIVE CORONARY ARTERY OF NATIVE HEART WITH UNSTABLE ANGINA PECTORIS (HCC): ICD-10-CM

## 2025-02-24 DIAGNOSIS — K80.20 GALLSTONES: ICD-10-CM

## 2025-02-24 DIAGNOSIS — K44.9 HIATAL HERNIA: ICD-10-CM

## 2025-02-24 DIAGNOSIS — D64.9 SYMPTOMATIC ANEMIA: Primary | ICD-10-CM

## 2025-02-24 DIAGNOSIS — R06.02 SHORTNESS OF BREATH: ICD-10-CM

## 2025-02-24 PROBLEM — C64.9 RENAL CELL CARCINOMA (HCC): Status: ACTIVE | Noted: 2025-02-24

## 2025-02-24 LAB
2HR DELTA HS TROPONIN: 0 NG/L
4HR DELTA HS TROPONIN: 1 NG/L
ABO GROUP BLD: NORMAL
ALBUMIN SERPL BCG-MCNC: 3.8 G/DL (ref 3.5–5)
ALP SERPL-CCNC: 96 U/L (ref 34–104)
ALT SERPL W P-5'-P-CCNC: 15 U/L (ref 7–52)
ANION GAP SERPL CALCULATED.3IONS-SCNC: 6 MMOL/L (ref 4–13)
AST SERPL W P-5'-P-CCNC: 16 U/L (ref 5–45)
BASOPHILS # BLD AUTO: 0.05 THOUSANDS/ÂΜL (ref 0–0.1)
BASOPHILS NFR BLD AUTO: 1 % (ref 0–1)
BILIRUB SERPL-MCNC: 0.34 MG/DL (ref 0.2–1)
BLD GP AB SCN SERPL QL: NEGATIVE
BNP SERPL-MCNC: 44 PG/ML (ref 0–100)
BUN SERPL-MCNC: 36 MG/DL (ref 5–25)
CALCIUM SERPL-MCNC: 8.7 MG/DL (ref 8.4–10.2)
CARDIAC TROPONIN I PNL SERPL HS: 5 NG/L (ref ?–50)
CARDIAC TROPONIN I PNL SERPL HS: 5 NG/L (ref ?–50)
CARDIAC TROPONIN I PNL SERPL HS: 6 NG/L (ref ?–50)
CHLORIDE SERPL-SCNC: 108 MMOL/L (ref 96–108)
CO2 SERPL-SCNC: 23 MMOL/L (ref 21–32)
CREAT SERPL-MCNC: 0.95 MG/DL (ref 0.6–1.3)
EOSINOPHIL # BLD AUTO: 0.25 THOUSAND/ÂΜL (ref 0–0.61)
EOSINOPHIL NFR BLD AUTO: 4 % (ref 0–6)
ERYTHROCYTE [DISTWIDTH] IN BLOOD BY AUTOMATED COUNT: 14.6 % (ref 11.6–15.1)
FLUAV AG UPPER RESP QL IA.RAPID: NEGATIVE
FLUBV AG UPPER RESP QL IA.RAPID: NEGATIVE
GLUCOSE SERPL-MCNC: 118 MG/DL (ref 65–140)
GLUCOSE SERPL-MCNC: 132 MG/DL (ref 65–140)
HCT VFR BLD AUTO: 30.6 % (ref 36.5–46.1)
HGB BLD-MCNC: 9.4 G/DL (ref 12–15.4)
IMM GRANULOCYTES # BLD AUTO: 0.02 THOUSAND/UL (ref 0–0.2)
IMM GRANULOCYTES NFR BLD AUTO: 0 % (ref 0–2)
LYMPHOCYTES # BLD AUTO: 1.81 THOUSANDS/ÂΜL (ref 0.6–4.47)
LYMPHOCYTES NFR BLD AUTO: 28 % (ref 14–44)
MCH RBC QN AUTO: 25.4 PG (ref 26.8–34.3)
MCHC RBC AUTO-ENTMCNC: 30.7 G/DL (ref 31.4–37.4)
MCV RBC AUTO: 83 FL (ref 82–98)
MONOCYTES # BLD AUTO: 1.05 THOUSAND/ÂΜL (ref 0.17–1.22)
MONOCYTES NFR BLD AUTO: 16 % (ref 4–12)
NEUTROPHILS # BLD AUTO: 3.39 THOUSANDS/ÂΜL (ref 1.85–7.62)
NEUTS SEG NFR BLD AUTO: 51 % (ref 43–75)
NRBC BLD AUTO-RTO: 0 /100 WBCS
PLATELET # BLD AUTO: 237 THOUSANDS/UL (ref 149–390)
PMV BLD AUTO: 9.5 FL (ref 8.9–12.7)
POTASSIUM SERPL-SCNC: 4.3 MMOL/L (ref 3.5–5.3)
PROT SERPL-MCNC: 7 G/DL (ref 6.4–8.4)
RBC # BLD AUTO: 3.7 MILLION/UL (ref 3.88–5.12)
RH BLD: POSITIVE
SARS-COV+SARS-COV-2 AG RESP QL IA.RAPID: NEGATIVE
SODIUM SERPL-SCNC: 137 MMOL/L (ref 135–147)
SPECIMEN EXPIRATION DATE: NORMAL
WBC # BLD AUTO: 6.57 THOUSAND/UL (ref 4.31–10.16)

## 2025-02-24 PROCEDURE — 71046 X-RAY EXAM CHEST 2 VIEWS: CPT

## 2025-02-24 PROCEDURE — 87804 INFLUENZA ASSAY W/OPTIC: CPT | Performed by: EMERGENCY MEDICINE

## 2025-02-24 PROCEDURE — 99285 EMERGENCY DEPT VISIT HI MDM: CPT | Performed by: EMERGENCY MEDICINE

## 2025-02-24 PROCEDURE — 74177 CT ABD & PELVIS W/CONTRAST: CPT

## 2025-02-24 PROCEDURE — 71260 CT THORAX DX C+: CPT

## 2025-02-24 PROCEDURE — 93005 ELECTROCARDIOGRAM TRACING: CPT

## 2025-02-24 PROCEDURE — 80053 COMPREHEN METABOLIC PANEL: CPT

## 2025-02-24 PROCEDURE — 85025 COMPLETE CBC W/AUTO DIFF WBC: CPT

## 2025-02-24 PROCEDURE — 83540 ASSAY OF IRON: CPT

## 2025-02-24 PROCEDURE — 86900 BLOOD TYPING SEROLOGIC ABO: CPT | Performed by: EMERGENCY MEDICINE

## 2025-02-24 PROCEDURE — 82746 ASSAY OF FOLIC ACID SERUM: CPT

## 2025-02-24 PROCEDURE — 36430 TRANSFUSION BLD/BLD COMPNT: CPT

## 2025-02-24 PROCEDURE — 82948 REAGENT STRIP/BLOOD GLUCOSE: CPT

## 2025-02-24 PROCEDURE — 87811 SARS-COV-2 COVID19 W/OPTIC: CPT | Performed by: EMERGENCY MEDICINE

## 2025-02-24 PROCEDURE — 99285 EMERGENCY DEPT VISIT HI MDM: CPT

## 2025-02-24 PROCEDURE — 83880 ASSAY OF NATRIURETIC PEPTIDE: CPT | Performed by: EMERGENCY MEDICINE

## 2025-02-24 PROCEDURE — 83550 IRON BINDING TEST: CPT

## 2025-02-24 PROCEDURE — 99222 1ST HOSP IP/OBS MODERATE 55: CPT

## 2025-02-24 PROCEDURE — 86923 COMPATIBILITY TEST ELECTRIC: CPT

## 2025-02-24 PROCEDURE — 84484 ASSAY OF TROPONIN QUANT: CPT

## 2025-02-24 PROCEDURE — 36415 COLL VENOUS BLD VENIPUNCTURE: CPT

## 2025-02-24 PROCEDURE — 86850 RBC ANTIBODY SCREEN: CPT | Performed by: EMERGENCY MEDICINE

## 2025-02-24 PROCEDURE — 86901 BLOOD TYPING SEROLOGIC RH(D): CPT | Performed by: EMERGENCY MEDICINE

## 2025-02-24 PROCEDURE — P9016 RBC LEUKOCYTES REDUCED: HCPCS

## 2025-02-24 PROCEDURE — 82607 VITAMIN B-12: CPT

## 2025-02-24 PROCEDURE — 82728 ASSAY OF FERRITIN: CPT

## 2025-02-24 RX ORDER — ASPIRIN 81 MG/1
81 TABLET ORAL DAILY
Status: DISCONTINUED | OUTPATIENT
Start: 2025-02-25 | End: 2025-02-25 | Stop reason: HOSPADM

## 2025-02-24 RX ORDER — ACETAMINOPHEN 325 MG/1
650 TABLET ORAL EVERY 6 HOURS PRN
Status: DISCONTINUED | OUTPATIENT
Start: 2025-02-24 | End: 2025-02-25 | Stop reason: HOSPADM

## 2025-02-24 RX ORDER — TAMSULOSIN HYDROCHLORIDE 0.4 MG/1
0.4 CAPSULE ORAL
Status: DISCONTINUED | OUTPATIENT
Start: 2025-02-25 | End: 2025-02-25 | Stop reason: HOSPADM

## 2025-02-24 RX ORDER — ATORVASTATIN CALCIUM 20 MG/1
20 TABLET, FILM COATED ORAL
Status: DISCONTINUED | OUTPATIENT
Start: 2025-02-25 | End: 2025-02-25 | Stop reason: HOSPADM

## 2025-02-24 RX ORDER — INSULIN LISPRO 100 [IU]/ML
1-5 INJECTION, SOLUTION INTRAVENOUS; SUBCUTANEOUS
Status: DISCONTINUED | OUTPATIENT
Start: 2025-02-24 | End: 2025-02-25 | Stop reason: HOSPADM

## 2025-02-24 RX ORDER — INSULIN LISPRO 100 [IU]/ML
1-5 INJECTION, SOLUTION INTRAVENOUS; SUBCUTANEOUS
Status: DISCONTINUED | OUTPATIENT
Start: 2025-02-25 | End: 2025-02-25 | Stop reason: HOSPADM

## 2025-02-24 RX ORDER — MULTIVIT WITH MINERALS/LUTEIN
TABLET ORAL EVERY 24 HOURS
Status: CANCELLED | OUTPATIENT
Start: 2025-02-24

## 2025-02-24 RX ORDER — POLYETHYLENE GLYCOL 3350 17 G/17G
17 POWDER, FOR SOLUTION ORAL DAILY PRN
Status: DISCONTINUED | OUTPATIENT
Start: 2025-02-24 | End: 2025-02-25 | Stop reason: HOSPADM

## 2025-02-24 RX ORDER — CALCIUM CARBONATE 500 MG/1
1000 TABLET, CHEWABLE ORAL DAILY PRN
Status: DISCONTINUED | OUTPATIENT
Start: 2025-02-24 | End: 2025-02-25 | Stop reason: HOSPADM

## 2025-02-24 RX ORDER — CHLORAL HYDRATE 500 MG
1000 CAPSULE ORAL 2 TIMES DAILY
Status: CANCELLED | OUTPATIENT
Start: 2025-02-24

## 2025-02-24 RX ORDER — ALBUTEROL SULFATE 90 UG/1
1-2 INHALANT RESPIRATORY (INHALATION) EVERY 6 HOURS PRN
Status: CANCELLED | OUTPATIENT
Start: 2025-02-24

## 2025-02-24 RX ORDER — PANTOPRAZOLE SODIUM 40 MG/10ML
40 INJECTION, POWDER, LYOPHILIZED, FOR SOLUTION INTRAVENOUS EVERY 12 HOURS SCHEDULED
Status: DISCONTINUED | OUTPATIENT
Start: 2025-02-24 | End: 2025-02-25 | Stop reason: HOSPADM

## 2025-02-24 RX ORDER — DILTIAZEM HYDROCHLORIDE 120 MG/1
120 CAPSULE, COATED, EXTENDED RELEASE ORAL DAILY
Status: DISCONTINUED | OUTPATIENT
Start: 2025-02-25 | End: 2025-02-25 | Stop reason: HOSPADM

## 2025-02-24 RX ORDER — ALBUTEROL SULFATE 90 UG/1
2 INHALANT RESPIRATORY (INHALATION) EVERY 6 HOURS PRN
Status: DISCONTINUED | OUTPATIENT
Start: 2025-02-24 | End: 2025-02-25 | Stop reason: HOSPADM

## 2025-02-24 RX ORDER — HEPARIN SODIUM 5000 [USP'U]/ML
5000 INJECTION, SOLUTION INTRAVENOUS; SUBCUTANEOUS EVERY 8 HOURS SCHEDULED
Status: CANCELLED | OUTPATIENT
Start: 2025-02-24

## 2025-02-24 RX ADMIN — TICAGRELOR 90 MG: 90 TABLET ORAL at 21:56

## 2025-02-24 RX ADMIN — PANTOPRAZOLE SODIUM 40 MG: 40 INJECTION, POWDER, LYOPHILIZED, FOR SOLUTION INTRAVENOUS at 21:56

## 2025-02-24 RX ADMIN — IOHEXOL 100 ML: 350 INJECTION, SOLUTION INTRAVENOUS at 16:42

## 2025-02-24 NOTE — ED PROVIDER NOTES
Time reflects when diagnosis was documented in both MDM as applicable and the Disposition within this note       Time User Action Codes Description Comment    2/24/2025  6:57 PM Bibiana Corneliuslor Add [D64.9] Symptomatic anemia     2/24/2025  7:02 PM Alban Mccarty Add [K21.9] GERD (gastroesophageal reflux disease)     2/24/2025  7:02 PM Alban Mccarty Add [K44.9] Hiatal hernia     2/24/2025  7:08 PM Alban Mccarty Add [K80.20] Gallstones           ED Disposition       ED Disposition   Admit    Condition   Stable    Date/Time   Mon Feb 24, 2025  6:57 PM    Comment   Case was discussed with hospitalist FABIOLA and the patient's admission status was agreed to be Admission Status: observation status to the service of Dr. Jeffries.               Assessment & Plan       Medical Decision Making  Blood work, EKG, chest x-ray, CT chest/abdomen/pelvis, viral swab, type and screen  Continue to monitor patient for any worsening symptoms    Patient's blood work shows low hemoglobin.  Patient had a 3 g drop in hemoglobin over the past 3 months.  Patient denies any alcides blood per rectum.  Patient denies any hematemesis.  Subsequently CT chest/abdomen/pelvis was ordered that did not show any acute abnormality.  Consent obtained and 1 unit of PRBC ordered for symptomatic anemia.  Patient will be admitted for further evaluation management.      Amount and/or Complexity of Data Reviewed  External Data Reviewed: labs, ECG and notes.  Labs: ordered. Decision-making details documented in ED Course.  Radiology: ordered. Decision-making details documented in ED Course.  ECG/medicine tests: ordered and independent interpretation performed. Decision-making details documented in ED Course.    Risk  Prescription drug management.  Decision regarding hospitalization.             Medications   pantoprazole (PROTONIX) injection 40 mg (has no administration in time range)   iohexol (OMNIPAQUE) 350 MG/ML injection (MULTI-DOSE) 100 mL (100 mL  Intravenous Given 2/24/25 1642)       ED Risk Strat Scores                            SBIRT 20yo+      Flowsheet Row Most Recent Value   Initial Alcohol Screen: US AUDIT-C     1. How often do you have a drink containing alcohol? 0 Filed at: 02/24/2025 1727   2. How many drinks containing alcohol do you have on a typical day you are drinking?  0 Filed at: 02/24/2025 1727   3a. Male UNDER 65: How often do you have five or more drinks on one occasion? 0 Filed at: 02/24/2025 1727   3b. FEMALE Any Age, or MALE 65+: How often do you have 4 or more drinks on one occassion? 0 Filed at: 02/24/2025 1727   Audit-C Score 0 Filed at: 02/24/2025 1727   PORFIRIO: How many times in the past year have you...    Used an illegal drug or used a prescription medication for non-medical reasons? Never Filed at: 02/24/2025 1727                            History of Present Illness       Chief Complaint   Patient presents with    Shortness of Breath     Patient c/o chest congestion and SOB x 1 week. + chest pain. Pain does not radiate       Past Medical History:   Diagnosis Date    Anemia     Chronic bilateral low back pain without sciatica 07/17/2020    Hypertension     Scoliosis     Stroke (HCC)     Stroke-like symptoms 11/22/2021      Past Surgical History:   Procedure Laterality Date    BACK SURGERY      BOWEL RESECTION      CARDIAC CATHETERIZATION N/A 02/26/2024    Procedure: Cardiac Coronary Angiogram;  Surgeon: Rivas Cantor MD;  Location: [facility];  Service: Cardiology    CARDIAC CATHETERIZATION  02/26/2024    Procedure: Cardiac catheterization;  Surgeon: Rivas Cantor MD;  Location: [facility];  Service: Cardiology    CARDIAC CATHETERIZATION N/A 02/26/2024    Procedure: Cardiac pci;  Surgeon: Rivas Cantor MD;  Location: [facility];  Service: Cardiology    COLONOSCOPY      MS TEAEC W/PATCH GRF CAROTID VERTB SUBCLAV NECK INC Left 12/01/2021    Procedure: ENDARTERECTOMY ARTERY CAROTID;  Surgeon: Vito  MD Chepe;  Location: [facility];  Service: Vascular    TONSILLECTOMY      UPPER GASTROINTESTINAL ENDOSCOPY        Family History   Adopted: Yes   Family history unknown: Yes      Social History     Tobacco Use    Smoking status: Former     Current packs/day: 0.00     Types: Cigarettes     Quit date:      Years since quittin.1    Smokeless tobacco: Never   Vaping Use    Vaping status: Never Used   Substance Use Topics    Alcohol use: Not Currently    Drug use: No      E-Cigarette/Vaping    E-Cigarette Use Never User       E-Cigarette/Vaping Substances    Nicotine No     THC No     CBD No     Flavoring No     Other No     Unknown No       I have reviewed and agree with the history as documented.     79-year-old male with past history of hypertension, scoliosis, CVA, chronic bilateral low back pain, renal cell carcinoma, anemia, GERD, presents to for evaluation of dry cough, shortness of breath, dyspnea on exertion, orthopnea over the past week.  Patient is not sure whether he is drinking enough fluids.  Patient denies any fevers or chills.  Symptoms started to worsen today.  Subsequently patient came to the ED for further evaluation.  Patient denies any hematemesis or alcides red blood per rectum.      History provided by:  Patient  Shortness of Breath  Associated symptoms: no abdominal pain, no chest pain, no cough, no ear pain, no fever, no rash, no sore throat and no vomiting        Review of Systems   Constitutional:  Negative for chills and fever.   HENT:  Negative for ear pain and sore throat.    Eyes:  Negative for pain and visual disturbance.   Respiratory:  Positive for shortness of breath. Negative for cough.    Cardiovascular:  Negative for chest pain and palpitations.   Gastrointestinal:  Negative for abdominal pain and vomiting.   Genitourinary:  Negative for dysuria and hematuria.   Musculoskeletal:  Negative for arthralgias and back pain.   Skin:  Negative for color change and rash.    Neurological:  Positive for weakness. Negative for seizures and syncope.   All other systems reviewed and are negative.          Objective       ED Triage Vitals   Temperature Pulse Blood Pressure Respirations SpO2 Patient Position - Orthostatic VS   02/24/25 1356 02/24/25 1356 02/24/25 1357 02/24/25 1356 02/24/25 1356 02/24/25 1356   98.7 °F (37.1 °C) 79 113/65 18 96 % Sitting      Temp Source Heart Rate Source BP Location FiO2 (%) Pain Score    02/24/25 1356 02/24/25 1356 02/24/25 1356 -- 02/24/25 1356    Temporal Monitor Right arm  No Pain      Vitals      Date and Time Temp Pulse SpO2 Resp BP Pain Score FACES Pain Rating User   02/24/25 1855 98 °F (36.7 °C) 67 98 % 18 -- -- -- EW   02/24/25 1841 98.1 °F (36.7 °C) 60 -- 18 145/70 -- -- EW   02/24/25 1700 -- 62 98 % 18 143/76 -- -- EW   02/24/25 1357 -- -- -- -- 113/65 -- -- AM   02/24/25 1356 98.7 °F (37.1 °C) 79 96 % 18 -- No Pain -- AM            Physical Exam  Vitals and nursing note reviewed.   Constitutional:       General: Danielle is not in acute distress.     Appearance: Danielle is well-developed.   HENT:      Head: Normocephalic and atraumatic.   Eyes:      Conjunctiva/sclera: Conjunctivae normal.   Cardiovascular:      Rate and Rhythm: Normal rate and regular rhythm.      Heart sounds: No murmur heard.  Pulmonary:      Effort: Pulmonary effort is normal. No respiratory distress.      Breath sounds: Normal breath sounds.   Abdominal:      Palpations: Abdomen is soft.      Tenderness: There is no abdominal tenderness.   Musculoskeletal:         General: No swelling.      Cervical back: Neck supple.   Skin:     General: Skin is warm and dry.      Capillary Refill: Capillary refill takes less than 2 seconds.   Neurological:      General: No focal deficit present.      Mental Status: Danielle is alert and oriented to person, place, and time.   Psychiatric:         Mood and Affect: Mood normal.         Results Reviewed       Procedure Component Value Units  Date/Time    HS Troponin I 4hr [[id number]]  (Normal) Collected: 02/24/25 1848    Lab Status: Final result Specimen: Blood from Arm, Right Updated: 02/24/25 1921     hs TnI 4hr 6 ng/L      Delta 4hr hsTnI 1 ng/L     Vitamin B12 [[id number]]     Lab Status: No result Specimen: Blood     Folate [[id number]]     Lab Status: No result Specimen: Blood     FLU/COVID Rapid Antigen (30 min. TAT) - Preferred screening test in ED [[id number]]  (Normal) Collected: 02/24/25 1610    Lab Status: Final result Specimen: Nares from Nose Updated: 02/24/25 1826     SARS COV Rapid Antigen Negative     Influenza A Rapid Antigen Negative     Influenza B Rapid Antigen Negative    Narrative:      This test has been performed using the [employer] Nilam 2 FLU+SARS Antigen test under the Emergency Use Authorization (EUA). This test has been validated by the  and verified by the performing laboratory. The Nilam uses lateral flow immunofluorescent sandwich assay to detect SARS-COV, Influenza A and Influenza B Antigen.     The Quidel Nilam 2 SARS Antigen test does not differentiate between SARS-CoV and SARS-CoV-2.     Negative results are presumptive and may be confirmed with a molecular assay, if necessary, for patient management. Negative results do not rule out SARS-CoV-2 or influenza infection and should not be used as the sole basis for treatment or patient management decisions. A negative test result may occur if the level of antigen in a sample is below the limit of detection of this test.     Positive results are indicative of the presence of viral antigens, but do not rule out bacterial infection or co-infection with other viruses.     All test results should be used as an adjunct to clinical observations and other information available to the provider.    FOR PEDIATRIC PATIENTS - copy/paste COVID Guidelines URL to browser: [email or web address]    HS Troponin I 2hr [[id number]]   (Normal) Collected: 02/24/25 1610    Lab Status: Final result Specimen: Blood from Arm, Right Updated: 02/24/25 1640     hs TnI 2hr 5 ng/L      Delta 2hr hsTnI 0 ng/L     B-Type Natriuretic Peptide(BNP) [[id number]]  (Normal) Collected: 02/24/25 1400    Lab Status: Final result Specimen: Blood from Arm, Right Updated: 02/24/25 1606     BNP 44 pg/mL     UA w Reflex to Microscopic w Reflex to Culture [[id number]]     Lab Status: No result Specimen: Urine     HS Troponin 0hr (reflex protocol) [[id number]]  (Normal) Collected: 02/24/25 1400    Lab Status: Final result Specimen: Blood from Arm, Right Updated: 02/24/25 1428     hs TnI 0hr 5 ng/L     Comprehensive metabolic panel [[id number]]  (Abnormal) Collected: 02/24/25 1400    Lab Status: Final result Specimen: Blood from Arm, Right Updated: 02/24/25 1420     Sodium 137 mmol/L      Potassium 4.3 mmol/L      Chloride 108 mmol/L      CO2 23 mmol/L      ANION GAP 6 mmol/L      BUN 36 mg/dL      Creatinine 0.95 mg/dL      Glucose 118 mg/dL      Calcium 8.7 mg/dL      AST 16 U/L      ALT 15 U/L      Alkaline Phosphatase 96 U/L      Total Protein 7.0 g/dL      Albumin 3.8 g/dL      Total Bilirubin 0.34 mg/dL      eGFR --    Narrative:      Notes:     1. eGFR calculation is only valid for adults 18 years and older.  2. EGFR calculation cannot be performed for patients who are transgender, non-binary, or whose legal sex, sex at birth, and gender identity differ.    CBC and differential [[id number]]  (Abnormal) Collected: 02/24/25 1400    Lab Status: Final result Specimen: Blood from Arm, Right Updated: 02/24/25 1405     WBC 6.57 Thousand/uL      RBC 3.70 Million/uL      Hemoglobin 9.4 g/dL      Hematocrit 30.6 %      MCV 83 fL      MCH 25.4 pg      MCHC 30.7 g/dL      RDW 14.6 %      MPV 9.5 fL      Platelets 237 Thousands/uL      nRBC 0 /100 WBCs      Segmented % 51 %      Immature Grans % 0 %      Lymphocytes % 28 %      Monocytes % 16 %      Eosinophils Relative 4 %       Basophils Relative 1 %      Absolute Neutrophils 3.39 Thousands/µL      Absolute Immature Grans 0.02 Thousand/uL      Absolute Lymphocytes 1.81 Thousands/µL      Absolute Monocytes 1.05 Thousand/µL      Eosinophils Absolute 0.25 Thousand/µL      Basophils Absolute 0.05 Thousands/µL             CT chest abdomen pelvis w contrast   Final Interpretation by Elpidio Champagne MD (02/24 6709)      No active disease seen in the chest.      Stable large hiatal hernia.      Stable gallstones without surrounding inflammation.      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      Stable solid enhancing left renal mass consistent with renal cell carcinoma.      Stable prostatomegaly.               Workstation performed: [id number]         XR chest 2 views   Final Interpretation by Josette Angulo MD (02/24 8473)   No acute cardiopulmonary disease.            Workstation performed: [id number]             ECG 12 Lead Documentation Only    Date/Time: 2/24/2025 2:00 PM    Performed by: Alban Mccarty DO  Authorized by: Alban Mccarty DO    Indications / Diagnosis:  Shortness of breath  ECG reviewed by me, the ED Provider: yes    Patient location:  ED  Previous ECG:     Previous ECG:  Compared to current    Similarity:  No change    Comparison to cardiac monitor: Yes    Interpretation:     Interpretation: abnormal    Comments:      Sinus rhythm, rate 85, normal axis, normal intervals, no acute ST/T wave abnormalities noted, minimal voltage criteria noted for LVH, essentially unchanged EKG from previous study.    Critical Care Time Statement: Upon my evaluation, this patient had a high probability of imminent or life-threatening deterioration due to abdomen again anemia, which required my direct attention, intervention, and personal management.  I spent a total of 35 minutes directly providing critical care services, including interpretation of complex medical databases, evaluating for the presence of  life-threatening injuries or illnesses, management of organ system failure(s) , complex medical decision making (to support/prevent further life-threatening deterioration)., and interpretation of hemodynamic data. This time is exclusive of procedures, teaching, treating other patients, family meetings, and any prior time recorded by providers other than myself.       ED Medication and Procedure Management   Prior to Admission Medications   Prescriptions Last Dose Informant Patient Reported? Taking?   Ascorbic Acid (vitamin C) 1000 MG tablet  Self Yes No   Sig: every 24 hours   Chelated Zinc 50 MG TABS  Self Yes No   Sig: every 24 hours   Omega 3 1000 MG CAPS  Self Yes No   Si capsule Every 12 hours   albuterol (Proventil HFA) 90 mcg/act inhaler  Self No No   Sig: Inhale 1-2 puffs every 6 (six) hours as needed for wheezing   aspirin (ECOTRIN LOW STRENGTH) 81 mg EC tablet  Self No No   Sig: Take 1 tablet (81 mg total) by mouth daily for 21 days   atorvastatin (LIPITOR) 20 mg tablet  Self No No   Sig: Take 1 tablet (20 mg total) by mouth daily   diltiazem (TIAZAC) 120 MG 24 hr capsule  Self Yes No   Sig: Take 120 mg by mouth daily   famotidine (PEPCID) 40 MG tablet  Self Yes No   Si (two) times a day as needed   metFORMIN (GLUCOPHAGE-XR) 500 mg 24 hr tablet  Self Yes No   Sig: every 24 hours   pantoprazole (PROTONIX) 40 mg tablet  Self No No   Sig: Take 1 tablet (40 mg total) by mouth daily   Patient not taking: Reported on 2025   polyethylene glycol (GLYCOLAX) 17 GM/SCOOP powder   No No   Sig: Bowel Prep: One (1) 238-gram container of Miralax (polyethylene glycol 3350) as directed   tamsulosin (FLOMAX) 0.4 mg  Self Yes No   Si capsule every 24 hours   ticagrelor (BRILINTA) 90 MG  Self No No   Sig: Take 1 tablet (90 mg total) by mouth every 12 (twelve) hours      Facility-Administered Medications: None     Patient's Medications   Discharge Prescriptions    No medications on file     No discharge  procedures on file.  ED SEPSIS DOCUMENTATION   Time reflects when diagnosis was documented in both MDM as applicable and the Disposition within this note       Time User Action Codes Description Comment    2/24/2025  6:57 PM Alban Mccarty [D64.9] Symptomatic anemia     2/24/2025  7:02 PM Alban Mccarty [K21.9] GERD (gastroesophageal reflux disease)     2/24/2025  7:02 PM Alban Mccarty [K44.9] Hiatal hernia     2/24/2025  7:08 PM Alban Mccarty [K80.20] Gallstones                  Alban Mccarty DO  02/24/25 1941

## 2025-02-25 VITALS
RESPIRATION RATE: 18 BRPM | BODY MASS INDEX: 25.3 KG/M2 | HEART RATE: 70 BPM | DIASTOLIC BLOOD PRESSURE: 71 MMHG | OXYGEN SATURATION: 95 % | TEMPERATURE: 97.5 F | SYSTOLIC BLOOD PRESSURE: 121 MMHG | WEIGHT: 157.41 LBS | HEIGHT: 66 IN

## 2025-02-25 LAB
ABO GROUP BLD BPU: NORMAL
ALBUMIN SERPL BCG-MCNC: 3.7 G/DL (ref 3.5–5)
ALP SERPL-CCNC: 97 U/L (ref 34–104)
ALT SERPL W P-5'-P-CCNC: 15 U/L (ref 7–52)
ANION GAP SERPL CALCULATED.3IONS-SCNC: 7 MMOL/L (ref 4–13)
AST SERPL W P-5'-P-CCNC: 14 U/L (ref 5–45)
BASOPHILS # BLD AUTO: 0.06 THOUSANDS/ÂΜL (ref 0–0.1)
BASOPHILS NFR BLD AUTO: 1 % (ref 0–1)
BILIRUB SERPL-MCNC: 0.69 MG/DL (ref 0.2–1)
BPU ID: NORMAL
BUN SERPL-MCNC: 32 MG/DL (ref 5–25)
CALCIUM SERPL-MCNC: 8.3 MG/DL (ref 8.4–10.2)
CHLORIDE SERPL-SCNC: 108 MMOL/L (ref 96–108)
CO2 SERPL-SCNC: 25 MMOL/L (ref 21–32)
CREAT SERPL-MCNC: 1.03 MG/DL (ref 0.6–1.3)
CROSSMATCH: NORMAL
EOSINOPHIL # BLD AUTO: 0.31 THOUSAND/ÂΜL (ref 0–0.61)
EOSINOPHIL NFR BLD AUTO: 4 % (ref 0–6)
ERYTHROCYTE [DISTWIDTH] IN BLOOD BY AUTOMATED COUNT: 15.1 % (ref 11.6–15.1)
EST. AVERAGE GLUCOSE BLD GHB EST-MCNC: 134 MG/DL
FERRITIN SERPL-MCNC: 6 NG/ML (ref 24–307)
FOLATE SERPL-MCNC: >22.3 NG/ML
GLUCOSE SERPL-MCNC: 102 MG/DL (ref 65–140)
GLUCOSE SERPL-MCNC: 113 MG/DL (ref 65–140)
GLUCOSE SERPL-MCNC: 89 MG/DL (ref 65–140)
HBA1C MFR BLD: 6.3 %
HCT VFR BLD AUTO: 35.3 % (ref 36.5–46.1)
HGB BLD-MCNC: 11.1 G/DL (ref 12–15.4)
IMM GRANULOCYTES # BLD AUTO: 0.02 THOUSAND/UL (ref 0–0.2)
IMM GRANULOCYTES NFR BLD AUTO: 0 % (ref 0–2)
IRON SATN MFR SERPL: 4 % (ref 15–50)
IRON SERPL-MCNC: 17 UG/DL (ref 50–212)
LYMPHOCYTES # BLD AUTO: 2.22 THOUSANDS/ÂΜL (ref 0.6–4.47)
LYMPHOCYTES NFR BLD AUTO: 32 % (ref 14–44)
MAGNESIUM SERPL-MCNC: 1.9 MG/DL (ref 1.9–2.7)
MCH RBC QN AUTO: 25.6 PG (ref 26.8–34.3)
MCHC RBC AUTO-ENTMCNC: 31.4 G/DL (ref 31.4–37.4)
MCV RBC AUTO: 82 FL (ref 82–98)
MONOCYTES # BLD AUTO: 1.01 THOUSAND/ÂΜL (ref 0.17–1.22)
MONOCYTES NFR BLD AUTO: 14 % (ref 4–12)
NEUTROPHILS # BLD AUTO: 3.37 THOUSANDS/ÂΜL (ref 1.85–7.62)
NEUTS SEG NFR BLD AUTO: 49 % (ref 43–75)
NRBC BLD AUTO-RTO: 0 /100 WBCS
PLATELET # BLD AUTO: 237 THOUSANDS/UL (ref 149–390)
PMV BLD AUTO: 10.1 FL (ref 8.9–12.7)
POTASSIUM SERPL-SCNC: 4.2 MMOL/L (ref 3.5–5.3)
PROT SERPL-MCNC: 6.9 G/DL (ref 6.4–8.4)
RBC # BLD AUTO: 4.33 MILLION/UL (ref 3.88–5.12)
SODIUM SERPL-SCNC: 140 MMOL/L (ref 135–147)
TIBC SERPL-MCNC: 410.2 UG/DL (ref 250–450)
TRANSFERRIN SERPL-MCNC: 293 MG/DL (ref 203–362)
UIBC SERPL-MCNC: 393 UG/DL (ref 155–355)
UNIT DISPENSE STATUS: NORMAL
UNIT PRODUCT CODE: NORMAL
UNIT PRODUCT VOLUME: 350 ML
UNIT RH: NORMAL
VIT B12 SERPL-MCNC: 453 PG/ML (ref 180–914)
WBC # BLD AUTO: 6.99 THOUSAND/UL (ref 4.31–10.16)

## 2025-02-25 PROCEDURE — 80053 COMPREHEN METABOLIC PANEL: CPT

## 2025-02-25 PROCEDURE — 85025 COMPLETE CBC W/AUTO DIFF WBC: CPT

## 2025-02-25 PROCEDURE — 36415 COLL VENOUS BLD VENIPUNCTURE: CPT

## 2025-02-25 PROCEDURE — 83036 HEMOGLOBIN GLYCOSYLATED A1C: CPT

## 2025-02-25 PROCEDURE — 82948 REAGENT STRIP/BLOOD GLUCOSE: CPT

## 2025-02-25 PROCEDURE — 99239 HOSP IP/OBS DSCHRG MGMT >30: CPT | Performed by: INTERNAL MEDICINE

## 2025-02-25 PROCEDURE — 99214 OFFICE O/P EST MOD 30 MIN: CPT | Performed by: INTERNAL MEDICINE

## 2025-02-25 PROCEDURE — 83735 ASSAY OF MAGNESIUM: CPT

## 2025-02-25 RX ORDER — FERROUS SULFATE 324(65)MG
324 TABLET, DELAYED RELEASE (ENTERIC COATED) ORAL
Qty: 30 TABLET | Refills: 0 | Status: SHIPPED | OUTPATIENT
Start: 2025-02-25 | End: 2025-03-27

## 2025-02-25 RX ADMIN — TICAGRELOR 90 MG: 90 TABLET ORAL at 09:09

## 2025-02-25 RX ADMIN — PANTOPRAZOLE SODIUM 40 MG: 40 INJECTION, POWDER, LYOPHILIZED, FOR SOLUTION INTRAVENOUS at 09:09

## 2025-02-25 RX ADMIN — DILTIAZEM HYDROCHLORIDE 120 MG: 120 CAPSULE, COATED, EXTENDED RELEASE ORAL at 09:09

## 2025-02-25 RX ADMIN — ASPIRIN 81 MG: 81 TABLET ORAL at 09:09

## 2025-02-25 NOTE — ASSESSMENT & PLAN NOTE
Patient with history of CVA and currently maintained on aspirin 81 mg, daily and Brilinta 90 mg, every 12 hours.  Will continue home regimen and watch H/H closely.

## 2025-02-25 NOTE — ASSESSMENT & PLAN NOTE
Lab Results   Component Value Date    EGFR 76 02/28/2024    EGFR 72 02/27/2024    EGFR 79 01/30/2024    CREATININE 0.95 02/24/2025    CREATININE 0.86 10/13/2024    CREATININE 1.00 10/12/2024     Per chart review, baseline creatinine 0.8-1.1.  Creatinine stable.   Continue to avoid and/or limit nephrotoxins.  Continue to monitor via daily BMP.

## 2025-02-25 NOTE — OCCUPATIONAL THERAPY NOTE
02/25/25 1302   Note Type   Note type Screen   Additional Comments OT orders received, chart reviewed. Patient w/ recent AMPAC of 23 for ADLs and ambulating at least 25 ft. Exhibiting  dyspnea on exertion. Pt w/ no acute OT need as OT will not impact SOB, recommend continued mobility and OOB for all meals  w/ RN staff, will DC OT at this time.             Occupational Therapy         Patient Name: Tevin Bazan  Today's Date: 2/25/2025

## 2025-02-25 NOTE — H&P
"H&P - Hospitalist   Name: Tevin Bazan 79 y.o. adult I MRN: [id number]  Unit/Bed#: ED 02 I Date of Admission: 2/24/2025   Date of Service: 2/24/2025 I Hospital Day: 0     Assessment & Plan  Shortness of breath  Patient presented with SOB x 1 week and chest pain. On admission, patient on room air. Patient explaining intermittent SOB symptoms, mostly with exertion.   Flu/COVID -negative  BNP (44); hs Tnl 0h (5)  Will need to follow troponins.   XR chest (2/24) - negative   CT c/a/p w contrast (2/24): \"No active disease seen in the chest.\"  Continue cough and deep breathing.  Continue to maintain oxygen > 90%.   EKG - NSR, no PVCs, QTc 440  Continue telemetry.  Will order ECHO for completeness..   Will order Cardiology consultation.   Symptomatic anemia  Per chart review, patient with noted hemoglobin decreased since 10/2024, where hemoglobin was 12.7 g/dL.  Patient had recent EGD completed in January 2025, where noted 6 cm type III hiatal hernia (Hill grade 4) and moderate erythematous mucosa with erosion of the duodenal bulb.    On admission, patient with noted hemoglobin of 9.4 g/dL.  In ED, patient receiving 1 unit PRBCs per ED provider.  Will hold off on giving additional PRBCs, once complete.  Patient with no active signs of bleed.  Will continue to monitor H/H, every 6 hours.  Will order iron panel, vitamin B12, folate.   Continue to monitor hemodynamics closely.  Will maintain hemoglobin > 7 g/dL.  Will order GI consultation.  Pancreatic lesion  EUS (1/20): \"One round, lobulated, heterogeneous, anechoic, microcystic, multilocular and septated cyst measuring 27 mm x 27 mm with well-defined and smooth margins in the head of the pancreas and neck of the pancreas.  No obvious associated mass lesion or nodularity, and no associated pancreatic duct dilatation.  Central stellate scar noted, suggestive of serous cystadenoma.  Microcystic lesion without a loculation large enough to meet size criteria for fine " "needle aspiration.\"   Per chart review, patient currently follows with outpatient GI.  Patient seen on 2/7 and recommended for MRI/MRCP in March 2025.  Would continue outpatient follow-up with GI.  Renal cell carcinoma (HCC)  Per chart view, patient with history of renal carcinoma.  Per patient, follows with oncologist in the outpatient setting, but was told not have to come back for another year.  Patient denies hematuria.  Will collect a UA to rule out hematuria.  Would encourage outpatient follow-up with oncology.  Patient may need referral for outpatient oncologist.  Stage 2 chronic kidney disease  Lab Results   Component Value Date    EGFR 76 02/28/2024    EGFR 72 02/27/2024    EGFR 79 01/30/2024    CREATININE 0.95 02/24/2025    CREATININE 0.86 10/13/2024    CREATININE 1.00 10/12/2024     Per chart review, baseline creatinine 0.8-1.1.  Creatinine stable.   Continue to avoid and/or limit nephrotoxins.  Continue to monitor via daily BMP.  Type 2 diabetes mellitus with other diabetic neurological complication (HCC)    Lab Results   Component Value Date    HGBA1C 6.3 (H) 09/14/2024     Per chart review, patient with hemoglobin A1c 6.3%.  Will hold oral glycemic medications.  Continue encourage lifestyle modifications.  Continue Accu-Cheks.  Will order SSI, as needed.  Will order carb-controlled diet.  Will update A1c and ordered for AM draw.   BPH (benign prostatic hyperplasia)  Continue tamsulosin 0.4 mg, daily.  History of stroke  Patient with history of CVA and currently maintained on aspirin 81 mg, daily and Brilinta 90 mg, every 12 hours.  Will continue home regimen and watch H/H closely.    VTE Pharmacologic Prophylaxis:   High Risk (Score >/= 5) - Pharmacological DVT Prophylaxis Contraindicated. Sequential Compression Devices Ordered.  Code Status: Prior   Discussion with family: Attempted to update  (wife) via phone. Unable to contact.    Anticipated Length of Stay: Patient will be admitted on " "an observation basis with an anticipated length of stay of less than 2 midnights secondary to pending imaging studies, shortness of breath, anginal symptoms, and telemetry monitoring.    History of Present Illness   Chief Complaint: \"I can't even walk 100 feet, I get short of breath and chest pain.\"     Tevin Bazan is a 79 y.o. adult with a PMH of pancreatic lesion, renal cell carcinoma, CKD 2, CAD, BPH, DM 2 who presents with worsening dyspnea on exertion and anginal symptoms.  Patient expresses that they recently had a CVA and \" everything is going downhill since.\"  Patient expresses that this past week they have noticed the inability to walk about 100 feet without anginal symptoms and shortness of breath.  Also, noting that they have been unable to complete normal test around the house that are usually completed.  Per patient, endorses utilizing PRN nitroglycerin, which explains is helpful for anginal symptoms. Patient denying bloody or tarry stools.  Also, denying hematuria.  Patient denies cough or congestion.  Patient denies dizziness or lightheadedness.      Patient be admitted for further evaluation and management of shortness of breath and dyspnea on exertion.  In addition, patient will be admitted for management of symptomatic anemia and further workup of source.    Review of Systems   Constitutional:  Positive for activity change. Negative for chills and fever.   HENT:  Negative for congestion, postnasal drip, rhinorrhea, sinus pressure, sneezing and sore throat.    Respiratory:  Positive for shortness of breath. Negative for apnea, cough, chest tightness and wheezing.    Cardiovascular:  Positive for chest pain (exertional). Negative for palpitations.   Gastrointestinal:  Negative for abdominal distention, abdominal pain, anal bleeding, blood in stool, constipation, diarrhea, nausea and vomiting.   Genitourinary:  Negative for difficulty urinating and flank pain.   Musculoskeletal:  Negative for " arthralgias, joint swelling and myalgias.   Skin:  Negative for wound.   Neurological:  Negative for dizziness, speech difficulty, weakness, light-headedness and numbness.   Psychiatric/Behavioral:  The patient is not nervous/anxious.        Historical Information   Past Medical History:   Diagnosis Date    Anemia     Chronic bilateral low back pain without sciatica 2020    Hypertension     Scoliosis     Stroke (HCC)     Stroke-like symptoms 2021     Past Surgical History:   Procedure Laterality Date    BACK SURGERY      BOWEL RESECTION      CARDIAC CATHETERIZATION N/A 2024    Procedure: Cardiac Coronary Angiogram;  Surgeon: Rivas Cantor MD;  Location: [facility];  Service: Cardiology    CARDIAC CATHETERIZATION  2024    Procedure: Cardiac catheterization;  Surgeon: Rivas Cantor MD;  Location: [facility];  Service: Cardiology    CARDIAC CATHETERIZATION N/A 2024    Procedure: Cardiac pci;  Surgeon: Rivas Cantor MD;  Location: [facility];  Service: Cardiology    COLONOSCOPY      AR TEAEC W/PATCH GRF CAROTID VERTB SUBCLAV NECK INC Left 2021    Procedure: ENDARTERECTOMY ARTERY CAROTID;  Surgeon: Vito Mo MD;  Location: [facility];  Service: Vascular    TONSILLECTOMY      UPPER GASTROINTESTINAL ENDOSCOPY       Social History     Tobacco Use    Smoking status: Former     Current packs/day: 0.00     Types: Cigarettes     Quit date:      Years since quittin.1    Smokeless tobacco: Never   Vaping Use    Vaping status: Never Used   Substance and Sexual Activity    Alcohol use: Not Currently    Drug use: No    Sexual activity: Not on file     E-Cigarette/Vaping    E-Cigarette Use Never User      E-Cigarette/Vaping Substances    Nicotine No     THC No     CBD No     Flavoring No     Other No     Unknown No        Meds/Allergies   I have reviewed home medications with patient personally.  Prior to Admission medications    Medication Sig Start Date  End Date Taking? Authorizing Provider   albuterol (Proventil HFA) 90 mcg/act inhaler Inhale 1-2 puffs every 6 (six) hours as needed for wheezing 12/6/22   Juana Bryant DO   Ascorbic Acid (vitamin C) 1000 MG tablet every 24 hours    Historical Provider, MD   aspirin (ECOTRIN LOW STRENGTH) 81 mg EC tablet Take 1 tablet (81 mg total) by mouth daily for 21 days 11/23/21 2/7/25  Stefan Jeffries MD   atorvastatin (LIPITOR) 20 mg tablet Take 1 tablet (20 mg total) by mouth daily 11/19/24   Grace Pearson MD   Chelated Zinc 50 MG TABS every 24 hours    Historical Provider, MD   diltiazem (TIAZAC) 120 MG 24 hr capsule Take 120 mg by mouth daily 6/27/22   Historical Provider, MD   famotidine (PEPCID) 40 MG tablet 2 (two) times a day as needed 7/19/23   Historical Provider, MD   metFORMIN (GLUCOPHAGE-XR) 500 mg 24 hr tablet every 24 hours 9/27/24   Historical Provider, MD   Omega 3 1000 MG CAPS 1 capsule Every 12 hours    Historical Provider, MD   pantoprazole (PROTONIX) 40 mg tablet Take 1 tablet (40 mg total) by mouth daily  Patient not taking: Reported on 2/5/2025 10/13/24   Bere Farias PA-C   polyethylene glycol (GLYCOLAX) 17 GM/SCOOP powder Bowel Prep: One (1) 238-gram container of Miralax (polyethylene glycol 3350) as directed 2/7/25   ANNIE Reynolds   tamsulosin (FLOMAX) 0.4 mg 1 capsule every 24 hours 5/16/24   Historical Provider, MD   ticagrelor (BRILINTA) 90 MG Take 1 tablet (90 mg total) by mouth every 12 (twelve) hours 9/17/24   Jessica Connolly MD     Allergies   Allergen Reactions    Black Cohosh Rash    Minoxidil Rash       Objective :  Temp:  [98 °F (36.7 °C)-98.7 °F (37.1 °C)] 98 °F (36.7 °C)  HR:  [60-79] 67  BP: (113-145)/(65-76) 145/70  Resp:  [18] 18  SpO2:  [96 %-98 %] 98 %  O2 Device: None (Room air)    Physical Exam  Vitals and nursing note reviewed.   Constitutional:       General: Danielle is awake. Danielle is not in acute distress.     Appearance: Danielle is not ill-appearing.   HENT:       Head: Normocephalic.   Eyes:      General: No scleral icterus.     Conjunctiva/sclera: Conjunctivae normal.   Cardiovascular:      Rate and Rhythm: Normal rate and regular rhythm.      Heart sounds: S1 normal and S2 normal. Murmur heard.      Systolic murmur is present with a grade of 2/6.   Pulmonary:      Effort: Pulmonary effort is normal. Tachypnea (intermittent) present. No respiratory distress.      Breath sounds: Examination of the right-lower field reveals rhonchi. Examination of the left-lower field reveals rhonchi. Rhonchi (slight rhonchi) present. No decreased breath sounds, wheezing or rales.   Abdominal:      General: Abdomen is flat. Bowel sounds are normal.      Palpations: Abdomen is soft.      Tenderness: There is no abdominal tenderness. There is no guarding.   Musculoskeletal:      Right lower leg: No edema.      Left lower leg: No edema.   Skin:     General: Skin is warm and dry.      Comments: On exam, no evidence of open lesions/masses on exposed skin.    Neurological:      Mental Status: Danielle is alert and oriented to person, place, and time.      Sensory: Sensation is intact.      Motor: Motor function is intact.   Psychiatric:         Attention and Perception: Attention normal.         Mood and Affect: Mood normal.         Speech: Speech normal.        Lines/Drains:      Lab Results: I have reviewed the following results:  Results from last 7 days   Lab Units 02/24/25  1400   WBC Thousand/uL 6.57   HEMOGLOBIN g/dL 9.4*   HEMATOCRIT % 30.6*   PLATELETS Thousands/uL 237   SEGS PCT % 51   LYMPHO PCT % 28   MONO PCT % 16*   EOS PCT % 4     Results from last 7 days   Lab Units 02/24/25  1400   SODIUM mmol/L 137   POTASSIUM mmol/L 4.3   CHLORIDE mmol/L 108   CO2 mmol/L 23   BUN mg/dL 36*   CREATININE mg/dL 0.95   ANION GAP mmol/L 6   CALCIUM mg/dL 8.7   ALBUMIN g/dL 3.8   TOTAL BILIRUBIN mg/dL 0.34   ALK PHOS U/L 96   ALT U/L 15   AST U/L 16   GLUCOSE RANDOM mg/dL 118             Lab Results    Component Value Date    HGBA1C 6.3 (H) 09/14/2024    HGBA1C 6.0 (H) 02/29/2024    HGBA1C 5.7 (H) 11/22/2021           Imaging Results Review: I reviewed radiology reports from this admission including: chest xray, CT chest, and CT abdomen/pelvis.  Other Study Results Review: EKG was reviewed.     Administrative Statements       ** Please Note: This note has been constructed using a voice recognition system. **

## 2025-02-25 NOTE — PLAN OF CARE
Problem: INFECTION - ADULT  Goal: Absence or prevention of progression during hospitalization  Description: INTERVENTIONS:  - Assess and monitor for signs and symptoms of infection  - Monitor lab/diagnostic results  - Monitor all insertion sites, i.e. indwelling lines, tubes, and drains  - Monitor endotracheal if appropriate and nasal secretions for changes in amount and color  - [place] appropriate cooling/warming therapies per order  - Administer medications as ordered  - Instruct and encourage patient and family to use good hand hygiene technique  - Identify and instruct in appropriate isolation precautions for identified infection/condition  Outcome: Progressing  Goal: Absence of fever/infection during neutropenic period  Description: INTERVENTIONS:  - Monitor WBC    Outcome: Progressing     Problem: SAFETY ADULT  Goal: Patient will remain free of falls  Description: INTERVENTIONS:  - Educate patient/family on patient safety including physical limitations  - Instruct patient to call for assistance with activity   - Consult OT/PT to assist with strengthening/mobility   - Keep Call bell within reach  - Keep bed low and locked with side rails adjusted as appropriate  - Keep care items and personal belongings within reach  - Initiate and maintain comfort rounds  - Make Fall Risk Sign visible to staff  - Offer Toileting every 2 Hours, in advance of need  - Initiate/Maintain 2 alarm  - Obtain necessary fall risk management equipment: 2  - Apply yellow socks and bracelet for high fall risk patients  - Consider moving patient to room near nurses station  Outcome: Progressing  Goal: Maintain or return to baseline ADL function  Description: INTERVENTIONS:  -  Assess patient's ability to carry out ADLs; assess patient's baseline for ADL function and identify physical deficits which impact ability to perform ADLs (bathing, care of mouth/teeth, toileting, grooming, dressing, etc.)  - Assess/evaluate cause of self-care  deficits   - Assess range of motion  - Assess patient's mobility; develop plan if impaired  - Assess patient's need for assistive devices and provide as appropriate  - Encourage maximum independence but intervene and supervise when necessary  - Involve family in performance of ADLs  - Assess for home care needs following discharge   - Consider OT consult to assist with ADL evaluation and planning for discharge  - Provide patient education as appropriate  Outcome: Progressing  Goal: Maintains/Returns to pre admission functional level  Description: INTERVENTIONS:  - Perform AM-PAC 6 Click Basic Mobility/ Daily Activity assessment daily.  - Set and communicate daily mobility goal to care team and patient/family/caregiver.   - Collaborate with rehabilitation services on mobility goals if consulted  - Perform Range of Motion 2 times a day.  - Reposition patient every 2 hours.  - Dangle patient 2 times a day  - Stand patient 2 times a day  - Ambulate patient 2 times a day  - Out of bed to chair 2 times a day   - Out of bed for meals 2 times a day  - Out of bed for toileting  - Record patient progress and toleration of activity level   Outcome: Progressing     Problem: DISCHARGE PLANNING  Goal: Discharge to home or other facility with appropriate resources  Description: INTERVENTIONS:  - Identify barriers to discharge w/patient and caregiver  - Arrange for needed discharge resources and transportation as appropriate  - Identify discharge learning needs (meds, wound care, etc.)  - Arrange for interpretive services to assist at discharge as needed  - Refer to Case Management Department for coordinating discharge planning if the patient needs post-hospital services based on physician/advanced practitioner order or complex needs related to functional status, cognitive ability, or social support system  Outcome: Progressing     Problem: Knowledge Deficit  Goal: Patient/family/caregiver demonstrates understanding of disease  process, treatment plan, medications, and discharge instructions  Description: Complete learning assessment and assess knowledge base.  Interventions:  - Provide teaching at level of understanding  - Provide teaching via preferred learning methods  Outcome: Progressing     Problem: RESPIRATORY - ADULT  Goal: Achieves optimal ventilation and oxygenation  Description: INTERVENTIONS:  - Assess for changes in respiratory status  - Assess for changes in mentation and behavior  - Position to facilitate oxygenation and minimize respiratory effort  - Oxygen administered by appropriate delivery if ordered  - Initiate smoking cessation education as indicated  - Encourage broncho-pulmonary hygiene including cough, deep breathe, Incentive Spirometry  - Assess the need for suctioning and aspirate as needed  - Assess and instruct to report SOB or any respiratory difficulty  - Respiratory Therapy support as indicated  Outcome: Progressing

## 2025-02-25 NOTE — ASSESSMENT & PLAN NOTE
79-year-old gender queer adult with history of stage II CKD, type 2 diabetes mellitus, BPH, CAD, CVA in Sept 2024 on aspirin and Brilinta presenting with dyspnea on exertion and chest pain x 2 weeks found to have anemia and evidence of iron deficiency. Hemoglobin 9.4, down from baseline 12-13 last year. Iron panel reveals ferritin 6, iron sat 4%, TIBC 410. No overt GI bleeding. Vital signs stable. Patient given 1 unit PRBCs with appropriate response in hemoglobin to 11.1.    EGD/EUS 1/27/2025 for evaluation of pancreatic cyst showed 6 cm type III hiatal hernia without obvious [place] erosions and moderate patchy erythema with erosion in the duodenal bulb. Colonoscopy performed for CRC screening revealed solid stool in the rectum, procedure aborted. Recommended repeat within 3 months.     Concern for chronic GI blood loss in the setting of dual antiplatelet therapy. No clear bleeding source identified on recent EGD. Differential includes AVM, large polyp, malignancy.    -No urgent indication for endoscopic evaluation at this time  -Monitor H&H and transfuse to keep hemoglobin greater than 8 due to history of CAD  -Recommend IV iron infusion  -Agree with completing cardiac workup   -Okay to continue aspirin and Brilinta from GI standpoint  -Recommend outpatient colonoscopy with 2-day bowel prep off Brilinta for 5 days once cleared from cardiac standpoint

## 2025-02-25 NOTE — DISCHARGE SUMMARY
"Discharge Summary - Hospitalist   Name: Tevin Bazan 79 y.o. adult I MRN: [id number]  Unit/Bed#: ED 02 I Date of Admission: 2/24/2025   Date of Service: 2/25/2025 I Hospital Day: 0     Assessment & Plan  Shortness of breath  Patient presented with SOB x 1 week and chest pain. On admission, patient on room air. Patient explaining intermittent SOB symptoms, mostly with exertion.   Flu/COVID -negative  BNP (44); hs Tnl 0h (5)  Troponins are negative  XR chest (2/24) - negative   CT c/a/p w contrast (2/24): \"No active disease seen in the chest.\"  Continue cough and deep breathing.  Continue to maintain oxygen > 90%.   EKG - NSR, no PVCs, QTc 440  Continue telemetry.  Shortness of breath is improved.  Discussed with cardiology they recommended outpatient follow-up  Symptomatic anemia  Per chart review, patient with noted hemoglobin decreased since 10/2024, where hemoglobin was 12.7 g/dL.  Patient had recent EGD completed in January 2025, where noted 6 cm type III hiatal hernia (Hill grade 4) and moderate erythematous mucosa with erosion of the duodenal bulb.    On admission, patient with noted hemoglobin of 9.4 g/dL.  In ED, patient receiving 1 unit PRBCs per ED provider.  Will hold off on giving additional PRBCs, once complete.  Patient with no active signs of bleed.  Will continue to monitor H/H, every 6 hours.  Will order iron panel, vitamin B12, folate.   Continue to monitor hemodynamics closely.  Will maintain hemoglobin > 7 g/dL.  GI consult appreciated  Okay to continue Brilinta and aspirin.  Hemoglobin this morning is 11.1.  GI recommended outpatient colonoscopy.  Discussed with patient and wife were in agreement with the discharge plan  Patient will be discharged on oral iron supplementation  Pancreatic lesion  EUS (1/20): \"One round, lobulated, heterogeneous, anechoic, microcystic, multilocular and septated cyst measuring 27 mm x 27 mm with well-defined and smooth margins in the head of the pancreas and " "neck of the pancreas.  No obvious associated mass lesion or nodularity, and no associated pancreatic duct dilatation.  Central stellate scar noted, suggestive of serous cystadenoma.  Microcystic lesion without a loculation large enough to meet size criteria for fine needle aspiration.\"   Per chart review, patient currently follows with outpatient GI.  Patient seen on 2/7 and recommended for MRI/MRCP in March 2025.  Would continue outpatient follow-up with GI.  Patient is recommended repeat MRI/MRCP in March  Renal cell carcinoma (HCC)  Per chart view, patient with history of renal carcinoma.  Per patient, follows with oncologist in the outpatient setting, but was told not have to come back for another year.  Patient denies hematuria..  Would encourage outpatient follow-up with oncology.  Patient may need referral for outpatient oncologist.  Stage 2 chronic kidney disease  Lab Results   Component Value Date    EGFR 76 02/28/2024    EGFR 72 02/27/2024    EGFR 79 01/30/2024    CREATININE 1.03 02/25/2025    CREATININE 0.95 02/24/2025    CREATININE 0.86 10/13/2024     Per chart review, baseline creatinine 0.8-1.1.  Creatinine stable.   Continue to avoid and/or limit nephrotoxins.  Continue to monitor via daily BMP.  Type 2 diabetes mellitus with other diabetic neurological complication (HCC)    Lab Results   Component Value Date    HGBA1C 6.3 (H) 02/25/2025     Per chart review, patient with hemoglobin A1c 6.3%.  Will hold oral glycemic medications.  Continue encourage lifestyle modifications.  Continue Accu-Cheks.  Will order SSI, as needed.  Will order carb-controlled diet.    BPH (benign prostatic hyperplasia)  Continue tamsulosin 0.4 mg, daily.  History of stroke  Patient with history of CVA and currently maintained on aspirin 81 mg, daily and Brilinta 90 mg, every 12 hours.  Will continue home regimen and watch H/H closely.   Hospital Course:     Tevin Bazan is a 79 y.o. adult patient who originally " presented to the hospital on   Admission Orders (From admission, onward)       Ordered        02/24/25 1909  Place in Observation  Once                         due to shortness of breath/symptomatic anemia.  Patient was given 1 unit of packed red cell transfusion ER.  Patient was seen by GI and recommended outpatient follow-up for colonoscopy.  Okay to continue aspirin and Brilinta per GI.  Patient hemoglobin is stable.  Discussed with patient and wife regarding outpatient follow-up with GI, oncology and cardiology.  On Exam-  Chest-bilateral air entry, clear to auscultation  Abdomen-soft, nontender  Heart-S1 S2 regular  Extremities-no pedal edema or calf tenderness  Neuro-alert awake oriented x3.  No focal deficits    Please see above list of diagnoses and related plan for additional information.   Follow-up with PCP as outpatient  Follow-up with GI as outpatient for colonoscopy  Follow-up with cardiology as outpatient  Outpatient follow-up with oncology for follow-up of renal cell carcinoma    CONSULTING PROVIDERS   IP CONSULT TO GASTROENTEROLOGY  IP CONSULT TO CARDIOLOGY    PROCEDURES PERFORMED  * No surgery found *    RADIOLOGY RESULTS  CT chest abdomen pelvis w contrast  Result Date: 2/24/2025  Narrative: CT CHEST, ABDOMEN AND PELVIS WITH IV CONTRAST INDICATION: symptomatic anemia, cough, sob. COMPARISON: CT abdomen/pelvis from 10/11/2024. Chest x-ray from earlier today. TECHNIQUE: CT examination of the chest, abdomen and pelvis was performed. Multiplanar 2D reformatted images were created from the source data. This examination, like all CT scans performed in the [facility], was performed utilizing techniques to minimize radiation dose exposure, including the use of iterative reconstruction and automated exposure control. Radiation dose length product (DLP) for this visit: 608.38 mGy-cm IV Contrast: 100 mL of iohexol Enteric Contrast: Not administered. FINDINGS: CHEST LUNGS: Lungs are clear. No  tracheal or endobronchial lesion. PLEURA: Unremarkable. HEART/GREAT VESSELS: Heart is unremarkable for patient's age. No thoracic aortic aneurysm. Coronary artery calcifications indicating atherosclerotic heart disease. MEDIASTINUM AND ACRLI: Large hiatal hernia unchanged. No adenopathy CHEST WALL AND LOWER NECK: Subcentimeter left thyroid nodule. Bilateral gynecomastia unchanged. ABDOMEN LIVER/BILIARY TREE: Unremarkable. GALLBLADDER: Cholelithiasis without findings of acute cholecystitis. SPLEEN: Unremarkable. 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 ADRENAL GLANDS: Unremarkable. KIDNEYS/URETERS: Stable solid enhancing mass in the left kidney measuring 1.4 cm consistent with renal cell carcinoma. Stable cysts bilaterally. No hydronephrosis. STOMACH AND BOWEL: Colonic diverticulosis without findings of acute diverticulitis. Status post right hemicolectomy. No bowel obstruction. APPENDIX: Removed. ABDOMINOPELVIC CAVITY: No ascites. No pneumoperitoneum. No lymphadenopathy. VESSELS: Unremarkable for patient's age. PELVIS REPRODUCTIVE ORGANS: Prostatomegaly unchanged. URINARY BLADDER: Unremarkable. ABDOMINAL WALL/INGUINAL REGIONS: Stable small supraumbilical fat-containing hernia without induration. BONES: No acute fracture or suspicious osseous lesion. Stable severe thoracic spine scoliosis and spinal degenerative changes.     Impression: No active disease seen in the chest. Stable large hiatal hernia. Stable gallstones without surrounding inflammation. 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 Stable solid enhancing left renal mass consistent with renal cell carcinoma. Stable prostatomegaly. Workstation performed: [id number]     XR chest 2 views  Result Date: 2/24/2025  Narrative: XR CHEST PA AND LATERAL INDICATION: Chest Pain. COMPARISON: None FINDINGS: Clear lungs. No pneumothorax or pleural effusion. Normal cardiomediastinal silhouette.  Moderate hiatal hernia. Redemonstrated is thoracic dextroscoliosis. Normal upper abdomen.     Impression: No acute cardiopulmonary disease. Workstation performed: [id number]     Endoscopic ultrasonography, GI (Upper)  Result Date: 1/27/2025  Narrative: Table formatting from the original result was not included. [facility] Endoscopy [address] [facility] [address] [phone] DATE OF SERVICE: 1/27/25 PHYSICIAN(S): Attending: Pat Escalera MD Fellow: Aly Ledezma DO INDICATION: Pancreas cystic lesion POST-OP DIAGNOSIS: See the impression below. PREPROCEDURE: Informed consent was obtained for the procedure, including sedation.  Risks of perforation, hemorrhage, adverse drug reaction and aspiration were discussed. The patient was placed in the left lateral decubitus position. Patient was explained about the risks and benefits of the procedure. Risks including but not limited to bleeding, infection, and perforation were explained in detail. Also explained about less than 100% sensitivity with the exam and other alternatives. PROCEDURE: EGD and EUS UPPER DETAILS OF PROCEDURE: Patient was taken to the procedure room where a time out was performed to confirm correct patient and correct procedure. The patient underwent monitored anesthesia care, which was administered by an anesthesia professional. The patient's blood pressure, heart rate, oxygen, respirations, level of consciousness, ECG and ETCO2 were monitored throughout the procedure. The endoscope was introduced through the mouth and advanced to the second part of the duodenum. Retroflexion was performed in the cardia, fundus and incisura. Ultrasound examination of the esophageal, gastric and duodenal regions was performed using a linear scope. The patient's estimated blood loss was minimal (<5 mL). The procedure was not difficult. The patient tolerated the procedure well. There were no apparent adverse events. ANESTHESIA INFORMATION:  ASA: III Anesthesia Type: Anesthesia type not filed in the log. MEDICATIONS: lactated ringers infusion 400 mL*  *From user-documented volume (Totals for administrations occurring from 0823 to 0906 on 01/27/25) FINDINGS: Endoscopic views of the esophagus appeared normal. Some minor pill and food residue in the esophagus noted. 6 cm herniation of both GE junction and stomach (type III hiatal hernia) - GE junction 32 cm from the incisors, diaphragmatic impression 38 cm from the incisors, confirmed by retroflexion. Hill grade 4. Some inflammation noted in the hernia just distal to GE junction. The body of the stomach, greater curve of the stomach, lesser curve of the stomach, incisura, antrum, prepyloric region and pylorus appeared normal. Performed random biopsy using biopsy forceps. Biopsies taken to rule out H pylori. Moderate, patchy erythematous mucosa with erosion in the duodenal bulb The major papilla was native. Extremely small ampulla noted. One round, lobulated, heterogeneous, anechoic, microcystic, multilocular and septated cyst measuring 27 mm x 27 mm with well-defined and smooth margins in the head of the pancreas and neck of the pancreas. Central stellate scar noted suggestive of serous cystadenoma. No obvious associated nodularity or mass lesion.  No upstream pancreatic duct dilatation visualized. The cyst was multilocular and microcystic, and loculations were felt to be too small to obtain FNA sample. The parenchyma was visualized in the entire pancreas. The parenchyma was heterogeneous and had lobularity. The pancreatic duct appeared normal and measured 3 mm at the head, 2 mm at the body and 1 mm at the tail. The bile duct appeared normal. The bile duct measured 4 mm at the distal end. Normal Celiac Axis. The left adrenal appeared normal. Irregular solid lesion visualized on left kidney consistent with known renal cell carcinoma lesion. SPECIMENS: ID Type Source Tests Collected by Time Destination 1 :  r/o h.pylori Tissue Stomach TISSUE EXAM Aly Ledezma DO 1/27/2025  8:29 AM       Impression: EGD: Endoscopic views of the esophagus appeared normal. 6 cm type III hiatal hernia, hill grade 4. The stomach otherwise appeared normal. Performed random biopsy to rule out H pylori. Moderate erythematous mucosa with erosion in the duodenal bulb. Small but otherwise unremarkable ampulla. EUS: One round, lobulated, heterogeneous, anechoic, microcystic, multilocular and septated cyst measuring 27 mm x 27 mm with well-defined and smooth margins in the head of the pancreas and neck of the pancreas.  No obvious associated mass lesion or nodularity, and no associated pancreatic duct dilatation.  Central stellate scar noted, suggestive of serous cystadenoma.  Microcystic lesion without a loculation large enough to meet size criteria for fine needle aspiration. The parenchyma was visualized in the entire pancreas. The parenchyma was heterogeneous and had lobularity. The pancreatic duct appeared normal. Irregular solid lesion noted on left kidney consistent with known renal cell carcinoma. RECOMMENDATION: Follow up with GI Clinic Repeat MRI/MRCP in March 2025. Proceed to colonoscopy.    Pat Escalera MD     Colonoscopy  Result Date: 1/27/2025  Narrative: Table formatting from the original result was not included. [facility] Endoscopy [address] [facility] [address] [phone] DATE OF SERVICE: 1/27/25 PHYSICIAN(S): Attending: Pat Escalera MD Fellow: Aly Ledezma DO INDICATION: Colon cancer Screening Recent melena POST-OP DIAGNOSIS: See the impression below. HISTORY: Prior colonoscopy: More than 10 years ago. BOWEL PREPARATION: Miralax/Dulcolax PREPROCEDURE: Informed consent was obtained for the procedure, including sedation. Risks including but not limited to bleeding, infection, perforation, adverse drug reaction and aspiration were explained in detail. Also explained about less than  100% sensitivity with the exam and other alternatives. The patient was placed in the left lateral decubitus position. Procedure: Colonoscopy DETAILS OF PROCEDURE: Patient was taken to the procedure room where a time out was performed to confirm correct patient and correct procedure. The patient underwent monitored anesthesia care, which was administered by an anesthesia professional. The patient's blood pressure, ECG, ETCO2, heart rate, level of consciousness, oxygen and respirations were monitored throughout the procedure. A digital rectal exam was performed. A perianal exam was performed. The scope was introduced through the anus and advanced to the rectum. Retroflexion was performed in the rectum. The quality of bowel preparation was evaluated using the [place] Bowel Preparation Scale with scores of: right colon = not assessed, transverse colon = not assessed, left colon = 0. The total BBPS score was 0. Bowel prep was not adequate. The patient experienced no blood loss. The procedure was difficult due to poor preparation. The patient tolerated the procedure well. There were no apparent adverse events. Procedure aborted in the rectum due to presence of solid stool. ANESTHESIA INFORMATION: ASA: III Anesthesia Type: General MEDICATIONS: lactated ringers infusion 400 mL*  *From user-documented volume (Totals for administrations occurring from 0823 to 0908 on 01/27/25) FINDINGS: Solid stool in the rectum. Procedure aborted due to poor prep and risk of perforation. EVENTS: Procedure Events Event Event Time ENDO SCOPE OUT TIME 1/27/2025  9:02 AM SPECIMENS: ID Type Source Tests Collected by Time Destination 1 : r/o h.pylori Tissue Stomach TISSUE EXAM Aly Ledezma,  1/27/2025  8:29 AM  EQUIPMENT: Colonoscope -     Impression: Solid stool in the rectum. Procedure aborted due to poor prep and risk of perforation. RECOMMENDATION: Repeat colonoscopy in 3 months, due: 4/27/2025 Inadequate bowel preparation  Recommend 2  "day prep.    Pat Escalera MD       LABS  Results from last 7 days   Lab Units 02/25/25  0446 02/24/25  1400   WBC Thousand/uL 6.99 6.57   HEMOGLOBIN g/dL 11.1* 9.4*   HEMATOCRIT % 35.3* 30.6*   MCV fL 82 83   PLATELETS Thousands/uL 237 237     Results from last 7 days   Lab Units 02/25/25  0446 02/24/25  1400   SODIUM mmol/L 140 137   POTASSIUM mmol/L 4.2 4.3   CHLORIDE mmol/L 108 108   CO2 mmol/L 25 23   BUN mg/dL 32* 36*   CREATININE mg/dL 1.03 0.95   CALCIUM mg/dL 8.3* 8.7   ALBUMIN g/dL 3.7 3.8   TOTAL BILIRUBIN mg/dL 0.69 0.34   ALK PHOS U/L 97 96   ALT U/L 15 15   AST U/L 14 16   GLUCOSE RANDOM mg/dL 102 118     Results from last 7 days   Lab Units 02/24/25  1848 02/24/25  1610 02/24/25  1400   HS TNI 0HR ng/L  --   --  5   HS TNI 2HR ng/L  --  5  --    HS TNI 4HR ng/L 6  --   --               Results from last 7 days   Lab Units 02/25/25  1102 02/25/25  0723 02/24/25  2153   POC GLUCOSE mg/dl 89 113 132     Results from last 7 days   Lab Units 02/25/25  0446   HEMOGLOBIN A1C % 6.3*                   Cultures:         Invalid input(s): \"URIBILINOGEN\"              Condition at Discharge:  good      Discharge instructions/Information to patient and family:   See after visit summary for information provided to patient and family.      Provisions for Follow-Up Care:  See after visit summary for information related to follow-up care and any pertinent home health orders.      Disposition:     Home       Discharge Statement:  I spent 40 minutes discharging the patient. This time was spent on the day of discharge. I had direct contact with the patient on the day of discharge. Greater than 50% of the total time was spent examining patient, answering all patient questions, arranging and discussing plan of care with patient as well as directly providing post-discharge instructions.  Additional time then spent on discharge activities.    Discharge Medications:  See after visit summary for reconciled discharge " medications provided to patient and family.      ** Please Note: This note has been constructed using a voice recognition system **

## 2025-02-25 NOTE — PHYSICAL THERAPY NOTE
PHYSICAL THERAPY SCREEN NOTE      Patient Name: Tevin Bazan  Today's Date: 2/25/2025 02/25/25 1218   PT Last Visit   PT Visit Date 02/25/25   Note Type   Note type Screen   Additional Comments PT orders received, chart review performed. Patient w/ recent AMPAC of 22, ambulating at least 25 ft. Per ED admission, pt is able to ambulate over 100ft, however has SOB after 100 ft. Pt w/ no acute PT need as PT will not impact SOB, recommend continued mobility w/ RN staff, will DC PT.       Melody Trejo, PT, DPT

## 2025-02-25 NOTE — ASSESSMENT & PLAN NOTE
Per chart view, patient with history of renal carcinoma.  Per patient, follows with oncologist in the outpatient setting, but was told not have to come back for another year.  Patient denies hematuria.  Will collect a UA to rule out hematuria.  Would encourage outpatient follow-up with oncology.  Patient may need referral for outpatient oncologist.

## 2025-02-25 NOTE — ASSESSMENT & PLAN NOTE
Per chart view, patient with history of renal carcinoma.  Per patient, follows with oncologist in the outpatient setting, but was told not have to come back for another year.  Patient denies hematuria..  Would encourage outpatient follow-up with oncology.  Patient may need referral for outpatient oncologist.

## 2025-02-25 NOTE — ASSESSMENT & PLAN NOTE
Lab Results   Component Value Date    HGBA1C 6.3 (H) 09/14/2024     Per chart review, patient with hemoglobin A1c 6.3%.  Will hold oral glycemic medications.  Continue encourage lifestyle modifications.  Continue Accu-Cheks.  Will order SSI, as needed.  Will order carb-controlled diet.  Will update A1c and ordered for AM draw.

## 2025-02-25 NOTE — ASSESSMENT & PLAN NOTE
Per chart review, patient with noted hemoglobin decreased since 10/2024, where hemoglobin was 12.7 g/dL.  Patient had recent EGD completed in January 2025, where noted 6 cm type III hiatal hernia (Hill grade 4) and moderate erythematous mucosa with erosion of the duodenal bulb.    On admission, patient with noted hemoglobin of 9.4 g/dL.  In ED, patient receiving 1 unit PRBCs per ED provider.  Will hold off on giving additional PRBCs, once complete.  Patient with no active signs of bleed.  Will continue to monitor H/H, every 6 hours.  Will order iron panel, vitamin B12, folate.   Continue to monitor hemodynamics closely.  Will maintain hemoglobin > 7 g/dL.  GI consult appreciated  Okay to continue Brilinta and aspirin.  Hemoglobin this morning is 11.1.  GI recommended outpatient colonoscopy.  Discussed with patient and wife were in agreement with the discharge plan  Patient will be discharged on oral iron supplementation

## 2025-02-25 NOTE — ASSESSMENT & PLAN NOTE
"EUS (1/20): \"One round, lobulated, heterogeneous, anechoic, microcystic, multilocular and septated cyst measuring 27 mm x 27 mm with well-defined and smooth margins in the head of the pancreas and neck of the pancreas.  No obvious associated mass lesion or nodularity, and no associated pancreatic duct dilatation.  Central stellate scar noted, suggestive of serous cystadenoma.  Microcystic lesion without a loculation large enough to meet size criteria for fine needle aspiration.\"   Per chart review, patient currently follows with outpatient GI.  Patient seen on 2/7 and recommended for MRI/MRCP in March 2025.  Would continue outpatient follow-up with GI.  "

## 2025-02-25 NOTE — ASSESSMENT & PLAN NOTE
Likely due to symptomatic anemia. CXR and chest CT negative for active disease. Flu/COVID rapid antigen negative.    -Planning cardiac workup  -Appreciate management per SLIM and cardiology

## 2025-02-25 NOTE — ASSESSMENT & PLAN NOTE
"Patient presented with SOB x 1 week and chest pain. On admission, patient on room air. Patient explaining intermittent SOB symptoms, mostly with exertion.   Flu/COVID -negative  BNP (44); hs Tnl 0h (5)  Troponins are negative  XR chest (2/24) - negative   CT c/a/p w contrast (2/24): \"No active disease seen in the chest.\"  Continue cough and deep breathing.  Continue to maintain oxygen > 90%.   EKG - NSR, no PVCs, QTc 440  Continue telemetry.  Shortness of breath is improved.  Discussed with cardiology they recommended outpatient follow-up  "

## 2025-02-25 NOTE — ASSESSMENT & PLAN NOTE
Per chart review, patient with noted hemoglobin decreased since 10/2024, where hemoglobin was 12.7 g/dL.  Patient had recent EGD completed in January 2025, where noted 6 cm type III hiatal hernia (Hill grade 4) and moderate erythematous mucosa with erosion of the duodenal bulb.    On admission, patient with noted hemoglobin of 9.4 g/dL.  In ED, patient receiving 1 unit PRBCs per ED provider.  Will hold off on giving additional PRBCs, once complete.  Patient with no active signs of bleed.  Will continue to monitor H/H, every 6 hours.  Will order iron panel, vitamin B12, folate.   Continue to monitor hemodynamics closely.  Will maintain hemoglobin > 7 g/dL.  Will order GI consultation.

## 2025-02-25 NOTE — ASSESSMENT & PLAN NOTE
Lab Results   Component Value Date    EGFR 76 02/28/2024    EGFR 72 02/27/2024    EGFR 79 01/30/2024    CREATININE 1.03 02/25/2025    CREATININE 0.95 02/24/2025    CREATININE 0.86 10/13/2024

## 2025-02-25 NOTE — ASSESSMENT & PLAN NOTE
Lab Results   Component Value Date    EGFR 76 02/28/2024    EGFR 72 02/27/2024    EGFR 79 01/30/2024    CREATININE 1.03 02/25/2025    CREATININE 0.95 02/24/2025    CREATININE 0.86 10/13/2024     Per chart review, baseline creatinine 0.8-1.1.  Creatinine stable.   Continue to avoid and/or limit nephrotoxins.  Continue to monitor via daily BMP.

## 2025-02-25 NOTE — CONSULTS
Consultation - Gastroenterology   Name: Tevin Bazan 79 y.o. adult I MRN: [id number]  Unit/Bed#: ED 02 I Date of Admission: 2/24/2025   Date of Service: 2/25/2025 I Hospital Day: 0   Inpatient consult to gastroenterology  Consult performed by: Vy Hill PA-C  Consult ordered by: Jesenia Neri PA-C        Physician Requesting Evaluation: Stefan Jeffries MD   Reason for Evaluation / Principal Problem: Anemia    Assessment & Plan  Symptomatic anemia  79-year-old gender queer adult with history of stage II CKD, type 2 diabetes mellitus, BPH, CAD, CVA in Sept 2024 on aspirin and Brilinta presenting with dyspnea on exertion and chest pain x 2 weeks found to have anemia and evidence of iron deficiency. Hemoglobin 9.4, down from baseline 12-13 last year. Iron panel reveals ferritin 6, iron sat 4%, TIBC 410. No overt GI bleeding. Vital signs stable. Patient given 1 unit PRBCs with appropriate response in hemoglobin to 11.1.    EGD/EUS 1/27/2025 for evaluation of pancreatic cyst showed 6 cm type III hiatal hernia without obvious [place] erosions and moderate patchy erythema with erosion in the duodenal bulb. Colonoscopy performed for CRC screening revealed solid stool in the rectum, procedure aborted. Recommended repeat within 3 months.     Concern for chronic GI blood loss in the setting of dual antiplatelet therapy. No clear bleeding source identified on recent EGD. Differential includes AVM, large polyp, malignancy.    -No urgent indication for endoscopic evaluation at this time  -Monitor H&H and transfuse to keep hemoglobin greater than 8 due to history of CAD  -Recommend IV iron infusion  -Agree with completing cardiac workup   -Okay to continue aspirin and Brilinta from GI standpoint  -Recommend outpatient colonoscopy with 2-day bowel prep off Brilinta for 5 days once cleared from cardiac standpoint  Shortness of breath  Likely due to symptomatic anemia. CXR and chest CT negative for active  disease. Flu/COVID rapid antigen negative.    -Planning cardiac workup  -Appreciate management per SLIM and cardiology  Pancreatic lesion  EUS was performed 1/27/25 which showed single round, lobulated, multilocular and septated cyst measuring 27 mm x 27 mm with well-defined and smooth margins in the head of the pancreas and neck of the pancreas. No concerning features. Findings suggestive of serous cystadenoma. Lesion did not meet criteria for FNA.     -Our advanced endoscopist has recommended repeat MRI/MRCP in March 2025  History of stroke  Continue aspirin and Brilinta  I have discussed the above management plan in detail with the primary service.     History of Present Illness   HPI:  Tevin Bazan is a 79 y.o. adult with history of stage II CKD, type 2 diabetes mellitus, BPH, CAD, history of CVA in Sept 2024 on aspirin and Brilinta who presents to the hospital with dyspnea and chest pain with exertion for about 2 weeks. Also reporting some associated dizziness and lightheadedness. Patient found to have hemoglobin 9.4, down from baseline 12-13 last year. Labs consistent with iron deficiency, ferritin 6, iron sat 4%, TIBC 410. Patient denies overt GI bleeding. No melena, hematochezia, hematemesis.  He has regular bowel movements. No nausea or vomiting. Appetite is good.    Review of Systems   Constitutional:  Negative for chills and fever.   HENT:  Negative for ear pain and sore throat.    Eyes:  Negative for pain and visual disturbance.   Respiratory:  Positive for shortness of breath. Negative for cough.         With exertion   Cardiovascular:  Positive for chest pain. Negative for palpitations.        With exertion   Gastrointestinal:  Negative for abdominal pain and vomiting.   Genitourinary:  Negative for dysuria and hematuria.   Musculoskeletal:  Negative for arthralgias and back pain.   Skin:  Negative for color change and rash.   Neurological:  Positive for dizziness and light-headedness. Negative  for seizures and syncope.   All other systems reviewed and are negative.    Medical History Review: I have reviewed the patient's PMH, PSH, Social History, Family History, Meds, and Allergies     Objective :  Temp:  [97.1 °F (36.2 °C)-98.7 °F (37.1 °C)] 97.5 °F (36.4 °C)  HR:  [60-86] 70  BP: (113-145)/(58-76) 121/71  Resp:  [16-20] 18  SpO2:  [95 %-99 %] 95 %  O2 Device: None (Room air)    Physical Exam  Vitals and nursing note reviewed.   Constitutional:       General: Danielle is not in acute distress.     Appearance: Danielle is well-developed.   HENT:      Head: Normocephalic and atraumatic.   Eyes:      Conjunctiva/sclera: Conjunctivae normal.   Cardiovascular:      Rate and Rhythm: Normal rate and regular rhythm.      Heart sounds: No murmur heard.  Pulmonary:      Effort: Pulmonary effort is normal. No respiratory distress.      Breath sounds: Normal breath sounds.   Abdominal:      Palpations: Abdomen is soft.      Tenderness: There is no abdominal tenderness.   Musculoskeletal:         General: No swelling.      Cervical back: Neck supple.   Skin:     General: Skin is warm and dry.   Neurological:      Mental Status: Danielle is alert.   Psychiatric:         Mood and Affect: Mood normal.           Lab Results: I have reviewed the following results:CBC/BMP:   .     02/25/25  0446   WBC 6.99   HGB 11.1*   HCT 35.3*      SODIUM 140   K 4.2      CO2 25   BUN 32*   CREATININE 1.03   GLUC 102   MG 1.9    , Creatinine Clearance: Estimated Creatinine Clearance (by C-G formula based on SCr of 1.03 mg/dL)  Female: 44.1 mL/min  Male: 51.6 mL/min  When the gender of the patient is unspecified you will see values for both male and female., LFTs:   .     02/25/25  0446   AST 14   ALT 15   ALB 3.7   TBILI 0.69   ALKPHOS 97    , PTT/INR:No new results in last 24 hours. , Troponin,BNP:  .     02/24/25  1400 02/24/25  1610   HSTNI0 5  --    HSTNI2  --  5   BNP 44  --     , Lactic Acid: No new results in last 24 hours. ,  "Procalcitonin: No results found for: \"PROCALCITONI\", ABG: No new results in last 24 hours.     Imaging Results Review: I reviewed radiology reports from this admission including: chest xray, CT chest, and CT abdomen/pelvis.  Other Study Results Review: Other studies reviewed include: EGD/EUS Jan 2025, colonoscopy Jan 2025      "

## 2025-02-25 NOTE — ASSESSMENT & PLAN NOTE
"EUS (1/20): \"One round, lobulated, heterogeneous, anechoic, microcystic, multilocular and septated cyst measuring 27 mm x 27 mm with well-defined and smooth margins in the head of the pancreas and neck of the pancreas.  No obvious associated mass lesion or nodularity, and no associated pancreatic duct dilatation.  Central stellate scar noted, suggestive of serous cystadenoma.  Microcystic lesion without a loculation large enough to meet size criteria for fine needle aspiration.\"   Per chart review, patient currently follows with outpatient GI.  Patient seen on 2/7 and recommended for MRI/MRCP in March 2025.  Would continue outpatient follow-up with GI.  Patient is recommended repeat MRI/MRCP in March  "

## 2025-02-25 NOTE — DISCHARGE INSTR - AVS FIRST PAGE
Follow-up with PCP as outpatient  Follow-up with GI as outpatient for colonoscopy  Follow-up with cardiology as outpatient  Outpatient follow-up with oncology for follow-up of renal cell carcinoma

## 2025-02-25 NOTE — ASSESSMENT & PLAN NOTE
EUS was performed 1/27/25 which showed single round, lobulated, multilocular and septated cyst measuring 27 mm x 27 mm with well-defined and smooth margins in the head of the pancreas and neck of the pancreas. No concerning features. Findings suggestive of serous cystadenoma. Lesion did not meet criteria for FNA.     -Our advanced endoscopist has recommended repeat MRI/MRCP in March 2025

## 2025-02-25 NOTE — UTILIZATION REVIEW
Initial Clinical Review    Admission: Date/Time/Statement: 2/24/25 1909 observation   Admission Orders (From admission, onward)       Ordered        02/24/25 1909  Place in Observation  Once                          Orders Placed This Encounter   Procedures    Place in Observation     Standing Status:   Standing     Number of Occurrences:   1     Level of Care:   Med Surg [16]     ED Arrival Information       Expected   -    Arrival   2/24/2025 13:25    Acuity   Urgent              Means of arrival   Wheelchair    Escorted by   Family Member    Service   Hospitalist    Admission type   Emergency              Arrival complaint   Shortness of breath             Chief Complaint   Patient presents with    Shortness of Breath     Patient c/o chest congestion and SOB x 1 week. + chest pain. Pain does not radiate       Initial Presentation: 79 y.o. adult  to ED via private vehicle from home.    Admitted to observation with Dx: Symptomatic anemia/Shortness of breath.  Presented to ED with cough, shortness of breath, Orthopnea and dyspnea on exertion starting week prior to arrival, worse on day of arrival.   PMHx: pancreatic lesion, renal cell carcinoma, CKD 2, CAD, BPH, DM 2 . On exam: Cardiac Systolic Murmur.  Intermittent tachypnea.  Lungs rhonchi.  H&H 9.4/30.6 with hgb 12.7 in 10/24.  bun 36.  Creatinine 0.95.   Imaging shows large hiatal hernia, gallstones, pancreatic cyst, stable left renal mass, consistent with renal cell Carcinoma and prostatomegaly.   In ED:  transfuse unit PRBC.      Plan includes telemetry.  Check echo.  Oximetry, goal sat > 90%.  Consult Cardiology.  H&H every 6 hours. Check iron panel.  Start IV Protonix daily.   Consult GI.  Carb Controlled diet and start accuchecks, hold home diabetic medications.     Anticipated Length of Stay/Certification Statement: Patient will be admitted on an observation basis with an anticipated length of stay of less than 2 midnights secondary to pending imaging  studies, shortness of breath, anginal symptoms, and telemetry monitoring.     Date: 2/25/25   Day 2:     ED Treatment-Medication Administration from 02/24/2025 1325 to 02/25/2025 0606         Date/Time Order Dose Route Action     02/24/2025 2156 pantoprazole (PROTONIX) injection 40 mg 40 mg Intravenous Given     02/24/2025 2156 ticagrelor (BRILINTA) tablet 90 mg 90 mg Oral Given            Scheduled Medications:  aspirin, 81 mg, Oral, Daily  atorvastatin, 20 mg, Oral, Daily With Dinner  diltiazem, 120 mg, Oral, Daily  insulin lispro, 1-5 Units, Subcutaneous, TID AC  insulin lispro, 1-5 Units, Subcutaneous, HS  pantoprazole, 40 mg, Intravenous, Q12H KELLI  tamsulosin, 0.4 mg, Oral, Daily With Dinner  ticagrelor, 90 mg, Oral, Q12H KELLI      Continuous IV Infusions:     PRN Meds:  acetaminophen, 650 mg, Oral, Q6H PRN  albuterol, 2 puff, Inhalation, Q6H PRN  calcium carbonate, 1,000 mg, Oral, Daily PRN  polyethylene glycol, 17 g, Oral, Daily PRN      ED Triage Vitals   Temperature Pulse Respirations Blood Pressure SpO2 Pain Score   02/24/25 1356 02/24/25 1356 02/24/25 1356 02/24/25 1357 02/24/25 1356 02/24/25 1356   98.7 °F (37.1 °C) 79 18 113/65 96 % No Pain     Weight (last 2 days)       Date/Time Weight    02/25/25 0454 71.4 (157.41)            Vital Signs (last 3 days)       Date/Time Temp Pulse Resp BP MAP (mmHg) SpO2 O2 Device Patient Position - Orthostatic VS Pain    02/24/25 2136 97.1 °F (36.2 °C) 67 20 130/60 87 97 % -- -- --    02/24/25 2115 -- -- -- -- -- -- None (Room air) -- No Pain    02/24/25 2030 -- 68 16 139/69 96 98 % None (Room air) Lying --    02/24/25 2015 -- 68 -- -- -- 98 % -- -- --    02/24/25 2000 98 °F (36.7 °C) 68 16 113/58 79 99 % Face tent -- --    02/24/25 1945 98.1 °F (36.7 °C) 67 18 -- -- 98 % None (Room air) Lying --    02/24/25 1930 -- 86 18 133/74 98 99 % -- -- No Pain    02/24/25 1915 -- 73 -- -- -- 98 % -- -- --    02/24/25 1900 98 °F (36.7 °C) 76 18 134/76 94 98 % None (Room air) -- --     02/24/25 1855 98 °F (36.7 °C) 67 18 -- -- 98 % None (Room air) -- --    02/24/25 1841 98.1 °F (36.7 °C) 60 18 145/70 -- -- -- -- --    02/24/25 1700 -- 62 18 143/76 103 98 % -- -- --    02/24/25 1357 -- -- -- 113/65 -- -- -- -- --    02/24/25 1356 98.7 °F (37.1 °C) 79 18 -- -- 96 % None (Room air) Sitting No Pain              Pertinent Labs/Diagnostic Test Results:   Radiology:  CT chest abdomen pelvis w contrast   Final Interpretation by Elpidio Champagne MD (02/24 1739)      No active disease seen in the chest.      Stable large hiatal hernia.      Stable gallstones without surrounding inflammation.      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      Stable solid enhancing left renal mass consistent with renal cell carcinoma.      Stable prostatomegaly.               Workstation performed: [id number]         XR chest 2 views   Final Interpretation by Josette Angulo MD (02/24 1523)   No acute cardiopulmonary disease.            Workstation performed: [id number]           Cardiology:  ECG 12 lead    by Interface, Ris Results In (02/24 1359)        GI:  No orders to display           Results from last 7 days   Lab Units 02/25/25  0446 02/24/25  1400   WBC Thousand/uL 6.99 6.57   HEMOGLOBIN g/dL 11.1* 9.4*   HEMATOCRIT % 35.3* 30.6*   PLATELETS Thousands/uL 237 237   TOTAL NEUT ABS Thousands/µL 3.37 3.39         Results from last 7 days   Lab Units 02/25/25  0446 02/24/25  1400   SODIUM mmol/L 140 137   POTASSIUM mmol/L 4.2 4.3   CHLORIDE mmol/L 108 108   CO2 mmol/L 25 23   ANION GAP mmol/L 7 6   BUN mg/dL 32* 36*   CREATININE mg/dL 1.03 0.95   CALCIUM mg/dL 8.3* 8.7   MAGNESIUM mg/dL 1.9  --      Results from last 7 days   Lab Units 02/25/25  0446 02/24/25  1400   AST U/L 14 16   ALT U/L 15 15   ALK PHOS U/L 97 96   TOTAL PROTEIN g/dL 6.9 7.0   ALBUMIN g/dL 3.7 3.8   TOTAL BILIRUBIN mg/dL 0.69 0.34     Results from last 7 days   Lab Units 02/24/25  2153   POC GLUCOSE mg/dl 132  "    Results from last 7 days   Lab Units 02/25/25  0446 02/24/25  1400   GLUCOSE RANDOM mg/dL 102 118             No results found for: \"BETA-HYDROXYBUTYRATE\"                   Results from last 7 days   Lab Units 02/24/25  1848 02/24/25  1610 02/24/25  1400   HS TNI 0HR ng/L  --   --  5   HS TNI 2HR ng/L  --  5  --    HSTNI D2 ng/L  --  0  --    HS TNI 4HR ng/L 6  --   --    HSTNI D4 ng/L 1  --   --                                  Results from last 7 days   Lab Units 02/24/25  1400   BNP pg/mL 44     Results from last 7 days   Lab Units 02/24/25  1610   FERRITIN ng/mL 6*   IRON SATURATION % 4*   IRON ug/dL 17*   TIBC ug/dL 410.2     Results from last 7 days   Lab Units 02/24/25  1610   TRANSFERRIN mg/dL 293     Results from last 7 days   Lab Units 02/25/25  0549   UNIT PRODUCT CODE  [id number]   UNIT NUMBER  [id number]   UNITABO  O   UNITRH  POS   CROSSMATCH  Compatible   UNIT DISPENSE STATUS  Presumed Trans   UNIT PRODUCT VOL ml 350                                                                           Past Medical History:   Diagnosis Date    Anemia     Chronic bilateral low back pain without sciatica 07/17/2020    Hypertension     Scoliosis     Stroke (HCC)     Stroke-like symptoms 11/22/2021     Present on Admission:   Shortness of breath   Type 2 diabetes mellitus with other diabetic neurological complication (HCC)   Stage 2 chronic kidney disease   Pancreatic lesion   BPH (benign prostatic hyperplasia)      Admitting Diagnosis: Shortness of breath [R06.02]  Age/Sex: 79 y.o. adult    Network Utilization Review Department  ATTENTION: Please call with any questions or concerns to [phone] and carefully listen to the prompts so that you are directed to the right person. All voicemails are confidential.   For Discharge needs, contact Care Management DC Support Team at [phone] opt. 2  Send all requests for admission clinical reviews, approved or denied determinations and any other requests to " dedicated fax number below belonging to the campus where the patient is receiving treatment. List of dedicated fax numbers for the Facilities:  FACILITY NAME UR FAX NUMBER   ADMISSION DENIALS (Administrative/Medical Necessity) [phone]   DISCHARGE SUPPORT TEAM (NETWORK) [phone]   PARENT CHILD HEALTH (Maternity/NICU/Pediatrics) [phone]   [facility] [phone]   [facility] [phone]   [facility] [phone]   General acute hospital [phone]   [facility] [phone]   [facility] [phone]   [facility] [phone]   [facility] [phone]   [facility] [phone]   [facility] [phone]   [facility] [phone]   [facility] [phone]

## 2025-02-25 NOTE — ASSESSMENT & PLAN NOTE
"Patient presented with SOB x 1 week and chest pain. On admission, patient on room air. Patient explaining intermittent SOB symptoms, mostly with exertion.   Flu/COVID -negative  BNP (44); hs Tnl 0h (5)  Will need to follow troponins.   XR chest (2/24) - negative   CT c/a/p w contrast (2/24): \"No active disease seen in the chest.\"  Continue cough and deep breathing.  Continue to maintain oxygen > 90%.   EKG - NSR, no PVCs, QTc 440  Continue telemetry.  Will order ECHO for completeness..   Will order Cardiology consultation.   "

## 2025-02-25 NOTE — PLAN OF CARE
Problem: PAIN - ADULT  Goal: Verbalizes/displays adequate comfort level or baseline comfort level  Description: Interventions:  - Encourage patient to monitor pain and request assistance  - Assess pain using appropriate pain scale  - Administer analgesics based on type and severity of pain and evaluate response  - Implement non-pharmacological measures as appropriate and evaluate response  - Consider cultural and social influences on pain and pain management  - Notify physician/advanced practitioner if interventions unsuccessful or patient reports new pain  Outcome: Progressing     Problem: INFECTION - ADULT  Goal: Absence or prevention of progression during hospitalization  Description: INTERVENTIONS:  - Assess and monitor for signs and symptoms of infection  - Monitor lab/diagnostic results  - Monitor all insertion sites, i.e. indwelling lines, tubes, and drains  - Monitor endotracheal if appropriate and nasal secretions for changes in amount and color  - [place] appropriate cooling/warming therapies per order  - Administer medications as ordered  - Instruct and encourage patient and family to use good hand hygiene technique  - Identify and instruct in appropriate isolation precautions for identified infection/condition  Outcome: Progressing  Goal: Absence of fever/infection during neutropenic period  Description: INTERVENTIONS:  - Monitor WBC    Outcome: Progressing     Problem: SAFETY ADULT  Goal: Patient will remain free of falls  Description: INTERVENTIONS:  - Educate patient/family on patient safety including physical limitations  - Instruct patient to call for assistance with activity   - Consult OT/PT to assist with strengthening/mobility   - Keep Call bell within reach  - Keep bed low and locked with side rails adjusted as appropriate  - Keep care items and personal belongings within reach  - Initiate and maintain comfort rounds  - Make Fall Risk Sign visible to staff  - Offer Toileting every 2 Hours,  in advance of need  - Initiate/Maintain bed alarm  - Obtain necessary fall risk management equipment:   - Apply yellow socks and bracelet for high fall risk patients  - Consider moving patient to room near nurses station  Outcome: Progressing  Goal: Maintain or return to baseline ADL function  Description: INTERVENTIONS:  -  Assess patient's ability to carry out ADLs; assess patient's baseline for ADL function and identify physical deficits which impact ability to perform ADLs (bathing, care of mouth/teeth, toileting, grooming, dressing, etc.)  - Assess/evaluate cause of self-care deficits   - Assess range of motion  - Assess patient's mobility; develop plan if impaired  - Assess patient's need for assistive devices and provide as appropriate  - Encourage maximum independence but intervene and supervise when necessary  - Involve family in performance of ADLs  - Assess for home care needs following discharge   - Consider OT consult to assist with ADL evaluation and planning for discharge  - Provide patient education as appropriate  Outcome: Progressing  Goal: Maintains/Returns to pre admission functional level  Description: INTERVENTIONS:  - Perform AM-PAC 6 Click Basic Mobility/ Daily Activity assessment daily.  - Set and communicate daily mobility goal to care team and patient/family/caregiver.   - Collaborate with rehabilitation services on mobility goals if consulted  - Perform Range of Motion 3 times a day.  - Reposition patient every 2 hours.  - Dangle patient 3 times a day  - Stand patient 3 times a day  - Ambulate patient 3 times a day  - Out of bed to chair 3 times a day   - Out of bed for meals 3 times a day  - Out of bed for toileting  - Record patient progress and toleration of activity level   Outcome: Progressing     Problem: DISCHARGE PLANNING  Goal: Discharge to home or other facility with appropriate resources  Description: INTERVENTIONS:  - Identify barriers to discharge w/patient and caregiver  -  Arrange for needed discharge resources and transportation as appropriate  - Identify discharge learning needs (meds, wound care, etc.)  - Arrange for interpretive services to assist at discharge as needed  - Refer to Case Management Department for coordinating discharge planning if the patient needs post-hospital services based on physician/advanced practitioner order or complex needs related to functional status, cognitive ability, or social support system  Outcome: Progressing     Problem: Knowledge Deficit  Goal: Patient/family/caregiver demonstrates understanding of disease process, treatment plan, medications, and discharge instructions  Description: Complete learning assessment and assess knowledge base.  Interventions:  - Provide teaching at level of understanding  - Provide teaching via preferred learning methods  Outcome: Progressing

## 2025-02-25 NOTE — ASSESSMENT & PLAN NOTE
Lab Results   Component Value Date    HGBA1C 6.3 (H) 02/25/2025     Per chart review, patient with hemoglobin A1c 6.3%.  Will hold oral glycemic medications.  Continue encourage lifestyle modifications.  Continue Accu-Cheks.  Will order SSI, as needed.  Will order carb-controlled diet.

## 2025-02-26 ENCOUNTER — TRANSITIONAL CARE MANAGEMENT (OUTPATIENT)
Dept: FAMILY MEDICINE CLINIC | Facility: HOSPITAL | Age: 80
End: 2025-02-26

## 2025-02-26 ENCOUNTER — NURSE TRIAGE (OUTPATIENT)
Age: 80
End: 2025-02-26

## 2025-02-26 NOTE — TELEPHONE ENCOUNTER
Regarding: ED follow up anemia ,GERD (gastroesophageal reflux disease ,Hiatal hernia,Gallstones  ----- Message from Susan HARDIN sent at 2/26/2025  1:11 PM EST -----  Pt's wife called in to rescheduled follow up appt due to patient being in the emergency room . Scheduled patient with first available in April and added to the wait list. Patient went in for these symptoms  anemia  ,GERD (gastroesophageal reflux disease ,Hiatal hernia,Gallstones .

## 2025-02-26 NOTE — TELEPHONE ENCOUNTER
Spoke with wife and there was some confusion about his upcoming appointments but we reviewed and now she is clear on his appointments.    Advised to get lab work one week prior to colonoscopy.  Will follow up with Sandro meneses.

## 2025-02-26 NOTE — TELEPHONE ENCOUNTER
"ED 2/24/2025 - 2/25/2025 (22 hours)  GI Plan:  Anemia: no overt bleeding. Undergoing cardiac evaluation. Had recent upper endoscopy and EUS. We will move up his outpatient colonoscopy (will need to hold Brilinta and have two day prep).    PCP appt 3/6/25  Cardiology appt 3/12/25  Colonoscopy currently scheduled 5/5/25  Last GI OV 2/7/25 ANNIE Reynolds   Next GI OV 4/1/25 Dr. Munoz      Called wife Virginia. Call dropped.     Please advise if pt needs urgent GI OV vs moving colonoscopy up once pt sees Cardiology.    Reason for Disposition  • Follow-up information-only call to recent contact, no triage required    Answer Assessment - Initial Assessment Questions  1. REASON FOR CALL: \"What is the main reason for your call?\" or \"How can I best help you?\"      ED Follow up Appt    Protocols used: Information Only Call - No Triage-Adult-[place]    "

## 2025-02-28 LAB
ATRIAL RATE: 85 BPM
P AXIS: 49 DEGREES
PR INTERVAL: 134 MS
QRS AXIS: 49 DEGREES
QRSD INTERVAL: 82 MS
QT INTERVAL: 370 MS
QTC INTERVAL: 440 MS
T WAVE AXIS: 70 DEGREES
VENTRICULAR RATE: 85 BPM

## 2025-02-28 PROCEDURE — 93010 ELECTROCARDIOGRAM REPORT: CPT | Performed by: INTERNAL MEDICINE

## 2025-03-03 ENCOUNTER — OFFICE VISIT (OUTPATIENT)
Dept: HEMATOLOGY ONCOLOGY | Facility: CLINIC | Age: 80
End: 2025-03-03
Payer: COMMERCIAL

## 2025-03-03 VITALS
HEART RATE: 83 BPM | SYSTOLIC BLOOD PRESSURE: 132 MMHG | DIASTOLIC BLOOD PRESSURE: 80 MMHG | TEMPERATURE: 97.3 F | BODY MASS INDEX: 26.03 KG/M2 | OXYGEN SATURATION: 98 % | RESPIRATION RATE: 18 BRPM | HEIGHT: 66 IN | WEIGHT: 162 LBS

## 2025-03-03 DIAGNOSIS — D64.9 SYMPTOMATIC ANEMIA: Primary | ICD-10-CM

## 2025-03-03 PROCEDURE — 99204 OFFICE O/P NEW MOD 45 MIN: CPT | Performed by: INTERNAL MEDICINE

## 2025-03-03 NOTE — ASSESSMENT & PLAN NOTE
79 year old with recent REYNA of unclear etiology. MM w/u in process now  Orders:    IgG, IgA, IgM; Future    Immunoglobulin free LT chains blood; Future    Protein electrophoresis, urine    Protein, Total and Protein Electrophoresis with Immunofixation; Future

## 2025-03-03 NOTE — PROGRESS NOTES
"Name: Tevin Bazan      : 1945      MRN: [id number]  Encounter Provider: Sarah Nielsen MD  Encounter Date: 3/3/2025   Encounter department: [facility]  :  Assessment & Plan  Symptomatic anemia  79 year old with recent REYNA of unclear etiology. MM w/u in process now  Orders:    IgG, IgA, IgM; Future    Immunoglobulin free LT chains blood; Future    Protein electrophoresis, urine    Protein, Total and Protein Electrophoresis with Immunofixation; Future        No follow-ups on file.    History of Present Illness   Chief Complaint   Patient presents with    Consult     He has been getting more REDDING since 10/2024 to the point of coming to the hospital 2025: Hgb 9.4 1U PRBC given  2025: iron 17, ferritin 6, Fe Sat 4%, TIBC 410, Vit B12 453, folate >22.3, WBC 7k, Hgb 11.1, MCV 82, plt 237k    Pertinent Medical History   HTN, scoliosis  2024: CVA  DMII  HLP  CAD  L symptomatic Carotid artery stenosis  Hx remote nicotine abuse    25:        Review of Systems      Denies F/C, N/V, SOB, CP, LH, HA, rash, itching, gen weakness, melena, hematuria, hematochezia, LOC, falls, diarrhea, or constipation        Objective   /80 (BP Location: Left arm, Patient Position: Sitting)   Pulse 83   Temp (!) 97.3 °F (36.3 °C) (Temporal)   Resp 18   Ht 5' 6\" (1.676 m)   Wt 73.5 kg (162 lb)   SpO2 98%   BMI 26.15 kg/m²     Pain Screening:  Pain Score: 0-No pain  ECOG   0  Physical Exam      Physical exam:  General:  Appears in no distress, sitting up   Neuro:  Speaks in full sentences, garbled  Pulmonary:  No cyanosis, no accessory muscle use  MSK: deviated spine noted  Cardiovascular: Regular rate, no abnormal rhythm noted  GI:  Appears nondistended, no masses noted  Extremities:  No new rash noted, no cyanosis  Psychiatry:  Normal mood with congruent affect  Ear nose and throat:  Atraumatic, extraocular muscles intact      Labs: I have " reviewed the following labs:  Lab Results   Component Value Date/Time    WBC 6.99 02/25/2025 04:46 AM    RBC 4.33 02/25/2025 04:46 AM    Hemoglobin 11.1 (L) 02/25/2025 04:46 AM    Hematocrit 35.3 (L) 02/25/2025 04:46 AM    MCV 82 02/25/2025 04:46 AM    MCH 25.6 (L) 02/25/2025 04:46 AM    RDW 15.1 02/25/2025 04:46 AM    Platelets 237 02/25/2025 04:46 AM    Segmented % 49 02/25/2025 04:46 AM    Lymphocytes % 32 02/25/2025 04:46 AM    Monocytes % 14 (H) 02/25/2025 04:46 AM    Eosinophils Relative 4 02/25/2025 04:46 AM    Basophils Relative 1 02/25/2025 04:46 AM    Immature Grans % 0 02/25/2025 04:46 AM    Absolute Neutrophils 3.37 02/25/2025 04:46 AM     Lab Results   Component Value Date/Time    Potassium 4.2 02/25/2025 04:46 AM    Chloride 108 02/25/2025 04:46 AM    CO2 25 02/25/2025 04:46 AM    BUN 32 (H) 02/25/2025 04:46 AM    Creatinine 1.03 02/25/2025 04:46 AM    Calcium 8.3 (L) 02/25/2025 04:46 AM    AST 14 02/25/2025 04:46 AM    ALT 15 02/25/2025 04:46 AM    Alkaline Phosphatase 97 02/25/2025 04:46 AM    Total Protein 6.9 02/25/2025 04:46 AM    Albumin 3.7 02/25/2025 04:46 AM    Total Bilirubin 0.69 02/25/2025 04:46 AM       Administrative Statements   I have spent a total time of 46 minutes in caring for this patient on the day of the visit/encounter including Counseling / Coordination of care, Documenting in the medical record, and Reviewing/placing orders in the medical record (including tests, medications, and/or procedures).

## 2025-03-06 ENCOUNTER — OFFICE VISIT (OUTPATIENT)
Dept: FAMILY MEDICINE CLINIC | Facility: HOSPITAL | Age: 80
End: 2025-03-06
Payer: COMMERCIAL

## 2025-03-06 VITALS
SYSTOLIC BLOOD PRESSURE: 116 MMHG | OXYGEN SATURATION: 97 % | BODY MASS INDEX: 25.34 KG/M2 | DIASTOLIC BLOOD PRESSURE: 68 MMHG | WEIGHT: 157 LBS | HEART RATE: 90 BPM

## 2025-03-06 DIAGNOSIS — Z76.89 ENCOUNTER FOR SUPPORT AND COORDINATION OF TRANSITION OF CARE: Primary | ICD-10-CM

## 2025-03-06 DIAGNOSIS — E11.49 TYPE 2 DIABETES MELLITUS WITH OTHER DIABETIC NEUROLOGICAL COMPLICATION (HCC): ICD-10-CM

## 2025-03-06 DIAGNOSIS — D64.9 SYMPTOMATIC ANEMIA: ICD-10-CM

## 2025-03-06 DIAGNOSIS — C64.9 RENAL CELL CARCINOMA, UNSPECIFIED LATERALITY (HCC): ICD-10-CM

## 2025-03-06 PROCEDURE — 99495 TRANSJ CARE MGMT MOD F2F 14D: CPT | Performed by: NURSE PRACTITIONER

## 2025-03-06 RX ORDER — OMEGA-3S/DHA/EPA/FISH OIL/D3 300MG-1000
400 CAPSULE ORAL DAILY
COMMUNITY

## 2025-03-06 RX ORDER — VITAMIN B COMPLEX
1 CAPSULE ORAL DAILY
COMMUNITY

## 2025-03-06 NOTE — PROGRESS NOTES
[facility]   Jesenia VALENCIA    Assessment/Plan:   1. Encounter for support and coordination of transition of care  2. Symptomatic anemia  Assessment & Plan:  Patient admitted on 2/24/2025 till 2/25/2025 to [facility] for symptomatic anemia.  PMH including hypertension, scoliosis, CVA, COPD, chronic  LBP, renal cell carcinoma, anemia and GERD.  He had been experiencing shortness of breath as well as orthopnea for 1 week prior.  He was noted to have a 3 g drop in his hemoglobin in the past 3 months without BRBPR, melena, hematuria nor hematemesis.  CT C/A/P without acute findings.  Was provided 1 unit PRBC and evaluated by GI.  He was placed on oral iron supplementation and advised to follow-up with GI upon discharge for an outpatient colonoscopy.  He was also advised to follow-up with cardiology as well as oncology regarding renal cell carcinoma history.  -Since hospital discharge dyspnea improving however persistent with some fatigue.  Appetite stable staying well-hydrated.  Taking iron limitation without complication.  Bowel and bladder function at baseline, denies melena nor hematuria nor hematemesis.  -Continue iron supplementation.  Will recheck CBC along with iron panel after 2 months of therapy.  -Follow-up with cardiology, oncology as well as GI as scheduled.  Orders:  -     Fe+TIBC+Fransico; Future; Expected date: 04/06/2025  -     CBC and differential; Future; Expected date: 04/06/2025  -     Fe+TIBC+Fransico  -     CBC and differential  3. Type 2 diabetes mellitus with other diabetic neurological complication (HCC)  Assessment & Plan:    Lab Results   Component Value Date    HGBA1C 6.3 (H) 02/25/2025   History of type 2 diabetes well-controlled on metformin 500 mg p.o. daily.  Denies any signs symptoms of hypoglycemia.  Optimizing hydration nutrition.  Diabetic foot exam completed today.  4. Renal cell carcinoma, unspecified laterality (HCC)      Check CBC as well as iron panel in 2  months can complete with blood work ordered 2/5/25.   Continue iron supplementation.   Complete PFT as ordered on 2/5/25  Return in June 2025, for Annual Medicare Wellness Visit.  Patient may call or return to office with any questions or concerns.     ______________________________________________________________________  Subjective:     Patient ID: Tevin Bazan is a 79 y.o. adult.  Tevin Bazan  Chief Complaint   Patient presents with    Transition of Care Management     Presents for TCM companied by spouse who assists with HPI/assessment.  TCM phone call completed on 2/26/2025.                   The following portions of the patient's history were reviewed and updated as appropriate: allergies, current medications, past family history, past medical history, past social history, past surgical history, and problem list.    Review of Systems   Constitutional:  Positive for fatigue. Negative for activity change, appetite change, chills and fever.   HENT: Negative.  Negative for congestion, ear pain, postnasal drip and sinus pain.    Eyes: Negative.    Respiratory:  Positive for shortness of breath. Negative for cough and chest tightness.    Cardiovascular: Negative.  Negative for chest pain and leg swelling.   Gastrointestinal: Negative.  Negative for blood in stool, constipation and diarrhea.   Endocrine: Negative.    Genitourinary: Negative.  Negative for difficulty urinating, dysuria and hematuria.   Musculoskeletal:  Positive for gait problem.   Skin: Negative.    Allergic/Immunologic: Negative.  Negative for immunocompromised state.   Neurological:  Positive for speech difficulty. Negative for dizziness and light-headedness.   Hematological: Negative.    Psychiatric/Behavioral:  Positive for sleep disturbance.         Due to nocturia.  Denies proximal dyspnea snoring nor orthopnea.         Objective:      Vitals:    03/06/25 1053   BP: 116/68   Pulse: 90   SpO2: 97%      Physical Exam  Vitals and  nursing note reviewed.   Constitutional:       General: Danielle is awake. Danielle is not in acute distress.     Appearance: Normal appearance. Danielle is not ill-appearing.   HENT:      Head: Normocephalic and atraumatic.      Right Ear: Tympanic membrane, ear canal and external ear normal.      Left Ear: Tympanic membrane, ear canal and external ear normal.      Nose: Nose normal.      Mouth/Throat:      Mouth: Mucous membranes are moist.      Dentition: Abnormal dentition.      Pharynx: Oropharynx is clear.   Eyes:      Extraocular Movements: Extraocular movements intact.      Conjunctiva/sclera: Conjunctivae normal.      Pupils: Pupils are equal, round, and reactive to light.   Cardiovascular:      Rate and Rhythm: Normal rate and regular rhythm.      Pulses: Pulses are weak.           Dorsalis pedis pulses are 1+ on the right side and 1+ on the left side.        Posterior tibial pulses are 1+ on the right side and 1+ on the left side.      Heart sounds: Murmur heard.   Pulmonary:      Effort: Pulmonary effort is normal.      Breath sounds: Normal breath sounds. No wheezing, rhonchi or rales.   Chest:      Chest wall: No tenderness.   Abdominal:      General: Bowel sounds are normal. There is no distension.      Palpations: Abdomen is soft.      Tenderness: There is no abdominal tenderness.   Musculoskeletal:         General: Deformity present. Normal range of motion.      Cervical back: Normal range of motion and neck supple.      Right lower leg: No edema.      Left lower leg: No edema.      Comments: Pronounced scoliosis   Feet:      Right foot:      Skin integrity: Dry skin present. No ulcer, skin breakdown, erythema, warmth or callus.      Left foot:      Skin integrity: Dry skin present. No ulcer, skin breakdown, erythema, warmth or callus.   Skin:     General: Skin is warm and dry.      Coloration: Skin is pale.   Neurological:      General: No focal deficit present.      Mental Status: Danielle is alert and  oriented to person, place, and time.      Cranial Nerves: Dysarthria present.      Sensory: Sensory deficit present.      Motor: Weakness and tremor present.      Gait: Gait abnormal.      Comments: Slightly bowlegged  MALLIKA action tremor   Psychiatric:         Mood and Affect: Mood normal.         Behavior: Behavior normal. Behavior is cooperative.         Thought Content: Thought content normal.         Judgment: Judgment normal.       Patient's shoes and socks removed.    Right Foot/Ankle   Right Foot Inspection  Skin Exam: skin normal, skin intact and dry skin. No warmth, no callus, no erythema, no maceration, no abnormal color, no pre-ulcer, no ulcer and no callus.     Toe Exam: ROM and strength within normal limits.     Sensory   Proprioception: intact  Monofilament testing: diminished    Vascular  Capillary refills: < 3 seconds  The right DP pulse is 1+. The right PT pulse is 1+.     Left Foot/Ankle  Left Foot Inspection  Skin Exam: skin normal, skin intact and dry skin. No warmth, no erythema, no maceration, normal color, no pre-ulcer, no ulcer and no callus.     Toe Exam: ROM and strength within normal limits.     Sensory   Proprioception: intact  Monofilament testing: diminished    Vascular  Capillary refills: < 3 seconds  The left DP pulse is 1+. The left PT pulse is 1+.     Assign Risk Category  No deformity present  Loss of protective sensation  Weak pulses  Risk: 2      TCM Call       Date and time call was made  2/26/2025  2:08 PM    Patient was hospitialized at  [facility]    Date of Admission  02/24/25    Date of discharge  02/25/25    Diagnosis  shorntess of breath    Disposition  Home    Current Symptoms  None          TCM Call       Post hospital issues  None    Should patient be enrolled in anticoag monitoring?  No    Scheduled for follow up?  Yes    Did you obtain your prescribed medications  Yes    I have advised the patient to call PCP with any new or worsening symptoms  Hanane  "VICKI Cobian [facility]    Living Arrangements  Spouse or Significiant other    Comments  I spoke with Danielle to set up GIOVANNA, states she is doing well, other than some fatigue. Has not been doing much at this point. started on Ferrous Sulfate. Med list reviewed. taking all as directed. no specific concerns at this time. GIOVANNA appt is 03/06/25              Portions of the record may have been created with voice recognition software. Occasional wrong word or \"sound alike\" substitutions may have occurred due to the inherent limitations of voice recognition software. Please review the chart carefully and recognize, using context, where substitutions/typographical errors may have occurred.       "

## 2025-03-06 NOTE — ASSESSMENT & PLAN NOTE
Lab Results   Component Value Date    HGBA1C 6.3 (H) 02/25/2025   History of type 2 diabetes well-controlled on metformin 500 mg p.o. daily.  Denies any signs symptoms of hypoglycemia.  Optimizing hydration nutrition.  Diabetic foot exam completed today.

## 2025-03-06 NOTE — ASSESSMENT & PLAN NOTE
Patient admitted on 2/24/2025 till 2/25/2025 to [facility] for symptomatic anemia.  PMH including hypertension, scoliosis, CVA, COPD, chronic  LBP, renal cell carcinoma, anemia and GERD.  He had been experiencing shortness of breath as well as orthopnea for 1 week prior.  He was noted to have a 3 g drop in his hemoglobin in the past 3 months without BRBPR, melena, hematuria nor hematemesis.  CT C/A/P without acute findings.  Was provided 1 unit PRBC and evaluated by GI.  He was placed on oral iron supplementation and advised to follow-up with GI upon discharge for an outpatient colonoscopy.  He was also advised to follow-up with cardiology as well as oncology regarding renal cell carcinoma history.  -Since hospital discharge dyspnea improving however persistent with some fatigue.  Appetite stable staying well-hydrated.  Taking iron limitation without complication.  Bowel and bladder function at baseline, denies melena nor hematuria nor hematemesis.  -Continue iron supplementation.  Will recheck CBC along with iron panel after 2 months of therapy.  -Follow-up with cardiology, oncology as well as GI as scheduled.

## 2025-03-06 NOTE — PATIENT INSTRUCTIONS
Check CBC as well as iron panel in 2 months can complete with blood work ordered 2/5/25.   Continue iron supplementation.   Complete PFT as ordered on 2/5/25  Please follow up with oncology, GI as well as Cardiology as scheduled

## 2025-03-10 DIAGNOSIS — I25.110 CORONARY ARTERY DISEASE INVOLVING NATIVE CORONARY ARTERY OF NATIVE HEART WITH UNSTABLE ANGINA PECTORIS (HCC): Primary | ICD-10-CM

## 2025-03-10 RX ORDER — NITROGLYCERIN 0.4 MG/1
0.4 TABLET SUBLINGUAL
Qty: 25 TABLET | Refills: 5 | Status: SHIPPED | OUTPATIENT
Start: 2025-03-10

## 2025-03-12 ENCOUNTER — OFFICE VISIT (OUTPATIENT)
Dept: CARDIOLOGY CLINIC | Facility: CLINIC | Age: 80
End: 2025-03-12
Payer: COMMERCIAL

## 2025-03-12 ENCOUNTER — OFFICE VISIT (OUTPATIENT)
Dept: VASCULAR SURGERY | Facility: CLINIC | Age: 80
End: 2025-03-12
Payer: COMMERCIAL

## 2025-03-12 VITALS
HEIGHT: 66 IN | WEIGHT: 161.2 LBS | BODY MASS INDEX: 25.91 KG/M2 | SYSTOLIC BLOOD PRESSURE: 104 MMHG | HEART RATE: 75 BPM | DIASTOLIC BLOOD PRESSURE: 62 MMHG

## 2025-03-12 VITALS
HEIGHT: 66 IN | BODY MASS INDEX: 25.71 KG/M2 | DIASTOLIC BLOOD PRESSURE: 70 MMHG | SYSTOLIC BLOOD PRESSURE: 184 MMHG | WEIGHT: 160 LBS | HEART RATE: 67 BPM

## 2025-03-12 DIAGNOSIS — I65.23 CAROTID STENOSIS, ASYMPTOMATIC, BILATERAL: ICD-10-CM

## 2025-03-12 DIAGNOSIS — I65.22 SYMPTOMATIC CAROTID ARTERY STENOSIS, LEFT: Primary | ICD-10-CM

## 2025-03-12 DIAGNOSIS — Z98.890 H/O CAROTID ENDARTERECTOMY: ICD-10-CM

## 2025-03-12 DIAGNOSIS — I25.110 CORONARY ARTERY DISEASE INVOLVING NATIVE CORONARY ARTERY OF NATIVE HEART WITH UNSTABLE ANGINA PECTORIS (HCC): Primary | ICD-10-CM

## 2025-03-12 PROCEDURE — 99213 OFFICE O/P EST LOW 20 MIN: CPT | Performed by: NURSE PRACTITIONER

## 2025-03-12 PROCEDURE — 99214 OFFICE O/P EST MOD 30 MIN: CPT | Performed by: PHYSICIAN ASSISTANT

## 2025-03-12 NOTE — PATIENT INSTRUCTIONS
Continue q. yearly carotid duplex imaging  Due next September 2025  Return to office following next carotid duplex imaging    Continue Brilinta, aspirin  Recommend continue use of statin therapy    Call 911 or go to the ED with TIA or strokelike symptoms  Call return to office sooner with new or worsening symptoms, questions or concerns.

## 2025-03-12 NOTE — PROGRESS NOTES
Name: Tevin Bazan      : 1945      MRN: [id number]  Encounter Provider: ANNIE Cook  Encounter Date: 3/12/2025   Enc[facility]: [facility] [place]  :  Assessment & Plan  Carotid stenosis, asymptomatic, bilateral  80 yo male/female with HTN, HLD, DM type II, COPD, nonrheumatic aortic and mitral valve stenosis, CVA, TIA, s/p L CEA 2021 ([place]) for symptomatic L ICA stenosis, lumbar radiculopathy, chronic low back pain, OA, renal cell carcinoma, and pancreatic mass presents to review carotid duplex imaging from .    -Presents without complaints or than residual baseline slurred speech.  -Denies new TIA or strokelike symptoms.  -Neuroexam intact  -Carotid Duplex 2024 without significant change or progression of disease  RIGHT: <50% ICA stenosis  LEFT: Widely patent ICA and endarterectomy site    -CTA h/n 24: No proximal large vessel occlusion or high-grade vascular stenosis in the head and neck.       Maintained on aspirin and Brilinta  Patient reports he is not taking statin regularly    Plan  -Continue q. yearly carotid duplex imaging-due 2025.  Patient requests [place]  -Return to office after following imaging  -Continue aspirin, Brilinta  -Recommend continued use of statin however patient not compliant with statin therapy  -Educated on TIA and strokelike symptoms and when to seek medical attention       H/O carotid endarterectomy  S/p L CEA 21 ([place]  Orders:    VAS carotid complete study; Future        History of Present Illness   Cc:  Patient presents to review CV. Denies s/s CVA.   HPI  Tevin Bazan is a 79 y.o. adult who presents to review carotid duplex imaging from August and [facility] visit.      History obtained from: patient    Review of Systems   Constitutional: Negative.    HENT: Negative.     Eyes: Negative.    Respiratory: Negative.     Cardiovascular: Negative.    Gastrointestinal: Negative.    Endocrine:  Negative.    Genitourinary: Negative.    Musculoskeletal: Negative.    Skin: Negative.    Allergic/Immunologic: Negative.    Neurological: Negative.    Hematological: Negative.    Psychiatric/Behavioral: Negative.       Medical History Reviewed by provider this encounter:     .  Past Medical History   Past Medical History:   Diagnosis Date    Anemia     Chronic bilateral low back pain without sciatica 07/17/2020    Hypertension     Scoliosis     Stroke (HCC)     Stroke-like symptoms 11/22/2021     Past Surgical History:   Procedure Laterality Date    BACK SURGERY      BOWEL RESECTION      CARDIAC CATHETERIZATION N/A 02/26/2024    Procedure: Cardiac Coronary Angiogram;  Surgeon: Rivas Cantor MD;  Location: [facility];  Service: Cardiology    CARDIAC CATHETERIZATION  02/26/2024    Procedure: Cardiac catheterization;  Surgeon: Rivas Cantor MD;  Location: [facility];  Service: Cardiology    CARDIAC CATHETERIZATION N/A 02/26/2024    Procedure: Cardiac pci;  Surgeon: Rivas Cantor MD;  Location: [facility];  Service: Cardiology    COLONOSCOPY      UT TEAEC W/PATCH GRF CAROTID VERTB SUBCLAV NECK INC Left 12/01/2021    Procedure: ENDARTERECTOMY ARTERY CAROTID;  Surgeon: Vito Mo MD;  Location: [facility];  Service: Vascular    TONSILLECTOMY      UPPER GASTROINTESTINAL ENDOSCOPY       Family History   Adopted: Yes   Family history unknown: Yes      reports that Danielle quit smoking about 40 years ago. Danielle's smoking use included cigarettes. Danielle has been exposed to tobacco smoke. Danielle has never used smokeless tobacco. Danielle reports that Danielle does not currently use alcohol. Danielle reports that Danielle does not use drugs.  Current Outpatient Medications   Medication Instructions    albuterol (Proventil HFA) 90 mcg/act inhaler 1-2 puffs, Inhalation, Every 6 hours PRN    Ascorbic Acid (vitamin C) 1000 MG tablet Every 24 hours    aspirin (ECOTRIN LOW STRENGTH) 81 mg, Oral, Daily    atorvastatin  (LIPITOR) 20 mg, Oral, Daily    b complex vitamins capsule 1 capsule, Oral, Daily    Chelated Zinc 50 MG TABS Every 24 hours    cholecalciferol (VITAMIN D3) 400 Units, Oral, Daily    diltiazem (TIAZAC) 120 mg, Daily    famotidine (PEPCID) 40 MG tablet 2 times daily PRN    ferrous sulfate 324 mg, Oral, Daily before breakfast    metFORMIN (GLUCOPHAGE-XR) 500 mg 24 hr tablet Every 24 hours    nitroglycerin (NITROSTAT) 0.4 mg, Sublingual, Every 5 minutes PRN    Omega 3 1000 MG CAPS 1 capsule, Every 12 hours    tamsulosin (FLOMAX) 0.4 mg 1 capsule, Every 24 hours    ticagrelor (BRILINTA) 90 mg, Oral, Every 12 hours scheduled     Allergies   Allergen Reactions    Black Cohosh Rash    Minoxidil Rash      Current Outpatient Medications on File Prior to Visit   Medication Sig Dispense Refill    albuterol (Proventil HFA) 90 mcg/act inhaler Inhale 1-2 puffs every 6 (six) hours as needed for wheezing 18 g 0    Ascorbic Acid (vitamin C) 1000 MG tablet every 24 hours      aspirin (ECOTRIN LOW STRENGTH) 81 mg EC tablet Take 1 tablet (81 mg total) by mouth daily for 21 days 21 tablet 0    atorvastatin (LIPITOR) 20 mg tablet Take 1 tablet (20 mg total) by mouth daily 30 tablet 2    b complex vitamins capsule Take 1 capsule by mouth daily      Chelated Zinc 50 MG TABS every 24 hours      cholecalciferol (VITAMIN D3) 400 units tablet Take 400 Units by mouth daily      diltiazem (TIAZAC) 120 MG 24 hr capsule Take 120 mg by mouth daily      famotidine (PEPCID) 40 MG tablet 2 (two) times a day as needed      ferrous sulfate 324 (65 Fe) mg Take 1 tablet (324 mg total) by mouth daily before breakfast 30 tablet 0    metFORMIN (GLUCOPHAGE-XR) 500 mg 24 hr tablet every 24 hours      nitroglycerin (NITROSTAT) 0.4 mg SL tablet Place 1 tablet (0.4 mg total) under the tongue every 5 (five) minutes as needed for chest pain 25 tablet 5    Omega 3 1000 MG CAPS 1 capsule Every 12 hours      tamsulosin (FLOMAX) 0.4 mg 1 capsule every 24 hours       "ticagrelor (BRILINTA) 90 MG Take 1 tablet (90 mg total) by mouth every 12 (twelve) hours 60 tablet 2     No current facility-administered medications on file prior to visit.      Social History     Tobacco Use    Smoking status: Former     Current packs/day: 0.00     Types: Cigarettes     Quit date:      Years since quittin.2     Passive exposure: Past    Smokeless tobacco: Never   Vaping Use    Vaping status: Never Used   Substance and Sexual Activity    Alcohol use: Not Currently    Drug use: No    Sexual activity: Not on file        Objective   BP (!) 184/70 (BP Location: Left arm, Patient Position: Sitting, Cuff Size: Standard)   Pulse 67   Ht 5' 6\" (1.676 m)   Wt 72.6 kg (160 lb)   BMI 25.82 kg/m²      Physical Exam  Constitutional:       General: Danielle is not in acute distress.  HENT:      Head: Normocephalic and atraumatic.   Cardiovascular:      Rate and Rhythm: Normal rate.      Pulses:           Radial pulses are 2+ on the right side and 2+ on the left side.   Pulmonary:      Effort: Pulmonary effort is normal. No respiratory distress.   Musculoskeletal:         General: Normal range of motion.      Cervical back: Normal range of motion.   Skin:     General: Skin is warm.   Neurological:      Mental Status: Danielle is alert and oriented to person, place, and time. Mental status is at baseline.      Sensory: No sensory deficit.      Motor: No weakness.      Comments: Slow, occasionally slurred speech at baseline   Psychiatric:         Behavior: Behavior normal.         Administrative Statements   I have spent a total time of 25 minutes in caring for this patient on the day of the visit/encounter including Diagnostic results, Instructions for management, Patient and family education, Importance of tx compliance, Impressions, Documenting in the medical record, Reviewing/placing orders in the medical record (including tests, medications, and/or procedures), and Obtaining or reviewing history  .  "

## 2025-03-12 NOTE — PROGRESS NOTES
Cardiology Office Follow Up  Tevin Bazan  1945  [id number]      ASSESSMENT:  Moderate CAD with  of RCA which is medically managed  Chronic stable angina, controlled  Hypertension  Hyperlipidemia  History of CVA  Carotid artery stenosis s/p left CEA.    PLAN:  Feeling well CV wise without acute concerns.  Feeling better on p.o. iron supplement.  Repeat CBC as per patient's PCP. Remains on ASA and Brilinta for vascular disease.  No overt bleeding, abnormal stools.   Reports no longer taking statin due to concerns regarding memory deficits.  Explained risk versus benefits of continuing statin and wishes to think about this more before resuming.  Continue diltiazem 120 mg daily.  BP/heart rate stable  Cardiac diet recommended, exercise to tolerance  RTO in 6 months with primary cardiologist or sooner if needed.    Interval History/ HPI:   Danielle Bazan is a 79-year-old adult with medical history noted above who follows with Dr. Cantor, last OV 10/16/2024.  He was seen gently at [facility] with shortness of breath.  Initial workup largely negative to include RSV/flu/COVID swab which was negative, chest x-ray without acute pulmonary disease, EKG showed sinus rhythm, BNP and initial troponin negative.  Labwork showed hemoglobin of 9.4.  Symptoms deemed secondary to symptomatic anemia.  Was started on p.o. iron supplement.  Subsequently discharged after 24 hours.  Since discharge feeling much better with less dyspnea on exertion.  Does have chronic dyspnea secondary to scoliosis.  Has any acute weight gain, orthopnea, PND, chest discomfort including with exertion.       Vitals:  104/62  75  161 LBS    Past Medical History:   Diagnosis Date    Anemia     Chronic bilateral low back pain without sciatica 07/17/2020    Hypertension     Scoliosis     Stroke (HCC)     Stroke-like symptoms 11/22/2021     Social History     Socioeconomic History    Marital status: /Civil  Union     Spouse name: Not on file    Number of children: Not on file    Years of education: Not on file    Highest education level: Not on file   Occupational History    Not on file   Tobacco Use    Smoking status: Former     Current packs/day: 0.00     Types: Cigarettes     Quit date:      Years since quittin.2     Passive exposure: Past    Smokeless tobacco: Never   Vaping Use    Vaping status: Never Used   Substance and Sexual Activity    Alcohol use: Not Currently    Drug use: No    Sexual activity: Not on file   Other Topics Concern    Not on file   Social History Narrative    Not on file     Social Drivers of Health     Financial Resource Strain: Low Risk  (2022)    Overall Financial Resource Strain (CARDIA)     Difficulty of Paying Living Expenses: Not hard at all   Food Insecurity: No Food Insecurity (10/13/2024)    Nursing - Inadequate Food Risk Classification     Worried About Running Out of Food in the Last Year: Never true     Ran Out of Food in the Last Year: Never true     Ran Out of Food in the Last Year: Not on file   Transportation Needs: No Transportation Needs (10/13/2024)    PRAPARE - Transportation     Lack of Transportation (Medical): No     Lack of Transportation (Non-Medical): No   Physical Activity: Sufficiently Active (2022)    Exercise Vital Sign     Days of Exercise per Week: 7 days     Minutes of Exercise per Session: 30 min   Stress: No Stress Concern Present (2022)    [place] [employer] - Occupational Stress Questionnaire     Feeling of Stress : Not at all   Social Connections: Socially Isolated (2022)    Social Connection and Isolation Panel [NHANES]     Frequency of Communication with Friends and Family: Never     Frequency of Social Gatherings with Friends and Family: Never     Attends Zoroastrianism Services: Never     Active Member of Clubs or Organizations: No     Attends Club or Organization Meetings: Never     Marital Status:     Intimate Partner Violence: Not At Risk (10/13/2024)    Humiliation, Afraid, Rape, and Kick questionnaire     Fear of Current or Ex-Partner: No     Emotionally Abused: No     Physically Abused: No     Sexually Abused: No   Housing Stability: Low Risk  (10/13/2024)    Housing Stability Vital Sign     Unable to Pay for Housing in the Last Year: No     Number of Times Moved in the Last Year: 0     Homeless in the Last Year: No      Family History   Adopted: Yes   Family history unknown: Yes     Past Surgical History:   Procedure Laterality Date    BACK SURGERY      BOWEL RESECTION      CARDIAC CATHETERIZATION N/A 02/26/2024    Procedure: Cardiac Coronary Angiogram;  Surgeon: Rivas Cantor MD;  Location: [facility];  Service: Cardiology    CARDIAC CATHETERIZATION  02/26/2024    Procedure: Cardiac catheterization;  Surgeon: Rivas Cantor MD;  Location: [facility];  Service: Cardiology    CARDIAC CATHETERIZATION N/A 02/26/2024    Procedure: Cardiac pci;  Surgeon: Rivas Cantor MD;  Location: [facility];  Service: Cardiology    COLONOSCOPY      NH TEAEC W/PATCH GRF CAROTID VERTB SUBCLAV NECK INC Left 12/01/2021    Procedure: ENDARTERECTOMY ARTERY CAROTID;  Surgeon: Vito Mo MD;  Location: [facility];  Service: Vascular    TONSILLECTOMY      UPPER GASTROINTESTINAL ENDOSCOPY         Current Outpatient Medications:     albuterol (Proventil HFA) 90 mcg/act inhaler, Inhale 1-2 puffs every 6 (six) hours as needed for wheezing, Disp: 18 g, Rfl: 0    Ascorbic Acid (vitamin C) 1000 MG tablet, every 24 hours, Disp: , Rfl:     aspirin (ECOTRIN LOW STRENGTH) 81 mg EC tablet, Take 1 tablet (81 mg total) by mouth daily for 21 days, Disp: 21 tablet, Rfl: 0    atorvastatin (LIPITOR) 20 mg tablet, Take 1 tablet (20 mg total) by mouth daily, Disp: 30 tablet, Rfl: 2    b complex vitamins capsule, Take 1 capsule by mouth daily, Disp: , Rfl:     Chelated Zinc 50 MG TABS, every 24 hours, Disp: , Rfl:      cholecalciferol (VITAMIN D3) 400 units tablet, Take 400 Units by mouth daily, Disp: , Rfl:     diltiazem (TIAZAC) 120 MG 24 hr capsule, Take 120 mg by mouth daily, Disp: , Rfl:     famotidine (PEPCID) 40 MG tablet, 2 (two) times a day as needed, Disp: , Rfl:     ferrous sulfate 324 (65 Fe) mg, Take 1 tablet (324 mg total) by mouth daily before breakfast, Disp: 30 tablet, Rfl: 0    metFORMIN (GLUCOPHAGE-XR) 500 mg 24 hr tablet, every 24 hours, Disp: , Rfl:     nitroglycerin (NITROSTAT) 0.4 mg SL tablet, Place 1 tablet (0.4 mg total) under the tongue every 5 (five) minutes as needed for chest pain, Disp: 25 tablet, Rfl: 5    Omega 3 1000 MG CAPS, 1 capsule Every 12 hours, Disp: , Rfl:     tamsulosin (FLOMAX) 0.4 mg, 1 capsule every 24 hours, Disp: , Rfl:     ticagrelor (BRILINTA) 90 MG, Take 1 tablet (90 mg total) by mouth every 12 (twelve) hours, Disp: 60 tablet, Rfl: 2      Review of Systems:  Review of Systems      Physical Exam:  Physical Exam  Constitutional:       Appearance: Danielle is well-developed.   HENT:      Right Ear: External ear normal.      Left Ear: External ear normal.   Eyes:      Pupils: Pupils are equal, round, and reactive to light.   Cardiovascular:      Rate and Rhythm: Normal rate and regular rhythm.      Heart sounds: Normal heart sounds. No murmur heard.     No friction rub. No gallop.   Pulmonary:      Effort: Pulmonary effort is normal.      Breath sounds: Normal breath sounds.   Chest:      Comments: Severe scoliosis  Abdominal:      Palpations: Abdomen is soft.   Musculoskeletal:         General: Normal range of motion.      Cervical back: Normal range of motion.   Skin:     General: Skin is warm and dry.   Neurological:      Mental Status: Danielle is alert and oriented to person, place, and time.      Deep Tendon Reflexes: Reflexes are normal and symmetric.   Psychiatric:         Behavior: Behavior normal.         Thought Content: Thought content normal.         Judgment: Judgment normal.          This note was completed in part utilizing M-Modal Fluency Direct Software.  Grammatical errors, random word insertions, spelling mistakes, and incomplete sentences can be an occasional consequence of this system secondary to software limitations, ambient noise, and hardware issues.  If you have any questions or concerns about the content, text, or information contained within the body of this dictation, please contact the provider for clarification.

## 2025-03-20 LAB
BASOPHILS # BLD AUTO: 0.1 X10E3/UL (ref 0–0.2)
BASOPHILS NFR BLD AUTO: 1 %
EOSINOPHIL # BLD AUTO: 0.2 X10E3/UL (ref 0–0.4)
EOSINOPHIL NFR BLD AUTO: 3 %
ERYTHROCYTE [DISTWIDTH] IN BLOOD BY AUTOMATED COUNT: 15.7 % (ref 11.7–15.4)
FERRITIN SERPL-MCNC: 15 NG/ML (ref 15–150)
HCT VFR BLD AUTO: 37.7 % (ref 34–46.6)
HGB BLD-MCNC: 11.5 G/DL (ref 11.1–15.9)
IGA SERPL-MCNC: 153 MG/DL (ref 64–422)
IGG SERPL-MCNC: 1001 MG/DL (ref 586–1602)
IGM SERPL-MCNC: 1643 MG/DL (ref 26–217)
IMM GRANULOCYTES # BLD: 0 X10E3/UL (ref 0–0.1)
IMM GRANULOCYTES NFR BLD: 0 %
IRON SATN MFR SERPL: 19 % (ref 15–55)
IRON SERPL-MCNC: 76 UG/DL (ref 27–139)
KAPPA LC FREE SER-MCNC: 29 MG/L (ref 3.3–19.4)
KAPPA LC FREE/LAMBDA FREE SER: 0.18 {RATIO} (ref 0.26–1.65)
LAMBDA LC FREE SERPL-MCNC: 163.3 MG/L (ref 5.7–26.3)
LYMPHOCYTES # BLD AUTO: 1.7 X10E3/UL (ref 0.7–3.1)
LYMPHOCYTES NFR BLD AUTO: 26 %
MCH RBC QN AUTO: 25.2 PG (ref 26.6–33)
MCHC RBC AUTO-ENTMCNC: 30.5 G/DL (ref 31.5–35.7)
MCV RBC AUTO: 83 FL (ref 79–97)
MONOCYTES # BLD AUTO: 0.9 X10E3/UL (ref 0.1–0.9)
MONOCYTES NFR BLD AUTO: 14 %
NEUTROPHILS # BLD AUTO: 3.5 X10E3/UL (ref 1.4–7)
NEUTROPHILS NFR BLD AUTO: 56 %
PLATELET # BLD AUTO: 275 X10E3/UL (ref 150–450)
PSA FREE MFR SERPL: 54 %
PSA FREE SERPL-MCNC: 0.27 NG/ML
PSA SERPL-MCNC: 0.5 NG/ML
RBC # BLD AUTO: 4.57 X10E6/UL (ref 3.77–5.28)
TIBC SERPL-MCNC: 391 UG/DL (ref 250–450)
UIBC SERPL-MCNC: 315 UG/DL (ref 118–369)
WBC # BLD AUTO: 6.3 X10E3/UL (ref 3.4–10.8)

## 2025-03-21 ENCOUNTER — RESULTS FOLLOW-UP (OUTPATIENT)
Dept: FAMILY MEDICINE CLINIC | Facility: HOSPITAL | Age: 80
End: 2025-03-21

## 2025-03-21 NOTE — TELEPHONE ENCOUNTER
----- Message from ANNIE Hubbard sent at 3/21/2025 11:46 AM EDT -----  Iron deficiency anemia much improved.  No change in current treatment.

## 2025-03-22 LAB
25(OH)D3+25(OH)D2 SERPL-MCNC: 55.1 NG/ML (ref 30–100)
ALBUMIN SERPL-MCNC: 4.4 G/DL (ref 3.8–4.8)
ALBUMIN/CREAT UR: 10 MG/G CREAT (ref 0–29)
ALP SERPL-CCNC: 135 IU/L (ref 44–121)
ALT SERPL-CCNC: 17 IU/L (ref 0–32)
AST SERPL-CCNC: 16 IU/L (ref 0–40)
BASOPHILS # BLD AUTO: 0.1 X10E3/UL (ref 0–0.2)
BASOPHILS NFR BLD AUTO: 1 %
BILIRUB SERPL-MCNC: 0.6 MG/DL (ref 0–1.2)
BUN SERPL-MCNC: 34 MG/DL (ref 8–27)
BUN/CREAT SERPL: 31 (ref 12–28)
CALCIUM SERPL-MCNC: 9 MG/DL (ref 8.7–10.3)
CHLORIDE SERPL-SCNC: 105 MMOL/L (ref 96–106)
CHOLEST SERPL-MCNC: 128 MG/DL (ref 100–199)
CHOLEST/HDLC SERPL: 2.1 RATIO (ref 0–4.4)
CO2 SERPL-SCNC: 22 MMOL/L (ref 20–29)
CREAT SERPL-MCNC: 1.09 MG/DL (ref 0.57–1)
CREAT UR-MCNC: 94.3 MG/DL
EGFR: 51 ML/MIN/1.73
EOSINOPHIL # BLD AUTO: 0.2 X10E3/UL (ref 0–0.4)
EOSINOPHIL NFR BLD AUTO: 3 %
ERYTHROCYTE [DISTWIDTH] IN BLOOD BY AUTOMATED COUNT: 15.7 % (ref 11.7–15.4)
EST. AVERAGE GLUCOSE BLD GHB EST-MCNC: 131 MG/DL
GAMMA INTERFERON BACKGROUND BLD IA-ACNC: 0.05 IU/ML
GLOBULIN SER-MCNC: 3.3 G/DL (ref 1.5–4.5)
GLUCOSE SERPL-MCNC: 115 MG/DL (ref 70–99)
HBA1C MFR BLD: 6.2 % (ref 4.8–5.6)
HCT VFR BLD AUTO: 37.2 % (ref 34–46.6)
HDLC SERPL-MCNC: 60 MG/DL
HGB BLD-MCNC: 11.3 G/DL (ref 11.1–15.9)
IMM GRANULOCYTES # BLD: 0 X10E3/UL (ref 0–0.1)
IMM GRANULOCYTES NFR BLD: 0 %
LDLC SERPL CALC-MCNC: 58 MG/DL (ref 0–99)
LYMPHOCYTES # BLD AUTO: 1.7 X10E3/UL (ref 0.7–3.1)
LYMPHOCYTES NFR BLD AUTO: 27 %
M TB IFN-G CD4+ T-CELLS BLD-ACNC: 0.05 IU/ML
M TB IFN-G CD4+ T-CELLS BLD-ACNC: 0.05 IU/ML
MCH RBC QN AUTO: 24.9 PG (ref 26.6–33)
MCHC RBC AUTO-ENTMCNC: 30.4 G/DL (ref 31.5–35.7)
MCV RBC AUTO: 82 FL (ref 79–97)
MICROALBUMIN UR-MCNC: 9.3 UG/ML
MITOGEN IGNF BLD-ACNC: 7.75 IU/ML
MONOCYTES # BLD AUTO: 0.9 X10E3/UL (ref 0.1–0.9)
MONOCYTES NFR BLD AUTO: 13 %
NEUTROPHILS # BLD AUTO: 3.7 X10E3/UL (ref 1.4–7)
NEUTROPHILS NFR BLD AUTO: 56 %
PLATELET # BLD AUTO: 279 X10E3/UL (ref 150–450)
POTASSIUM SERPL-SCNC: 4.4 MMOL/L (ref 3.5–5.2)
PROT SERPL-MCNC: 7.7 G/DL (ref 6–8.5)
QUANTIFERON INCUBATION COMMENT: NORMAL
QUANTIFERON-TB GOLD PLUS: NEGATIVE
RBC # BLD AUTO: 4.53 X10E6/UL (ref 3.77–5.28)
SERVICE CMNT-IMP: NORMAL
SL AMB VLDL CHOLESTEROL CALC: 10 MG/DL (ref 5–40)
SODIUM SERPL-SCNC: 141 MMOL/L (ref 134–144)
TRIGL SERPL-MCNC: 38 MG/DL (ref 0–149)
TSH SERPL DL<=0.005 MIU/L-ACNC: 2.03 UIU/ML (ref 0.45–4.5)
WBC # BLD AUTO: 6.5 X10E3/UL (ref 3.4–10.8)

## 2025-03-24 ENCOUNTER — OFFICE VISIT (OUTPATIENT)
Age: 80
End: 2025-03-24
Payer: COMMERCIAL

## 2025-03-24 ENCOUNTER — APPOINTMENT (OUTPATIENT)
Dept: LAB | Facility: HOSPITAL | Age: 80
End: 2025-03-24
Payer: COMMERCIAL

## 2025-03-24 VITALS
TEMPERATURE: 98 F | OXYGEN SATURATION: 97 % | DIASTOLIC BLOOD PRESSURE: 70 MMHG | HEIGHT: 66 IN | WEIGHT: 157 LBS | SYSTOLIC BLOOD PRESSURE: 145 MMHG | HEART RATE: 104 BPM | BODY MASS INDEX: 25.23 KG/M2

## 2025-03-24 DIAGNOSIS — D47.2 IGM LAMBDA MONOCLONAL GAMMOPATHY: Primary | ICD-10-CM

## 2025-03-24 DIAGNOSIS — C64.2 RENAL CELL CARCINOMA OF LEFT KIDNEY (HCC): ICD-10-CM

## 2025-03-24 DIAGNOSIS — D47.2 IGM LAMBDA MONOCLONAL GAMMOPATHY: ICD-10-CM

## 2025-03-24 DIAGNOSIS — D50.0 IRON DEFICIENCY ANEMIA DUE TO CHRONIC BLOOD LOSS: ICD-10-CM

## 2025-03-24 PROCEDURE — G2211 COMPLEX E/M VISIT ADD ON: HCPCS | Performed by: INTERNAL MEDICINE

## 2025-03-24 PROCEDURE — 84166 PROTEIN E-PHORESIS/URINE/CSF: CPT

## 2025-03-24 PROCEDURE — 86334 IMMUNOFIX E-PHORESIS SERUM: CPT

## 2025-03-24 PROCEDURE — 99214 OFFICE O/P EST MOD 30 MIN: CPT | Performed by: INTERNAL MEDICINE

## 2025-03-24 PROCEDURE — 86335 IMMUNFIX E-PHORSIS/URINE/CSF: CPT

## 2025-03-24 PROCEDURE — 84165 PROTEIN E-PHORESIS SERUM: CPT

## 2025-03-24 PROCEDURE — 36415 COLL VENOUS BLD VENIPUNCTURE: CPT

## 2025-03-24 NOTE — PROGRESS NOTES
Name: Tevin Bazan      : 1945      MRN: [id number]  Encounter Provider: Jeremiah Obrien MD  Encounter Date: 3/24/2025   Encounter department: [facility]ATOLOG[facility]  :  Assessment & Plan  IgM lambda monoclonal gammopathy  Myeloma workup is significant for a kappa light chain 29, lambda light chain 163, ratio 0.18.    Ig M 1643 with normal Gg G and ig A. Repeat hemoglobin 11.5 normal WBC and platelet count.  Creatinine 1.09, no hypercalcemia.  No SPEP/UPEP to review.  CT chest abdomen and pelvis from 2025 showed stable left kidney mass measuring 1.4 cm and a 3 cm pancreatic sidebranch IPMN.  There is no hepatosplenomegaly or lymphadenopathy.    Recommend completing work up for myeloma and we will arrange for next steps accordingly.  May need bone marrow bx too. Follow up to be decided    Orders:    Protein electrophoresis, serum; Future    Protein electrophoresis, urine; Future    Immunofixation, Serum(Reflex Only-Do Not Order); Future    Kappa / lambda light chains, urine, 24 hour; Future    Iron deficiency anemia due to chronic blood loss    Improving iron panel. Ferritin now up to 15 with trans sat of 19% as of 3/19/2025  Continue PO iron supplements which he is tolerating well.  Has repeat colonoscopy next month       Renal cell carcinoma of left kidney (HCC)  Stable left kidney mass 1.4cm  Continue surveillance with urology - Dr. Prieto           No follow-ups on file.    History of Present Illness   Chief Complaint   Patient presents with    Follow-up     This is a 79-year-old patient who was recently seen by Dr. Nielsen in early 2025 as a new consult, who is here today for transition of care.  Past medical history significant for hypertension, renal cell carcinoma, anemia, GERD, chronic lower back pain, CVA and COPD. Patient complains of worsening shortness of breath on exertion since the 2024.     In 2025, he underwent  an EGD and colonoscopy.  Colonoscopy was poor prep, and a repeat was recommended in 3 months.  EUS revealed a 27 mm x 27 mm well defined heterogeneous multilocular and septated cyst in the head and neck of the pancreas.  Irregular solid lesion in the left kidney.  No evidence of bleeding on the EGD.    Pt was admitted to the hospital in February 2025 with a hemoglobin of 9.4, requiring blood transfusion.  No history of overt bleeding.  Iron deficiency with a ferritin of 6, transferrin saturation of 4%.  B12 453, normal WBC and platelet count.  He was discharged on iron supplements.  Further workup was sent and patient presents for follow-up of labs.       Review of Systems   Constitutional:  Negative for fever and unexpected weight change.   Respiratory:  Positive for shortness of breath (improved from before).    Gastrointestinal:  Positive for constipation (controlled with diet). Negative for blood in stool.   Genitourinary:  Negative for hematuria.   Musculoskeletal:  Positive for back pain (chronic from scoliosis).   All other systems reviewed and are negative.    Medical History Reviewed by provider this encounter:     .  Current Outpatient Medications on File Prior to Visit   Medication Sig Dispense Refill    albuterol (Proventil HFA) 90 mcg/act inhaler Inhale 1-2 puffs every 6 (six) hours as needed for wheezing 18 g 0    Ascorbic Acid (vitamin C) 1000 MG tablet every 24 hours      aspirin (ECOTRIN LOW STRENGTH) 81 mg EC tablet Take 1 tablet (81 mg total) by mouth daily for 21 days 21 tablet 0    atorvastatin (LIPITOR) 20 mg tablet Take 1 tablet (20 mg total) by mouth daily 30 tablet 2    b complex vitamins capsule Take 1 capsule by mouth daily      Chelated Zinc 50 MG TABS every 24 hours      cholecalciferol (VITAMIN D3) 400 units tablet Take 400 Units by mouth daily      diltiazem (TIAZAC) 120 MG 24 hr capsule Take 120 mg by mouth daily      famotidine (PEPCID) 40 MG tablet 2 (two) times a day as needed       "ferrous sulfate 324 (65 Fe) mg Take 1 tablet (324 mg total) by mouth daily before breakfast 30 tablet 0    metFORMIN (GLUCOPHAGE-XR) 500 mg 24 hr tablet every 24 hours      nitroglycerin (NITROSTAT) 0.4 mg SL tablet Place 1 tablet (0.4 mg total) under the tongue every 5 (five) minutes as needed for chest pain 25 tablet 5    Omega 3 1000 MG CAPS 1 capsule Every 12 hours      tamsulosin (FLOMAX) 0.4 mg 1 capsule every 24 hours      ticagrelor (BRILINTA) 90 MG Take 1 tablet (90 mg total) by mouth every 12 (twelve) hours 60 tablet 2     No current facility-administered medications on file prior to visit.          Objective   /70 (BP Location: Left arm, Patient Position: Sitting, Cuff Size: Standard)   Pulse 104   Temp 98 °F (36.7 °C) (Temporal)   Ht 5' 6\" (1.676 m)   Wt 71.2 kg (157 lb)   SpO2 97%   BMI 25.34 kg/m²     Physical Exam  Vitals reviewed.   Constitutional:       Appearance: Normal appearance.   Cardiovascular:      Rate and Rhythm: Normal rate and regular rhythm.      Heart sounds: No murmur heard.  Pulmonary:      Effort: Pulmonary effort is normal. No respiratory distress.      Breath sounds: Normal breath sounds.   Abdominal:      Palpations: Abdomen is soft.   Musculoskeletal:         General: No swelling.   Skin:     General: Skin is warm.      Findings: No bruising.   Neurological:      General: No focal deficit present.      Mental Status: Danielle is alert and oriented to person, place, and time.         Labs: I have reviewed the following labs:  Results for orders placed or performed in visit on 03/06/25   Fe+TIBC+Fransico   Result Value Ref Range    Total Iron Binding Capacity (TIBC) 391 250 - 450 ug/dL    UIBC 315 118 - 369 ug/dL    Iron, Serum 76 27 - 139 ug/dL    Iron Saturation 19 15 - 55 %    Ferritin 15 15 - 150 ng/mL   CBC and differential   Result Value Ref Range    White Blood Cell Count 6.3 3.4 - 10.8 x10E3/uL    Red Blood Cell Count 4.57 3.77 - 5.28 x10E6/uL    Hemoglobin 11.5 11.1 - " 15.9 g/dL    HCT 37.7 34.0 - 46.6 %    MCV 83 79 - 97 fL    MCH 25.2 (L) 26.6 - 33.0 pg    MCHC 30.5 (L) 31.5 - 35.7 g/dL    RDW 15.7 (H) 11.7 - 15.4 %    Platelet Count 275 150 - 450 x10E3/uL    Neutrophils 56 Not Estab. %    Lymphocytes 26 Not Estab. %    Monocytes 14 Not Estab. %    Eosinophils 3 Not Estab. %    Basophils PCT 1 Not Estab. %    Neutrophils (Absolute) 3.5 1.4 - 7.0 x10E3/uL    Lymphocytes (Absolute) 1.7 0.7 - 3.1 x10E3/uL    Monocytes (Absolute) 0.9 0.1 - 0.9 x10E3/uL    Eosinophils (Absolute) 0.2 0.0 - 0.4 x10E3/uL    Basophils ABS 0.1 0.0 - 0.2 x10E3/uL    Immature Granulocytes 0 Not Estab. %    Immature Granulocytes (Absolute) 0.0 0.0 - 0.1 x10E3/uL     Lab Results   Component Value Date/Time    Ig Kappa Free Light Chain 29.0 (H) 03/19/2025 10:49 AM    Ig Lambda Free Light Chain 163.3 (H) 03/19/2025 10:49 AM    IgG 1,001 03/19/2025 10:49 AM    IgA 153 03/19/2025 10:49 AM    IGM 1,643 (H) 03/19/2025 10:49 AM        Radiology Results Review: I personally reviewed the following image studies in PACS and associated radiology reports: CT chest and CT abdomen/pelvis. My interpretation of the radiology images/reports is: as above.    Administrative Statements   I have spent a total time of 45 minutes in caring for this patient on the day of the visit/encounter including Diagnostic results, Prognosis, Risks and benefits of tx options, Documenting in the medical record, Reviewing/placing orders in the medical record (including tests, medications, and/or procedures), and Obtaining or reviewing history  .

## 2025-03-25 ENCOUNTER — HOSPITAL ENCOUNTER (OUTPATIENT)
Dept: PULMONOLOGY | Facility: HOSPITAL | Age: 80
Discharge: HOME | End: 2025-03-25
Payer: COMMERCIAL

## 2025-03-25 DIAGNOSIS — J44.9 CHRONIC OBSTRUCTIVE PULMONARY DISEASE, UNSPECIFIED COPD TYPE (HCC): ICD-10-CM

## 2025-03-25 LAB
ALBUMIN SERPL ELPH-MCNC: 3.58 G/DL (ref 3.2–5.1)
ALBUMIN SERPL ELPH-MCNC: 52.6 % (ref 48–70)
ALPHA1 GLOB SERPL ELPH-MCNC: 0.24 G/DL (ref 0.15–0.47)
ALPHA1 GLOB SERPL ELPH-MCNC: 3.5 % (ref 1.8–7)
ALPHA2 GLOB SERPL ELPH-MCNC: 0.67 G/DL (ref 0.42–1.04)
ALPHA2 GLOB SERPL ELPH-MCNC: 9.9 % (ref 5.9–14.9)
BETA GLOB ABNORMAL SERPL ELPH-MCNC: 0.5 G/DL (ref 0.31–0.57)
BETA1 GLOB SERPL ELPH-MCNC: 7.4 % (ref 4.7–7.7)
BETA2 GLOB SERPL ELPH-MCNC: 2.5 % (ref 3.1–7.9)
BETA2+GAMMA GLOB SERPL ELPH-MCNC: 0.17 G/DL (ref 0.2–0.58)
GAMMA GLOB ABNORMAL SERPL ELPH-MCNC: 1.64 G/DL (ref 0.4–1.66)
GAMMA GLOB SERPL ELPH-MCNC: 24.1 % (ref 6.9–22.3)
IGG/ALB SER: 1.11 {RATIO} (ref 1.1–1.8)
INTERPRETATION UR IFE-IMP: NORMAL
M PROTEIN 1 MFR SERPL ELPH: 11 %
M PROTEIN 1 SERPL ELPH-MCNC: 0.75 G/DL
PROT PATTERN SERPL ELPH-IMP: ABNORMAL
PROT SERPL-MCNC: 6.8 G/DL (ref 6.4–8.2)

## 2025-03-25 PROCEDURE — 94726 PLETHYSMOGRAPHY LUNG VOLUMES: CPT | Performed by: INTERNAL MEDICINE

## 2025-03-25 PROCEDURE — 94729 DIFFUSING CAPACITY: CPT

## 2025-03-25 PROCEDURE — 94726 PLETHYSMOGRAPHY LUNG VOLUMES: CPT

## 2025-03-25 PROCEDURE — 94060 EVALUATION OF WHEEZING: CPT

## 2025-03-25 PROCEDURE — 86334 IMMUNOFIX E-PHORESIS SERUM: CPT | Performed by: PATHOLOGY

## 2025-03-25 PROCEDURE — 94060 EVALUATION OF WHEEZING: CPT | Performed by: INTERNAL MEDICINE

## 2025-03-25 PROCEDURE — 94760 N-INVAS EAR/PLS OXIMETRY 1: CPT

## 2025-03-25 PROCEDURE — 84165 PROTEIN E-PHORESIS SERUM: CPT | Performed by: PATHOLOGY

## 2025-03-25 PROCEDURE — 94729 DIFFUSING CAPACITY: CPT | Performed by: INTERNAL MEDICINE

## 2025-03-25 RX ORDER — ALBUTEROL SULFATE 0.83 MG/ML
2.5 SOLUTION RESPIRATORY (INHALATION) ONCE
Status: COMPLETED | OUTPATIENT
Start: 2025-03-25 | End: 2025-03-25

## 2025-03-25 RX ADMIN — ALBUTEROL SULFATE 2.5 MG: 2.5 SOLUTION RESPIRATORY (INHALATION) at 12:15

## 2025-03-26 ENCOUNTER — APPOINTMENT (OUTPATIENT)
Dept: LAB | Facility: HOSPITAL | Age: 80
End: 2025-03-26
Attending: INTERNAL MEDICINE
Payer: COMMERCIAL

## 2025-03-26 DIAGNOSIS — D64.9 SYMPTOMATIC ANEMIA: ICD-10-CM

## 2025-03-26 PROCEDURE — 83521 IG LIGHT CHAINS FREE EACH: CPT

## 2025-03-27 ENCOUNTER — TELEPHONE (OUTPATIENT)
Age: 80
End: 2025-03-27

## 2025-03-27 ENCOUNTER — RESULTS FOLLOW-UP (OUTPATIENT)
Age: 80
End: 2025-03-27

## 2025-03-27 LAB
ALBUMIN UR ELPH-MCNC: 56.1 %
ALPHA1 GLOB MFR UR ELPH: 9.7 %
ALPHA2 GLOB MFR UR ELPH: 14.3 %
B-GLOBULIN MFR UR ELPH: 3.9 %
GAMMA GLOB MFR UR ELPH: 16 %
INTERPRETATION UR IFE-IMP: NORMAL
PROT PATTERN UR ELPH-IMP: NORMAL
PROT UR-MCNC: 8.1 MG/DL

## 2025-03-27 PROCEDURE — 86335 IMMUNFIX E-PHORSIS/URINE/CSF: CPT | Performed by: STUDENT IN AN ORGANIZED HEALTH CARE EDUCATION/TRAINING PROGRAM

## 2025-03-27 PROCEDURE — 84166 PROTEIN E-PHORESIS/URINE/CSF: CPT | Performed by: STUDENT IN AN ORGANIZED HEALTH CARE EDUCATION/TRAINING PROGRAM

## 2025-03-27 NOTE — TELEPHONE ENCOUNTER
Called and left a message 6 MO FU scheduled for 9/23 at 1 pm with Dr Obrien. Left [employer] should date or time does not work for patient.

## 2025-03-28 LAB
KAPPA LC UR-MCNC: 37.15 MG/L (ref 1.17–86.46)
KAPPA LC/LAMBDA UR: 4.07 {RATIO} (ref 1.83–14.26)
LAMBDA LC UR-MCNC: 9.13 MG/L (ref 0.27–15.21)

## 2025-03-31 DIAGNOSIS — D47.2 MGUS (MONOCLONAL GAMMOPATHY OF UNKNOWN SIGNIFICANCE): Primary | ICD-10-CM

## 2025-04-01 ENCOUNTER — OFFICE VISIT (OUTPATIENT)
Age: 80
End: 2025-04-01
Payer: COMMERCIAL

## 2025-04-01 VITALS
SYSTOLIC BLOOD PRESSURE: 121 MMHG | HEIGHT: 66 IN | DIASTOLIC BLOOD PRESSURE: 71 MMHG | BODY MASS INDEX: 25.55 KG/M2 | WEIGHT: 159 LBS

## 2025-04-01 DIAGNOSIS — I25.110 CORONARY ARTERY DISEASE INVOLVING NATIVE CORONARY ARTERY OF NATIVE HEART WITH UNSTABLE ANGINA PECTORIS (HCC): ICD-10-CM

## 2025-04-01 DIAGNOSIS — Z86.73 HISTORY OF STROKE: ICD-10-CM

## 2025-04-01 DIAGNOSIS — D50.9 IRON DEFICIENCY ANEMIA, UNSPECIFIED IRON DEFICIENCY ANEMIA TYPE: Primary | ICD-10-CM

## 2025-04-01 DIAGNOSIS — K86.9 PANCREATIC LESION: ICD-10-CM

## 2025-04-01 PROCEDURE — G2211 COMPLEX E/M VISIT ADD ON: HCPCS | Performed by: INTERNAL MEDICINE

## 2025-04-01 PROCEDURE — 99214 OFFICE O/P EST MOD 30 MIN: CPT | Performed by: INTERNAL MEDICINE

## 2025-04-01 RX ORDER — POLYETHYLENE GLYCOL 3350, SODIUM SULFATE ANHYDROUS, SODIUM BICARBONATE, SODIUM CHLORIDE, POTASSIUM CHLORIDE 236; 22.74; 6.74; 5.86; 2.97 G/4L; G/4L; G/4L; G/4L; G/4L
4000 POWDER, FOR SOLUTION ORAL ONCE
Qty: 4000 ML | Refills: 0 | Status: SHIPPED | OUTPATIENT
Start: 2025-04-01 | End: 2025-04-01

## 2025-04-01 NOTE — LETTER
2025     ANNIE Hubbard  [address]  [address]  [place] [address]    Patient: Tevin Bazan   YOB: 1945   Date of Visit: 2025       Dear Dr. Woodall:    Thank you for referring Tevin Bazan to me for evaluation. Below are my notes for this consultation.    If you have questions, please do not hesitate to call me. I look forward to following your patient along with you.         Sincerely,        Ghanshyam Munoz MD        CC: MD Rivas Lilly MD Michael J. Cassidy, MD  2025  1:24 PM  Sign when Signing Visit  Name: Tevin Bazan      : 1945      MRN: [id number]  Encounter Provider: Ghanshyam Munoz MD  Encounter Date: 2025   Encounter department: [facility] GASTROENTEROLOGY SPECIALIST [place]  :  Assessment & Plan  Iron deficiency anemia, unspecified iron deficiency anemia type  Found to have an iron deficiency anemia in the hospital.  Recent negative EGD.  Attempted colonoscopy in January but poor prep.  Has known history of right hemicolectomy  -Colonoscopy at  Will hold Brilinta for [facility] with 2-day prep  Orders:  •  polyethylene glycol (Golytely) 4000 mL solution; Take 4,000 mL by mouth once for 1 dose Take 4000 mL by mouth once for 1 dose. Use as directed    Pancreatic lesion  S/P EUS 2025.  Consistent with serous cystadenoma       History of stroke  On aspirin and Brilinta.  Will 7 days prior to the procedure if okay with neurology I will       Coronary artery disease involving native coronary artery of native heart with unstable angina pectoris (HCC)  Follows with Dr. Cantor           History of Present Illness  Tevin Bazan is a 79 y.o. adult who presents for follow-up after recent hospitalization.  Patient was seen in the hospital last month when he was admitted with shortness of breath.  He has cardiology follow-up.  He was found to have an iron deficiency anemia.  He was  started on oral iron.  He was seen by GI who recommended outpatient follow-up.  Patient had an EGD and EUS in January 2025 secondary to a pancreatic lesion which appears to be a serous cystadenoma.  A colonoscopy is attempted at that time but the prep was poor with formed stool in the rectum.  Patient currently reports that he is moving his bowels regularly.  He denies any rectal bleeding or melena.  He reports his stools are a little dark when he remembers to take his iron.  He denies any upper GI symptoms.  His weight is stable.  He no longer complains of any shortness of breath.  Repeat CBC done on March 19 was normal  HPI  History obtained from: patient and patient's Significant Other  Review of Systems A complete review of systems is negative other than that noted above in the HPI.      Current Outpatient Medications   Medication Sig Dispense Refill   • albuterol (Proventil HFA) 90 mcg/act inhaler Inhale 1-2 puffs every 6 (six) hours as needed for wheezing 18 g 0   • Ascorbic Acid (vitamin C) 1000 MG tablet every 24 hours     • aspirin (ECOTRIN LOW STRENGTH) 81 mg EC tablet Take 1 tablet (81 mg total) by mouth daily for 21 days 21 tablet 0   • b complex vitamins capsule Take 1 capsule by mouth daily     • Chelated Zinc 50 MG TABS every 24 hours     • cholecalciferol (VITAMIN D3) 400 units tablet Take 400 Units by mouth daily     • diltiazem (TIAZAC) 120 MG 24 hr capsule Take 120 mg by mouth daily     • famotidine (PEPCID) 40 MG tablet 2 (two) times a day as needed     • ferrous sulfate 324 (65 Fe) mg Take 1 tablet (324 mg total) by mouth daily before breakfast 30 tablet 0   • metFORMIN (GLUCOPHAGE-XR) 500 mg 24 hr tablet every 24 hours     • nitroglycerin (NITROSTAT) 0.4 mg SL tablet Place 1 tablet (0.4 mg total) under the tongue every 5 (five) minutes as needed for chest pain 25 tablet 5   • Omega 3 1000 MG CAPS 1 capsule Every 12 hours     • polyethylene glycol (Golytely) 4000 mL solution Take 4,000 mL by  "mouth once for 1 dose Take 4000 mL by mouth once for 1 dose. Use as directed 4000 mL 0   • tamsulosin (FLOMAX) 0.4 mg 1 capsule every 24 hours     • ticagrelor (BRILINTA) 90 MG Take 1 tablet (90 mg total) by mouth every 12 (twelve) hours 60 tablet 2   • atorvastatin (LIPITOR) 20 mg tablet Take 1 tablet (20 mg total) by mouth daily (Patient not taking: Reported on 4/1/2025) 30 tablet 2     No current facility-administered medications for this visit.     Objective  /71   Ht 5' 6\" (1.676 m)   Wt 72.1 kg (159 lb)   BMI 25.66 kg/m²     Physical Exam   General appearance: alert, appears stated age and cooperative  Eyes: PERLLA, EOMI, no icterus   Head: Normocephalic, without obvious abnormality, atraumatic  Lungs: clear to auscultation bilaterally  Heart: regular rate and rhythm, S1, S2 normal, no murmur, click, rub or gallop  Abdomen: soft, non-tender; bowel sounds normal; no masses,  no organomegaly  Extremities: extremities normal, atraumatic, no cyanosis or edema  Neurologic: Grossly normal        Lab Results: I personally reviewed relevant lab results. CBC/BMP: No new results in last 24 hours.       Results for orders placed during the hospital encounter of 01/27/25    Colonoscopy    Impression  Solid stool in the rectum. Procedure aborted due to poor prep and risk of perforation.        RECOMMENDATION:  Repeat colonoscopy in 3 months, due: 4/27/2025  Inadequate bowel preparation    Recommend 2 day prep.            Pat Escalera MD      "

## 2025-04-01 NOTE — PROGRESS NOTES
Name: Tevin Bazan      : 1945      MRN: [id number]  Encounter Provider: Ghanshyam Munoz MD  Encounter Date: 2025   Encounter department: [facility] [place]  :  Assessment & Plan  Iron deficiency anemia, unspecified iron deficiency anemia type  Found to have an iron deficiency anemia in the hospital.  Recent negative EGD.  Attempted colonoscopy in January but poor prep.  Has known history of right hemicolectomy  -Colonoscopy at  Will hold Brilinta for [facility] with 2-day prep  Orders:    polyethylene glycol (Golytely) 4000 mL solution; Take 4,000 mL by mouth once for 1 dose Take 4000 mL by mouth once for 1 dose. Use as directed    Pancreatic lesion  S/P EUS 2025.  Consistent with serous cystadenoma       History of stroke  On aspirin and Brilinta.  Will 7 days prior to the procedure if okay with neurology I will       Coronary artery disease involving native coronary artery of native heart with unstable angina pectoris (HCC)  Follows with Dr. Cantor           History of Present Illness   Tevin Bazan is a 79 y.o. adult who presents for follow-up after recent hospitalization.  Patient was seen in the hospital last month when he was admitted with shortness of breath.  He has cardiology follow-up.  He was found to have an iron deficiency anemia.  He was started on oral iron.  He was seen by GI who recommended outpatient follow-up.  Patient had an EGD and EUS in 2025 secondary to a pancreatic lesion which appears to be a serous cystadenoma.  A colonoscopy is attempted at that time but the prep was poor with formed stool in the rectum.  Patient currently reports that he is moving his bowels regularly.  He denies any rectal bleeding or melena.  He reports his stools are a little dark when he remembers to take his iron.  He denies any upper GI symptoms.  His weight is stable.  He no longer complains of any shortness of breath.   "Repeat CBC done on March 19 was normal  HPI  History obtained from: patient and patient's Significant Other  Review of Systems A complete review of systems is negative other than that noted above in the HPI.      Current Outpatient Medications   Medication Sig Dispense Refill    albuterol (Proventil HFA) 90 mcg/act inhaler Inhale 1-2 puffs every 6 (six) hours as needed for wheezing 18 g 0    Ascorbic Acid (vitamin C) 1000 MG tablet every 24 hours      aspirin (ECOTRIN LOW STRENGTH) 81 mg EC tablet Take 1 tablet (81 mg total) by mouth daily for 21 days 21 tablet 0    b complex vitamins capsule Take 1 capsule by mouth daily      Chelated Zinc 50 MG TABS every 24 hours      cholecalciferol (VITAMIN D3) 400 units tablet Take 400 Units by mouth daily      diltiazem (TIAZAC) 120 MG 24 hr capsule Take 120 mg by mouth daily      famotidine (PEPCID) 40 MG tablet 2 (two) times a day as needed      ferrous sulfate 324 (65 Fe) mg Take 1 tablet (324 mg total) by mouth daily before breakfast 30 tablet 0    metFORMIN (GLUCOPHAGE-XR) 500 mg 24 hr tablet every 24 hours      nitroglycerin (NITROSTAT) 0.4 mg SL tablet Place 1 tablet (0.4 mg total) under the tongue every 5 (five) minutes as needed for chest pain 25 tablet 5    Omega 3 1000 MG CAPS 1 capsule Every 12 hours      polyethylene glycol (Golytely) 4000 mL solution Take 4,000 mL by mouth once for 1 dose Take 4000 mL by mouth once for 1 dose. Use as directed 4000 mL 0    tamsulosin (FLOMAX) 0.4 mg 1 capsule every 24 hours      ticagrelor (BRILINTA) 90 MG Take 1 tablet (90 mg total) by mouth every 12 (twelve) hours 60 tablet 2    atorvastatin (LIPITOR) 20 mg tablet Take 1 tablet (20 mg total) by mouth daily (Patient not taking: Reported on 4/1/2025) 30 tablet 2     No current facility-administered medications for this visit.     Objective   /71   Ht 5' 6\" (1.676 m)   Wt 72.1 kg (159 lb)   BMI 25.66 kg/m²     Physical Exam   General appearance: alert, appears stated age " and cooperative  Eyes: PERLLA, EOMI, no icterus   Head: Normocephalic, without obvious abnormality, atraumatic  Lungs: clear to auscultation bilaterally  Heart: regular rate and rhythm, S1, S2 normal, no murmur, click, rub or gallop  Abdomen: soft, non-tender; bowel sounds normal; no masses,  no organomegaly  Extremities: extremities normal, atraumatic, no cyanosis or edema  Neurologic: Grossly normal        Lab Results: I personally reviewed relevant lab results. CBC/BMP: No new results in last 24 hours.       Results for orders placed during the hospital encounter of 01/27/25    Colonoscopy    Impression  Solid stool in the rectum. Procedure aborted due to poor prep and risk of perforation.        RECOMMENDATION:  Repeat colonoscopy in 3 months, due: 4/27/2025  Inadequate bowel preparation    Recommend 2 day prep.            Pat Escalera MD

## 2025-04-02 ENCOUNTER — TELEPHONE (OUTPATIENT)
Dept: GASTROENTEROLOGY | Facility: CLINIC | Age: 80
End: 2025-04-02

## 2025-04-02 NOTE — TELEPHONE ENCOUNTER
Our mutual patient is scheduled for procedure: Colonoscopy    On: 5/5/25     With: Dr Ghanshyam Munoz    They are taking the following blood thinner: Brilinta    Can this be stopped _5_ days prior to the procedure?      Physician Approving clearance: Dr. Grace Pearson

## 2025-04-02 NOTE — TELEPHONE ENCOUNTER
----- Message from ANNIE Hubbard sent at 4/2/2025  9:57 AM EDT -----  PFT results consistent with restrictive lung disease due to kyphoscoliosis as well as COPD.  Continue current medical management.

## 2025-04-15 ENCOUNTER — HOSPITAL ENCOUNTER (OUTPATIENT)
Dept: MRI IMAGING | Facility: HOSPITAL | Age: 80
Discharge: HOME | End: 2025-04-15
Payer: COMMERCIAL

## 2025-04-15 DIAGNOSIS — K86.9 PANCREATIC LESION: ICD-10-CM

## 2025-04-15 PROCEDURE — A9585 GADOBUTROL INJECTION: HCPCS | Performed by: STUDENT IN AN ORGANIZED HEALTH CARE EDUCATION/TRAINING PROGRAM

## 2025-04-15 PROCEDURE — 74183 MRI ABD W/O CNTR FLWD CNTR: CPT

## 2025-04-15 RX ORDER — GADOBUTROL 604.72 MG/ML
7 INJECTION INTRAVENOUS
Status: COMPLETED | OUTPATIENT
Start: 2025-04-15 | End: 2025-04-15

## 2025-04-15 RX ADMIN — GADOBUTROL 7 ML: 604.72 INJECTION INTRAVENOUS at 14:58

## 2025-04-18 ENCOUNTER — TELEPHONE (OUTPATIENT)
Dept: GASTROENTEROLOGY | Facility: CLINIC | Age: 80
End: 2025-04-18

## 2025-04-18 NOTE — TELEPHONE ENCOUNTER
Procedure confirmed  Colonoscopy     Via: Voice mail    Instructions given: Missael     Prep Given: Golytely 2 Day    Call the office if there are any questions.

## 2025-04-22 ENCOUNTER — TELEPHONE (OUTPATIENT)
Age: 80
End: 2025-04-22

## 2025-04-22 NOTE — TELEPHONE ENCOUNTER
Patient calling as he would like PCP to review results of MRI with him. Please advise when provider is able to review, thank you

## 2025-04-23 NOTE — TELEPHONE ENCOUNTER
Patient's spouse called following up on the testing results. Please call home phone once results have been reviewed by ANNIE Rojas. Thank you

## 2025-04-24 ENCOUNTER — TELEPHONE (OUTPATIENT)
Age: 80
End: 2025-04-24

## 2025-04-24 NOTE — TELEPHONE ENCOUNTER
Your MRI show a stable pancreatic cyst that is benign. The radiologist recommended a repeat MRI in 2 years. You also have a kidney mass which is which your urologist/oncologist will follow up on.    Written by Aly Ledezma DO on 4/23/2025  9:28 PM EDT     SPOKE WITH PT, INFORMED OF ABOVE MRI RESULTS AND RECOMMENDATIONS PER PROVIDER. ALL QUESTIONS ANSWERED.

## 2025-04-24 NOTE — TELEPHONE ENCOUNTER
Spoke w/pt's wife and advised if they didn't receive the mychart message from Gi they need to reach out to them for the MCRP results.

## 2025-04-24 NOTE — TELEPHONE ENCOUNTER
Patients GI provider:  Dr. Munoz    Number to return call: [phone]    Reason for call: Pt called for results. I transferred to GI triage and Beckie took the call    Scheduled procedure/appointment date if applicable: Apt/procedure N/A

## 2025-05-03 ENCOUNTER — NURSE TRIAGE (OUTPATIENT)
Dept: OTHER | Facility: OTHER | Age: 80
End: 2025-05-03

## 2025-05-03 NOTE — TELEPHONE ENCOUNTER
"Regarding: Colonoscopy / Prep Questions  ----- Message from Lyndsay MAN sent at 5/3/2025  8:44 AM EDT -----  Pt's Wife stated, \" We have a few questions on my husbands prep for his colonoscopy. We are not sure if he should start the no food diet today.\"    "

## 2025-05-03 NOTE — TELEPHONE ENCOUNTER
"FOLLOW UP: none needed    REASON FOR CONVERSATION: Procedure    SYMPTOMS: n/a    OTHER: wants to make sure that he can eat normally until 1800 today and to then start a clear liquid diet and the miralax prep? she is worried its not going to work because he had done the normal day before prep and it didn't work for him    Spoke with on call provider who advised clears for two days. If the patient requires solid food,  would advise a little residue diet through lunchtime and then start clears at dinner time through tomorrow.     DISPOSITION: Call PCP Now        Answer Assessment - Initial Assessment Questions  1. NAME of MEDICINE: \"What medicine(s) are you calling about?\"      GI prep     2. QUESTION: \"What is your question?\" (e.g., double dose of medicine, side effect)       She just wants to make sure that he can eat normally until 1800 today and to then start a clear liquid diet and the miralax prep? she is worried its not going to work because he had done the normal day before prep and it didn't work for him    3. PRESCRIBER: \"Who prescribed the medicine?\" Reason: if prescribed by specialist, call should be referred to that group.       [facility]    Protocols used: Medication Question Call-Adult-    "

## 2025-05-05 ENCOUNTER — TELEPHONE (OUTPATIENT)
Age: 80
End: 2025-05-05

## 2025-05-05 ENCOUNTER — HOSPITAL ENCOUNTER (OUTPATIENT)
Dept: GASTROENTEROLOGY | Facility: HOSPITAL | Age: 80
Setting detail: OUTPATIENT SURGERY
Discharge: HOME | End: 2025-05-05
Attending: INTERNAL MEDICINE
Payer: COMMERCIAL

## 2025-05-05 ENCOUNTER — ANESTHESIA (OUTPATIENT)
Dept: GASTROENTEROLOGY | Facility: HOSPITAL | Age: 80
End: 2025-05-05
Payer: COMMERCIAL

## 2025-05-05 ENCOUNTER — ANESTHESIA EVENT (OUTPATIENT)
Dept: GASTROENTEROLOGY | Facility: HOSPITAL | Age: 80
End: 2025-05-05
Payer: COMMERCIAL

## 2025-05-05 VITALS
RESPIRATION RATE: 18 BRPM | DIASTOLIC BLOOD PRESSURE: 60 MMHG | HEART RATE: 79 BPM | OXYGEN SATURATION: 94 % | SYSTOLIC BLOOD PRESSURE: 102 MMHG | TEMPERATURE: 97.2 F

## 2025-05-05 DIAGNOSIS — R07.9 CHEST PAIN: ICD-10-CM

## 2025-05-05 DIAGNOSIS — K92.1 MELENA: ICD-10-CM

## 2025-05-05 LAB — GLUCOSE SERPL-MCNC: 92 MG/DL (ref 65–140)

## 2025-05-05 PROCEDURE — 82948 REAGENT STRIP/BLOOD GLUCOSE: CPT

## 2025-05-05 PROCEDURE — 88305 TISSUE EXAM BY PATHOLOGIST: CPT | Performed by: PATHOLOGY

## 2025-05-05 PROCEDURE — 45380 COLONOSCOPY AND BIOPSY: CPT | Performed by: INTERNAL MEDICINE

## 2025-05-05 RX ORDER — PROPOFOL 10 MG/ML
INJECTION, EMULSION INTRAVENOUS AS NEEDED
Status: DISCONTINUED | OUTPATIENT
Start: 2025-05-05 | End: 2025-05-05

## 2025-05-05 RX ORDER — PROPOFOL 10 MG/ML
INJECTION, EMULSION INTRAVENOUS CONTINUOUS PRN
Status: DISCONTINUED | OUTPATIENT
Start: 2025-05-05 | End: 2025-05-05

## 2025-05-05 RX ORDER — ALBUTEROL SULFATE 90 UG/1
1-2 INHALANT RESPIRATORY (INHALATION) EVERY 6 HOURS PRN
Qty: 18 G | Refills: 0 | Status: CANCELLED | OUTPATIENT
Start: 2025-05-05

## 2025-05-05 RX ORDER — SODIUM CHLORIDE, SODIUM LACTATE, POTASSIUM CHLORIDE, CALCIUM CHLORIDE 600; 310; 30; 20 MG/100ML; MG/100ML; MG/100ML; MG/100ML
125 INJECTION, SOLUTION INTRAVENOUS CONTINUOUS
Status: DISCONTINUED | OUTPATIENT
Start: 2025-05-05 | End: 2025-05-09 | Stop reason: HOSPADM

## 2025-05-05 RX ORDER — SODIUM CHLORIDE 9 MG/ML
INJECTION, SOLUTION INTRAVENOUS CONTINUOUS PRN
Status: DISCONTINUED | OUTPATIENT
Start: 2025-05-05 | End: 2025-05-05

## 2025-05-05 RX ORDER — PHENYLEPHRINE HCL IN 0.9% NACL 1 MG/10 ML
SYRINGE (ML) INTRAVENOUS AS NEEDED
Status: DISCONTINUED | OUTPATIENT
Start: 2025-05-05 | End: 2025-05-05

## 2025-05-05 RX ORDER — ALBUTEROL SULFATE 90 UG/1
1-2 INHALANT RESPIRATORY (INHALATION) EVERY 6 HOURS PRN
Qty: 18 G | Refills: 5 | Status: SHIPPED | OUTPATIENT
Start: 2025-05-05

## 2025-05-05 RX ADMIN — PROPOFOL 80 MG: 10 INJECTION, EMULSION INTRAVENOUS at 08:43

## 2025-05-05 RX ADMIN — SODIUM CHLORIDE: 0.9 INJECTION, SOLUTION INTRAVENOUS at 08:30

## 2025-05-05 RX ADMIN — PROPOFOL 100 MCG/KG/MIN: 10 INJECTION, EMULSION INTRAVENOUS at 08:43

## 2025-05-05 RX ADMIN — Medication 100 MCG: at 09:05

## 2025-05-05 NOTE — ANESTHESIA PREPROCEDURE EVALUATION
Procedure:  COLONOSCOPY    Relevant Problems   ANESTHESIA (within normal limits)      CARDIO   (+) Carotid stenosis, asymptomatic, bilateral   (+) Coronary artery disease involving native coronary artery of native heart with unstable angina pectoris (HCC)   (+) Essential hypertension   (+) Hypercholesterolemia   (+) Nonrheumatic aortic valve stenosis   (+) Nonrheumatic mitral valve stenosis   (+) Nutcracker phenomenon of renal vein      GI/HEPATIC   (+) Pancreatic lesion      /RENAL   (+) BPH (benign prostatic hyperplasia)   (+) Kidney lesion   (+) Renal cell carcinoma (HCC)   (+) Stage 2 chronic kidney disease      HEMATOLOGY   (+) Symptomatic anemia      MUSCULOSKELETAL   (+) Chronic bilateral low back pain without sciatica   (+) Primary osteoarthritis of left knee   (+) Thoracogenic scoliosis of thoracolumbar region      NEURO/PSYCH   (+) Chronic bilateral low back pain without sciatica   (+) Chronic pain syndrome   (+) Stroke due to embolism (HCC)      PULMONARY   (+) COPD (chronic obstructive pulmonary disease) (HCC)   (+) Shortness of breath      Neurology/Sleep   (+) Cerebrovascular disease   (+) History of stroke        Physical Exam    Airway    Mallampati score: I  TM Distance: >3 FB  Neck ROM: full     Dental   Comment: Multiple missing and no reported loose, No notable dental hx     Cardiovascular  Cardiovascular exam normal    Pulmonary  Pulmonary exam normal     Other Findings        Anesthesia Plan  ASA Score- 3     Anesthesia Type- IV sedation with anesthesia with ASA Monitors.         Additional Monitors:     Airway Plan:     Comment: I discussed risks (reviewed with patient on the anesthesia consent form), benefits and alternatives of monitored sedation including the possibility under sedation to have recall or mild discomfort.  .       Plan Factors-    Chart reviewed.    Patient summary reviewed.                  Induction- intravenous.    Postoperative Plan-         Informed Consent- Anesthetic  plan and risks discussed with patient.  I personally reviewed this patient with the CRNA. Discussed and agreed on the Anesthesia Plan with the CRNA..      NPO Status:  Vitals Value Taken Time   Date of last liquid 05/05/25 05/05/25 0731   Time of last liquid 1900 05/05/25 0731   Date of last solid 05/03/25 05/05/25 0731   Time of last solid 1000 05/05/25 0731

## 2025-05-05 NOTE — ANESTHESIA POSTPROCEDURE EVALUATION
Post-Op Assessment Note    CV Status:  Stable  Pain Score: 0    Pain management: adequate       Mental Status:  Alert, awake and sleepy   Hydration Status:  Euvolemic   PONV Controlled:  Controlled   Airway Patency:  Patent     Post Op Vitals Reviewed: Yes    No anethesia notable event occurred.    Staff: CRNA           Last Filed PACU Vitals:  Vitals Value Taken Time   Temp 97.5    Pulse 70 05/05/25 0916   /59 05/05/25 0916   Resp 18 05/05/25 0916   SpO2 93 % 05/05/25 0916       Modified Dipak:     Vitals Value Taken Time   Activity 2 05/05/25 0917   Respiration 2 05/05/25 0917   Circulation 2 05/05/25 0917   Consciousness 1 05/05/25 0917   Oxygen Saturation 1 05/05/25 0917     Modified Dipak Score: 8

## 2025-05-05 NOTE — H&P
History and Physical - [facility]  Tevin Bazan 79 y.o. adult MRN: [id number]    HPI: Tevin Bazan is a 79 y.o. year old adult who presents for colonoscopy secondary to iron deficiency anemia    REVIEW OF SYSTEMS: Per the HPI, and otherwise unremarkable.    Historical Information   Past Medical History:   Diagnosis Date    Anemia     Chronic bilateral low back pain without sciatica 2020    Hypertension     Scoliosis     Stroke (HCC)     Stroke-like symptoms 2021     Past Surgical History:   Procedure Laterality Date    BACK SURGERY      BOWEL RESECTION      CARDIAC CATHETERIZATION N/A 2024    Procedure: Cardiac Coronary Angiogram;  Surgeon: Rivas Cantor MD;  Location: [facility];  Service: Cardiology    CARDIAC CATHETERIZATION  2024    Procedure: Cardiac catheterization;  Surgeon: Rivas aCntor MD;  Location: [facility];  Service: Cardiology    CARDIAC CATHETERIZATION N/A 2024    Procedure: Cardiac pci;  Surgeon: Rivas Cantor MD;  Location: [facility];  Service: Cardiology    COLONOSCOPY      MS TEAEC W/PATCH GRF CAROTID VERTB SUBCLAV NECK INC Left 2021    Procedure: ENDARTERECTOMY ARTERY CAROTID;  Surgeon: Vito Mo MD;  Location: [facility];  Service: Vascular    TONSILLECTOMY      UPPER GASTROINTESTINAL ENDOSCOPY       Social History   Social History     Substance and Sexual Activity   Alcohol Use Not Currently     Social History     Substance and Sexual Activity   Drug Use No     Social History     Tobacco Use   Smoking Status Former    Current packs/day: 0.00    Types: Cigarettes    Quit date:     Years since quittin.3    Passive exposure: Past   Smokeless Tobacco Never     Family History   Adopted: Yes       Meds/Allergies       Current Outpatient Medications:     albuterol (Proventil HFA) 90 mcg/act inhaler    Ascorbic Acid (vitamin C) 1000 MG tablet    aspirin (ECOTRIN LOW STRENGTH) 81 mg EC  tablet    atorvastatin (LIPITOR) 20 mg tablet    b complex vitamins capsule    Chelated Zinc 50 MG TABS    cholecalciferol (VITAMIN D3) 400 units tablet    diltiazem (TIAZAC) 120 MG 24 hr capsule    famotidine (PEPCID) 40 MG tablet    ferrous sulfate 324 (65 Fe) mg    metFORMIN (GLUCOPHAGE-XR) 500 mg 24 hr tablet    nitroglycerin (NITROSTAT) 0.4 mg SL tablet    Omega 3 1000 MG CAPS    polyethylene glycol (Golytely) 4000 mL solution    tamsulosin (FLOMAX) 0.4 mg    ticagrelor (BRILINTA) 90 MG    Current Facility-Administered Medications:     lactated ringers infusion, 125 mL/hr, Intravenous, Continuous    Allergies   Allergen Reactions    Black Cohosh Rash    Minoxidil Rash       Objective     /66   Pulse 76   Temp (!) 97.2 °F (36.2 °C) (Temporal)   Resp 19   SpO2 93%     PHYSICAL EXAM    Gen: NAD AAOx3  Head: Normocephalic, Atraumatic  CV: S1S2 RRR no m/r/g  CHEST: Clear b/l no c/r/w  ABD: soft, +BS NT/ND  EXT: no edema    ASSESSMENT/PLAN:  This is a 79 y.o. year old adult here for colonoscopy secondary to iron deficiency anemia, and Danielle is stable and optimized for Danielle's procedure.

## 2025-05-05 NOTE — TELEPHONE ENCOUNTER
Patients spouse called to inquire on the refill request for patients Albuterol inhaler, last prescribed in 2022. During our conversation, she had mentioned that back in march patient had completed a PFT but they had not received the results. Upon further review, we were able to see the providers written result message and read the message in detail, explained that the office had attempted to reach by phone and mail but was not successful. Results were understood with no further questions.   At this time is provider ok with sending refill of patients inhaler, or would provider prefer to have patients scheduled to come in for a follow up visit prior.  Please advise and follow up with patient thank you

## 2025-05-08 PROCEDURE — 88305 TISSUE EXAM BY PATHOLOGIST: CPT | Performed by: PATHOLOGY

## 2025-05-19 ENCOUNTER — RESULTS FOLLOW-UP (OUTPATIENT)
Dept: GASTROENTEROLOGY | Facility: CLINIC | Age: 80
End: 2025-05-19

## 2025-06-19 ENCOUNTER — TELEPHONE (OUTPATIENT)
Age: 80
End: 2025-06-19

## 2025-06-19 ENCOUNTER — OFFICE VISIT (OUTPATIENT)
Dept: FAMILY MEDICINE CLINIC | Facility: HOSPITAL | Age: 80
End: 2025-06-19
Payer: COMMERCIAL

## 2025-06-19 VITALS
SYSTOLIC BLOOD PRESSURE: 108 MMHG | BODY MASS INDEX: 25.18 KG/M2 | WEIGHT: 156 LBS | DIASTOLIC BLOOD PRESSURE: 58 MMHG | OXYGEN SATURATION: 95 % | HEART RATE: 94 BPM

## 2025-06-19 DIAGNOSIS — E78.00 HYPERCHOLESTEROLEMIA: ICD-10-CM

## 2025-06-19 DIAGNOSIS — N18.2 STAGE 2 CHRONIC KIDNEY DISEASE: ICD-10-CM

## 2025-06-19 DIAGNOSIS — R45.89 ANXIETY ABOUT HEALTH: ICD-10-CM

## 2025-06-19 DIAGNOSIS — D64.9 SYMPTOMATIC ANEMIA: ICD-10-CM

## 2025-06-19 DIAGNOSIS — E11.49 TYPE 2 DIABETES MELLITUS WITH OTHER DIABETIC NEUROLOGICAL COMPLICATION (HCC): ICD-10-CM

## 2025-06-19 DIAGNOSIS — I10 ESSENTIAL HYPERTENSION: Primary | ICD-10-CM

## 2025-06-19 PROBLEM — R07.89 CHEST HEAVINESS: Status: RESOLVED | Noted: 2023-08-21 | Resolved: 2025-06-19

## 2025-06-19 PROBLEM — I20.0 UNSTABLE ANGINA (HCC): Status: RESOLVED | Noted: 2024-02-26 | Resolved: 2025-06-19

## 2025-06-19 PROCEDURE — G2211 COMPLEX E/M VISIT ADD ON: HCPCS | Performed by: NURSE PRACTITIONER

## 2025-06-19 PROCEDURE — 99214 OFFICE O/P EST MOD 30 MIN: CPT | Performed by: NURSE PRACTITIONER

## 2025-06-19 NOTE — ASSESSMENT & PLAN NOTE
Lab Results   Component Value Date    HGBA1C 6.2 (H) 03/19/2025   History of type 2 diabetes which was well-controlled at last office visit.  Reports stopping metformin a few months ago as Danielle read up side effects and didn't like it.  Continues to monitor BS twice daily with range in the 130s.  Did not bring records.    Haven't been feeling well in the mornings typically higher FBS than later in the day. Ate pizza cheese and pepperoni for dinner.  Denies any signs symptoms of hypoglycemia.  -I have asked Danielle to bring blood sugar records to follow-up.  I have asked Danielle to continue to optimize nutrition and physical activity.  We will recheck A1c prior to next appointment in 3 months.

## 2025-06-19 NOTE — ASSESSMENT & PLAN NOTE
Latest Reference Range & Units 03/19/25 10:50   Cholesterol 100 - 199 mg/dL 128   Triglycerides 0 - 149 mg/dL 38   HDL >39 mg/dL 60   LDL Calculated 0 - 99 mg/dL 58   VLDL Cholesterol Nate 5 - 40 mg/dL 10     History of hypercholesterolemia with associated bilateral carotid artery stenosis and CVA.  Reports since last office visit Danielle stopped taking atorvastatin 20 mg p.o. daily.  Is now taking red yeast rice and omega-3.  Advised against this given history of CVA and BRANDEE with verbalized understanding.  Declines restarting atorvastatin at this time.

## 2025-06-19 NOTE — PROGRESS NOTES
[facility]   Jesenia VALENCIA    Assessment/Plan:   1. Essential hypertension  Assessment & Plan:  History of hypertension with medication adherence to diltiazem 120 mg p.o. daily as well as tamsulosin 0.4 mg daily.  Blood pressure well-controlled today.  Denies chest pressure pain palpitations does have occasional REDDING which has improved.  No lightheadedness dizziness or headache.  Will continue current medical management.  Orders:  -     Comprehensive metabolic panel; Future  -     Comprehensive metabolic panel  2. Type 2 diabetes mellitus with other diabetic neurological complication (HCC)  Assessment & Plan:    Lab Results   Component Value Date    HGBA1C 6.2 (H) 03/19/2025   History of type 2 diabetes which was well-controlled at last office visit.  Reports stopping metformin a few months ago as Danielle read up side effects and didn't like it.  Continues to monitor BS twice daily with range in the 130s.  Did not bring records.    Haven't been feeling well in the mornings typically higher FBS than later in the day. Ate pizza cheese and pepperoni for dinner.  Denies any signs symptoms of hypoglycemia.  -I have asked Danielle to bring blood sugar records to follow-up.  I have asked Danielle to continue to optimize nutrition and physical activity.  We will recheck A1c prior to next appointment in 3 months.  Orders:  -     Hemoglobin A1C; Future  -     Hemoglobin A1C  3. Hypercholesterolemia  Assessment & Plan:   Latest Reference Range & Units 03/19/25 10:50   Cholesterol 100 - 199 mg/dL 128   Triglycerides 0 - 149 mg/dL 38   HDL >39 mg/dL 60   LDL Calculated 0 - 99 mg/dL 58   VLDL Cholesterol Nate 5 - 40 mg/dL 10     History of hypercholesterolemia with associated bilateral carotid artery stenosis and CVA.  Reports since last office visit Danielle stopped taking atorvastatin 20 mg p.o. daily.  Is now taking red yeast rice and omega-3.  Advised against this given history of CVA and BRANDEE with verbalized  "understanding.  Declines restarting atorvastatin at this time.  4. Stage 2 chronic kidney disease  Assessment & Plan:  Lab Results   Component Value Date    EGFR 51 (L) 03/19/2025    EGFR 76 02/28/2024    EGFR 72 02/27/2024    CREATININE 1.09 (H) 03/19/2025    CREATININE 1.03 02/25/2025    CREATININE 0.95 02/24/2025   History of CKD 2.  Reports optimizing nutrition hydration and avoiding nephrotoxic agents.  Will recheck CMP prior to follow-up in 3 months.  Orders:  -     Comprehensive metabolic panel; Future  -     Comprehensive metabolic panel  5. Anxiety about health  Assessment & Plan:  Here with concerns of ongoing health conditions.  Struggling with MGUS diagnosis and wants more aggressive follow up.  HX Renal cell carcinoma which is followed by Oncology.  Denies depression but endorses anxiety regarding one's health.  Discussed how being adopted was stressful then developed scoliosis which in Danielle's perspective is a \" girls condition\".  Has interpersonal discord with siblings which also shape Danielle's overall well being.  Has supportive spouse and children.  Not currently in therapeutic services for past trauma or anxious response of current health status but notes it would be beneficial.  Declines medicinal intervention as Danielle does not want to take more medications which I completely respect.  Discussed and agreeable to behavioral health consult.  Also asked Danielle to look into insurance options for therapy as well as [email or web address]      Orders:  -     Ambulatory referral to Psych Services; Future  6. Symptomatic anemia  Assessment & Plan:  History of symptomatic anemia with recent hospitalization in February requiring 1 unit PRBC.  Has since had repeat colonoscopy by GI and deemed stable.  Was then diagnosed with  MGUS.  Was placed on oral iron supplementation to which Danielle admits inconsistency with adherence.  Does note when taken Danielle does feel better.     - Requested Danielle to take iron supplementation " consistently.  Will continue to follow with GI as well as heme-onc.        Please bring blood sugar records to follow up.    Please restart iron supplementation   Check labwork prior to follow up.    Please look into therapist regarding overall anxiety in regards to health concerns.  [email or web address], look into insurance options.   Return in about 3 months (around 9/19/2025) for Annual Medicare Wellness Visit.  Patient may call or return to office with any questions or concerns.     ______________________________________________________________________  Subjective:     Patient ID: Tevin Bazan is a 79 y.o. adult.  Tevin Bazan  Chief Complaint   Patient presents with   • Follow-up     Per A/P             The following portions of the patient's history were reviewed and updated as appropriate: allergies, current medications, past family history, past medical history, past social history, past surgical history, and problem list.    Review of Systems   Constitutional:  Positive for fatigue. Negative for activity change, appetite change, chills, fever and unexpected weight change.   HENT: Negative.  Negative for congestion, ear pain, postnasal drip and sinus pain.    Eyes: Negative.    Respiratory: Negative.  Negative for cough and shortness of breath.         Mild REDDING   Cardiovascular: Negative.  Negative for chest pain and leg swelling.   Gastrointestinal: Negative.  Negative for constipation and diarrhea.   Endocrine: Negative.    Genitourinary: Negative.  Negative for dysuria.   Musculoskeletal:  Positive for arthralgias and gait problem.   Skin: Negative.    Allergic/Immunologic: Negative.  Negative for immunocompromised state.   Neurological:  Positive for speech difficulty. Negative for dizziness and light-headedness.   Hematological: Negative.    Psychiatric/Behavioral:  Positive for decreased concentration. Negative for sleep disturbance.          Objective:      Vitals:    06/19/25 0744   BP:  108/58   Pulse: 94   SpO2: 95%      Physical Exam  Vitals and nursing note reviewed.   Constitutional:       General: Danielle is awake. Danielle is not in acute distress.     Appearance: Normal appearance. Danielle is not ill-appearing.   HENT:      Head: Normocephalic and atraumatic.      Right Ear: Tympanic membrane, ear canal and external ear normal.      Left Ear: Tympanic membrane, ear canal and external ear normal.      Nose: Nose normal.      Mouth/Throat:      Mouth: Mucous membranes are moist.      Dentition: Abnormal dentition.      Pharynx: Oropharynx is clear.     Eyes:      Extraocular Movements: Extraocular movements intact.      Conjunctiva/sclera: Conjunctivae normal.      Pupils: Pupils are equal, round, and reactive to light.       Cardiovascular:      Rate and Rhythm: Normal rate and regular rhythm.      Pulses: Normal pulses.      Heart sounds: Murmur heard.   Pulmonary:      Effort: Pulmonary effort is normal.      Breath sounds: Normal breath sounds.   Abdominal:      General: Bowel sounds are normal.      Palpations: Abdomen is soft.     Musculoskeletal:         General: Deformity present. Normal range of motion.      Cervical back: Normal range of motion and neck supple.      Thoracic back: Scoliosis present.      Lumbar back: Scoliosis present.      Comments: Pronounced scoliosis      Skin:     General: Skin is warm and dry.      Coloration: Skin is pale.     Neurological:      General: No focal deficit present.      Mental Status: Danielle is alert and oriented to person, place, and time.      Cranial Nerves: Dysarthria present.      Sensory: Sensory deficit present.      Gait: Gait abnormal.      Comments: Bowlegged  Can be forgetful at times which is baseline   Psychiatric:         Mood and Affect: Mood normal.         Behavior: Behavior normal. Behavior is cooperative.         Thought Content: Thought content normal.         Judgment: Judgment normal.         Portions of the record may have been  "created with voice recognition software. Occasional wrong word or \"sound alike\" substitutions may have occurred due to the inherent limitations of voice recognition software. Please review the chart carefully and recognize, using context, where substitutions/typographical errors may have occurred.       "

## 2025-06-19 NOTE — ASSESSMENT & PLAN NOTE
Lab Results   Component Value Date    EGFR 51 (L) 03/19/2025    EGFR 76 02/28/2024    EGFR 72 02/27/2024    CREATININE 1.09 (H) 03/19/2025    CREATININE 1.03 02/25/2025    CREATININE 0.95 02/24/2025   History of CKD 2.  Reports optimizing nutrition hydration and avoiding nephrotoxic agents.  Will recheck CMP prior to follow-up in 3 months.

## 2025-06-19 NOTE — TELEPHONE ENCOUNTER
Writer attempted to contact pt regarding referral for [facility] to verify services needed to place him on [facility] wait list. Lvm to call writer back.

## 2025-06-19 NOTE — ASSESSMENT & PLAN NOTE
History of hypertension with medication adherence to diltiazem 120 mg p.o. daily as well as tamsulosin 0.4 mg daily.  Blood pressure well-controlled today.  Denies chest pressure pain palpitations does have occasional REDDING which has improved.  No lightheadedness dizziness or headache.  Will continue current medical management.

## 2025-06-19 NOTE — ASSESSMENT & PLAN NOTE
"Here with concerns of ongoing health conditions.  Struggling with MGUS diagnosis and wants more aggressive follow up.  HX Renal cell carcinoma which is followed by Oncology.  Denies depression but endorses anxiety regarding one's health.  Discussed how being adopted was stressful then developed scoliosis which in Danielle's perspective is a \" girls condition\".  Has interpersonal discord with siblings which also shape Danielle's overall well being.  Has supportive spouse and children.  Not currently in therapeutic services for past trauma or anxious response of current health status but notes it would be beneficial.  Declines medicinal intervention as Danielle does not want to take more medications which I completely respect.  Discussed and agreeable to behavioral health consult.  Also asked Danielle to look into insurance options for therapy as well as [email or web address]      "

## 2025-06-19 NOTE — ASSESSMENT & PLAN NOTE
History of symptomatic anemia with recent hospitalization in February requiring 1 unit PRBC.  Has since had repeat colonoscopy by GI and deemed stable.  Was then diagnosed with  MGUS.  Was placed on oral iron supplementation to which Danielle admits inconsistency with adherence.  Does note when taken Danielle does feel better.     - Requested Danielle to take iron supplementation consistently.  Will continue to follow with GI as well as heme-onc.

## 2025-06-19 NOTE — PATIENT INSTRUCTIONS
Please bring blood sugar records to follow up.    Please restart iron supplementation   Check labwork prior to follow up.    Please look into therapist regarding overall anxiety in regards to health concerns.  [email or web address], look into insurance options.

## 2025-06-24 NOTE — TELEPHONE ENCOUNTER
2nd attempt to contact pt to verify services needed to place him on [facility] wait list. Lvm to call writer back.

## 2025-07-23 ENCOUNTER — APPOINTMENT (EMERGENCY)
Dept: CT IMAGING | Facility: HOSPITAL | Age: 80
End: 2025-07-23
Payer: COMMERCIAL

## 2025-07-23 ENCOUNTER — HOSPITAL ENCOUNTER (INPATIENT)
Facility: HOSPITAL | Age: 80
LOS: 1 days | Discharge: HOME | End: 2025-07-24
Attending: EMERGENCY MEDICINE | Admitting: INTERNAL MEDICINE
Payer: COMMERCIAL

## 2025-07-23 DIAGNOSIS — R20.2 NUMBNESS AND TINGLING IN LEFT HAND: ICD-10-CM

## 2025-07-23 DIAGNOSIS — I67.9 CEREBROVASCULAR DISEASE: ICD-10-CM

## 2025-07-23 DIAGNOSIS — R41.3 AMNESIA: ICD-10-CM

## 2025-07-23 DIAGNOSIS — N30.90 CYSTITIS: ICD-10-CM

## 2025-07-23 DIAGNOSIS — K29.70 GASTRITIS: ICD-10-CM

## 2025-07-23 DIAGNOSIS — K59.00 CONSTIPATION: ICD-10-CM

## 2025-07-23 DIAGNOSIS — R41.82 ALTERED MENTAL STATUS: ICD-10-CM

## 2025-07-23 DIAGNOSIS — R29.90 STROKE-LIKE SYMPTOMS: Primary | ICD-10-CM

## 2025-07-23 DIAGNOSIS — N40.0 BENIGN PROSTATIC HYPERPLASIA WITHOUT LOWER URINARY TRACT SYMPTOMS: Primary | ICD-10-CM

## 2025-07-23 DIAGNOSIS — R07.9 CHEST PAIN: ICD-10-CM

## 2025-07-23 DIAGNOSIS — R20.0 NUMBNESS AND TINGLING IN LEFT HAND: ICD-10-CM

## 2025-07-23 PROBLEM — R73.03 PREDIABETES: Status: ACTIVE | Noted: 2019-12-06

## 2025-07-23 PROBLEM — G93.41 METABOLIC ENCEPHALOPATHY: Status: ACTIVE | Noted: 2025-07-23

## 2025-07-23 LAB
2HR DELTA HS TROPONIN: 4 NG/L
ALBUMIN SERPL BCG-MCNC: 4.1 G/DL (ref 3.5–5)
ALP SERPL-CCNC: 105 U/L (ref 34–104)
ALT SERPL W P-5'-P-CCNC: 13 U/L (ref 7–52)
ANION GAP SERPL CALCULATED.3IONS-SCNC: 8 MMOL/L (ref 4–13)
APTT PPP: 32 SECONDS (ref 23–34)
AST SERPL W P-5'-P-CCNC: 13 U/L (ref 13–39)
BACTERIA UR QL AUTO: NORMAL /HPF
BILIRUB DIRECT SERPL-MCNC: 0.09 MG/DL (ref 0–0.2)
BILIRUB SERPL-MCNC: 0.36 MG/DL (ref 0.2–1)
BILIRUB UR QL STRIP: NEGATIVE
BUN SERPL-MCNC: 34 MG/DL (ref 5–25)
CALCIUM SERPL-MCNC: 9.2 MG/DL (ref 8.4–10.2)
CARDIAC TROPONIN I PNL SERPL HS: 11 NG/L (ref ?–50)
CARDIAC TROPONIN I PNL SERPL HS: 7 NG/L (ref ?–50)
CHLORIDE SERPL-SCNC: 105 MMOL/L (ref 96–108)
CLARITY UR: CLEAR
CO2 SERPL-SCNC: 25 MMOL/L (ref 21–32)
COLOR UR: YELLOW
CREAT SERPL-MCNC: 1.09 MG/DL (ref 0.6–1.3)
ERYTHROCYTE [DISTWIDTH] IN BLOOD BY AUTOMATED COUNT: 18.1 % (ref 11.6–15.1)
GLUCOSE SERPL-MCNC: 110 MG/DL (ref 65–140)
GLUCOSE SERPL-MCNC: 111 MG/DL (ref 65–140)
GLUCOSE UR STRIP-MCNC: NEGATIVE MG/DL
HCT VFR BLD AUTO: 35.4 % (ref 36.5–46.1)
HGB BLD-MCNC: 10.6 G/DL (ref 12–15.4)
HGB UR QL STRIP.AUTO: NEGATIVE
INR PPP: 1.05 (ref 0.85–1.19)
KETONES UR STRIP-MCNC: NEGATIVE MG/DL
LEUKOCYTE ESTERASE UR QL STRIP: NEGATIVE
MCH RBC QN AUTO: 24.5 PG (ref 26.8–34.3)
MCHC RBC AUTO-ENTMCNC: 29.9 G/DL (ref 31.4–37.4)
MCV RBC AUTO: 82 FL (ref 82–98)
NITRITE UR QL STRIP: NEGATIVE
NON-SQ EPI CELLS URNS QL MICRO: NORMAL /HPF
PH UR STRIP.AUTO: 6 [PH]
PLATELET # BLD AUTO: 277 THOUSANDS/UL (ref 149–390)
PMV BLD AUTO: 9.6 FL (ref 8.9–12.7)
POTASSIUM SERPL-SCNC: 4.2 MMOL/L (ref 3.5–5.3)
PROT SERPL-MCNC: 7.6 G/DL (ref 6.4–8.4)
PROT UR STRIP-MCNC: ABNORMAL MG/DL
PROTHROMBIN TIME: 14.2 SECONDS (ref 12.3–15)
RBC # BLD AUTO: 4.32 MILLION/UL (ref 3.88–5.12)
RBC #/AREA URNS AUTO: NORMAL /HPF
SODIUM SERPL-SCNC: 138 MMOL/L (ref 135–147)
SP GR UR STRIP.AUTO: <1.005 (ref 1–1.03)
UROBILINOGEN UR STRIP-ACNC: <2 MG/DL
WBC # BLD AUTO: 9.95 THOUSAND/UL (ref 4.31–10.16)
WBC #/AREA URNS AUTO: NORMAL /HPF

## 2025-07-23 PROCEDURE — 36415 COLL VENOUS BLD VENIPUNCTURE: CPT | Performed by: EMERGENCY MEDICINE

## 2025-07-23 PROCEDURE — 81001 URINALYSIS AUTO W/SCOPE: CPT | Performed by: EMERGENCY MEDICINE

## 2025-07-23 PROCEDURE — 80076 HEPATIC FUNCTION PANEL: CPT | Performed by: EMERGENCY MEDICINE

## 2025-07-23 PROCEDURE — 85610 PROTHROMBIN TIME: CPT | Performed by: EMERGENCY MEDICINE

## 2025-07-23 PROCEDURE — 70498 CT ANGIOGRAPHY NECK: CPT

## 2025-07-23 PROCEDURE — 74174 CTA ABD&PLVS W/CONTRAST: CPT

## 2025-07-23 PROCEDURE — 99222 1ST HOSP IP/OBS MODERATE 55: CPT

## 2025-07-23 PROCEDURE — 99285 EMERGENCY DEPT VISIT HI MDM: CPT | Performed by: EMERGENCY MEDICINE

## 2025-07-23 PROCEDURE — 84484 ASSAY OF TROPONIN QUANT: CPT | Performed by: EMERGENCY MEDICINE

## 2025-07-23 PROCEDURE — 93005 ELECTROCARDIOGRAM TRACING: CPT

## 2025-07-23 PROCEDURE — 85027 COMPLETE CBC AUTOMATED: CPT | Performed by: EMERGENCY MEDICINE

## 2025-07-23 PROCEDURE — 80048 BASIC METABOLIC PNL TOTAL CA: CPT | Performed by: EMERGENCY MEDICINE

## 2025-07-23 PROCEDURE — 85730 THROMBOPLASTIN TIME PARTIAL: CPT | Performed by: EMERGENCY MEDICINE

## 2025-07-23 PROCEDURE — 82948 REAGENT STRIP/BLOOD GLUCOSE: CPT

## 2025-07-23 PROCEDURE — 99285 EMERGENCY DEPT VISIT HI MDM: CPT

## 2025-07-23 PROCEDURE — 70496 CT ANGIOGRAPHY HEAD: CPT

## 2025-07-23 PROCEDURE — 71275 CT ANGIOGRAPHY CHEST: CPT

## 2025-07-23 RX ORDER — ALBUTEROL SULFATE 90 UG/1
2 INHALANT RESPIRATORY (INHALATION) EVERY 6 HOURS PRN
Status: DISCONTINUED | OUTPATIENT
Start: 2025-07-23 | End: 2025-07-24 | Stop reason: HOSPADM

## 2025-07-23 RX ORDER — ACETAMINOPHEN 325 MG/1
650 TABLET ORAL EVERY 6 HOURS PRN
Status: DISCONTINUED | OUTPATIENT
Start: 2025-07-23 | End: 2025-07-24 | Stop reason: HOSPADM

## 2025-07-23 RX ORDER — TICAGRELOR 90 MG/1
90 TABLET, FILM COATED ORAL ONCE
Status: COMPLETED | OUTPATIENT
Start: 2025-07-23 | End: 2025-07-23

## 2025-07-23 RX ORDER — FERROUS SULFATE 325(65) MG
325 TABLET ORAL
Status: DISCONTINUED | OUTPATIENT
Start: 2025-07-24 | End: 2025-07-24 | Stop reason: HOSPADM

## 2025-07-23 RX ORDER — TICAGRELOR 90 MG/1
90 TABLET, FILM COATED ORAL EVERY 12 HOURS SCHEDULED
Status: DISCONTINUED | OUTPATIENT
Start: 2025-07-24 | End: 2025-07-24 | Stop reason: HOSPADM

## 2025-07-23 RX ORDER — FAMOTIDINE 20 MG/1
40 TABLET, FILM COATED ORAL
Status: DISCONTINUED | OUTPATIENT
Start: 2025-07-23 | End: 2025-07-24 | Stop reason: HOSPADM

## 2025-07-23 RX ORDER — ASPIRIN 81 MG/1
81 TABLET ORAL DAILY
Status: DISCONTINUED | OUTPATIENT
Start: 2025-07-24 | End: 2025-07-24 | Stop reason: HOSPADM

## 2025-07-23 RX ORDER — ONDANSETRON 2 MG/ML
4 INJECTION INTRAMUSCULAR; INTRAVENOUS EVERY 4 HOURS PRN
Status: DISCONTINUED | OUTPATIENT
Start: 2025-07-23 | End: 2025-07-24 | Stop reason: HOSPADM

## 2025-07-23 RX ORDER — ATORVASTATIN CALCIUM 40 MG/1
40 TABLET, FILM COATED ORAL EVERY EVENING
Status: DISCONTINUED | OUTPATIENT
Start: 2025-07-23 | End: 2025-07-24 | Stop reason: HOSPADM

## 2025-07-23 RX ORDER — ASPIRIN 325 MG
325 TABLET ORAL ONCE
Status: COMPLETED | OUTPATIENT
Start: 2025-07-23 | End: 2025-07-23

## 2025-07-23 RX ORDER — ENOXAPARIN SODIUM 100 MG/ML
40 INJECTION SUBCUTANEOUS DAILY
Status: DISCONTINUED | OUTPATIENT
Start: 2025-07-24 | End: 2025-07-24 | Stop reason: HOSPADM

## 2025-07-23 RX ORDER — ASCORBIC ACID, THIAMINE MONONITRATE,RIBOFLAVIN, NIACINAMIDE, PYRIDOXINE HYDROCHLORIDE, FOLIC ACID, CYANOCOBALAMIN, BIOTIN, CALCIUM PANTOTHENATE, 100; 1.5; 1.7; 20; 10; 1; 6000; 150000; 5 MG/1; MG/1; MG/1; MG/1; MG/1; MG/1; UG/1; UG/1; MG/1
1 CAPSULE, LIQUID FILLED ORAL DAILY
Status: DISCONTINUED | OUTPATIENT
Start: 2025-07-24 | End: 2025-07-24 | Stop reason: HOSPADM

## 2025-07-23 RX ORDER — TAMSULOSIN HYDROCHLORIDE 0.4 MG/1
0.4 CAPSULE ORAL
Status: DISCONTINUED | OUTPATIENT
Start: 2025-07-24 | End: 2025-07-24 | Stop reason: HOSPADM

## 2025-07-23 RX ORDER — DILTIAZEM HYDROCHLORIDE 120 MG/1
120 CAPSULE, COATED, EXTENDED RELEASE ORAL DAILY
Status: DISCONTINUED | OUTPATIENT
Start: 2025-07-24 | End: 2025-07-24 | Stop reason: HOSPADM

## 2025-07-23 RX ORDER — TAMSULOSIN HYDROCHLORIDE 0.4 MG/1
0.4 CAPSULE ORAL EVERY 24 HOURS
Qty: 90 CAPSULE | Refills: 1 | Status: SHIPPED | OUTPATIENT
Start: 2025-07-23

## 2025-07-23 RX ADMIN — FAMOTIDINE 40 MG: 20 TABLET, FILM COATED ORAL at 22:25

## 2025-07-23 RX ADMIN — ASPIRIN 325 MG ORAL TABLET 325 MG: 325 PILL ORAL at 21:31

## 2025-07-23 RX ADMIN — TICAGRELOR 90 MG: 90 TABLET ORAL at 21:30

## 2025-07-23 RX ADMIN — SODIUM CHLORIDE 500 ML: 0.9 INJECTION, SOLUTION INTRAVENOUS at 21:31

## 2025-07-23 RX ADMIN — IOHEXOL 85 ML: 350 INJECTION, SOLUTION INTRAVENOUS at 19:29

## 2025-07-23 RX ADMIN — ATORVASTATIN CALCIUM 40 MG: 40 TABLET, FILM COATED ORAL at 22:25

## 2025-07-23 RX ADMIN — IOHEXOL 100 ML: 350 INJECTION, SOLUTION INTRAVENOUS at 19:29

## 2025-07-23 NOTE — ED PROVIDER NOTES
Time reflects when diagnosis was documented in both MDM as applicable and the Disposition within this note       Time User Action Codes Description Comment    7/23/2025  6:57 PM Lamberto Coombs [R29.90] Stroke-like symptoms     7/23/2025  8:50 PM Lamberto Coombs [R41.82] Altered mental status     7/23/2025  8:50 PM Lamberto Coombs [R07.9] Chest pain     7/23/2025  8:50 PM Lamberto Coombs [R41.3] Amnesia     7/23/2025  8:51 PM Lamberto Coombs Add [R20.0,  R20.2] Numbness and tingling in left hand     7/23/2025  8:51 PM Lamberto Coombs [K29.70] Gastritis     7/23/2025  8:51 PM Lamberto Coombs [K59.00] Constipation     7/23/2025  8:51 PM Lamberto Coombs [N30.90] Cystitis     7/23/2025  9:52 PM Pippa Jones [I67.9] Cerebrovascular disease           ED Disposition       ED Disposition   Admit    Condition   Stable    Date/Time   Wed Jul 23, 2025  9:06 PM    Comment   Case was discussed with Mae and the patient's admission status was agreed to be Admission Status: inpatient status to the service of Dr. DONOVAN Connolly .               Assessment & Plan       Medical Decision Making  Differential includes stroke, TIA, global amnesia, less likely infection, uremia, toxins.  Patient has chest pain as well so differential also includes arrhythmia electrolyte abnormality coronary syndrome angina, less likely aortic dissection    Stroke alert called - reviewed with neuro Willem - if new symptoms, admit, give aspirin and brilinta    Amount and/or Complexity of Data Reviewed  Labs: ordered.  Radiology: ordered.    Risk  OTC drugs.  Prescription drug management.  Decision regarding hospitalization.             Medications   iohexol (OMNIPAQUE) 350 MG/ML injection (MULTI-DOSE) 100 mL (100 mL Intravenous Given 7/23/25 1929)   iohexol (OMNIPAQUE) 350 MG/ML injection (MULTI-DOSE) 85 mL (85 mL Intravenous Given 7/23/25 1929)   sodium chloride 0.9 % bolus 500 mL (500 mL Intravenous New Bag 7/23/25 2131)   aspirin tablet 325 mg (325  mg Oral Given 7/23/25 2131)   ticagrelor (BRILINTA) tablet 90 mg (90 mg Oral Given 7/23/25 2130)       ED Risk Strat Scores         Stroke Assessment       Row Name 07/23/25 1906             NIH Stroke Scale    Interval Baseline      Level of Consciousness (1a.) 0      LOC Questions (1b.) 1      LOC Commands (1c.) 0      Best Gaze (2.) 0      Visual (3.) 0      Facial Palsy (4.) 0      Motor Arm, Left (5a.) 0      Motor Arm, Right (5b.) 0      Motor Leg, Left (6a.) 0      Motor Leg, Right (6b.) 0      Limb Ataxia (7.) 0      Sensory (8.) 0      Best Language (9.) 0      Dysarthria (10.) 0      Extinction and Inattention (11.) (Formerly Neglect) 0      Total 1                    Flowsheet Row Most Recent Value   Thrombolytic Decision Options    Thrombolytic Decision Patient not a candidate.   Patient is not a candidate options Symptoms resolved/clearly non disabling.                    No data recorded                            History of Present Illness       Chief Complaint   Patient presents with    Altered Mental Status     Pt wife said he had midsternum chest pain that started piror to arrival. Pt took 2 nitro. Pt denies pain right now. Pt does not remember having any chest pain. Per wife LWK was around 1700 pt was more confused and was not acting right. Per wife pt was confused and did not know his bday.       Past Medical History[1]   Past Surgical History[2]   Family History[3]   Social History[4]   E-Cigarette/Vaping    E-Cigarette Use Never User       E-Cigarette/Vaping Substances    Nicotine No     THC No     CBD No     Flavoring No     Other No     Unknown No       I have reviewed and agree with the history as documented.     History from patient and wife, past medical history stroke hypertension angina presents with concern for confusion and left arm and hand numbness last known normal was 5 PM today.  Patient is disoriented and does not have any pain or complaints at this time.  He admits to feeling  confused. Wife states that patient felt lousy around 6 PM he was laying on the couch with chest pain.  She was asking him questions and he did not remember his age.  He did not remember picking up the truck earlier in the day.  Patient does not seem to remember events that happened during the day.  Then on the way driving over here he said he had left hand numbness and he was shaking it.  Again he does not recall this now. he complained of numbness in his left hand.  She noticed he was confused and not making sense.        Review of Systems   Constitutional:  Negative for chills and fever.   HENT:  Negative for rhinorrhea and sore throat.    Respiratory:  Negative for shortness of breath.    Cardiovascular:  Negative for chest pain.   Gastrointestinal:  Negative for constipation, diarrhea, nausea and vomiting.   Genitourinary:  Negative for dysuria and frequency.   Skin:  Negative for rash.   Psychiatric/Behavioral:  Positive for confusion.    All other systems reviewed and are negative.          Objective       ED Triage Vitals   Temperature Pulse Blood Pressure Respirations SpO2 Patient Position - Orthostatic VS   07/23/25 1900 07/23/25 1859 07/23/25 1859 07/23/25 1859 07/23/25 1900 07/23/25 1905   98.2 °F (36.8 °C) 65 126/68 17 97 % Lying      Temp Source Heart Rate Source BP Location FiO2 (%) Pain Score    07/23/25 2202 07/23/25 1859 07/23/25 1902 -- 07/23/25 2202    Oral Monitor Right arm  No Pain      Vitals      Date and Time Temp Pulse SpO2 Resp BP Pain Score FACES Pain Rating User   07/23/25 2300 -- -- -- 20 -- -- --    07/23/25 2300 98.3 °F (36.8 °C) 77 97 % -- 149/72 -- -- DII   07/23/25 2242 98.1 °F (36.7 °C) 83 -- 20 130/63 -- --    07/23/25 2202 -- -- -- -- -- No Pain --    07/23/25 2202 98.1 °F (36.7 °C) 83 97 % 20 130/63 -- -- DII   07/23/25 2100 -- 65 96 % -- 133/73 -- -- KW   07/23/25 2045 -- 66 96 % 20 149/68 -- -- KW 07/23/25 2030 -- 72 97 % 20 138/74 -- -- KW 07/23/25 2015 -- 69 97 %  20 151/72 -- -- KW   07/23/25 2000 -- 65 97 % 20 149/68 -- -- KW   07/23/25 1945 -- 68 97 % 20 147/70 -- -- KW   07/23/25 1930 -- 82 97 % 18 141/65 -- -- KW   07/23/25 1915 -- 62 97 % 18 130/63 -- -- KW   07/23/25 1905 -- 61 97 % 16 108/61 -- -- KW   07/23/25 1903 -- -- -- -- 126/68 -- -- SS   07/23/25 1902 -- -- -- -- 123/68 -- --    07/23/25 1900 98.2 °F (36.8 °C) 68 97 % 17 131/71 -- --    07/23/25 1859 -- 65 -- 17 126/68 -- --             Physical Exam  Vitals and nursing note reviewed.   Constitutional:       Appearance: Danielle is well-developed.   HENT:      Head: Normocephalic and atraumatic.      Right Ear: External ear normal.      Left Ear: External ear normal.      Nose: Nose normal.     Eyes:      General: No visual field deficit.     Conjunctiva/sclera: Conjunctivae normal.      Pupils: Pupils are equal, round, and reactive to light.       Cardiovascular:      Rate and Rhythm: Normal rate and regular rhythm.      Heart sounds: Normal heart sounds.   Pulmonary:      Effort: Pulmonary effort is normal. No respiratory distress.      Breath sounds: Normal breath sounds. No wheezing.   Abdominal:      General: Bowel sounds are normal. There is no distension.      Palpations: Abdomen is soft.      Tenderness: There is no abdominal tenderness.     Musculoskeletal:         General: No deformity. Normal range of motion.      Cervical back: Normal range of motion and neck supple. No spinous process tenderness.     Skin:     General: Skin is warm and dry.      Findings: No rash.     Neurological:      General: No focal deficit present.      Mental Status: Danielle is disoriented and confused.      GCS: GCS eye subscore is 4. GCS verbal subscore is 4. GCS motor subscore is 6.      Cranial Nerves: No cranial nerve deficit, dysarthria or facial asymmetry.      Sensory: No sensory deficit.      Motor: No weakness.      Coordination: Coordination normal.     Psychiatric:         Mood and Affect: Mood is anxious.          Results Reviewed       Procedure Component Value Units Date/Time    HS Troponin I 2hr [[id number]]  (Normal) Collected: 07/23/25 2116    Lab Status: Final result Specimen: Blood from Arm, Right Updated: 07/23/25 2142     hs TnI 2hr 11 ng/L      Delta 2hr hsTnI 4 ng/L     Urine Microscopic [[id number]]  (Normal) Collected: 07/23/25 2116    Lab Status: Final result Specimen: Urine, Clean Catch Updated: 07/23/25 2139     RBC, UA None Seen /hpf      WBC, UA 0-1 /hpf      Epithelial Cells Occasional /hpf      Bacteria, UA None Seen /hpf     UA w Reflex to Microscopic w Reflex to Culture [[id number]]  (Abnormal) Collected: 07/23/25 2116    Lab Status: Final result Specimen: Urine, Clean Catch Updated: 07/23/25 2127     Color, UA Yellow     Clarity, UA Clear     Specific Gravity, UA <1.005     pH, UA 6.0     Leukocytes, UA Negative     Nitrite, UA Negative     Protein, UA 30 (1+) mg/dl      Glucose, UA Negative mg/dl      Ketones, UA Negative mg/dl      Urobilinogen, UA <2.0 mg/dl      Bilirubin, UA Negative     Occult Blood, UA Negative    Hepatic function panel [[id number]]  (Abnormal) Collected: 07/23/25 1902    Lab Status: Final result Specimen: Blood from Arm, Right Updated: 07/23/25 1943     Total Bilirubin 0.36 mg/dL      Bilirubin, Direct 0.09 mg/dL      Alkaline Phosphatase 105 U/L      AST 13 U/L      ALT 13 U/L      Total Protein 7.6 g/dL      Albumin 4.1 g/dL     HS Troponin 0hr (reflex protocol) [[id number]]  (Normal) Collected: 07/23/25 1902    Lab Status: Final result Specimen: Blood from Arm, Right Updated: 07/23/25 1935     hs TnI 0hr 7 ng/L     Basic metabolic panel [[id number]]  (Abnormal) Collected: 07/23/25 1902    Lab Status: Final result Specimen: Blood from Arm, Right Updated: 07/23/25 1928     Sodium 138 mmol/L      Potassium 4.2 mmol/L      Chloride 105 mmol/L      CO2 25 mmol/L      ANION GAP 8 mmol/L      BUN 34 mg/dL      Creatinine 1.09 mg/dL      Glucose 110 mg/dL      Calcium 9.2 mg/dL       eGFR --    Narrative:      Notes:     1. eGFR calculation is only valid for adults 18 years and older.  2. EGFR calculation cannot be performed for patients who are transgender, non-binary, or whose legal sex, sex at birth, and gender identity differ.    Protime-INR [[id number]]  (Normal) Collected: 07/23/25 1902    Lab Status: Final result Specimen: Blood from Arm, Right Updated: 07/23/25 1928     Protime 14.2 seconds      INR 1.05    Narrative:      INR Therapeutic Range    Indication                                             INR Range      Atrial Fibrillation                                               2.0-3.0  Hypercoagulable State                                    2.0.2.3  Left Ventricular Asist Device                            2.0-3.0  Mechanical Heart Valve                                  -    Aortic(with afib, MI, embolism, HF, LA enlargement,    and/or coagulopathy)                                     2.0-3.0 (2.5-3.5)     Mitral                                                             2.5-3.5  Prosthetic/Bioprosthetic Heart Valve               2.0-3.0  Venous thromboembolism (VTE: VT, PE        2.0-3.0    APTT [[id number]]  (Normal) Collected: 07/23/25 1902    Lab Status: Final result Specimen: Blood from Arm, Right Updated: 07/23/25 1928     PTT 32 seconds     CBC and Platelet [[id number]]  (Abnormal) Collected: 07/23/25 1902    Lab Status: Final result Specimen: Blood from Arm, Right Updated: 07/23/25 1912     WBC 9.95 Thousand/uL      RBC 4.32 Million/uL      Hemoglobin 10.6 g/dL      Hematocrit 35.4 %      MCV 82 fL      MCH 24.5 pg      MCHC 29.9 g/dL      RDW 18.1 %      Platelets 277 Thousands/uL      MPV 9.6 fL     Fingerstick Glucose (POCT) [[id number]]  (Normal) Collected: 07/23/25 1856    Lab Status: Final result Specimen: Blood Updated: 07/23/25 1857     POC Glucose 111 mg/dl             CT stroke alert brain   Final Interpretation by Lefty Bruce MD (07/23 1941)      No acute  intracranial abnormality.  Chronic microangiopathic changes and chronic infarctions as above.      Findings were directly communicated with Pedro Bangura   at approximately 7:37 p.m 7/23/2025 .            Workstation performed: [id number]         CTA stroke alert (head/neck)   Final Interpretation by Lefty Bruce MD (07/23 1941)      No large vessel occlusion or high-grade stenosis in the head or neck.         I personally discussed this study with ELIZABETH GUY on 7/23/2025 7:40 PM.         Workstation performed: [id number]         CTA dissection protocol chest/abdomen/pelvis   Final Interpretation by Sathya Louis MD (07/23 2038)      1.  No thoracoabdominal aortic aneurysm or dissection. Extensive calcific atherosclerosis noted with associated multifocal areas of mild to moderate luminal narrowing involving the abdominal aorta and proximal branches in the chest, abdomen, and pelvis.    However, no occlusion or hemodynamically significant flow-limiting atherosclerotic stenosis of the aorta or major aortic branch vessels in the chest, abdomen or pelvis identified.   2.  No acute pulmonary embolism.   3.  Wall thickening of the mid to lower esophagus could reflect reflux esophagitis. Small hiatal hernia containing the proximal stomach.   4.  Mild wall thickening of the stomach could be due to under distention or gastritis. No evidence of bowel obstruction, inflammation, obstructive uropathy, free air, or free fluid. Patient is status post right hemicolectomy with unremarkable ileocolonic    anastomosis. Increased stool burden in the remaining colon suggestive of mild constipation. Extensive descending and sigmoid colon diverticulosis without evidence for acute diverticulitis.   5.  There is diffuse wall thickening of the urinary bladder concerning for cystitis, recommend correlation with urinalysis. Additional ancillary findings detailed above.                  Workstation performed: [id number]         MRI  Inpatient Order    (Results Pending)       ECG 12 Lead Documentation Only    Date/Time: 2025 8:24 PM    Performed by: Lamberto Coombs DO  Authorized by: Lamberto Coombs DO    ECG reviewed by me, the ED Provider: yes    Patient location:  ED  Interpretation:     Interpretation: normal    Rate:     ECG rate assessment: normal    Rhythm:     Rhythm: sinus rhythm    Ectopy:     Ectopy: none    QRS:     QRS axis:  Normal    QRS intervals:  Normal  Conduction:     Conduction: normal    ST segments:     ST segments:  Normal  T waves:     T waves: normal    Comments:      This EKG was interpreted by me.      ED Medication and Procedure Management   Prior to Admission Medications   Prescriptions Last Dose Informant Patient Reported? Taking?   Ascorbic Acid (vitamin C) 1000 MG tablet  Self, Spouse/Significant Other Yes No   Sig: every 24 hours   Chelated Zinc 50 MG TABS  Self, Spouse/Significant Other Yes No   Sig: every 24 hours   Omega 3 1000 MG CAPS  Self, Spouse/Significant Other Yes No   Si capsule in the morning and 1 capsule in the evening.   albuterol (Proventil HFA) 90 mcg/act inhaler   No No   Sig: Inhale 1-2 puffs every 6 (six) hours as needed for wheezing   aspirin (ECOTRIN LOW STRENGTH) 81 mg EC tablet  Self, Spouse/Significant Other No No   Sig: Take 1 tablet (81 mg total) by mouth daily for 21 days   b complex vitamins capsule  Self, Spouse/Significant Other Yes No   Sig: Take 1 capsule by mouth in the morning.   cholecalciferol (VITAMIN D3) 400 units tablet  Self, Spouse/Significant Other Yes No   Sig: Take 400 Units by mouth in the morning.   diltiazem (TIAZAC) 120 MG 24 hr capsule  Self, Spouse/Significant Other Yes No   Sig: Take 120 mg by mouth in the morning.   famotidine (PEPCID) 40 MG tablet  Self, Spouse/Significant Other Yes No   Sig: as needed in the morning and as needed in the evening.   ferrous sulfate 324 (65 Fe) mg  Self, Spouse/Significant Other No No   Sig: Take 1 tablet (324 mg total) by  mouth daily before breakfast   nitroglycerin (NITROSTAT) 0.4 mg SL tablet  Self, Spouse/Significant Other No No   Sig: Place 1 tablet (0.4 mg total) under the tongue every 5 (five) minutes as needed for chest pain   Patient not taking: Reported on 6/19/2025   polyethylene glycol (Golytely) 4000 mL solution   No No   Sig: Take 4,000 mL by mouth once for 1 dose Take 4000 mL by mouth once for 1 dose. Use as directed   tamsulosin (FLOMAX) 0.4 mg   No No   Sig: Take 1 capsule (0.4 mg total) by mouth every 24 hours   ticagrelor (BRILINTA) 90 MG  Self, Spouse/Significant Other No No   Sig: Take 1 tablet (90 mg total) by mouth every 12 (twelve) hours      Facility-Administered Medications: None     Current Discharge Medication List        CONTINUE these medications which have NOT CHANGED    Details   albuterol (Proventil HFA) 90 mcg/act inhaler Inhale 1-2 puffs every 6 (six) hours as needed for wheezing  Qty: 18 g, Refills: 5    Comments: Substitution to a formulary equivalent within the same pharmaceutical class is authorized.  Associated Diagnoses: Chest pain      Ascorbic Acid (vitamin C) 1000 MG tablet every 24 hours      aspirin (ECOTRIN LOW STRENGTH) 81 mg EC tablet Take 1 tablet (81 mg total) by mouth daily for 21 days  Qty: 21 tablet, Refills: 0    Associated Diagnoses: Stroke-like symptoms      b complex vitamins capsule Take 1 capsule by mouth in the morning.      Chelated Zinc 50 MG TABS every 24 hours      cholecalciferol (VITAMIN D3) 400 units tablet Take 400 Units by mouth in the morning.      diltiazem (TIAZAC) 120 MG 24 hr capsule Take 120 mg by mouth in the morning.      famotidine (PEPCID) 40 MG tablet as needed in the morning and as needed in the evening.      ferrous sulfate 324 (65 Fe) mg Take 1 tablet (324 mg total) by mouth daily before breakfast  Qty: 30 tablet, Refills: 0    Associated Diagnoses: Symptomatic anemia      nitroglycerin (NITROSTAT) 0.4 mg SL tablet Place 1 tablet (0.4 mg total) under  the tongue every 5 (five) minutes as needed for chest pain  Qty: 25 tablet, Refills: 5    Associated Diagnoses: Coronary artery disease involving native coronary artery of native heart with unstable angina pectoris (HCC)      Omega 3 1000 MG CAPS 1 capsule in the morning and 1 capsule in the evening.      polyethylene glycol (Golytely) 4000 mL solution Take 4,000 mL by mouth once for 1 dose Take 4000 mL by mouth once for 1 dose. Use as directed  Qty: 4000 mL, Refills: 0    Associated Diagnoses: Iron deficiency anemia, unspecified iron deficiency anemia type      tamsulosin (FLOMAX) 0.4 mg Take 1 capsule (0.4 mg total) by mouth every 24 hours  Qty: 90 capsule, Refills: 1    Associated Diagnoses: Benign prostatic hyperplasia without lower urinary tract symptoms      ticagrelor (BRILINTA) 90 MG Take 1 tablet (90 mg total) by mouth every 12 (twelve) hours  Qty: 60 tablet, Refills: 2    Associated Diagnoses: Cerebrovascular accident (CVA) due to other mechanism (HCC)           No discharge procedures on file.  ED SEPSIS DOCUMENTATION   Time reflects when diagnosis was documented in both MDM as applicable and the Disposition within this note       Time User Action Codes Description Comment    7/23/2025  6:57 PM Lamberto Coombs [R29.90] Stroke-like symptoms     7/23/2025  8:50 PM Lamberto Coombs [R41.82] Altered mental status     7/23/2025  8:50 PM Lamberto Coombs [R07.9] Chest pain     7/23/2025  8:50 PM Lamberto Coombs [R41.3] Amnesia     7/23/2025  8:51 PM Lamberto Coombs [R20.0,  R20.2] Numbness and tingling in left hand     7/23/2025  8:51 PM Lamberto Coombs [K29.70] Gastritis     7/23/2025  8:51 PM Lamberto Coombs [K59.00] Constipation     7/23/2025  8:51 PM Lamberto Coombs [N30.90] Cystitis     7/23/2025  9:52 PM Pippa Jones [I67.9] Cerebrovascular disease                      [1]   Past Medical History:  Diagnosis Date    Anemia     Chronic bilateral low back pain without sciatica 07/17/2020     Hypertension     Scoliosis     Stroke (HCC)     Stroke-like symptoms 2021    Unstable angina (HCC) 2024   [2]   Past Surgical History:  Procedure Laterality Date    BACK SURGERY      BOWEL RESECTION      CARDIAC CATHETERIZATION N/A 2024    Procedure: Cardiac Coronary Angiogram;  Surgeon: Rivas Cantor MD;  Location: [facility];  Service: Cardiology    CARDIAC CATHETERIZATION  2024    Procedure: Cardiac catheterization;  Surgeon: Rivas Cantor MD;  Location: [facility];  Service: Cardiology    CARDIAC CATHETERIZATION N/A 2024    Procedure: Cardiac pci;  Surgeon: Rivas Cantor MD;  Location: [facility];  Service: Cardiology    COLONOSCOPY      RI TEAEC W/PATCH GRF CAROTID VERTB SUBCLAV NECK INC Left 2021    Procedure: ENDARTERECTOMY ARTERY CAROTID;  Surgeon: Vito Mo MD;  Location: [facility];  Service: Vascular    TONSILLECTOMY      UPPER GASTROINTESTINAL ENDOSCOPY     [3]   Family History  Adopted: Yes   [4]   Social History  Tobacco Use    Smoking status: Former     Current packs/day: 0.00     Types: Cigarettes     Quit date:      Years since quittin.5     Passive exposure: Past    Smokeless tobacco: Never   Vaping Use    Vaping status: Never Used   Substance Use Topics    Alcohol use: Not Currently    Drug use: No        Lamberto Coombs DO  [id number]

## 2025-07-23 NOTE — QUICK NOTE
Stroke Alert    Called 6:58pm    81 y/o M with HTN, HLD, CAD, and prior bilateral corona/BG infarcts that presents with acute onset of L hand numbness and confusion. LKN 5pm. Reports began to have numbness in left arm mainly involving hand but this has since resolved. At this point only NIHSS 1 for disorientation to month; no other deficits. Per record review has not had this particular deficit in the past - has had presentations with speech changes and previously had b/l infarcts thought to be 2/2 small vessel disease. Takes ticagrelor daily.    HCT and CTA were personally reviewed - no evidence of acute ischemia/hemorrhage and no significant vascular abnormality.    Not a TNK candidate given mild, nondisabling deficit. Unable to rule out TIA however recrudescence is also in differential. Would perform toxic-metabolic and infectious work-up. May admit for stroke pathway otherwise; load ASA 325mg x1 and continue ASA along with home ticagrelor.

## 2025-07-24 ENCOUNTER — APPOINTMENT (INPATIENT)
Dept: MRI IMAGING | Facility: HOSPITAL | Age: 80
End: 2025-07-24
Payer: COMMERCIAL

## 2025-07-24 ENCOUNTER — APPOINTMENT (INPATIENT)
Dept: NON INVASIVE DIAGNOSTICS | Facility: HOSPITAL | Age: 80
End: 2025-07-24
Payer: COMMERCIAL

## 2025-07-24 VITALS
DIASTOLIC BLOOD PRESSURE: 70 MMHG | WEIGHT: 153 LBS | BODY MASS INDEX: 23.19 KG/M2 | SYSTOLIC BLOOD PRESSURE: 136 MMHG | RESPIRATION RATE: 17 BRPM | HEART RATE: 79 BPM | HEIGHT: 68 IN | TEMPERATURE: 97.5 F | OXYGEN SATURATION: 97 %

## 2025-07-24 LAB
ALBUMIN SERPL BCG-MCNC: 3.5 G/DL (ref 3.5–5)
ALP SERPL-CCNC: 78 U/L (ref 34–104)
ALT SERPL W P-5'-P-CCNC: 11 U/L (ref 7–52)
ANION GAP SERPL CALCULATED.3IONS-SCNC: 8 MMOL/L (ref 4–13)
AORTIC ROOT: 3.9 CM
AORTIC VALVE MEAN VELOCITY: 14.6 M/S
ASCENDING AORTA: 3.3 CM
AST SERPL W P-5'-P-CCNC: 17 U/L (ref 13–39)
AV AREA BY CONTINUOUS VTI: 1.6 CM2
AV AREA PEAK VELOCITY: 1.5 CM2
AV LVOT MEAN GRADIENT: 2 MMHG
AV LVOT PEAK GRADIENT: 4 MMHG
AV MEAN PRESS GRAD SYS DOP V1V2: 10 MMHG
AV ORIFICE AREA US: 1.63 CM2
AV PEAK GRADIENT: 19 MMHG
AV REGURGITATION PRESSURE HALF TIME: 623 MS
AV VELOCITY RATIO: 0.52
BASOPHILS # BLD AUTO: 0.06 THOUSANDS/ÂΜL (ref 0–0.1)
BASOPHILS NFR BLD AUTO: 1 % (ref 0–1)
BILIRUB SERPL-MCNC: 0.51 MG/DL (ref 0.2–1)
BSA FOR ECHO PROCEDURE: 1.82 M2
BUN SERPL-MCNC: 27 MG/DL (ref 5–25)
CALCIUM SERPL-MCNC: 8.3 MG/DL (ref 8.4–10.2)
CHLORIDE SERPL-SCNC: 108 MMOL/L (ref 96–108)
CHOLEST SERPL-MCNC: 81 MG/DL (ref ?–200)
CO2 SERPL-SCNC: 23 MMOL/L (ref 21–32)
CREAT SERPL-MCNC: 0.89 MG/DL (ref 0.6–1.3)
DOP CALC AO VTI: 45.8 CM
DOP CALC LVOT AREA: 3.14
DOP CALC LVOT DIAMETER: 2 CM
DOP CALC LVOT PEAK VEL VTI: 23.8 CM
DOP CALC LVOT PEAK VEL: 1.06 M/S
DOP CALC LVOT STROKE INDEX: 41.2 ML/M2
DOP CALC LVOT STROKE VOLUME: 74.73
DOP CALC MV VTI: 46.6 CM
E WAVE DECELERATION TIME: 327 MS
E/A RATIO: 0.64
EOSINOPHIL # BLD AUTO: 0.32 THOUSAND/ÂΜL (ref 0–0.61)
EOSINOPHIL NFR BLD AUTO: 4 % (ref 0–6)
ERYTHROCYTE [DISTWIDTH] IN BLOOD BY AUTOMATED COUNT: 18.6 % (ref 11.6–15.1)
EST. AVERAGE GLUCOSE BLD GHB EST-MCNC: 128 MG/DL
FRACTIONAL SHORTENING: 51 (ref 28–44)
GLUCOSE SERPL-MCNC: 98 MG/DL (ref 65–140)
HBA1C MFR BLD: 6.1 %
HCT VFR BLD AUTO: 31.2 % (ref 36.5–46.1)
HDLC SERPL-MCNC: 39 MG/DL
HGB BLD-MCNC: 9.5 G/DL (ref 12–15.4)
IMM GRANULOCYTES # BLD AUTO: 0.04 THOUSAND/UL (ref 0–0.2)
IMM GRANULOCYTES NFR BLD AUTO: 1 % (ref 0–2)
INTERVENTRICULAR SEPTUM IN DIASTOLE (PARASTERNAL SHORT AXIS VIEW): 1 CM
INTERVENTRICULAR SEPTUM: 1 CM (ref 0.6–1.1)
LA/AORTA RATIO 2D: 0.8
LAAS-AP2: 27 CM2
LAAS-AP4: 24.5 CM2
LDLC SERPL CALC-MCNC: 33 MG/DL (ref 0–100)
LEFT ATRIUM SIZE: 3.1 CM
LEFT ATRIUM VOLUME (MOD BIPLANE): 83 ML
LEFT ATRIUM VOLUME INDEX (MOD BIPLANE): 45.6 ML/M2
LEFT INTERNAL DIMENSION IN SYSTOLE: 1.7 CM (ref 2.1–4)
LEFT VENTRICLE DIASTOLIC VOLUME (MOD BIPLANE): 80 ML
LEFT VENTRICLE DIASTOLIC VOLUME INDEX (MOD BIPLANE): 44 ML/M2
LEFT VENTRICLE SYSTOLIC VOLUME (MOD BIPLANE): 37 ML
LEFT VENTRICLE SYSTOLIC VOLUME INDEX (MOD BIPLANE): 20.3 ML/M2
LEFT VENTRICULAR INTERNAL DIMENSION IN DIASTOLE: 3.5 CM (ref 3.5–6)
LEFT VENTRICULAR POSTERIOR WALL IN END DIASTOLE: 1.2 CM
LEFT VENTRICULAR STROKE VOLUME: 41 ML
LV EF BIPLANE MOD: 54 %
LV EF US.2D.A4C+ESTIMATED: 60 %
LVSV (TEICH): 41 ML
LYMPHOCYTES # BLD AUTO: 1.89 THOUSANDS/ÂΜL (ref 0.6–4.47)
LYMPHOCYTES NFR BLD AUTO: 22 % (ref 14–44)
MCH RBC QN AUTO: 24.7 PG (ref 26.8–34.3)
MCHC RBC AUTO-ENTMCNC: 30.4 G/DL (ref 31.4–37.4)
MCV RBC AUTO: 81 FL (ref 82–98)
MONOCYTES # BLD AUTO: 1.15 THOUSAND/ÂΜL (ref 0.17–1.22)
MONOCYTES NFR BLD AUTO: 14 % (ref 4–12)
MV E'TISSUE VEL-LAT: 7 CM/S
MV E'TISSUE VEL-SEP: 6 CM/S
MV MEAN GRADIENT: 3 MMHG
MV PEAK A VEL: 1.76 M/S
MV PEAK E VEL: 113 CM/S
MV PEAK GRADIENT: 11 MMHG
MV VALVE AREA BY CONTINUITY EQUATION: 1.6 CM2
NEUTROPHILS # BLD AUTO: 4.97 THOUSANDS/ÂΜL (ref 1.85–7.62)
NEUTS SEG NFR BLD AUTO: 58 % (ref 43–75)
NRBC BLD AUTO-RTO: 0 /100 WBCS
PLATELET # BLD AUTO: 244 THOUSANDS/UL (ref 149–390)
PMV BLD AUTO: 9.8 FL (ref 8.9–12.7)
POTASSIUM SERPL-SCNC: 4.3 MMOL/L (ref 3.5–5.3)
PROT SERPL-MCNC: 6.5 G/DL (ref 6.4–8.4)
RBC # BLD AUTO: 3.84 MILLION/UL (ref 3.88–5.12)
RIGHT VENTRICLE ID DIMENSION: 3.8 CM
SL CV AV PEAK GRADIENT RETROGRADE: 48 MMHG
SL CV LV EF: 55
SL CV PED ECHO LEFT VENTRICLE DIASTOLIC VOLUME (MOD BIPLANE) 2D: 50 ML
SL CV PED ECHO LEFT VENTRICLE SYSTOLIC VOLUME (MOD BIPLANE) 2D: 9 ML
SODIUM SERPL-SCNC: 139 MMOL/L (ref 135–147)
TR MAX PG: 30 MMHG
TRICUSPID ANNULAR PLANE SYSTOLIC EXCURSION: 2.7 CM
TRIGL SERPL-MCNC: 46 MG/DL (ref ?–150)
WBC # BLD AUTO: 8.43 THOUSAND/UL (ref 4.31–10.16)

## 2025-07-24 PROCEDURE — 80061 LIPID PANEL: CPT

## 2025-07-24 PROCEDURE — 97163 PT EVAL HIGH COMPLEX 45 MIN: CPT

## 2025-07-24 PROCEDURE — 97167 OT EVAL HIGH COMPLEX 60 MIN: CPT

## 2025-07-24 PROCEDURE — 80053 COMPREHEN METABOLIC PANEL: CPT

## 2025-07-24 PROCEDURE — 92610 EVALUATE SWALLOWING FUNCTION: CPT

## 2025-07-24 PROCEDURE — 83036 HEMOGLOBIN GLYCOSYLATED A1C: CPT

## 2025-07-24 PROCEDURE — 85025 COMPLETE CBC W/AUTO DIFF WBC: CPT

## 2025-07-24 PROCEDURE — 99214 OFFICE O/P EST MOD 30 MIN: CPT | Performed by: PSYCHIATRY & NEUROLOGY

## 2025-07-24 PROCEDURE — 93306 TTE W/DOPPLER COMPLETE: CPT | Performed by: INTERNAL MEDICINE

## 2025-07-24 PROCEDURE — 93306 TTE W/DOPPLER COMPLETE: CPT

## 2025-07-24 PROCEDURE — 70551 MRI BRAIN STEM W/O DYE: CPT

## 2025-07-24 PROCEDURE — NC001 PR NO CHARGE: Performed by: PSYCHIATRY & NEUROLOGY

## 2025-07-24 PROCEDURE — 99239 HOSP IP/OBS DSCHRG MGMT >30: CPT | Performed by: INTERNAL MEDICINE

## 2025-07-24 RX ADMIN — TICAGRELOR 90 MG: 90 TABLET ORAL at 10:13

## 2025-07-24 RX ADMIN — ASPIRIN 81 MG: 81 TABLET, COATED ORAL at 09:10

## 2025-07-24 RX ADMIN — FERROUS SULFATE TAB 325 MG (65 MG ELEMENTAL FE) 325 MG: 325 (65 FE) TAB at 09:10

## 2025-07-24 RX ADMIN — CHOLECALCIFEROL (VITAMIN D3) 10 MCG (400 UNIT) TABLET 400 UNITS: at 09:10

## 2025-07-24 RX ADMIN — Medication 1 CAPSULE: at 09:10

## 2025-07-24 RX ADMIN — DILTIAZEM HYDROCHLORIDE 120 MG: 120 CAPSULE, COATED, EXTENDED RELEASE ORAL at 09:10

## 2025-07-24 RX ADMIN — ENOXAPARIN SODIUM 40 MG: 40 INJECTION SUBCUTANEOUS at 09:10

## 2025-07-24 NOTE — ASSESSMENT & PLAN NOTE
Does not appear to be in acute exacerbation   Continue albuterol as needed, patient on no maintenance medications

## 2025-07-24 NOTE — PHYSICAL THERAPY NOTE
"                                                                                  PHYSICAL THERAPY EVAL  Physical Therapy Evaluation    Performed at least 2 patient identifiers during session:  Problem List[1]    Past Medical History[2]    Past Surgical History[3]       07/24/25 0830   PT Last Visit   PT Visit Date 07/24/25   Note Type   Note type Evaluation   Pain Assessment   Pain Assessment Tool 0-10   Pain Score No Pain   Restrictions/Precautions   Weight Bearing Precautions Per Order No   Other Precautions Fall Risk;Telemetry;Bed Alarm;Chair Alarm;Impulsive;Agitated   Home Living   Type of Home House  (Bi-level)   Home Layout Multi-level  (0 MINDA, 5 steps up 7 steps down with HR)   Bathroom Shower/Tub Tub/shower unit   Bathroom Toilet Standard   Home Equipment   (no DME)   Prior Function   Level of [place] Independent with ADLs;Independent with functional mobility   Lives With Spouse   Falls in the last 6 months 0   General   Additional Pertinent History Hx of CVA   Family/Caregiver Present No   Cognition   Overall Cognitive Status Impaired   Arousal/Participation Alert   Attention Attends with cues to redirect   Orientation Level Oriented X4   Memory Decreased recall of recent events;Decreased recall of precautions   Following Commands Follows one step commands with increased time or repetition   Comments Impulsive and agitated at times. Not open to education.   Subjective   Subjective \"I am off balance because I just woke up.\"   RLE Assessment   RLE Assessment WFL   LLE Assessment   LLE Assessment WFL   Light Touch   RLE Light Touch Grossly intact   LLE Light Touch Grossly intact   Bed Mobility   Supine to Sit 5  Supervision   Additional items HOB elevated;Bedrails;Increased time required;Verbal cues   Additional Comments Patient with increased time to complete bed mobility. Bp checked EOB. Stable.   Transfers   Sit to Stand 4  Minimal assistance   Additional items Assist x 1;Armrests;Verbal cues   Stand " to Sit 4  Minimal assistance   Additional items Assist x 1;Armrests;Verbal cues   Ambulation/Elevation   Gait pattern Foward flexed;Narrow DWIGHT;Decreased foot clearance  (Scoliotic posture/curvature. Multiple losses of balance towards the L and R.)   Gait Assistance 4  Minimal assist   Additional items Assist x 1;Verbal cues;Tactile cues   Assistive Device None   Distance 15ft, 125ft   Ambulation/Elevation Additional Comments Very unsteady gait with multiple losses of balance. Patient refused use of an AD.   Balance   Static Sitting Fair +   Dynamic Sitting Fair +   Static Standing Fair   Dynamic Standing Fair   Ambulatory Poor +   Endurance Deficit   Endurance Deficit Yes   Endurance Deficit Description Limited by fatigue   Activity Tolerance   Activity Tolerance Patient limited by fatigue   Medical Staff Made Aware Kalyani MEYER  (SLP made aware of patient coughing while eating and drinking)   Nurse Made Aware Darshana WHITE- made aware of functional status, fall risk and possible swallowing difficulty.   Assessment   Prognosis Guarded   Problem List Decreased strength;Decreased endurance;Impaired balance;Decreased mobility;Impaired judgement;Decreased safety awareness   Assessment Patient is a 81y/o who presented with L handed numbness and confusion. MRI was negative. Patient has a history of CVA, COPD. Patient resides with spouse in a multi-level home with steps. He is independent at baseline. Current medical status includes fall risk, bed/chair alarm, poor safety awareness, poor insight, impulsivity, agitation, decreased strength, balance, endurance and mobility. Patient required min A for transfers and ambulation. Gait was extremely unsteady with multiple losses of balance. Patient refused to use an AD and became agitated at times. Patient is not receptive to education and has poor safety awareness. Patient is a high fall risk. Recommending level 2 resources. Moderate intensity. The patient's AM-PAC Basic Mobility  Inpatient Short Form Raw Score is 16. A Raw score of less than or equal to 17 suggests the patient may benefit from discharge to post-acute rehabilitation services. Please also refer to the recommendation of the Physical Therapist for safe discharge planning.   Barriers to Discharge Inaccessible home environment;Decreased caregiver support   Goals   Patient Goals To eat   STG Expiration Date 08/07/25   Short Term Goal #1 1. perform supine<>sit with HOB flat without use of bedrails ind 2. Perform st<>stand transfers mod I 3. Ambulate 200ft with a RW mod I level 4. Ascend/descend 7 steps with railing nonreciprocal pattern supervision level.   PT Treatment Day 0   Plan   Treatment/Interventions Functional transfer training;LE strengthening/ROM;Elevations;Therapeutic exercise;Endurance training;Patient/family training;Equipment eval/education;Gait training;Bed mobility;Spoke to nursing;OT   PT Frequency 3-5x/wk   Discharge Recommendation   Rehab Resource Intensity Level, PT II (Moderate Resource Intensity)   Equipment Recommended Walker   Walker Package Recommended Wheeled walker   Additional Comments Recommending use of RW at all times- patient refused.   AM-PAC Basic Mobility Inpatient   Turning in Flat Bed Without Bedrails 3   Lying on Back to Sitting on Edge of Flat Bed Without Bedrails 3   Moving Bed to Chair 3   Standing Up From Chair Using Arms 3   Walk in Room 3   Climb 3-5 Stairs With Railing 1   Basic Mobility Inpatient Raw Score 16   Basic Mobility Standardized Score 38.32   [facility] Highest Level Of Mobility   -HLM Goal 5: Stand one or more mins   -HLM Achieved 7: Walk 25 feet or more   End of Consult   Patient Position at End of Consult Bedside chair;Bed/Chair alarm activated;All needs within reach     Sarina Finn, PT,DPT             Patient Name: Tevin Bazan  Today's Date: 7/24/2025       [1]   Patient Active Problem List  Diagnosis    Male-to-female transgender person    Stroke due  to embolism (HCC)    Primary osteoarthritis of left knee    Lumbar spondylosis    Chronic bilateral low back pain without sciatica    Lumbar radiculopathy    Spinal stenosis of lumbar region with neurogenic claudication    Chronic pain of left knee    Thoracogenic scoliosis of thoracolumbar region    Chronic pain syndrome    Stroke-like symptoms    Essential hypertension    Carotid stenosis, asymptomatic, bilateral    History of stroke    Nonrheumatic aortic valve stenosis    Nonrheumatic mitral valve stenosis    Abnormal stress test    Shortness of breath    Hypercholesterolemia    Stage 2 chronic kidney disease    COPD (chronic obstructive pulmonary disease) (HCC)    Abnormal computed tomography angiography (CTA)    Coronary artery disease involving native coronary artery of native heart with unstable angina pectoris (HCC)    Elevated troponin    Cerebrovascular disease    Tremor    BPH (benign prostatic hyperplasia)    Melena    Kidney lesion    Pancreatic lesion    Nutcracker phenomenon of renal vein    Prediabetes    Restrictive lung disease due to kyphoscoliosis    Renal cell carcinoma (HCC)    Symptomatic anemia    Anxiety about health    Metabolic encephalopathy   [2]   Past Medical History:  Diagnosis Date    Anemia     Chronic bilateral low back pain without sciatica 07/17/2020    Hypertension     Scoliosis     Stroke (HCC)     Stroke-like symptoms 11/22/2021    Unstable angina (HCC) 02/26/2024   [3]   Past Surgical History:  Procedure Laterality Date    BACK SURGERY      BOWEL RESECTION      CARDIAC CATHETERIZATION N/A 02/26/2024    Procedure: Cardiac Coronary Angiogram;  Surgeon: Rivas Cantor MD;  Location: [facility];  Service: Cardiology    CARDIAC CATHETERIZATION  02/26/2024    Procedure: Cardiac catheterization;  Surgeon: Rivas Cantor MD;  Location: [facility];  Service: Cardiology    CARDIAC CATHETERIZATION N/A 02/26/2024    Procedure: Cardiac pci;  Surgeon: Rivas Cantor MD;   Location: [facility];  Service: Cardiology    COLONOSCOPY      DE TEAEC W/PATCH GRF CAROTID VERTB SUBCLAV NECK INC Left 12/01/2021    Procedure: ENDARTERECTOMY ARTERY CAROTID;  Surgeon: Vito Mo MD;  Location: [facility];  Service: Vascular    TONSILLECTOMY      UPPER GASTROINTESTINAL ENDOSCOPY

## 2025-07-24 NOTE — ASSESSMENT & PLAN NOTE
"Patient presenting to the ED with confusion as per patient's wife, and some L hand numbness,Symptom onset at 1700  Patient with Hx of CVA 09/23/2024 and 2003, was given TNK in 2024.  Previously on Coumadin, thought to be embolic in nature no known Hx of a-fib, coumadin d/almas due to epistaxis  Patient maintained on ASA and brilinta   CT stroke alert brain showing: \"No acute intracranial abnormality. Chronic microangiopathic changes and chronic infarctions as above\"  CTA stroke alert showing, \"No large vessel occlusion or high-grade stenosis in the head or neck\"  CTA dissection CAP showing no acute dissection   Seen and evaluated by neurology  No concern for acute CVA  Symptoms consistent with ulnar neuropathy at left elbow    "

## 2025-07-24 NOTE — ASSESSMENT & PLAN NOTE
"Patient presenting to the ED with confusion as per patient's wife, and some L hand numbness,Symptom onset at 1700  Patient with Hx of CVA 09/23/2024 and 2003, was given TNK in 2024.  Previously on Coumadin, thought to be embolic in nature no known Hx of a-fib, coumadin d/almas due to epistaxis  Patient maintained on ASA and brilinta   CT stroke alert brain showing: \"No acute intracranial abnormality. Chronic microangiopathic changes and chronic infarctions as above\"  CTA stroke alert showing, \"No large vessel occlusion or high-grade stenosis in the head or neck\"  CTA dissection CAP showing no acute dissection   Stroke pathway   Neurology consulted   Atorvastatin 80mg started, continue ASA and Brilinta  MRI ordered, echo ordered   Pending lipid panel and HgA1c  NIHSS on admit 1, not a TNK candidate symptoms fully resolved   "

## 2025-07-24 NOTE — CONSULTS
Consultation - Neurology   Name: Tevin Bazan 80 y.o. adult I MRN: [id number]  Unit/Bed#: -01 I Date of Admission: 7/23/2025   Date of Service: 7/24/2025 I Hospital Day: 1   Consult to Neurology  Consult performed by: Teddy Moreira DO  Consult ordered by: Lamberto Coombs DO        Physician Requesting Evaluation: Jessica Connolly MD     See previous note of today

## 2025-07-24 NOTE — CONSULTS
Tevin Bazan will not need outpatient follow up with Neurology. Danielle will not require outpatient neurological testing.  H&P - Neurologist  Name: Tevin Bazan 80 y.o. adult I MRN: [id number]  Unit/Bed#: MS 311Saul I Date of Admission: 7/23/2025  DOS: 7/24/2025  History of Present Illness   Tevin Bazan is a 80 y.o. right handed adult who presents with confusion and left hand numbness.    Pt is a 79 yo RH M with HTN, HLD, CAD, and prior bilateral corona/BG infarcts that presents with acute onset of L hand numbness and confusion. LKN at 1700hrs on 7/23/2025 with left hand numbness (mainly digits 4&5).  Pt was not TNK candidate with resolving sx.    CTH, CTA, and MRI Brain all reported negative for acute abn.      Review of Systems   Denies: f/c, cough, SOB, palpitations, dizziness/lightheadedness, N/V, D/C, hematuria, hematochezia, dark tarry stools, LOC, syncope, tremors/shaking.  Admits to dry mouth and residuals from previous stroke.    Historical Information   Past Medical History[1]  Past Surgical History[2]  Social History[3]  E-Cigarette/Vaping    E-Cigarette Use Never User      E-Cigarette/Vaping Substances    Nicotine No     THC No     CBD No     Flavoring No     Other No     Unknown No      Family history non-contributory    Objective :  Temp:  [97.4 °F (36.3 °C)-98.5 °F (36.9 °C)] 97.5 °F (36.4 °C)  HR:  [61-83] 78  BP: (108-151)/(61-74) 136/70  Resp:  [16-20] 17  SpO2:  [95 %-98 %] 95 %  O2 Device: None (Room air)    Physical ExamNeurological Exam      Lab Results: I have reviewed the following results:I have personally reviewed pertinent reports.    Recent Labs     07/24/25  0436   WBC 8.43   HGB 9.5*   HCT 31.2*      SODIUM 139   K 4.3      CO2 23   BUN 27*   CREATININE 0.89   GLUC 98     Imaging Results Review: I reviewed radiology reports from this admission including: CTA, CT head, and MRI brain.  Other Study Results Review: No additional pertinent studies  reviewed.    Physical Exam:  Gen: laying in bed; appears comfortable; tired appearing  HEENT: no otorrhea; no rhinorrhea; atraumatic head; atraumatic oral cavity; dry oral cavity  CV: no cyanosis  Lungs: no cough  Ext: warm and dry; POS clubbing; POS tenting on hands  Neuro:  Mental Status: Awake, alert, and oriented to person, place, date, and general situation; follow simple commands; speech chronically slurred but understandable; language appropriate;    Cranial Nerves: ROSAURA; pupils ~3 mm b/l; EOMI; no nystagmus; VFF; normal facial sensation without extinction; face appears generally symmetric; hearing intact b/l but decreased b/l; soft palate elevates symmetrically; shoulder shrug symmetric; tongue protrusion midline   Motor: symmetric bulk; normal tone; Strength 5/5 in b/l UE; no abnormal movements; no tremors  Reflexes: DTR 2/4 in b/l UE and LE; negative Alonso b/l; negative asterixis;  negative Spurling sign; negative Wing Beating tremor; POSITIVE Tinel at left elbow; Neg Tinel on right elbow and b/l wrists  Coordination: normal FTN testing; no ataxia; no dysmetria; normal CHUCK  Sensory: normal light touch sensation without extinction  Gait: not assessed    A/P:   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  If persisting, would recommend EMG of b/l UE as outpt.  -I do not believe this was a stroke nor a TIA. Please stop the stroke pathway. Pt can be placed on home meds  -we discussed increasing water intake at home.  -will s/o.  Please call back if needed             [1]   Past Medical History:  Diagnosis Date    Anemia     Chronic bilateral low back pain without sciatica 07/17/2020    Hypertension     Scoliosis     Stroke (HCC)     Stroke-like symptoms 11/22/2021    Unstable angina (HCC) 02/26/2024   [2]   Past Surgical History:  Procedure  Laterality Date    BACK SURGERY      BOWEL RESECTION      CARDIAC CATHETERIZATION N/A 2024    Procedure: Cardiac Coronary Angiogram;  Surgeon: Rivas Cantor MD;  Location: [facility];  Service: Cardiology    CARDIAC CATHETERIZATION  2024    Procedure: Cardiac catheterization;  Surgeon: Rivas Cantor MD;  [facility];  Service: Cardiology    CARDIAC CATHETERIZATION N/A 2024    Procedure: Cardiac pci;  Surgeon: Rivas Cantor MD;  [facility]: [facility];  Service: Cardiology    COLONOSCOPY      OH TEAEC W/PATCH GRF CAROTID VERTB SUBCLAV NECK INC Left 2021    Procedure: ENDARTERECTOMY ARTERY CAROTID;  Surgeon: Vito Mo MD;  Location: [facility];  Service: Vascular    TONSILLECTOMY      UPPER GASTROINTESTINAL ENDOSCOPY     [3]   Social History  Tobacco Use    Smoking status: Former     Current packs/day: 0.00     Types: Cigarettes     Quit date:      Years since quittin.5     Passive exposure: Past    Smokeless tobacco: Never   Vaping Use    Vaping status: Never Used   Substance and Sexual Activity    Alcohol use: Not Currently    Drug use: No

## 2025-07-24 NOTE — PLAN OF CARE
Problem: PAIN - ADULT  Goal: Verbalizes/displays adequate comfort level or baseline comfort level  Description: Interventions:  - Encourage patient to monitor pain and request assistance  - Assess pain using appropriate pain scale  - Administer analgesics as ordered based on type and severity of pain and evaluate response  - Implement non-pharmacological measures as appropriate and evaluate response  - Consider cultural and social influences on pain and pain management  - Notify physician/advanced practitioner if interventions unsuccessful or patient reports new pain  - Educate patient/family on pain management process including their role and importance of  reporting pain   - Provide non-pharmacologic/complimentary pain relief interventions  Outcome: Progressing     Problem: INFECTION - ADULT  Goal: Absence or prevention of progression during hospitalization  Description: INTERVENTIONS:  - Assess and monitor for signs and symptoms of infection  - Monitor lab/diagnostic results  - Monitor all insertion sites, i.e. indwelling lines, tubes, and drains  - Monitor endotracheal if appropriate and nasal secretions for changes in amount and color  - [place] appropriate cooling/warming therapies per order  - Administer medications as ordered  - Instruct and encourage patient and family to use good hand hygiene technique  - Identify and instruct in appropriate isolation precautions for identified infection/condition  Outcome: Progressing  Goal: Absence of fever/infection during neutropenic period  Description: INTERVENTIONS:  - Monitor WBC  - Perform strict hand hygiene  - Limit to healthy visitors only  - No plants, dried, fresh or silk flowers with meyer in patient room  Outcome: Progressing     Problem: SAFETY ADULT  Goal: Patient will remain free of falls  Description: INTERVENTIONS:  - Educate patient/family on patient safety including physical limitations  - Instruct patient to call for assistance with activity   -  Consider consulting OT/PT to assist with strengthening/mobility based on AM PAC & JH-HLM score  - Consult OT/PT to assist with strengthening/mobility   - Keep Call bell within reach  - Keep bed low and locked with side rails adjusted as appropriate  - Keep care items and personal belongings within reach  - Initiate and maintain comfort rounds  - Make Fall Risk Sign visible to staff  - Offer Toileting every 3 Hours, in advance of need  - Initiate/Maintain bedalarm  - Obtain necessary fall risk management equipment:   - Apply yellow socks and bracelet for high fall risk patients  - Consider moving patient to room near nurses station  Outcome: Progressing  Goal: Maintain or return to baseline ADL function  Description: INTERVENTIONS:  -  Assess patient's ability to carry out ADLs; assess patient's baseline for ADL function and identify physical deficits which impact ability to perform ADLs (bathing, care of mouth/teeth, toileting, grooming, dressing, etc.)  - Assess/evaluate cause of self-care deficits   - Assess range of motion  - Assess patient's mobility; develop plan if impaired  - Assess patient's need for assistive devices and provide as appropriate  - Encourage maximum independence but intervene and supervise when necessary  - Involve family in performance of ADLs  - Assess for home care needs following discharge   - Consider OT consult to assist with ADL evaluation and planning for discharge  - Provide patient education as appropriate  - Monitor functional capacity and physical performance, use of AM PAC & JH-HLM   - Monitor gait, balance and fatigue with ambulation    Outcome: Progressing  Goal: Maintains/Returns to pre admission functional level  Description: INTERVENTIONS:  - Perform AM-PAC 6 Click Basic Mobility/ Daily Activity assessment daily.  - Set and communicate daily mobility goal to care team and patient/family/caregiver.   - Collaborate with rehabilitation services on mobility goals if consulted  -  Perform Range of Motion 3 times a day.  - Reposition patient every 3 hours.  - Dangle patient 3 times a day  - Stand patient 3 times a day  - Ambulate patient 3 times a day  - Out of bed to chair 3 times a day   - Out of bed for meals 3 times a day  - Out of bed for toileting  - Record patient progress and toleration of activity level   Outcome: Progressing     Problem: DISCHARGE PLANNING  Goal: Discharge to home or other facility with appropriate resources  Description: INTERVENTIONS:  - Identify barriers to discharge w/patient and caregiver  - Arrange for needed discharge resources and transportation as appropriate  - Identify discharge learning needs (meds, wound care, etc.)  - Arrange for interpretive services to assist at discharge as needed  - Refer to Case Management Department for coordinating discharge planning if the patient needs post-hospital services based on physician/advanced practitioner order or complex needs related to functional status, cognitive ability, or social support system  Outcome: Progressing     Problem: Neurological Deficit  Goal: Neurological status is stable or improving  Description: Interventions:  - Monitor and assess patient's level of consciousness, motor function, sensory function, and level of assistance needed for ADLs.   - Monitor and report changes from baseline. Collaborate with interdisciplinary team to initiate plan and implement interventions as ordered.   - Provide and maintain a safe environment.  - Consider seizure precautions.  - Consider fall precautions.  - Consider aspiration precautions.  - Consider bleeding precautions.  Outcome: Progressing     Problem: Activity Intolerance/Impaired Mobility  Goal: Mobility/activity is maintained at optimum level for patient  Description: Interventions:  - Assess and monitor patient  barriers to mobility and need for assistive/adaptive devices.  - Assess patient's emotional response to limitations.  - Collaborate with  interdisciplinary team and initiate plans and interventions as ordered.  - Encourage independent activity per ability.  - Maintain proper body alignment.  - Perform active/passive rom as tolerated/ordered.  - Plan activities to conserve energy.  - Turn patient as appropriate  Outcome: Progressing     Problem: Communication Impairment  Goal: Ability to express needs and understand communication  Description: Assess patient's communication skills and ability to understand information.  Patient will demonstrate use of effective communication techniques, alternative methods of communication and understanding even if not able to speak.     - Encourage communication and provide alternate methods of communication as needed.  - Collaborate with case management/ for discharge needs.  - Include patient/family/caregiver in decisions related to communication.  Outcome: Progressing

## 2025-07-24 NOTE — PLAN OF CARE
Problem: OCCUPATIONAL THERAPY ADULT  Goal: Performs self-care activities at highest level of function for planned discharge setting.  See evaluation for individualized goals.  Description: Treatment Interventions: ADL retraining, Functional transfer training, Endurance training, Cognitive reorientation, Patient/family training, Equipment evaluation/education, Compensatory technique education, Continued evaluation          See flowsheet documentation for full assessment, interventions and recommendations.   7/24/2025 0959 by Kalyani Kamara OT  Note: Limitation: Decreased ADL status, Decreased Safe judgement during ADL, Decreased cognition, Decreased endurance, Decreased self-care trans, Decreased high-level ADLs  Prognosis: Fair  Assessment: Pt is a 80 y.o. adult seen for OT evaluation at [facility], admitted 7/23/2025 w/ stroke-like symptoms. Extensive chart review completed by OT. Per chart review, MRI (-) for acute infarct. Comorbidities affecting the pt's functional performance include a significant PMH of: HTN, stroke, hypercholesteremia, COPD, CAD, BPH, OA of L knee, chronic pain syndrome, spinal stenosis of lumbar region, carotid stenosis, stage 2 CKD, kidney lesion, pancreatic lesion, renal cell carcinoma, tremor, cerebrovascular disease. Personal factors affecting pt at time of IE include: steps to enter environment, behavioral pattern, difficulty performing ADLS, difficulty performing IADLS , limited insight into deficits, compliance, flat affect, health management , and environment. PTA pt was living in a bi-level home w/ 0 MINDA, 5 steps up and 7 steps down, was  IND w/ ADLs and  IND with functional mobility with use of no DME/AD. Upon OT IE pt demonstrated  S for bed mobility, Min Ax1 for functional transfers, Min Ax1 for functional mobility, S for UB ADLs and [place] for LB ADLS 2* the following deficits impacting occupational performance: weakness, decreased strength, decreased balance, decreased tolerance,  impaired memory, impaired sequencing, impaired problem solving, impulsivity, and decreased safety awareness. Full objective findings from OT assessment regarding body systems outlined above. Pt to benefit from continued skilled OT tx while in the hospital to address deficits as defined above and maximize level of functional independence w/ ADL's and functional mobility. Occupational Performance areas to address include: grooming, bathing/shower, toilet hygiene, dressing, functional mobility, and clothing management. Based on findings, pt is of high complexity. The patient's raw score on the AM-PAC Daily Activity inpatient short form is 18, standardized score is 38.66, less than 39.4. Patients at this level are likely to benefit from DC to post-acute rehabilitation services. However, please refer to therapist recommendation for discharge planning given other factors that may influence destination. At this time, OT recommendations at time of discharge are DC with Level II - Moderate Rehab Resource Intensity resources.  Recommendation: (S) Geriatric Consult (Recommend patient to f/u with OP geriatric medicine for formal cognitive evaluation)  Rehab Resource Intensity Level, OT: II (Moderate Resource Intensity)

## 2025-07-24 NOTE — CASE MANAGEMENT
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    Melena 10/11/2024    BPH (benign prostatic hyperplasia) 2024    Tremor 05/10/2024    Elevated troponin 2024    Cerebrovascular disease 2024    Abnormal computed tomography angiography (CTA) 2024    Coronary artery disease involving native coronary artery of native heart with unstable angina pectoris (HCC) 2024    Stage 2 chronic kidney disease 2024    COPD (chronic obstructive pulmonary disease) ([facility]) 2024    Abnormal stress test 2023    Shortness of breath 2023    Hypercholesterolemia 2023    Nonrheumatic aortic valve stenosis 2023    Nonrheumatic mitral valve stenosis 2023    History of stroke 2021    Carotid stenosis, asymptomatic, bilateral 2021    Stroke-like symptoms 2021    Essential hypertension 2021    Chronic bilateral low back pain without sciatica 2020    Lumbar radiculopathy 2020    Spinal stenosis of lumbar region with neurogenic claudication 2020    Chronic pain of left knee 2020    Thoracogenic scoliosis of thoracolumbar region 2020    Chronic pain syndrome 2020    Lumbar spondylosis 2020    Primary osteoarthritis of left knee 04/10/2020    Prediabetes 2019    Male-to-female transgender person 2018    Stroke due to embolism (HCC)  12/07/2018      LOS (days): 1  Geometric Mean LOS (GMLOS) (days): 3.8  Days to GMLOS:3.2     OBJECTIVE:    Risk of Unplanned Readmission Score: 23.61         Current admission status: Inpatient       Preferred Pharmacy:   [id number] - [place] Garcia - [address]  [address]  [place] [address]  Phone: [phone] Fax: [phone]    [facility] - [place] [id number]  [address]  [facility] [address]  Phone: [phone] Fax: [phone]    Primary Care Provider: ANNIE Rojas    Primary Insurance: MOHAMUD SALEEM  Secondary Insurance:     ASSESSMENT:  Active Health Care Proxies       Prisca Bazan [facility] Health Care Representative - Spouse   Primary Phone: [phone] (Mobile)  Home Phone: [phone]                           Readmission Root Cause  30 Day Readmission: No    Patient Information  Admitted from:: Home  Mental Status: Alert  During Assessment patient was accompanied by: Not accompanied during assessment  Assessment information provided by:: Patient  Primary Caregiver: Self  Support Systems: Family members, Spouse/significant other  County of Residence: [place]  What city do you live in?: [place]  Home entry access options. Select all that apply.: No steps to enter home  Type of Current Residence: Bi-level  Upon entering residence, is there a bedroom on the main floor (no further steps)?: No  A bedroom is located on the following floor levels of residence (select all that apply):: 2nd Floor  Upon entering residence, is there a bathroom on the main floor (no further steps)?: No  Indicate which floors of current residence have a bathroom (select all the apply):: 2nd Floor  Number of steps to 2nd floor from main floor: 6  Living Arrangements: Lives w/ Spouse/significant other  Is patient a ?: No    Activities of Daily Living Prior to Admission  Functional Status:  Independent  Completes ADLs independently?: Yes  Ambulates independently?: Yes  Does patient use assisted devices?: No  Does patient currently own DME?: No  Does patient have a history of Outpatient Therapy (PT/OT)?: Yes  Does the patient have a history of Short-Term Rehab?: Yes (Acute rehab in [place])  Does patient have a history of HHC?: No  Does patient currently have HHC?: No         Patient Information Continued  Income Source: SSI/SSD  Does patient have prescription coverage?: Yes  Can the patient afford their medications and any related supplies (such as glucometers or test strips)?: Yes  Does patient receive dialysis treatments?: No  Does patient have a history of substance abuse?: No  Does patient have a history of Mental Health Diagnosis?: No         Means of Transportation  Means of Transport to [facility]:: Drives Self          DISCHARGE DETAILS:    Discharge planning discussed with:: Pt at bedside and pt's spouse via phone call.  Freedom of Choice: Yes  Comments - Freedom of Choice: Level 2 rec reviewed with pt and spouse. Both declined STR and would like OPPT on discharge.  CM contacted family/caregiver?: Yes  Were Treatment Team discharge recommendations reviewed with patient/caregiver?: Yes  Did patient/caregiver verbalize understanding of patient care needs?: Yes  Were patient/caregiver advised of the risks associated with not following Treatment Team discharge recommendations?: Yes    Contacts  Patient Contacts: Prisca Bazan: wife  Relationship to Patient:: Family  Contact Method: Phone  Phone Number: [phone]  Reason/Outcome: Discharge Planning, Emergency Contact, Continuity of Care    Requested Home Health Care         Is the patient interested in HHC at discharge?: No    DME Referral Provided  Referral made for DME?: No    Other Referral/Resources/Interventions Provided:  Referral Comments: No referrals made at this time. Pt would like to return home with OPPT.    Treatment Team  Recommendation: Short Term Rehab  Expected Discharge Disposition: Outpatient Rehab  Additional Discharge Dispositions: Outpatient Rehab  Transport at Discharge : Family     Additional Comments: CM met with pt at bedside and introduced self and role. Pt resides in a bi-level home with no MINDA. Pt's bedroom and bathroom are located on the 2nd floor with 6 steps to access 2nd floor. Pt IPTA with ADL's and ambulation. Pt has hx of acute rehab in [place] in past. Pt has hx of OPPT. No hx of HHC. No hx of MH or substance abuse. CM discussed level 2 rec with pt and pt's wife via phone call. Both parties report they are fine with how patient is ambulating and would like pt to return home with OPPT. No other CM needs identified at this time. CM department to remain available for any discharge planning needs that may arise throughout this admission.

## 2025-07-24 NOTE — DISCHARGE SUMMARY
"Discharge Summary - Hospitalist   Name: Tevin Bazan 80 y.o. adult I MRN: [id number]  Unit/Bed#: -01 I Date of Admission: 7/23/2025   Date of Service: 7/24/2025 I Hospital Day: 1     Assessment & Plan  Stroke-like symptoms  History of stroke  Patient presenting to the ED with confusion as per patient's wife, and some L hand numbness,Symptom onset at 1700  Patient with Hx of CVA 09/23/2024 and 2003, was given TNK in 2024.  Previously on Coumadin, thought to be embolic in nature no known Hx of a-fib, coumadin d/almas due to epistaxis  Patient maintained on ASA and brilinta   CT stroke alert brain showing: \"No acute intracranial abnormality. Chronic microangiopathic changes and chronic infarctions as above\"  CTA stroke alert showing, \"No large vessel occlusion or high-grade stenosis in the head or neck\"  CTA dissection CAP showing no acute dissection   Seen and evaluated by neurology  No concern for acute CVA  Symptoms consistent with ulnar neuropathy at left elbow    Essential hypertension  Patients BP stable on admission  Continue cardizem, continue to monitor   Hypercholesterolemia  Most recent LDL from march wnl  Recheck LDL, start atorvastatin 80mg as per stroke pathway  COPD (chronic obstructive pulmonary disease) (HCC)  Does not appear to be in acute exacerbation   Continue albuterol as needed, patient on no maintenance medications   I have personally counseled the patient on medication indication, compliance, utilization and side effects. Patient may be discharged home with albuterol  Coronary artery disease involving native coronary artery of native heart with unstable angina pectoris (HCC)  Patient with Hx of CAD with  of RCA  Follows with Dr. Cantor  Currently on ASA and Brilinta  BPH (benign prostatic hyperplasia)  Continue tamsulosin  Prediabetes  Most recent HgA1C 6.2  Diet controlled     Admission Date: 7/23/2025 1850  Discharge Date: 07/24/25  Admitting Diagnosis: Cerebrovascular disease " [I67.9]  Gastritis [K29.70]  Altered mental status [R41.82]  Chest pain [R07.9]  Cystitis [N30.90]  Constipation [K59.00]  Amnesia [R41.3]  Numbness and tingling in left hand [R20.0, R20.2]  Stroke-like symptoms [R29.90]  Discharge Diagnosis:       Procedures Performed:   Orders Placed This Encounter   Procedures    ED ECG Documentation Only       Summary of Hospital Course: Patient presented with left hand numbness.  Stroke workup did not show any acute infarct/hemorrhage.  He was seen and evaluated by neurology.  Symptoms are mostly consistent with left ulnar nerve neuropathy at elbow level.  He was seen by PT/OT who recommended rehab, however he refused.  This is a brief summary of inpatient stay, for more details please review progress notes    Significant Findings, Care, Treatment and Services Provided: See above    Complications: none    Condition at Discharge: stable       Discharge instructions/Information to patient and family:   See After Visit Summary (AVS) for information provided to patient and family.      Provisions for Follow-Up Care:  See after visit summary for information related to follow-up care and any pertinent home health orders.      PCP: ANNIE Rojas    Disposition: Home    Planned Readmission: No     Discharge Medications:  See after visit summary for reconciled discharge medications provided to patient and family.      Discharge Statement:  I have spent a total time of 36 minutes in caring for this patient on the day of the visit/encounter. >30 minutes of time was spent on: Patient and family education, Importance of tx compliance, Counseling / Coordination of care, Documenting in the medical record, Reviewing / ordering tests, medicine, procedures  , and Communicating with other healthcare professionals .

## 2025-07-24 NOTE — OCCUPATIONAL THERAPY NOTE
"    Occupational Therapy Evaluation     Patient Name: Tevin Bazan  Today's Date: 7/24/2025  Problem List  Active Problems:    Stroke-like symptoms    Essential hypertension    History of stroke    Hypercholesterolemia    COPD (chronic obstructive pulmonary disease) (HCC)    Coronary artery disease involving native coronary artery of native heart with unstable angina pectoris (HCC)    BPH (benign prostatic hyperplasia)    Prediabetes    Past Medical History  Past Medical History[1]  Past Surgical History  Past Surgical History[2]      07/24/25 0814   OT Last Visit   OT Visit Date 07/24/25   Note Type   Note type Evaluation   Pain Assessment   Pain Assessment Tool 0-10   Pain Score No Pain   Restrictions/Precautions   Weight Bearing Precautions Per Order No   Home Living   Type of Home House  (split-level)   Home Layout Two level  (0 MINDA, 5 steps up 7 steps down with HR)   Bathroom Shower/Tub Tub/shower unit   Bathroom Toilet Standard   Home Equipment   (Pt reports no DME)   Prior Function   Level of [place] Independent with ADLs;Independent with functional mobility   Lives With Spouse   Receives Help From Family   Falls in the last 6 months 0  (Reports none)   Lifestyle   Reciprocal Relationships Spouse   General   Family/Caregiver Present No   Subjective   Subjective \"Its not good.\" - Referring to his swallowing; SLP Kim notified   ADL   Eating Assistance 7  Independent   Grooming Assistance 5  Supervision/Setup   UB Bathing Assistance 5  Supervision/Setup   LB Bathing Assistance 4  Minimal Assistance   UB Dressing Assistance 5  Supervision/Setup   LB Dressing Assistance 4  Minimal Assistance   Toileting Assistance  5  Supervision/Setup   Bed Mobility   Supine to Sit 5  Supervision   Additional items HOB elevated;Bedrails;Increased time required;Verbal cues   Additional Comments Signficant verbal cueing to get feet flat on the floor   Transfers   Sit to Stand 4  Minimal assistance   Additional items " Assist x 1;Armrests;Verbal cues   Stand to Sit 4  Minimal assistance   Additional items Assist x 1;Armrests;Verbal cues   Tub transfer 4  Minimal assistance   Additional items (S)  Assist x 1  (CGA to ST - Pt left on ST for privacy, pt told to make this therapist aware when done before standing up. Pt did not notify therapist and stood, unstable balance noted. This therapist attempted to assist patient and pt became agitated and irritable.)   Additional Comments Upon standing noted signficant scoliotic posture   Functional Mobility   Functional Mobility 4  Minimal assistance   Additional Comments x1   Additional items Rolling walker  (~12ft to BR, seated rest break for toileting, additional ~100ft. Pt with multiple LOB's requiring assist for correction. Pt would benefit from use of RW. Attempted to utilize RW, however, pt persistant on not using AD.)   Balance   Static Sitting Fair +   Dynamic Sitting Fair   Static Standing Fair -   Dynamic Standing Poor +   Activity Tolerance   Activity Tolerance Patient tolerated treatment well;Other (Comment)  (cognition)   Medical Staff Made Aware PT Joana   Nurse Made Aware CHRISTOPHER THORNEE Assessment   RUE Assessment WFL   LUE Assessment   LUE Assessment WFL   Hand Function   Gross Motor Coordination Functional   Fine Motor Coordination Functional   Psychosocial   Psychosocial (WDL) X   Patient Behaviors/Mood Irritable   Cognition   Overall Cognitive Status Impaired   Arousal/Participation Alert   Attention Attends with cues to redirect   Orientation Level Oriented X4   Memory Decreased recall of precautions;Decreased recall of recent events   Following Commands Follows one step commands with increased time or repetition   Comments Pt willing to participate in OT evaluation. Pt with poor safety awareness and limited insight on deficits. Impulsive and agitated at times. Recommend patient to f/u with OP geriatric medicine   Assessment   Limitation Decreased ADL status;Decreased  Safe judgement during ADL;Decreased cognition;Decreased endurance;Decreased self-care trans;Decreased high-level ADLs   Prognosis Fair   Assessment Pt is a 80 y.o. adult seen for OT evaluation at [facility], admitted 7/23/2025 w/ stroke-like symptoms. Extensive chart review completed by OT. Per chart review, MRI (-) for acute infarct. Comorbidities affecting the pt's functional performance include a significant PMH of: HTN, stroke, hypercholesteremia, COPD, CAD, BPH, OA of L knee, chronic pain syndrome, spinal stenosis of lumbar region, carotid stenosis, stage 2 CKD, kidney lesion, pancreatic lesion, renal cell carcinoma, tremor, cerebrovascular disease. Personal factors affecting pt at time of IE include: steps to enter environment, behavioral pattern, difficulty performing ADLS, difficulty performing IADLS , limited insight into deficits, compliance, flat affect, health management , and environment. PTA pt was living in a bi-level home w/ 0 MINDA, 5 steps up and 7 steps down, was  IND w/ ADLs and  IND with functional mobility with use of no DME/AD. Upon OT IE pt demonstrated  S for bed mobility, Min Ax1 for functional transfers, Min Ax1 for functional mobility, S for UB ADLs and [place] for LB ADLS 2* the following deficits impacting occupational performance: weakness, decreased strength, decreased balance, decreased tolerance, impaired memory, impaired sequencing, impaired problem solving, impulsivity, and decreased safety awareness. Full objective findings from OT assessment regarding body systems outlined above. Pt to benefit from continued skilled OT tx while in the hospital to address deficits as defined above and maximize level of functional independence w/ ADL's and functional mobility. Occupational Performance areas to address include: grooming, bathing/shower, toilet hygiene, dressing, functional mobility, and clothing management. Based on findings, pt is of high complexity. The patient's raw score on the AM-PAC  Daily Activity inpatient short form is 18, standardized score is 38.66, less than 39.4. Patients at this level are likely to benefit from DC to post-acute rehabilitation services. However, please refer to therapist recommendation for discharge planning given other factors that may influence destination. At this time, OT recommendations at time of discharge are DC with Level II - Moderate Rehab Resource Intensity resources.   Goals   Patient Goals To eat breakfast   Plan   Treatment Interventions ADL retraining;Functional transfer training;Endurance training;Cognitive reorientation;Patient/family training;Equipment evaluation/education;Compensatory technique education;Continued evaluation   Goal Expiration Date 08/03/25   OT Treatment Day 0   OT Frequency 2-3x/wk   Discharge Recommendation   Recommendation (S)  Geriatric Consult  (Recommend patient to f/u with OP geriatric medicine for formal cognitive evaluation)   Rehab Resource Intensity Level, OT II (Moderate Resource Intensity)   AM-PAC Daily Activity Inpatient   Lower Body Dressing 3   Bathing 3   Toileting 3   Upper Body Dressing 3   Grooming 3   Eating 3   Daily Activity Raw Score 18   Daily Activity Standardized Score (Calc for Raw Score >=11) 38.66   AM-PAC Applied Cognition Inpatient   Following a Speech/Presentation 4   Understanding Ordinary Conversation 4   Taking Medications 4   Remembering Where Things Are Placed or Put Away 4   Remembering List of 4-5 Errands 4   Taking Care of Complicated Tasks 4   Applied Cognition Raw Score 24   Applied Cognition Standardized Score 62.21   End of Consult   Education Provided Yes   Patient Position at End of Consult Bedside chair;Bed/Chair alarm activated;All needs within reach   Nurse Communication Nurse aware of consult     Pt will complete the following goals within 10 days:     Pt will complete grooming with CHASTITY .    Pt will complete UB bathing and dressing with CHASTITY .    Pt will complete LB bathing and  dressing with CHASTITY & DME PRN.    Pt will complete toileting w/ CHASTITY  w/ G hygiene/thoroughness using DME as needed.    Pt will improve functional mobility during ADL/IADL/leisure tasks to S using DME as needed w/ G balance/safety.    Pt will perform functional transfers with S in order to facilitate completion of  ADL routine.    Pt will improve functional activity tolerance to 10 minutes of sustained functional tasks to increase participation in basic self-care and decrease assistance level.       Pt will increase balance by 1/2 grade for increased independence with ADLs.       Pt will demonstrate G carryover of pt/caregiver education and training as appropriate w/ Mod I w/o cues w/ good tolerance.    Pt will identify 3-5 fall risks to ensure safety upon discharge.    Kalyani Kamara, OTR/L                [1]   Past Medical History:  Diagnosis Date    Anemia     Chronic bilateral low back pain without sciatica 07/17/2020    Hypertension     Scoliosis     Stroke (HCC)     Stroke-like symptoms 11/22/2021    Unstable angina (HCC) 02/26/2024   [2]   Past Surgical History:  Procedure Laterality Date    BACK SURGERY      BOWEL RESECTION      CARDIAC CATHETERIZATION N/A 02/26/2024    Procedure: Cardiac Coronary Angiogram;  Surgeon: Rivas Cantor MD;  Location: [facility];  Service: Cardiology    CARDIAC CATHETERIZATION  02/26/2024    Procedure: Cardiac catheterization;  Surgeon: Rivas Cantor MD;  Location: [facility];  Service: Cardiology    CARDIAC CATHETERIZATION N/A 02/26/2024    Procedure: Cardiac pci;  Surgeon: Rivas Cantor MD;  Location: [facility];  Service: Cardiology    COLONOSCOPY      FL TEAEC W/PATCH GRF CAROTID VERTB SUBCLAV NECK INC Left 12/01/2021    Procedure: ENDARTERECTOMY ARTERY CAROTID;  Surgeon: Vito Mo MD;  Location: [facility];  Service: Vascular    TONSILLECTOMY      UPPER GASTROINTESTINAL ENDOSCOPY

## 2025-07-24 NOTE — H&P
"H&P - Hospitalist   Name: Tevin Bazan 80 y.o. adult I MRN: [id number]  Unit/Bed#: -01 I Date of Admission: 7/23/2025   Date of Service: 7/23/2025 I Hospital Day: 0     Assessment & Plan  Stroke-like symptoms  History of stroke  Patient presenting to the ED with confusion as per patient's wife, and some L hand numbness,Symptom onset at 1700  Patient with Hx of CVA 09/23/2024 and 2003, was given TNK in 2024.  Previously on Coumadin, thought to be embolic in nature no known Hx of a-fib, coumadin d/almas due to epistaxis  Patient maintained on ASA and brilinta   CT stroke alert brain showing: \"No acute intracranial abnormality. Chronic microangiopathic changes and chronic infarctions as above\"  CTA stroke alert showing, \"No large vessel occlusion or high-grade stenosis in the head or neck\"  CTA dissection CAP showing no acute dissection   Stroke pathway   Neurology consulted   Atorvastatin 80mg started, continue ASA and Brilinta  MRI ordered, echo ordered   Pending lipid panel and HgA1c  NIHSS on admit 1, not a TNK candidate symptoms fully resolved   Essential hypertension  Patients BP stable on admission  Continue cardizem, continue to monitor   Hypercholesterolemia  Most recent LDL from march wnl  Recheck LDL, start atorvastatin 80mg as per stroke pathway  COPD (chronic obstructive pulmonary disease) (HCC)  Does not appear to be in acute exacerbation   Continue albuterol as needed, patient on no maintenance medications   Coronary artery disease involving native coronary artery of native heart with unstable angina pectoris (HCC)  Patient with Hx of CAD with  of RCA  Follows with Dr. Cantor  Currently on ASA and Brilinta  BPH (benign prostatic hyperplasia)  Continue tamsulosin  Prediabetes  Most recent HgA1C 6.2  Pending repeat, diet controlled      VTE Pharmacologic Prophylaxis: VTE Score: 4 Moderate Risk (Score 3-4) - Pharmacological DVT Prophylaxis Ordered: enoxaparin (Lovenox).  Code Status: Level 1 " - Full Code patient   Discussion with family: Updated  (wife) at bedside.    Anticipated Length of Stay: Patient will be admitted on an inpatient basis with an anticipated length of stay of greater than 2 midnights secondary to Neurology consult, MRI, echo.    History of Present Illness   Chief Complaint:   Chief Complaint   Patient presents with    Altered Mental Status     Pt wife said he had midsternum chest pain that started piror to arrival. Pt took 2 nitro. Pt denies pain right now. Pt does not remember having any chest pain. Per wife LWK was around 1700 pt was more confused and was not acting right. Per wife pt was confused and did not know his bday.         Tevin Bazan is a 80 y.o. adult with a PMH of Prediabetes, HTN, HLD, Hx of CVA,COPD, CAD who presents with confusion and numbness of left hand. As per the patient's wife the patient started to appear confused to her and was mistaking the year. She also reports the patient previous referred to themselves as Danielle and used she/her pronouns but today has been referring to themself as Tevin. Patient alert and oriented to person, place, but not year. Patient reported it was 2020, however their wife reported earlier they said it was 1980. Wife reporting the patient is less confused now. Patient reports the numbness in hand is resolved. Denies CP, n/v/d/c, abdominal pain, lightheadedness or dizziness at this time. Patient reports some SOB which they always have, it is not worse than baseline.      Review of Systems   Constitutional:  Negative for activity change, appetite change, chills, diaphoresis and fever.   HENT: Negative.     Respiratory:  Positive for shortness of breath. Negative for chest tightness and wheezing.    Cardiovascular:  Negative for chest pain and leg swelling.   Gastrointestinal:  Negative for abdominal distention, abdominal pain, constipation, diarrhea, nausea and vomiting.   Genitourinary:  Positive for frequency and  urgency. Negative for difficulty urinating, flank pain and hematuria.   Neurological:  Negative for dizziness, light-headedness and headaches.       Historical Information   Past Medical History[1]  Past Surgical History[2]  Social History[3]  E-Cigarette/Vaping    E-Cigarette Use Never User      E-Cigarette/Vaping Substances    Nicotine No     THC No     CBD No     Flavoring No     Other No     Unknown No      Family history non-contributory  Social History:  Marital Status: /Civil Union   Occupation: retired  Patient Pre-hospital Living Situation: Home  Patient Pre-hospital Level of Mobility: walks  Patient Pre-hospital Diet Restrictions: Carb controlled    Meds/Allergies   I have reviewed home medications with patient personally.  Prior to Admission medications    Medication Sig Start Date End Date Taking? Authorizing Provider   albuterol (Proventil HFA) 90 mcg/act inhaler Inhale 1-2 puffs every 6 (six) hours as needed for wheezing 5/5/25   ANNIE Hubbard   Ascorbic Acid (vitamin C) 1000 MG tablet every 24 hours    Historical Provider, MD   aspirin (ECOTRIN LOW STRENGTH) 81 mg EC tablet Take 1 tablet (81 mg total) by mouth daily for 21 days 11/23/21 6/19/25  Stefan Jeffries MD   b complex vitamins capsule Take 1 capsule by mouth in the morning.    Historical Provider, MD   Chelated Zinc 50 MG TABS every 24 hours    Historical Provider, MD   cholecalciferol (VITAMIN D3) 400 units tablet Take 400 Units by mouth in the morning.    Historical Provider, MD   diltiazem (TIAZAC) 120 MG 24 hr capsule Take 120 mg by mouth in the morning. 6/27/22   Historical Provider, MD   famotidine (PEPCID) 40 MG tablet as needed in the morning and as needed in the evening. 7/19/23   Historical Provider, MD   ferrous sulfate 324 (65 Fe) mg Take 1 tablet (324 mg total) by mouth daily before breakfast 2/25/25 4/1/25  Stefan Jeffries MD   nitroglycerin (NITROSTAT) 0.4 mg SL tablet Place 1 tablet (0.4 mg total) under the  tongue every 5 (five) minutes as needed for chest pain  Patient not taking: Reported on 6/19/2025 3/10/25   ANNIE Hubbard   Omega 3 1000 MG CAPS 1 capsule in the morning and 1 capsule in the evening.    Historical Provider, MD   polyethylene glycol (Golytely) 4000 mL solution Take 4,000 mL by mouth once for 1 dose Take 4000 mL by mouth once for 1 dose. Use as directed 4/1/25 4/1/25  Ghanshyam Munoz MD   tamsulosin (FLOMAX) 0.4 mg Take 1 capsule (0.4 mg total) by mouth every 24 hours 7/23/25   ANNIE Hubbard   ticagrelor (BRILINTA) 90 MG Take 1 tablet (90 mg total) by mouth every 12 (twelve) hours 9/17/24   Jessica Connolly MD   tamsulosin (FLOMAX) 0.4 mg 1 capsule every 24 hours 5/16/24 7/23/25  Historical Provider, MD     Allergies   Allergen Reactions    Black Cohosh Rash    Minoxidil Rash       Objective :  Temp:  [98.1 °F (36.7 °C)-98.2 °F (36.8 °C)] 98.1 °F (36.7 °C)  HR:  [61-83] 83  BP: (108-151)/(61-74) 130/63  Resp:  [16-20] 20  SpO2:  [96 %-97 %] 97 %  O2 Device: None (Room air)    Physical Exam  Constitutional:       General: Danielle is not in acute distress.     Appearance: Normal appearance. Danielle is not toxic-appearing.   HENT:      Head: Normocephalic and atraumatic.      Mouth/Throat:      Mouth: Mucous membranes are moist.     Eyes:      General: No visual field deficit or scleral icterus.     Conjunctiva/sclera: Conjunctivae normal.       Cardiovascular:      Rate and Rhythm: Normal rate and regular rhythm.      Heart sounds: No murmur heard.  Pulmonary:      Effort: Pulmonary effort is normal. No respiratory distress.      Breath sounds: Normal breath sounds. No wheezing.   Abdominal:      General: There is no distension.      Palpations: Abdomen is soft.      Tenderness: There is no abdominal tenderness.     Musculoskeletal:      Right lower leg: No edema.      Left lower leg: No edema.     Skin:     General: Skin is warm.     Neurological:      General: No focal deficit present.      Mental  Status: Danielle is alert.      Cranial Nerves: Cranial nerves 2-12 are intact. No cranial nerve deficit, dysarthria or facial asymmetry.      Sensory: No sensory deficit.      Motor: No weakness.      Coordination: Finger-Nose-Finger Test and Heel to Shin Test normal.     Psychiatric:         Mood and Affect: Mood normal.         Lines/Drains:            Lab Results: I have reviewed the following results:  Results from last 7 days   Lab Units 07/23/25  1902   WBC Thousand/uL 9.95   HEMOGLOBIN g/dL 10.6*   HEMATOCRIT % 35.4*   PLATELETS Thousands/uL 277     Results from last 7 days   Lab Units 07/23/25  1902   SODIUM mmol/L 138   POTASSIUM mmol/L 4.2   CHLORIDE mmol/L 105   CO2 mmol/L 25   BUN mg/dL 34*   CREATININE mg/dL 1.09   ANION GAP mmol/L 8   CALCIUM mg/dL 9.2   ALBUMIN g/dL 4.1   TOTAL BILIRUBIN mg/dL 0.36   ALK PHOS U/L 105*   ALT U/L 13   AST U/L 13   GLUCOSE RANDOM mg/dL 110     Results from last 7 days   Lab Units 07/23/25  1902   INR  1.05     Results from last 7 days   Lab Units 07/23/25  1856   POC GLUCOSE mg/dl 111     Lab Results   Component Value Date    HGBA1C 6.2 (H) 03/19/2025    HGBA1C 6.3 (H) 02/25/2025    HGBA1C 6.3 (H) 09/14/2024           Imaging Results Review: I reviewed radiology reports from this admission including: CT stroke brain, CTA stroke, CTA CAP.  Other Study Results Review: EKG was personally reviewed and my interpretation is: normal sinus rhythm..    Administrative Statements     ** Please Note: This note has been constructed using a voice recognition system. **         [1]   Past Medical History:  Diagnosis Date    Anemia     Chronic bilateral low back pain without sciatica 07/17/2020    Hypertension     Scoliosis     Stroke (HCC)     Stroke-like symptoms 11/22/2021    Unstable angina (HCC) 02/26/2024   [2]   Past Surgical History:  Procedure Laterality Date    BACK SURGERY      BOWEL RESECTION      CARDIAC CATHETERIZATION N/A 02/26/2024    Procedure: Cardiac Coronary Angiogram;   Surgeon: Rivas Cantor MD;  Location: [facility];  Service: Cardiology    CARDIAC CATHETERIZATION  2024    Procedure: Cardiac catheterization;  Surgeon: Rivas Cantor MD;  Location: [facility];  Service: Cardiology    CARDIAC CATHETERIZATION N/A 2024    Procedure: Cardiac pci;  Surgeon: Rivas Cantor MD;  Location: [facility];  Service: Cardiology    COLONOSCOPY      NC TEAEC W/PATCH GRF CAROTID VERTB SUBCLAV NECK INC Left 2021    Procedure: ENDARTERECTOMY ARTERY CAROTID;  Surgeon: Vito Mo MD;  Location: [facility];  Service: Vascular    TONSILLECTOMY      UPPER GASTROINTESTINAL ENDOSCOPY     [3]   Social History  Tobacco Use    Smoking status: Former     Current packs/day: 0.00     Types: Cigarettes     Quit date:      Years since quittin.5     Passive exposure: Past    Smokeless tobacco: Never   Vaping Use    Vaping status: Never Used   Substance and Sexual Activity    Alcohol use: Not Currently    Drug use: No

## 2025-07-24 NOTE — ASSESSMENT & PLAN NOTE
Does not appear to be in acute exacerbation   Continue albuterol as needed, patient on no maintenance medications   I have personally counseled the patient on medication indication, compliance, utilization and side effects. Patient may be discharged home with albuterol

## 2025-07-24 NOTE — SPEECH THERAPY NOTE
Speech Language/Pathology  Speech-Language Pathology Bedside Swallow Evaluation      Patient Name: Tevin Bazan    Today's Date: 7/24/2025     Problem List  Principal Problem:    Stroke-like symptoms  Active Problems:    Essential hypertension    History of stroke    Hypercholesterolemia    COPD (chronic obstructive pulmonary disease) (HCC)    Coronary artery disease involving native coronary artery of native heart with unstable angina pectoris (HCC)    BPH (benign prostatic hyperplasia)    Prediabetes      Past Medical History  Past Medical History[1]    Past Surgical History  Past Surgical History[2]    Summary   Pt presents w/ s/s suggestive of min oropharyngeal dysphagia, ultimately functional.     Complete labial seal for retrieval and containment of all materials, no anterior bolus loss present. Min prolonged rotary mastication of regular textures, ultimately functional. Complete bolus breakdown and functional bolus transfer. Min diffuse oral residue noted, cleared w/ liquid wash. Suspected delayed swallow initiation and reduced hyolaryngeal elevation to palpation. No overt s/s of aspiration at this time.     Education initiated w/ pt regarding strategies to optimize swallow safety including frequent/thorough oral care and to notify medical staff if s/s of aspiration arise. Pt verbalized understanding and agreement, denies questions or concerns at this time.     Pt w/ increased risk of aspiration 2/2 current admission c/f CVA and overt s/s of aspiration per PT/OT. SLP to f/u for diet tolerance as able and appropriate.      Recommended Diet: regular diet w/ independent choice of softer solids and thin liquids   Recommended Form of Meds: whole or crushed in puree    Aspiration precautions and swallowing strategies: upright posture, only feed when fully alert, slow rate of feeding, small bites/sips, and alternating bites and sips  Other Recommendations: Continue frequent oral care        Current Medical  Status  Copied from H&P:  Tevin Bazan is a 80 y.o. adult with a PMH of Prediabetes, HTN, HLD, Hx of CVA,COPD, CAD who presents with confusion and numbness of left hand. As per the patient's wife the patient started to appear confused to her and was mistaking the year. She also reports the patient previous referred to themselves as Danielle and used she/her pronouns but today has been referring to themself as Tevin. Patient alert and oriented to person, place, but not year. Patient reported it was 2020, however their wife reported earlier they said it was 1980. Wife reporting the patient is less confused now. Patient reports the numbness in hand is resolved. Denies CP, n/v/d/c, abdominal pain, lightheadedness or dizziness at this time. Patient reports some SOB which they always have, it is not worse than baseline.    Allergies:  No known food allergies    Past medical history:  Please see H&P for details    Special Studies:  7/24/25 MRI brain wo contrast:  No evidence of acute infarct, intracranial hemorrhage or mass.     7/23/25 CTA dissection protocol chest/abdomen/pelvis:  1.  No thoracoabdominal aortic aneurysm or dissection. Extensive calcific atherosclerosis noted with associated multifocal areas of mild to moderate luminal narrowing involving the abdominal aorta and proximal branches in the chest, abdomen, and pelvis.   However, no occlusion or hemodynamically significant flow-limiting atherosclerotic stenosis of the aorta or major aortic branch vessels in the chest, abdomen or pelvis identified.  2.  No acute pulmonary embolism.  3.  Wall thickening of the mid to lower esophagus could reflect reflux esophagitis. Small hiatal hernia containing the proximal stomach.  4.  Mild wall thickening of the stomach could be due to under distention or gastritis. No evidence of bowel obstruction, inflammation, obstructive uropathy, free air, or free fluid. Patient is status post right hemicolectomy with unremarkable  ileocolonic   anastomosis. Increased stool burden in the remaining colon suggestive of mild constipation. Extensive descending and sigmoid colon diverticulosis without evidence for acute diverticulitis.  5.  There is diffuse wall thickening of the urinary bladder concerning for cystitis, recommend correlation with urinalysis. Additional ancillary findings detailed above.     7/23/25 CTA stroke alert (head/neck):  No large vessel occlusion or high-grade stenosis in the head or neck.     7/23/25 CT stroke alert brain:  No acute intracranial abnormality. Chronic microangiopathic changes and chronic infarctions as above.     History of VFSS:  Modified (Video) Barium Swallow Study     Summary:  Images are on PACS for review.      Pt presents w/ mild oral dysphagia, min-mild pharyngeal dysphagia.      Min-mild prolonged rotary mastication w/ slowed bolus transfer and reduced bolus control w/ premature spill over BOT. Reduced  BOT retraction w/ mild oral residue on lingual surface and BOT. Pt noted to have piecemeal deglutition throughout study. Delayed swallow initiation w/ bolus head in the pyriforms w/ most trials except solids. WFL laryngeal elevation, epiglottic inversion and vestibule closure though reduced anterior hyoid excursion. Penetration before the swallow d/t residual requiring secondary swallow ultimately w/ thin liquids contacting vocal folds. Penetration noted during the swallow with cup sip, sequential cup sip and chin tuck thin liquids w/ epiglottic undercoating. No penetration or aspiration w/ other materials administered. WFL pharyngeal stripping wave, w/ min pharyngeal residue in the valleculae and pyriforms.         Recommendations:  Diet: Regular textures w/ choice of softer solids as needed  Liquids: Thin liquids   Meds: whole w/ puree/crushed as needed   Strategies: small bites/sips, added moisture  Frequent oral care  Upright position  F/u ST tx: as able and appropriate at the acute care level,  pt would benefit from OP ST following d/c   Therapy Prognosis: good   Prognosis considerations: age, motivation/participation  Aspiration Precautions  Results reviewed with: pt, physician  Aspiration precautions posted.  Repeat MBS as necessary  If a dedicated assessment of the esophagus is desired, consider esophagram/barium swallow or EGD.    Social/Education/Vocational Hx:  Pt lives w/ spouse     Swallow Information   Current Diet: regular diet and thin liquids   Baseline Diet: regular diet and thin liquids per pt report       Baseline Assessment   Behavior/Cognition: alert  Speech/Language Status: able to participate in conversation and able to follow commands  Patient Positioning: upright in chair  Pain Status/Interventions/Response to Interventions: No report of or nonverbal indications of pain.       Oral Mechanism Exam  Facial: symmetrical  Labial: WFL  Lingual: WFL  Velum: symmetrical  Mandible: adequate ROM  Dentition: missing multiple teeth   Vocal quality:clear/adequate   Volitional Cough: strong/productive   Respiratory Status: on RA        Consistencies Assessed and Performance   Consistencies Administered: thin liquids and hard solids    Oral Stage:   Complete labial seal for retrieval and containment of all materials, no anterior bolus loss present. Min prolonged rotary mastication of regular textures, ultimately functional. Complete bolus breakdown and functional bolus transfer. Min diffuse oral residue noted, cleared w/ liquid wash.     Pharyngeal Stage:   Suspected delayed swallow initiation and reduced hyolaryngeal elevation to palpation. No overt s/s of aspiration at this time.     Esophageal Concerns: none reported    Summary and Recommendations (see above)    Results Reviewed with: patient, RN, and MD     Treatment Recommended: as able and appropriate for diet tolerance     Dysphagia LTG  -Patient will demonstrate safe and effective oral intake (without overt s/s significant oral/pharyngeal  dysphagia including s/s penetration or aspiration) for the highest appropriate diet level.     Short Term Goals:  -Pt will tolerate regular textures w/ independent choice of softer solids and thin liquid with no significant s/s oral or pharyngeal dysphagia across 1-3 diagnostic session/s    Speech Therapy Prognosis   Prognosis: good    Prognosis Considerations: age, medical status, and prior medical history         [1]   Past Medical History:  Diagnosis Date    Anemia     Chronic bilateral low back pain without sciatica 07/17/2020    Hypertension     Scoliosis     Stroke (HCC)     Stroke-like symptoms 11/22/2021    Unstable angina (HCC) 02/26/2024   [2]   Past Surgical History:  Procedure Laterality Date    BACK SURGERY      BOWEL RESECTION      CARDIAC CATHETERIZATION N/A 02/26/2024    Procedure: Cardiac Coronary Angiogram;  Surgeon: Rivas Cantor MD;  Location: [facility];  Service: Cardiology    CARDIAC CATHETERIZATION  02/26/2024    Procedure: Cardiac catheterization;  Surgeon: Rivas Cantor MD;  Location: [facility];  Service: Cardiology    CARDIAC CATHETERIZATION N/A 02/26/2024    Procedure: Cardiac pci;  Surgeon: Rivas Cantor MD;  Location: [facility];  Service: Cardiology    COLONOSCOPY      NM TEAEC W/PATCH GRF CAROTID VERTB SUBCLAV NECK INC Left 12/01/2021    Procedure: ENDARTERECTOMY ARTERY CAROTID;  Surgeon: Vito Mo MD;  Location: [facility];  Service: Vascular    TONSILLECTOMY      UPPER GASTROINTESTINAL ENDOSCOPY

## 2025-07-24 NOTE — UTILIZATION REVIEW
Initial Clinical Review    Admission: Date/Time/Statement:   Admission Orders (From admission, onward)       Ordered        07/23/25 2106  INPATIENT ADMISSION  Once                          Orders Placed This Encounter   Procedures    INPATIENT ADMISSION     Standing Status:   Standing     Number of Occurrences:   1     Level of Care:   Med Surg [16]     Estimated length of stay:   More than 2 Midnights     Certification:   I certify that inpatient services are medically necessary for this patient for a duration of greater than two midnights. See H&P and MD Progress Notes for additional information about the patient's course of treatment.     ED Arrival Information       Expected   -    Arrival   7/23/2025 18:49    Acuity   Emergent              Means of arrival   Walk-In    Escorted by   Family Member    Service   Hospitalist    Admission type   Emergency              Arrival complaint   Chest pain, L arm numbness, confusion             Chief Complaint   Patient presents with    Altered Mental Status     Pt wife said he had midsternum chest pain that started piror to arrival. Pt took 2 nitro. Pt denies pain right now. Pt does not remember having any chest pain. Per wife LWK was around 1700 pt was more confused and was not acting right. Per wife pt was confused and did not know his bday.       Initial Presentation: 80 y.o. adult  to ED via walk in from home.    Admitted to inpatient with Dx: Stroke like symptoms, history of stroke/hypertension.  Presented to ED with confusion, left arm hand and arm numbness starting about 2 hours prior to arrival.  Per wife, about 1800 was laying on couch and had chest pain,  did not remember events of day.  En route with left hand numbness, confused and not making sense.  . PMHx:Prediabetes, HTN, HLD, Hx of CVA,COPD, CAD .    On exam: disoriented and confused.  Anxious.   NIHSS 1. GCS 14.  H&H 10.6/35.2.   bun 34. Creatinine 1.09.    Imaging shows no acute findings,  chronic  "infarcts on ct head.   ED treatment: stroke alert.  Discussed with neurology,  admit, asa and Brilinta recommended.   Given same, IVF bolus.    Plan includes stroke work up:  consult neurology.  Neuro checks (Every 1 hour x 4 hours, then every 2 hours x 8 hours, then every 4 hours x 72 hours).  Started statin.  Continue asa and Brilinta.  MRI brain and echo.  Lipid panel and A1c.  Continue home Cardizem and monitor BP.  Continue home flomax.      Per neurology - stroke alert. \"Not a TNK candidate given mild, nondisabling deficit. Unable to rule out TIA however recrudescence is also in differential. Would perform toxic-metabolic and infectious work-up. May admit for stroke pathway otherwise; load ASA 325mg x1 and continue ASA along with home ticagrelor. \"    Anticipated Length of Stay/Certification Statement: Patient will be admitted on an inpatient basis with an anticipated length of stay of greater than 2 midnights secondary to Neurology consult, MRI, echo.     Date: 7/24/25    Day 2:  dry mouth.  Feels residuals from previous stroke - speech slurred.  Telemetry sinus.   Forgetful.    On exam:  \"Mental Status: Awake, alert, and oriented to person, place, date, and general situation; follow simple commands; speech chronically slurred but understandable; language appropriate;    Cranial Nerves: ROSAURA; pupils ~3 mm b/l; EOMI; no nystagmus; VFF; normal facial sensation without extinction; face appears generally symmetric; hearing intact b/l but decreased b/l; soft palate elevates symmetrically; shoulder shrug symmetric; tongue protrusion midline   Motor: symmetric bulk; normal tone; Strength 5/5 in b/l UE; no abnormal movements; no tremors  Reflexes: DTR 2/4 in b/l UE and LE; negative Alonso b/l; negative asterixis;  negative Spurling sign; negative Wing Beating tremor; POSITIVE Tinel at left elbow; Neg Tinel on right elbow and b/l wrists  Coordination: normal FTN testing; no ataxia; no dysmetria; normal CHUCK  Sensory: " "normal light touch sensation without extinction\"  Plan:   on asa, statin and Brilinta.   Telemetry.  Neuro checks in progress.  Echo pending.  PT/OT    7/24/25 per neurology - ulnar neuropathy at left elbow/poor po intake and likely dehydration.   Doubt stroke and TIA.  No need to continue stroke work up.   Continue home medications.  Discussed with patient increasing water intake,  elbow pad, protecting from elbow compression and excessive bending at elbow as this aggreviates the ulnar nerve. If persisting, would recommend EMG of b/l UE as outpt.     ED Treatment-Medication Administration from 07/23/2025 1848 to 07/23/2025 2154         Date/Time Order Dose Route Action     07/23/2025 2131 sodium chloride 0.9 % bolus 500 mL 500 mL Intravenous New Bag     07/23/2025 2131 aspirin tablet 325 mg 325 mg Oral Given     07/23/2025 2130 ticagrelor (BRILINTA) tablet 90 mg 90 mg Oral Given            Scheduled Medications:  aspirin, 81 mg, Oral, Daily  atorvastatin, 40 mg, Oral, QPM  cholecalciferol, 400 Units, Oral, Daily  diltiazem, 120 mg, Oral, Daily  enoxaparin, 40 mg, Subcutaneous, Daily  famotidine, 40 mg, Oral, HS  ferrous sulfate, 325 mg, Oral, Daily With Breakfast  tamsulosin, 0.4 mg, Oral, After Dinner  ticagrelor, 90 mg, Oral, Q12H [facility]  vit B complex-vit C-folic acid, 1 capsule, Oral, Daily    Continuous IV Infusions:     PRN Meds: not used.   acetaminophen, 650 mg, Oral, Q6H PRN  albuterol, 2 puff, Inhalation, Q6H PRN  ondansetron, 4 mg, Intravenous, Q4H PRN      ED Triage Vitals   Temperature Pulse Respirations Blood Pressure SpO2 Pain Score   07/23/25 1900 07/23/25 1859 07/23/25 1859 07/23/25 1859 07/23/25 1900 07/23/25 2202   98.2 °F (36.8 °C) 65 17 126/68 97 % No Pain     Weight (last 2 days)       Date/Time Weight    07/23/25 2242 69.4 (153)    07/23/25 1948 68.8 (151.68)    07/23/25 1857 69.4 (153)          Date and Time Trauma Responsiveness Trauma Length of LOC (Seconds) R Pupil Size (mm) L Pupil Size " (mm) R Pupil Reaction L Pupil Reaction   07/24/25 0800 -- -- 3 3 Brisk Brisk   07/24/25 0401 -- -- 3 3 Brisk Brisk   07/24/25 0201 -- -- 3 3 Brisk Brisk   07/24/25 0100 -- -- 3 3 Brisk Brisk   07/24/25 0000 -- -- 3 3 Brisk Brisk   07/23/25 2300 -- -- 3 3 Brisk Brisk   07/23/25 2202 -- -- 3 3 Brisk Brisk   07/23/25 2100 -- -- 3 3 Brisk Brisk   07/23/25 2045 -- -- 3 3 Brisk Brisk   07/23/25 2030 -- -- 3 3 Brisk Brisk   07/23/25 2015 -- -- 3 3 Brisk Brisk   07/23/25 2000 -- -- 3 3 Brisk Brisk   07/23/25 1945 -- -- 3 3 Brisk Brisk   07/23/25 1930 -- -- 3 3 Brisk Brisk   07/23/25 1915 -- -- 3 3 -- Brisk   07/23/25 1905 -- -- 3 3 Brisk Brisk   07/23/25 1900 -- -- 3 3 Brisk Brisk     Date and Time Eye Opening Best Verbal Response Best Motor Response Louis Coma Scale Score   07/24/25 0800 4 4 6 14   07/24/25 0401 4 4 6 14   07/24/25 0201 4 4 6 14   07/24/25 0100 4 4 6 14   07/24/25 0000 4 4 6 14   07/23/25 2300 4 4 6 14   07/23/25 2202 4 4 6 14   07/23/25 2100 4 4 6 14   07/23/25 2045 4 4 6 14   07/23/25 2030 4 4 6 14   07/23/25 2015 4 4 6 14   07/23/25 2000 4 4 6 14   07/23/25 1945 4 4 6 14   07/23/25 1930 4 4 6 14   07/23/25 1915 4 4 6 14   07/23/25 1905 4 4 6 14   07/23/25 1900 4 4 6 14     Vital Signs (last 3 days)       Date/Time Temp Pulse Resp BP MAP (mmHg) SpO2 O2 Device Patient Position - Orthostatic VS Louis Coma Scale Score Pain    07/24/25 0830 -- -- -- -- -- -- -- -- -- No Pain    07/24/25 08:18:24 -- 78 -- 136/70 92 95 % -- -- -- --    07/24/25 0814 -- -- -- -- -- -- -- -- -- No Pain    07/24/25 0800 -- -- -- -- -- -- -- -- 14 --    07/24/25 07:35:10 97.5 °F (36.4 °C) 69 17 140/70 93 95 % None (Room air) Lying -- --    07/24/25 0700 97.5 °F (36.4 °C) 69 17 140/70 -- 95 % -- -- -- --    07/24/25 05:49:30 97.4 °F (36.3 °C) 67 18 143/68 93 95 % None (Room air) Lying -- --    07/24/25 04:01:03 98.3 °F (36.8 °C) 64 18 144/67 93 95 % None (Room air) Lying 14 --    07/24/25 02:01:27 98.3 °F (36.8 °C) 69 18 118/67  84 95 % None (Room air) Lying 14 --    07/24/25 0100 -- -- -- -- -- -- -- -- 14 --    07/24/25 00:49:29 -- 72 18 140/72 95 98 % None (Room air) -- -- --    07/24/25 0000 -- -- -- -- -- -- -- -- 14 --    07/23/25 23:58:12 98.5 °F (36.9 °C) 72 20 144/70 95 97 % None (Room air) -- -- --    07/23/25 23:00:07 98.3 °F (36.8 °C) 77 20 149/72 98 97 % None (Room air) Lying 14 --    07/23/25 2242 98.1 °F (36.7 °C) 83 20 130/63 -- -- -- -- -- --    07/23/25 22:02:44 98.1 °F (36.7 °C) 83 20 130/63 85 97 % None (Room air) Lying 14 No Pain    07/23/25 2100 -- 65 -- 133/73 97 96 % -- -- 14 --    07/23/25 2045 -- 66 20 149/68 98 96 % None (Room air) -- 14 --    07/23/25 2030 -- 72 20 138/74 98 97 % -- -- 14 --    07/23/25 2015 -- 69 20 151/72 103 97 % -- -- 14 --    07/23/25 2000 -- 65 20 149/68 97 97 % -- -- 14 --    07/23/25 1945 -- 68 20 147/70 -- 97 % None (Room air) Lying 14 --    07/23/25 1930 -- 82 18 141/65 -- 97 % None (Room air) Lying 14 --    07/23/25 1915 -- 62 18 130/63 -- 97 % None (Room air) Lying 14 --    07/23/25 1905 -- 61 16 108/61 -- 97 % None (Room air) Lying 14 --    07/23/25 1903 -- -- -- 126/68 -- -- -- -- -- --    07/23/25 1902 -- -- -- 123/68 -- -- -- -- -- --    07/23/25 1900 98.2 °F (36.8 °C) 68 17 131/71 -- 97 % None (Room air) -- 14 --    07/23/25 1859 -- 65 17 126/68 -- -- -- -- -- --          Pertinent Labs/Diagnostic Test Results:   Radiology:  MRI brain wo contrast   Final Interpretation by Pk Martinez MD (07/24 0143)      No evidence of acute infarct, intracranial hemorrhage or mass.      Workstation performed: [id number]         CT stroke alert brain   Final Interpretation by Lefty Bruce MD (07/23 1941)      No acute intracranial abnormality.  Chronic microangiopathic changes and chronic infarctions as above.      Findings were directly communicated with Pedro Bangura   at approximately 7:37 p.m 7/23/2025 .            Workstation performed: [id number]         CTA stroke alert  (head/neck)   Final Interpretation by Lefty Bruce MD (07/23 1941)      No large vessel occlusion or high-grade stenosis in the head or neck.         I personally discussed this study with ELIZABETH ROD MALENA on 7/23/2025 7:40 PM.         Workstation performed: [id number]         CTA dissection protocol chest/abdomen/pelvis   Final Interpretation by Sathya Louis MD (07/23 2038)      1.  No thoracoabdominal aortic aneurysm or dissection. Extensive calcific atherosclerosis noted with associated multifocal areas of mild to moderate luminal narrowing involving the abdominal aorta and proximal branches in the chest, abdomen, and pelvis.    However, no occlusion or hemodynamically significant flow-limiting atherosclerotic stenosis of the aorta or major aortic branch vessels in the chest, abdomen or pelvis identified.   2.  No acute pulmonary embolism.   3.  Wall thickening of the mid to lower esophagus could reflect reflux esophagitis. Small hiatal hernia containing the proximal stomach.   4.  Mild wall thickening of the stomach could be due to under distention or gastritis. No evidence of bowel obstruction, inflammation, obstructive uropathy, free air, or free fluid. Patient is status post right hemicolectomy with unremarkable ileocolonic    anastomosis. Increased stool burden in the remaining colon suggestive of mild constipation. Extensive descending and sigmoid colon diverticulosis without evidence for acute diverticulitis.   5.  There is diffuse wall thickening of the urinary bladder concerning for cystitis, recommend correlation with urinalysis. Additional ancillary findings detailed above.                  Workstation performed: [id number]           Cardiology:  ECG 12 lead    by Interface, Ris Results In (07/23 1859)        ECG 12 Lead Documentation Only   Date/Time: 7/23/2025 8:24 PM  ECG reviewed by me, the ED Provider: yes    Patient location:  ED  Interpretation:     Interpretation: normal    Rate:     ECG rate  assessment: normal    Rhythm:     Rhythm: sinus rhythm    Ectopy:     Ectopy: none    QRS:     QRS axis:  Normal    QRS intervals:  Normal  Conduction:     Conduction: normal    ST segments:     ST segments:  Normal  T waves:     T waves: normal      GI:  No orders to display       Results from last 7 days   Lab Units 07/24/25 0436 07/23/25  1902   WBC Thousand/uL 8.43 9.95   HEMOGLOBIN g/dL 9.5* 10.6*   HEMATOCRIT % 31.2* 35.4*   PLATELETS Thousands/uL 244 277   TOTAL NEUT ABS Thousands/µL 4.97  --      Results from last 7 days   Lab Units 07/24/25 0436 07/23/25  1902   SODIUM mmol/L 139 138   POTASSIUM mmol/L 4.3 4.2   CHLORIDE mmol/L 108 105   CO2 mmol/L 23 25   ANION GAP mmol/L 8 8   BUN mg/dL 27* 34*   CREATININE mg/dL 0.89 1.09   CALCIUM mg/dL 8.3* 9.2     Results from last 7 days   Lab Units 07/24/25 0436 07/23/25  1902   AST U/L 17 13   ALT U/L 11 13   ALK PHOS U/L 78 105*   TOTAL PROTEIN g/dL 6.5 7.6   ALBUMIN g/dL 3.5 4.1   TOTAL BILIRUBIN mg/dL 0.51 0.36   BILIRUBIN DIRECT mg/dL  --  0.09     Results from last 7 days   Lab Units 07/23/25  1856   POC GLUCOSE mg/dl 111     Results from last 7 days   Lab Units 07/24/25 0436 07/23/25  1902   GLUCOSE RANDOM mg/dL 98 110     Results from last 7 days   Lab Units 07/23/25 2116 07/23/25  1902   HS TNI 0HR ng/L  --  7   HS TNI 2HR ng/L 11  --    HSTNI D2 ng/L 4  --      Results from last 7 days   Lab Units 07/23/25  1902   PROTIME seconds 14.2   INR  1.05   PTT seconds 32     Results from last 7 days   Lab Units 07/23/25 2116   CLARITY UA  Clear   COLOR UA  Yellow   SPEC GRAV UA  <1.005*   PH UA  6.0   GLUCOSE UA mg/dl Negative   KETONES UA mg/dl Negative   BLOOD UA  Negative   PROTEIN UA mg/dl 30 (1+)*   NITRITE UA  Negative   BILIRUBIN UA  Negative   UROBILINOGEN UA (BE) mg/dl <2.0   LEUKOCYTES UA  Negative   WBC UA /hpf 0-1   RBC UA /hpf None Seen   BACTERIA UA /hpf None Seen   EPITHELIAL CELLS WET PREP /hpf Occasional       Past Medical  History[1]  Present on Admission:   Stroke-like symptoms   Prediabetes   Hypercholesterolemia   Essential hypertension   Coronary artery disease involving native coronary artery of native heart with unstable angina pectoris (HCC)   COPD (chronic obstructive pulmonary disease) (HCC)   BPH (benign prostatic hyperplasia)      Admitting Diagnosis: Cerebrovascular disease [I67.9]  Gastritis [K29.70]  Altered mental status [R41.82]  Chest pain [R07.9]  Cystitis [N30.90]  Constipation [K59.00]  Amnesia [R41.3]  Numbness and tingling in left hand [R20.0, R20.2]  Stroke-like symptoms [R29.90]  Age/Sex: 80 y.o. adult    Network Utilization Review Department  ATTENTION: Please call with any questions or concerns to [phone] and carefully listen to the prompts so that you are directed to the right person. All voicemails are confidential.   For Discharge needs, contact Care Management DC Support Team at [phone] opt. 2  Send all requests for admission clinical reviews, approved or denied determinations and any other requests to dedicated fax number below belonging to the campus where the patient is receiving treatment. List of dedicated fax numbers for the Facilities:  FACILITY NAME UR FAX NUMBER   ADMISSION DENIALS (Administrative/Medical Necessity) [phone]   DISCHARGE SUPPORT TEAM (NETWORK) [phone]   PARENT CHILD HEALTH (Maternity/NICU/Pediatrics) [phone]   [facility] [phone]   [facility] [phone]   [facility] [phone]   [facility] [phone]   [facility] [phone]   [facility] [phone]   [facility] [phone]   General acute hospital [phone]   [facility] [phone]   [facility] [phone]   [facility]  [facility] [phone]   [facility] [phone]   [facility] [phone]   --           [1]   Past Medical History:  Diagnosis Date    Anemia     Chronic bilateral low back pain without sciatica 07/17/2020    Hypertension     Scoliosis     Stroke (HCC)     Stroke-like symptoms 11/22/2021    Unstable angina (HCC) 02/26/2024

## 2025-07-24 NOTE — PLAN OF CARE
Problem: PHYSICAL THERAPY ADULT  Goal: Performs mobility at highest level of function for planned discharge setting.  See evaluation for individualized goals.  Description: Treatment/Interventions: Functional transfer training, LE strengthening/ROM, Elevations, Therapeutic exercise, Endurance training, Patient/family training, Equipment eval/education, Gait training, Bed mobility, Spoke to nursing, OT  Equipment Recommended: Walker       See flowsheet documentation for full assessment, interventions and recommendations.  Note: Prognosis: Guarded  Problem List: Decreased strength, Decreased endurance, Impaired balance, Decreased mobility, Impaired judgement, Decreased safety awareness  Assessment: Patient is a 79y/o who presented with L handed numbness and confusion. MRI was negative. Patient has a history of CVA, COPD. Patient resides with spouse in a multi-level home with steps. He is independent at baseline. Current medical status includes fall risk, bed/chair alarm, poor safety awareness, poor insight, impulsivity, agitation, decreased strength, balance, endurance and mobility. Patient required min A for transfers and ambulation. Gait was extremely unsteady with multiple losses of balance. Patient refused to use an AD and became agitated at times. Patient is not receptive to education and has poor safety awareness. Patient is a high fall risk. Recommending level 2 resources. Moderate intensity. The patient's AM-PAC Basic Mobility Inpatient Short Form Raw Score is 16. A Raw score of less than or equal to 17 suggests the patient may benefit from discharge to post-acute rehabilitation services. Please also refer to the recommendation of the Physical Therapist for safe discharge planning.  Barriers to Discharge: Inaccessible home environment, Decreased caregiver support     Rehab Resource Intensity Level, PT: II (Moderate Resource Intensity)    See flowsheet documentation for full assessment.

## 2025-07-25 ENCOUNTER — TRANSITIONAL CARE MANAGEMENT (OUTPATIENT)
Dept: FAMILY MEDICINE CLINIC | Facility: HOSPITAL | Age: 80
End: 2025-07-25

## 2025-07-25 LAB
ATRIAL RATE: 67 BPM
P AXIS: 51 DEGREES
PR INTERVAL: 154 MS
QRS AXIS: 51 DEGREES
QRSD INTERVAL: 72 MS
QT INTERVAL: 390 MS
QTC INTERVAL: 412 MS
T WAVE AXIS: 65 DEGREES
VENTRICULAR RATE: 67 BPM

## 2025-07-25 PROCEDURE — 93010 ELECTROCARDIOGRAM REPORT: CPT | Performed by: INTERNAL MEDICINE

## 2025-07-25 NOTE — UTILIZATION REVIEW
NOTIFICATION OF ADMISSION DISCHARGE   This is a Notification of Discharge from [facility]. Please be advised that this patient has been discharge from our facility. Below you will find the admission and discharge date and time including the patient’s disposition.   UTILIZATION REVIEW CONTACT:  Utilization Review Assistants  Network Utilization Review Department  Phone: [phone] x carefully listen to the prompts. All voicemails are confidential.  Email: [email or web address]     ADMISSION INFORMATION  PRESENTATION DATE: 7/23/2025  6:50 PM  OBERVATION ADMISSION DATE: N/A  INPATIENT ADMISSION DATE: 7/23/25  9:06 PM   DISCHARGE DATE: 7/24/2025  1:59 PM   DISPOSITION:Home/Self Care    Network Utilization Review Department  ATTENTION: Please call with any questions or concerns to [phone] and carefully listen to the prompts so that you are directed to the right person. All voicemails are confidential.   For Discharge needs, contact Care Management DC Support Team at [phone] opt. 2  Send all requests for admission clinical reviews, approved or denied determinations and any other requests to dedicated fax number below belonging to the campus where the patient is receiving treatment. List of dedicated fax numbers for the Facilities:  FACILITY NAME UR FAX NUMBER   ADMISSION DENIALS (Administrative/Medical Necessity) [phone]   DISCHARGE SUPPORT TEAM ([facility]) [phone]   PARENT CHILD HEALTH (Maternity/NICU/Pediatrics) [phone]   [facility] [phone]   [facility] [phone]   [facility] [phone]   [facility] [phone]   [facility] [phone]   [facility] [phone]   [facility] [phone]   [facility] [phone]   [facility]  [facility] [phone]   [facility] [phone]   [facility] [phone]   [facility] [phone]         Doxepin Pregnancy And Lactation Text: This medication is Pregnancy Category C and it isn't known if it is safe during pregnancy. It is also excreted in breast milk and breast feeding isn't recommended.

## 2025-07-28 ENCOUNTER — TELEPHONE (OUTPATIENT)
Age: 80
End: 2025-07-28

## 2025-07-31 ENCOUNTER — OFFICE VISIT (OUTPATIENT)
Dept: FAMILY MEDICINE CLINIC | Facility: HOSPITAL | Age: 80
End: 2025-07-31
Payer: COMMERCIAL

## 2025-07-31 VITALS
HEIGHT: 68 IN | WEIGHT: 152 LBS | BODY MASS INDEX: 23.04 KG/M2 | OXYGEN SATURATION: 94 % | HEART RATE: 71 BPM | SYSTOLIC BLOOD PRESSURE: 92 MMHG | DIASTOLIC BLOOD PRESSURE: 48 MMHG

## 2025-07-31 DIAGNOSIS — Z76.89 ENCOUNTER FOR SUPPORT AND COORDINATION OF TRANSITION OF CARE: Primary | ICD-10-CM

## 2025-07-31 DIAGNOSIS — K21.9 GASTROESOPHAGEAL REFLUX DISEASE WITHOUT ESOPHAGITIS: ICD-10-CM

## 2025-07-31 DIAGNOSIS — M62.81 GENERALIZED MUSCLE WEAKNESS: ICD-10-CM

## 2025-07-31 DIAGNOSIS — G56.22 ULNAR NEUROPATHY AT ELBOW OF LEFT UPPER EXTREMITY: ICD-10-CM

## 2025-07-31 DIAGNOSIS — R41.3 IMPAIRED MEMORY: ICD-10-CM

## 2025-07-31 DIAGNOSIS — D64.9 SYMPTOMATIC ANEMIA: ICD-10-CM

## 2025-07-31 DIAGNOSIS — E78.00 HYPERCHOLESTEROLEMIA: ICD-10-CM

## 2025-07-31 DIAGNOSIS — I10 ESSENTIAL HYPERTENSION: ICD-10-CM

## 2025-07-31 DIAGNOSIS — I25.110 CORONARY ARTERY DISEASE INVOLVING NATIVE CORONARY ARTERY OF NATIVE HEART WITH UNSTABLE ANGINA PECTORIS (HCC): ICD-10-CM

## 2025-07-31 DIAGNOSIS — R29.90 STROKE-LIKE SYMPTOMS: ICD-10-CM

## 2025-07-31 DIAGNOSIS — Z86.73 HISTORY OF STROKE: ICD-10-CM

## 2025-07-31 PROCEDURE — 99496 TRANSJ CARE MGMT HIGH F2F 7D: CPT | Performed by: NURSE PRACTITIONER

## 2025-07-31 RX ORDER — FAMOTIDINE 20 MG/1
20 TABLET, FILM COATED ORAL DAILY
Qty: 90 TABLET | Refills: 3 | Status: SHIPPED | OUTPATIENT
Start: 2025-07-31

## 2025-08-13 ENCOUNTER — TELEPHONE (OUTPATIENT)
Age: 80
End: 2025-08-13

## 2025-08-18 ENCOUNTER — OFFICE VISIT (OUTPATIENT)
Dept: FAMILY MEDICINE CLINIC | Facility: HOSPITAL | Age: 80
End: 2025-08-18
Payer: COMMERCIAL

## 2025-08-18 VITALS
OXYGEN SATURATION: 97 % | BODY MASS INDEX: 23.04 KG/M2 | HEIGHT: 68 IN | SYSTOLIC BLOOD PRESSURE: 120 MMHG | WEIGHT: 152 LBS | DIASTOLIC BLOOD PRESSURE: 70 MMHG | HEART RATE: 76 BPM

## 2025-08-18 DIAGNOSIS — Z00.00 MEDICARE ANNUAL WELLNESS VISIT, SUBSEQUENT: Primary | ICD-10-CM

## 2025-08-18 DIAGNOSIS — D64.9 SYMPTOMATIC ANEMIA: ICD-10-CM

## 2025-08-18 DIAGNOSIS — I10 ESSENTIAL HYPERTENSION: ICD-10-CM

## 2025-08-18 DIAGNOSIS — Z12.83 SKIN CANCER SCREENING: ICD-10-CM

## 2025-08-18 DIAGNOSIS — E78.00 HYPERCHOLESTEROLEMIA: ICD-10-CM

## 2025-08-18 PROCEDURE — G0439 PPPS, SUBSEQ VISIT: HCPCS | Performed by: NURSE PRACTITIONER

## 2025-08-21 ENCOUNTER — EVALUATION (OUTPATIENT)
Dept: SPEECH THERAPY | Facility: CLINIC | Age: 80
End: 2025-08-21
Attending: NURSE PRACTITIONER
Payer: COMMERCIAL

## 2025-08-21 DIAGNOSIS — I69.922 DYSARTHRIA AS LATE EFFECT OF CEREBROVASCULAR DISEASE: ICD-10-CM

## 2025-08-21 DIAGNOSIS — Z86.73 HISTORY OF STROKE: ICD-10-CM

## 2025-08-21 DIAGNOSIS — R41.3 IMPAIRED MEMORY: ICD-10-CM

## 2025-08-21 DIAGNOSIS — R41.841 COGNITIVE COMMUNICATION DEFICIT: Primary | ICD-10-CM

## 2025-08-21 PROCEDURE — 96125 COGNITIVE TEST BY HC PRO: CPT

## (undated) DEVICE — GLIDESHEATH BASIC HYDROPHILIC COATED INTRODUCER SHEATH: Brand: GLIDESHEATH

## (undated) DEVICE — Device: Brand: ASAHI SILVERWAY

## (undated) DEVICE — HI-TORQUE PILOT 50 GUIDE WIRE .014 STRAIGHT TIP 3.0 CM X 300 CM: Brand: HI-TORQUE PILOT

## (undated) DEVICE — CATH GUIDE LAUNCHER 6FR AR1

## (undated) DEVICE — DGW .035 FC J3MM 260CM TEF: Brand: EMERALD

## (undated) DEVICE — RADIFOCUS OPTITORQUE ANGIOGRAPHIC CATHETER: Brand: OPTITORQUE

## (undated) DEVICE — CATH GUIDE LAUNCHER 5FR EBU 3.75

## (undated) DEVICE — TR BAND RADIAL ARTERY COMPRESSION DEVICE: Brand: TR BAND